# Patient Record
Sex: MALE | Race: WHITE | NOT HISPANIC OR LATINO | Employment: UNEMPLOYED | URBAN - METROPOLITAN AREA
[De-identification: names, ages, dates, MRNs, and addresses within clinical notes are randomized per-mention and may not be internally consistent; named-entity substitution may affect disease eponyms.]

---

## 2020-05-29 ENCOUNTER — HOSPITAL ENCOUNTER (EMERGENCY)
Facility: HOSPITAL | Age: 35
Discharge: LEFT BEFORE DISCHARGE | End: 2020-05-29
Attending: EMERGENCY MEDICINE | Admitting: EMERGENCY MEDICINE

## 2020-05-29 VITALS
OXYGEN SATURATION: 99 % | HEART RATE: 112 BPM | HEIGHT: 71 IN | BODY MASS INDEX: 28.28 KG/M2 | TEMPERATURE: 98.1 F | SYSTOLIC BLOOD PRESSURE: 150 MMHG | WEIGHT: 202 LBS | RESPIRATION RATE: 18 BRPM | DIASTOLIC BLOOD PRESSURE: 91 MMHG

## 2020-05-29 DIAGNOSIS — T40.601A OPIATE OVERDOSE, ACCIDENTAL OR UNINTENTIONAL, INITIAL ENCOUNTER (HCC): Primary | ICD-10-CM

## 2020-05-29 PROCEDURE — 99283 EMERGENCY DEPT VISIT LOW MDM: CPT

## 2020-05-29 RX ORDER — LOSARTAN POTASSIUM 25 MG/1
25 TABLET ORAL DAILY
COMMUNITY
End: 2021-02-02

## 2020-05-29 RX ORDER — PROPRANOLOL HYDROCHLORIDE 20 MG/1
20 TABLET ORAL 3 TIMES DAILY
COMMUNITY
End: 2021-02-02

## 2020-05-29 RX ORDER — ALPRAZOLAM 0.5 MG/1
0.5 TABLET ORAL 2 TIMES DAILY PRN
COMMUNITY
End: 2021-02-02

## 2020-05-29 RX ORDER — SODIUM CHLORIDE 0.9 % (FLUSH) 0.9 %
10 SYRINGE (ML) INJECTION AS NEEDED
Status: DISCONTINUED | OUTPATIENT
Start: 2020-05-29 | End: 2020-05-29 | Stop reason: HOSPADM

## 2021-02-02 ENCOUNTER — APPOINTMENT (OUTPATIENT)
Dept: GENERAL RADIOLOGY | Facility: HOSPITAL | Age: 36
End: 2021-02-02

## 2021-02-02 ENCOUNTER — HOSPITAL ENCOUNTER (INPATIENT)
Facility: HOSPITAL | Age: 36
LOS: 21 days | Discharge: NURSING, REHAB OR LONG-TERM CARE | End: 2021-02-23
Attending: EMERGENCY MEDICINE | Admitting: STUDENT IN AN ORGANIZED HEALTH CARE EDUCATION/TRAINING PROGRAM

## 2021-02-02 DIAGNOSIS — J18.9 COMMUNITY ACQUIRED PNEUMONIA, UNSPECIFIED LATERALITY: ICD-10-CM

## 2021-02-02 DIAGNOSIS — J04.10 TRACHEITIS: Primary | ICD-10-CM

## 2021-02-02 DIAGNOSIS — D64.9 ANEMIA, UNSPECIFIED TYPE: ICD-10-CM

## 2021-02-02 PROBLEM — J96.21 ACUTE ON CHRONIC RESPIRATORY FAILURE WITH HYPOXEMIA (HCC): Status: ACTIVE | Noted: 2021-02-02

## 2021-02-02 PROBLEM — D72.829 LEUKOCYTOSIS: Status: ACTIVE | Noted: 2021-02-02

## 2021-02-02 PROBLEM — A41.9 SEPSIS, UNSPECIFIED ORGANISM (HCC): Status: ACTIVE | Noted: 2021-02-02

## 2021-02-02 PROBLEM — I10 ESSENTIAL HYPERTENSION: Status: ACTIVE | Noted: 2021-02-02

## 2021-02-02 PROBLEM — G82.50 QUADRIPLEGIA (HCC): Status: ACTIVE | Noted: 2021-02-02

## 2021-02-02 LAB
ABO GROUP BLD: NORMAL
ALBUMIN SERPL-MCNC: 3.7 G/DL (ref 3.5–5.2)
ALBUMIN/GLOB SERPL: 1.1 G/DL
ALP SERPL-CCNC: 144 U/L (ref 39–117)
ALT SERPL W P-5'-P-CCNC: 16 U/L (ref 1–41)
ANION GAP SERPL CALCULATED.3IONS-SCNC: 11.9 MMOL/L (ref 5–15)
ARTERIAL PATENCY WRIST A: POSITIVE
AST SERPL-CCNC: 12 U/L (ref 1–40)
ATMOSPHERIC PRESS: 755.8 MMHG
B PARAPERT DNA SPEC QL NAA+PROBE: NOT DETECTED
B PERT DNA SPEC QL NAA+PROBE: NOT DETECTED
BASE EXCESS BLDA CALC-SCNC: -2 MMOL/L (ref 0–2)
BASOPHILS # BLD AUTO: 0.02 10*3/MM3 (ref 0–0.2)
BASOPHILS NFR BLD AUTO: 0.2 % (ref 0–1.5)
BDY SITE: ABNORMAL
BILIRUB SERPL-MCNC: 3.2 MG/DL (ref 0–1.2)
BLD GP AB SCN SERPL QL: NEGATIVE
BUN SERPL-MCNC: 14 MG/DL (ref 6–20)
BUN/CREAT SERPL: 45.2 (ref 7–25)
C PNEUM DNA NPH QL NAA+NON-PROBE: NOT DETECTED
CALCIUM SPEC-SCNC: 9.5 MG/DL (ref 8.6–10.5)
CHLORIDE SERPL-SCNC: 97 MMOL/L (ref 98–107)
CO2 SERPL-SCNC: 26.1 MMOL/L (ref 22–29)
CREAT SERPL-MCNC: 0.31 MG/DL (ref 0.76–1.27)
D-LACTATE SERPL-SCNC: 0.7 MMOL/L (ref 0.5–2)
DEPRECATED RDW RBC AUTO: 52.1 FL (ref 37–54)
EOSINOPHIL # BLD AUTO: 0.03 10*3/MM3 (ref 0–0.4)
EOSINOPHIL NFR BLD AUTO: 0.2 % (ref 0.3–6.2)
ERYTHROCYTE [DISTWIDTH] IN BLOOD BY AUTOMATED COUNT: 17.5 % (ref 12.3–15.4)
EXPIRATION DATE: NORMAL
FECAL OCCULT BLOOD SCREEN, POC: NEGATIVE
FERRITIN SERPL-MCNC: 615 NG/ML (ref 30–400)
FLUAV SUBTYP SPEC NAA+PROBE: NOT DETECTED
FLUBV RNA ISLT QL NAA+PROBE: NOT DETECTED
GFR SERPL CREATININE-BSD FRML MDRD: >150 ML/MIN/1.73
GLOBULIN UR ELPH-MCNC: 3.3 GM/DL
GLUCOSE SERPL-MCNC: 93 MG/DL (ref 65–99)
HADV DNA SPEC NAA+PROBE: NOT DETECTED
HAPTOGLOB SERPL-MCNC: 11 MG/DL (ref 30–200)
HCO3 BLDA-SCNC: 23.6 MMOL/L (ref 22–28)
HCOV 229E RNA SPEC QL NAA+PROBE: NOT DETECTED
HCOV HKU1 RNA SPEC QL NAA+PROBE: NOT DETECTED
HCOV NL63 RNA SPEC QL NAA+PROBE: NOT DETECTED
HCOV OC43 RNA SPEC QL NAA+PROBE: NOT DETECTED
HCT VFR BLD AUTO: 21.1 % (ref 37.5–51)
HGB BLD-MCNC: 6.8 G/DL (ref 13–17.7)
HGB RETIC QN AUTO: 30.4 PG (ref 29.8–36.1)
HMPV RNA NPH QL NAA+NON-PROBE: NOT DETECTED
HPIV1 RNA SPEC QL NAA+PROBE: NOT DETECTED
HPIV2 RNA SPEC QL NAA+PROBE: NOT DETECTED
HPIV3 RNA NPH QL NAA+PROBE: NOT DETECTED
HPIV4 P GENE NPH QL NAA+PROBE: NOT DETECTED
IMM GRANULOCYTES # BLD AUTO: 0.08 10*3/MM3 (ref 0–0.05)
IMM GRANULOCYTES NFR BLD AUTO: 0.7 % (ref 0–0.5)
IMM RETICS NFR: 28.3 % (ref 3–15.8)
INHALED O2 CONCENTRATION: 50 %
IRON 24H UR-MRATE: 36 MCG/DL (ref 59–158)
IRON SATN MFR SERPL: 16 % (ref 20–50)
LDH SERPL-CCNC: 193 U/L (ref 135–225)
LYMPHOCYTES # BLD AUTO: 0.99 10*3/MM3 (ref 0.7–3.1)
LYMPHOCYTES NFR BLD AUTO: 8.1 % (ref 19.6–45.3)
Lab: 154
M PNEUMO IGG SER IA-ACNC: NOT DETECTED
MCH RBC QN AUTO: 26.6 PG (ref 26.6–33)
MCHC RBC AUTO-ENTMCNC: 32.2 G/DL (ref 31.5–35.7)
MCV RBC AUTO: 82.4 FL (ref 79–97)
MODALITY: ABNORMAL
MONOCYTES # BLD AUTO: 0.56 10*3/MM3 (ref 0.1–0.9)
MONOCYTES NFR BLD AUTO: 4.6 % (ref 5–12)
MRSA DNA SPEC QL NAA+PROBE: ABNORMAL
NEGATIVE CONTROL: NEGATIVE
NEUTROPHILS NFR BLD AUTO: 10.48 10*3/MM3 (ref 1.7–7)
NEUTROPHILS NFR BLD AUTO: 86.2 % (ref 42.7–76)
NRBC BLD AUTO-RTO: 0.1 /100 WBC (ref 0–0.2)
O2 A-A PPRESDIFF RESPIRATORY: 0.2 MMHG
PCO2 BLDA: 43.3 MM HG (ref 35–45)
PH BLDA: 7.34 PH UNITS (ref 7.35–7.45)
PLATELET # BLD AUTO: 366 10*3/MM3 (ref 140–450)
PMV BLD AUTO: 9 FL (ref 6–12)
PO2 BLDA: 76.4 MM HG (ref 80–100)
POSITIVE CONTROL: POSITIVE
POTASSIUM SERPL-SCNC: 3.6 MMOL/L (ref 3.5–5.2)
PROCALCITONIN SERPL-MCNC: 0.12 NG/ML (ref 0–0.25)
PROT SERPL-MCNC: 7 G/DL (ref 6–8.5)
QT INTERVAL: 378 MS
RBC # BLD AUTO: 2.56 10*6/MM3 (ref 4.14–5.8)
RETICS # AUTO: 0.2 10*6/MM3 (ref 0.02–0.13)
RETICS/RBC NFR AUTO: 6.46 % (ref 0.7–1.9)
RH BLD: POSITIVE
RHINOVIRUS RNA SPEC NAA+PROBE: NOT DETECTED
RSV RNA NPH QL NAA+NON-PROBE: NOT DETECTED
SAO2 % BLDCOA: 94.3 % (ref 92–99)
SARS-COV-2 RNA NPH QL NAA+NON-PROBE: NOT DETECTED
SODIUM SERPL-SCNC: 135 MMOL/L (ref 136–145)
T&S EXPIRATION DATE: NORMAL
TIBC SERPL-MCNC: 225 MCG/DL (ref 298–536)
TOTAL RATE: 28 BREATHS/MINUTE
TRANSFERRIN SERPL-MCNC: 151 MG/DL (ref 200–360)
TROPONIN T SERPL-MCNC: <0.01 NG/ML (ref 0–0.03)
TROPONIN T SERPL-MCNC: <0.01 NG/ML (ref 0–0.03)
WBC # BLD AUTO: 12.16 10*3/MM3 (ref 3.4–10.8)

## 2021-02-02 PROCEDURE — 87147 CULTURE TYPE IMMUNOLOGIC: CPT | Performed by: INTERNAL MEDICINE

## 2021-02-02 PROCEDURE — 82270 OCCULT BLOOD FECES: CPT | Performed by: PHYSICIAN ASSISTANT

## 2021-02-02 PROCEDURE — 87040 BLOOD CULTURE FOR BACTERIA: CPT | Performed by: EMERGENCY MEDICINE

## 2021-02-02 PROCEDURE — 84484 ASSAY OF TROPONIN QUANT: CPT | Performed by: EMERGENCY MEDICINE

## 2021-02-02 PROCEDURE — 87205 SMEAR GRAM STAIN: CPT | Performed by: INTERNAL MEDICINE

## 2021-02-02 PROCEDURE — 85046 RETICYTE/HGB CONCENTRATE: CPT | Performed by: INTERNAL MEDICINE

## 2021-02-02 PROCEDURE — 82728 ASSAY OF FERRITIN: CPT | Performed by: INTERNAL MEDICINE

## 2021-02-02 PROCEDURE — 94640 AIRWAY INHALATION TREATMENT: CPT

## 2021-02-02 PROCEDURE — 25010000002 CEFEPIME PER 500 MG: Performed by: EMERGENCY MEDICINE

## 2021-02-02 PROCEDURE — 93010 ELECTROCARDIOGRAM REPORT: CPT | Performed by: INTERNAL MEDICINE

## 2021-02-02 PROCEDURE — 83605 ASSAY OF LACTIC ACID: CPT | Performed by: EMERGENCY MEDICINE

## 2021-02-02 PROCEDURE — 87150 DNA/RNA AMPLIFIED PROBE: CPT | Performed by: EMERGENCY MEDICINE

## 2021-02-02 PROCEDURE — 36415 COLL VENOUS BLD VENIPUNCTURE: CPT | Performed by: INTERNAL MEDICINE

## 2021-02-02 PROCEDURE — 86923 COMPATIBILITY TEST ELECTRIC: CPT

## 2021-02-02 PROCEDURE — 36415 COLL VENOUS BLD VENIPUNCTURE: CPT

## 2021-02-02 PROCEDURE — 0202U NFCT DS 22 TRGT SARS-COV-2: CPT | Performed by: EMERGENCY MEDICINE

## 2021-02-02 PROCEDURE — 82803 BLOOD GASES ANY COMBINATION: CPT

## 2021-02-02 PROCEDURE — 84466 ASSAY OF TRANSFERRIN: CPT | Performed by: INTERNAL MEDICINE

## 2021-02-02 PROCEDURE — 71045 X-RAY EXAM CHEST 1 VIEW: CPT

## 2021-02-02 PROCEDURE — P9016 RBC LEUKOCYTES REDUCED: HCPCS

## 2021-02-02 PROCEDURE — 87186 SC STD MICRODIL/AGAR DIL: CPT | Performed by: INTERNAL MEDICINE

## 2021-02-02 PROCEDURE — 25010000002 MORPHINE PER 10 MG: Performed by: HOSPITALIST

## 2021-02-02 PROCEDURE — 25010000002 VANCOMYCIN 10 G RECONSTITUTED SOLUTION: Performed by: HOSPITALIST

## 2021-02-02 PROCEDURE — 86850 RBC ANTIBODY SCREEN: CPT | Performed by: PHYSICIAN ASSISTANT

## 2021-02-02 PROCEDURE — 36600 WITHDRAWAL OF ARTERIAL BLOOD: CPT

## 2021-02-02 PROCEDURE — 25010000002 AZITHROMYCIN PER 500 MG: Performed by: EMERGENCY MEDICINE

## 2021-02-02 PROCEDURE — 87641 MR-STAPH DNA AMP PROBE: CPT | Performed by: HOSPITALIST

## 2021-02-02 PROCEDURE — 99285 EMERGENCY DEPT VISIT HI MDM: CPT

## 2021-02-02 PROCEDURE — 86900 BLOOD TYPING SEROLOGIC ABO: CPT

## 2021-02-02 PROCEDURE — 25010000002 PIPERACILLIN SOD-TAZOBACTAM PER 1 G: Performed by: HOSPITALIST

## 2021-02-02 PROCEDURE — 87070 CULTURE OTHR SPECIMN AEROBIC: CPT | Performed by: INTERNAL MEDICINE

## 2021-02-02 PROCEDURE — 25010000002 MORPHINE PER 10 MG: Performed by: EMERGENCY MEDICINE

## 2021-02-02 PROCEDURE — 25010000002 ONDANSETRON PER 1 MG: Performed by: EMERGENCY MEDICINE

## 2021-02-02 PROCEDURE — 80053 COMPREHEN METABOLIC PANEL: CPT | Performed by: EMERGENCY MEDICINE

## 2021-02-02 PROCEDURE — 99222 1ST HOSP IP/OBS MODERATE 55: CPT | Performed by: INTERNAL MEDICINE

## 2021-02-02 PROCEDURE — 86900 BLOOD TYPING SEROLOGIC ABO: CPT | Performed by: PHYSICIAN ASSISTANT

## 2021-02-02 PROCEDURE — 86901 BLOOD TYPING SEROLOGIC RH(D): CPT | Performed by: PHYSICIAN ASSISTANT

## 2021-02-02 PROCEDURE — 84145 PROCALCITONIN (PCT): CPT | Performed by: EMERGENCY MEDICINE

## 2021-02-02 PROCEDURE — 93005 ELECTROCARDIOGRAM TRACING: CPT | Performed by: EMERGENCY MEDICINE

## 2021-02-02 PROCEDURE — 87147 CULTURE TYPE IMMUNOLOGIC: CPT | Performed by: EMERGENCY MEDICINE

## 2021-02-02 PROCEDURE — 83615 LACTATE (LD) (LDH) ENZYME: CPT | Performed by: INTERNAL MEDICINE

## 2021-02-02 PROCEDURE — 85025 COMPLETE CBC W/AUTO DIFF WBC: CPT | Performed by: EMERGENCY MEDICINE

## 2021-02-02 PROCEDURE — 86901 BLOOD TYPING SEROLOGIC RH(D): CPT

## 2021-02-02 PROCEDURE — 36430 TRANSFUSION BLD/BLD COMPNT: CPT

## 2021-02-02 PROCEDURE — 83540 ASSAY OF IRON: CPT | Performed by: INTERNAL MEDICINE

## 2021-02-02 PROCEDURE — 94799 UNLISTED PULMONARY SVC/PX: CPT

## 2021-02-02 PROCEDURE — 83010 ASSAY OF HAPTOGLOBIN QUANT: CPT | Performed by: INTERNAL MEDICINE

## 2021-02-02 RX ORDER — PROPRANOLOL HYDROCHLORIDE 20 MG/1
20 TABLET ORAL 3 TIMES DAILY
Status: DISCONTINUED | OUTPATIENT
Start: 2021-02-02 | End: 2021-02-23 | Stop reason: HOSPADM

## 2021-02-02 RX ORDER — ONDANSETRON 2 MG/ML
4 INJECTION INTRAMUSCULAR; INTRAVENOUS ONCE
Status: COMPLETED | OUTPATIENT
Start: 2021-02-02 | End: 2021-02-02

## 2021-02-02 RX ORDER — HYDROXYZINE HYDROCHLORIDE 25 MG/1
25 TABLET, FILM COATED ORAL DAILY
COMMUNITY

## 2021-02-02 RX ORDER — SODIUM CHLORIDE 0.9 % (FLUSH) 0.9 %
10 SYRINGE (ML) INJECTION EVERY 12 HOURS SCHEDULED
Status: DISCONTINUED | OUTPATIENT
Start: 2021-02-02 | End: 2021-02-02

## 2021-02-02 RX ORDER — BISACODYL 10 MG
10 SUPPOSITORY, RECTAL RECTAL DAILY PRN
COMMUNITY
End: 2021-02-27 | Stop reason: SDUPTHER

## 2021-02-02 RX ORDER — ZINC GLUCONATE 50 MG
50 TABLET ORAL DAILY
COMMUNITY

## 2021-02-02 RX ORDER — LANOLIN ALCOHOL/MO/W.PET/CERES
3 CREAM (GRAM) TOPICAL NIGHTLY
COMMUNITY
End: 2021-02-23 | Stop reason: HOSPADM

## 2021-02-02 RX ORDER — MORPHINE SULFATE 2 MG/ML
4 INJECTION, SOLUTION INTRAMUSCULAR; INTRAVENOUS EVERY 4 HOURS PRN
Status: DISCONTINUED | OUTPATIENT
Start: 2021-02-02 | End: 2021-02-06

## 2021-02-02 RX ORDER — CIPROFLOXACIN 500 MG/1
500 TABLET, FILM COATED ORAL 2 TIMES DAILY
COMMUNITY
Start: 2021-01-30 | End: 2021-02-23 | Stop reason: HOSPADM

## 2021-02-02 RX ORDER — ACETAMINOPHEN 325 MG/1
650 TABLET ORAL EVERY 4 HOURS PRN
Status: DISCONTINUED | OUTPATIENT
Start: 2021-02-02 | End: 2021-02-04 | Stop reason: SDUPTHER

## 2021-02-02 RX ORDER — OXYCODONE HYDROCHLORIDE 5 MG/1
5 CAPSULE ORAL EVERY 6 HOURS PRN
COMMUNITY
End: 2021-02-23 | Stop reason: HOSPADM

## 2021-02-02 RX ORDER — SODIUM CHLORIDE 0.9 % (FLUSH) 0.9 %
10 SYRINGE (ML) INJECTION AS NEEDED
Status: DISCONTINUED | OUTPATIENT
Start: 2021-02-02 | End: 2021-02-02

## 2021-02-02 RX ORDER — LOSARTAN POTASSIUM 25 MG/1
25 TABLET ORAL DAILY
Status: DISCONTINUED | OUTPATIENT
Start: 2021-02-02 | End: 2021-02-03

## 2021-02-02 RX ORDER — ACETAMINOPHEN 650 MG/1
650 SUPPOSITORY RECTAL EVERY 4 HOURS PRN
COMMUNITY
End: 2021-03-16 | Stop reason: HOSPADM

## 2021-02-02 RX ORDER — MIDODRINE HYDROCHLORIDE 2.5 MG/1
7.5 TABLET ORAL 3 TIMES DAILY
COMMUNITY
End: 2021-03-16 | Stop reason: HOSPADM

## 2021-02-02 RX ORDER — FENTANYL 25 UG/H
1 PATCH TRANSDERMAL
Status: ON HOLD | COMMUNITY
End: 2021-02-22 | Stop reason: SDUPTHER

## 2021-02-02 RX ORDER — FLUDROCORTISONE ACETATE 0.1 MG/1
0.2 TABLET ORAL DAILY
COMMUNITY
End: 2021-02-23 | Stop reason: HOSPADM

## 2021-02-02 RX ORDER — FERROUS SULFATE 325(65) MG
325 TABLET ORAL
COMMUNITY
End: 2021-03-16 | Stop reason: HOSPADM

## 2021-02-02 RX ORDER — MORPHINE SULFATE 2 MG/ML
2 INJECTION, SOLUTION INTRAMUSCULAR; INTRAVENOUS ONCE
Status: COMPLETED | OUTPATIENT
Start: 2021-02-02 | End: 2021-02-02

## 2021-02-02 RX ORDER — BISACODYL 10 MG
10 SUPPOSITORY, RECTAL RECTAL DAILY PRN
Status: DISCONTINUED | OUTPATIENT
Start: 2021-02-02 | End: 2021-02-23 | Stop reason: HOSPADM

## 2021-02-02 RX ORDER — ACETAMINOPHEN 650 MG
1 TABLET, EXTENDED RELEASE ORAL DAILY
COMMUNITY
End: 2021-03-16 | Stop reason: HOSPADM

## 2021-02-02 RX ORDER — ACETAMINOPHEN 325 MG/1
650 TABLET ORAL EVERY 6 HOURS PRN
COMMUNITY
End: 2021-02-27

## 2021-02-02 RX ORDER — SENNA PLUS 8.6 MG/1
1 TABLET ORAL 2 TIMES DAILY
COMMUNITY
End: 2021-03-16 | Stop reason: HOSPADM

## 2021-02-02 RX ORDER — ONDANSETRON 2 MG/ML
4 INJECTION INTRAMUSCULAR; INTRAVENOUS EVERY 6 HOURS PRN
Status: DISCONTINUED | OUTPATIENT
Start: 2021-02-02 | End: 2021-02-23 | Stop reason: HOSPADM

## 2021-02-02 RX ORDER — DIPHENHYDRAMINE HCL/ZINC ACET 2 %-0.1 %
1 AEROSOL, SPRAY (GRAM) TOPICAL DAILY
Status: ON HOLD | COMMUNITY
Start: 2021-01-30 | End: 2021-02-22

## 2021-02-02 RX ORDER — MORPHINE SULFATE 2 MG/ML
4 INJECTION, SOLUTION INTRAMUSCULAR; INTRAVENOUS ONCE
Status: COMPLETED | OUTPATIENT
Start: 2021-02-02 | End: 2021-02-02

## 2021-02-02 RX ORDER — GLYCOPYRROLATE 0.2 MG/ML
0.4 INJECTION INTRAMUSCULAR; INTRAVENOUS ONCE
Status: COMPLETED | OUTPATIENT
Start: 2021-02-02 | End: 2021-02-02

## 2021-02-02 RX ORDER — IPRATROPIUM BROMIDE AND ALBUTEROL SULFATE 2.5; .5 MG/3ML; MG/3ML
3 SOLUTION RESPIRATORY (INHALATION)
Status: DISCONTINUED | OUTPATIENT
Start: 2021-02-02 | End: 2021-02-04

## 2021-02-02 RX ORDER — DOXYCYCLINE HYCLATE 100 MG
100 TABLET ORAL 2 TIMES DAILY
COMMUNITY
Start: 2021-02-02 | End: 2021-02-23 | Stop reason: HOSPADM

## 2021-02-02 RX ORDER — CLOPIDOGREL BISULFATE 75 MG/1
75 TABLET ORAL DAILY
COMMUNITY
End: 2021-02-23 | Stop reason: HOSPADM

## 2021-02-02 RX ORDER — SERTRALINE HYDROCHLORIDE 100 MG/1
100 TABLET, FILM COATED ORAL DAILY
COMMUNITY
End: 2021-03-16 | Stop reason: HOSPADM

## 2021-02-02 RX ORDER — MULTIPLE VITAMINS W/ MINERALS TAB 9MG-400MCG
1 TAB ORAL DAILY
COMMUNITY

## 2021-02-02 RX ORDER — ECHINACEA PURPUREA EXTRACT 125 MG
1 TABLET ORAL
COMMUNITY
End: 2021-03-16 | Stop reason: HOSPADM

## 2021-02-02 RX ORDER — ACETAMINOPHEN 160 MG/5ML
650 SOLUTION ORAL EVERY 4 HOURS PRN
Status: DISCONTINUED | OUTPATIENT
Start: 2021-02-02 | End: 2021-02-22

## 2021-02-02 RX ORDER — GABAPENTIN 800 MG/1
800 TABLET ORAL EVERY 6 HOURS
COMMUNITY
End: 2021-02-23 | Stop reason: HOSPADM

## 2021-02-02 RX ORDER — ACETAMINOPHEN 325 MG/1
650 TABLET ORAL EVERY 4 HOURS PRN
Status: DISCONTINUED | OUTPATIENT
Start: 2021-02-02 | End: 2021-02-22

## 2021-02-02 RX ORDER — ACETAMINOPHEN 650 MG/1
650 SUPPOSITORY RECTAL EVERY 4 HOURS PRN
Status: DISCONTINUED | OUTPATIENT
Start: 2021-02-02 | End: 2021-02-22

## 2021-02-02 RX ORDER — ASPIRIN 81 MG/1
81 TABLET ORAL DAILY
COMMUNITY

## 2021-02-02 RX ORDER — ASCORBIC ACID 500 MG
1000 TABLET ORAL DAILY
COMMUNITY

## 2021-02-02 RX ORDER — SODIUM CHLORIDE 9 MG/ML
75 INJECTION, SOLUTION INTRAVENOUS CONTINUOUS
Status: DISCONTINUED | OUTPATIENT
Start: 2021-02-02 | End: 2021-02-10

## 2021-02-02 RX ORDER — IPRATROPIUM BROMIDE AND ALBUTEROL SULFATE 2.5; .5 MG/3ML; MG/3ML
3 SOLUTION RESPIRATORY (INHALATION) EVERY 4 HOURS PRN
COMMUNITY
End: 2021-03-16 | Stop reason: HOSPADM

## 2021-02-02 RX ORDER — CYCLOBENZAPRINE HCL 10 MG
10 TABLET ORAL DAILY
COMMUNITY
End: 2021-02-23 | Stop reason: HOSPADM

## 2021-02-02 RX ORDER — ONDANSETRON 4 MG/1
4 TABLET, FILM COATED ORAL EVERY 6 HOURS PRN
Status: DISCONTINUED | OUTPATIENT
Start: 2021-02-02 | End: 2021-02-22

## 2021-02-02 RX ORDER — DEXTROMETHORPHAN HYDROBROMIDE AND GUAIFENESIN 20; 400 MG/20ML; MG/20ML
20 SOLUTION ORAL EVERY 4 HOURS PRN
COMMUNITY
Start: 2021-02-01 | End: 2021-02-23 | Stop reason: HOSPADM

## 2021-02-02 RX ORDER — ONDANSETRON 4 MG/1
4 TABLET, FILM COATED ORAL EVERY 6 HOURS PRN
COMMUNITY
End: 2021-03-16 | Stop reason: HOSPADM

## 2021-02-02 RX ORDER — ALPRAZOLAM 1 MG/1
1 TABLET ORAL EVERY 8 HOURS PRN
COMMUNITY
End: 2021-02-23 | Stop reason: HOSPADM

## 2021-02-02 RX ORDER — SODIUM CHLORIDE FOR INHALATION 7 %
4 VIAL, NEBULIZER (ML) INHALATION
Status: DISCONTINUED | OUTPATIENT
Start: 2021-02-02 | End: 2021-02-05

## 2021-02-02 RX ADMIN — SODIUM CHLORIDE 1000 ML: 9 INJECTION, SOLUTION INTRAVENOUS at 06:59

## 2021-02-02 RX ADMIN — PROPRANOLOL HYDROCHLORIDE 20 MG: 20 TABLET ORAL at 17:00

## 2021-02-02 RX ADMIN — PROPRANOLOL HYDROCHLORIDE 20 MG: 20 TABLET ORAL at 22:43

## 2021-02-02 RX ADMIN — VANCOMYCIN HYDROCHLORIDE 1500 MG: 10 INJECTION, POWDER, LYOPHILIZED, FOR SOLUTION INTRAVENOUS at 14:08

## 2021-02-02 RX ADMIN — GLYCOPYRROLATE 0.4 MG: 0.2 INJECTION, SOLUTION INTRAMUSCULAR; INTRAVENOUS at 13:09

## 2021-02-02 RX ADMIN — MORPHINE SULFATE 4 MG: 2 INJECTION, SOLUTION INTRAMUSCULAR; INTRAVENOUS at 21:14

## 2021-02-02 RX ADMIN — MORPHINE SULFATE 4 MG: 2 INJECTION, SOLUTION INTRAMUSCULAR; INTRAVENOUS at 13:09

## 2021-02-02 RX ADMIN — AZITHROMYCIN 500 MG: 500 INJECTION, POWDER, LYOPHILIZED, FOR SOLUTION INTRAVENOUS at 07:39

## 2021-02-02 RX ADMIN — LOSARTAN POTASSIUM 25 MG: 25 TABLET, FILM COATED ORAL at 17:00

## 2021-02-02 RX ADMIN — IPRATROPIUM BROMIDE AND ALBUTEROL SULFATE 3 ML: 2.5; .5 SOLUTION RESPIRATORY (INHALATION) at 15:44

## 2021-02-02 RX ADMIN — SODIUM CHLORIDE 125 ML/HR: 9 INJECTION, SOLUTION INTRAVENOUS at 16:41

## 2021-02-02 RX ADMIN — SODIUM CHLORIDE 1000 ML: 9 INJECTION, SOLUTION INTRAVENOUS at 08:19

## 2021-02-02 RX ADMIN — MORPHINE SULFATE 2 MG: 2 INJECTION, SOLUTION INTRAMUSCULAR; INTRAVENOUS at 06:40

## 2021-02-02 RX ADMIN — MORPHINE SULFATE 4 MG: 2 INJECTION, SOLUTION INTRAMUSCULAR; INTRAVENOUS at 07:03

## 2021-02-02 RX ADMIN — SODIUM CHLORIDE 125 ML/HR: 9 INJECTION, SOLUTION INTRAVENOUS at 22:41

## 2021-02-02 RX ADMIN — MORPHINE SULFATE 4 MG: 2 INJECTION, SOLUTION INTRAMUSCULAR; INTRAVENOUS at 18:22

## 2021-02-02 RX ADMIN — TAZOBACTAM SODIUM AND PIPERACILLIN SODIUM 4.5 G: 500; 4 INJECTION, SOLUTION INTRAVENOUS at 16:41

## 2021-02-02 RX ADMIN — CEFEPIME 2 G: 2 INJECTION, POWDER, FOR SOLUTION INTRAVENOUS at 06:58

## 2021-02-02 RX ADMIN — ONDANSETRON 4 MG: 2 INJECTION INTRAMUSCULAR; INTRAVENOUS at 06:40

## 2021-02-02 RX ADMIN — Medication 4 ML: at 15:53

## 2021-02-02 NOTE — CONSULTS
"[facility]    Reason for consult: tracheostomy, tracheitis, respiratory failure    HPI:  Mr. Carvajal is a 34yo Quadriplegic.  He has a chronic tracheostomy and feeding tube.  He was brought to [facility] er today for increasing shortness of breath of sudden onset for the past day or two.  He has had increased secretions with color change and increased tenacity as well.  He had oxygen saturations of 84% on 4L.  He feels morphine helps him feel less short of breath. His tracheostomy has already been exchanged.    Past Medical History:   Diagnosis Date   • Hypertension      Social History     Socioeconomic History   • Marital status: Single     Spouse name: Not on file   • Number of children: Not on file   • Years of education: Not on file   • Highest education level: Not on file   Tobacco Use   • Smoking status: Current Every Day Smoker     Packs/day: 0.50     Types: Cigarettes   • Smokeless tobacco: Never Used   Substance and Sexual Activity   • Alcohol use: Yes     Comment: occasional    • Drug use: Yes     Comment: \"a pill for pain off the street\"     No family history on file.  MEDS; reviewed as per chart  ALL: NKDA  ROS: 12 point negative except as in HPI    Vital Sign Min/Max for last 24 hours  Temp  Min: 96.5 °F (35.8 °C)  Max: 98 °F (36.7 °C)   BP  Min: 109/69  Max: 155/90   Pulse  Min: 79  Max: 107   Resp  Min: 16  Max: 28   SpO2  Min: 81 %  Max: 100 %   Flow (L/min)  Min: 10  Max: 15   Weight  Min: 73.3 kg (161 lb 11.2 oz)  Max: 73.3 kg (161 lb 11.2 oz)     GEN:   appears ill, AxOx3  HEENT: PERRL, EOMI, no icterus, mmm, no jvd, trachea midline, neck supple  CHEST: coarse breath sounds bilat, no wheezes, no crackles, no use of accessory muscles  CV: RRR, no m/g/r  ABD: soft, nt, nd +bs, no hepatosplenomegaly  EXT: no c/c/e  SKIN: no rashes, no xanthomas, nl turgor, warm, dry  LYMPH: no palpable cervical or supraclavicular lymphadenopathy  NEURO: CN 2-12 intact and symmetric bilaterally  PSYCH: nl " affect, nl orientation, nl judgement, nl mood  MSK: no kyphoscoliosis, weakness of all 4 extremities. He has some movement of the upper extremities bilaterally    Labs: 2/2: reviewed:  rpp negative  7.34/43/76  Wbc 12 (86%neutrophils)  hgb 6.8  plts 366  procal 0.12  CXR: 2/2: reviewed: there is tubing overlaying the lung fields and there is no lateral and I don't have prior for comparison but there maybe a right middle or lower lobe infiltrate there    A/P:  1. Tracheitis +/-Pneumonia-- agree with current antibiotics, pulmonary toilet, suctioning as needed, nebulizer and chest physiotherapy, repeat swallow eval.   2. Acute hypoxemic respiratory failure -- titrate oxygen as needed  3. Sepsis  4. Quadriplegia  5. Anemia
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
## 2021-02-02 NOTE — PROGRESS NOTES
[facility]  Clinical Pharmacy Department     Vancomycin Pharmacokinetic Note    Maxim Carvajal is a 35 y.o. male who is on day 1 of pharmacy to dose vancomycin for HAP.    Target level: AUC24 400-600  Consulting Provider:  Dr. Joshua Vasquez  Current Vancomycin Dose:  1500 mg (20 mg/kg) loading dose scheduled to start at 1145 today but has not been administered yet.   Planned Duration of Therapy: 7 days  Other Antimicrobials: Zosyn 4.5 gram IV q8h has been scheduled; patient also received cefepime 2 gram x 1 and azithromycin 500 mg IV this morning     Allergies  Allergies as of 02/02/2021   • (No Known Allergies)       Microbiology:  Microbiology Results (last 10 days)     Procedure Component Value - Date/Time    Respiratory Panel PCR w/COVID-19(SARS-CoV-2) BG/RAZIA/BIBI/PAD/COR/MAD/LOUIS In-House, NP Swab in UTM/VTM, 3-4 HR TAT - Swab, Nasopharynx [[id number]]  (Normal) Collected: 02/02/21 0641    Lab Status: Final result Specimen: Swab from Nasopharynx Updated: 02/02/21 0822     ADENOVIRUS, PCR Not Detected     Coronavirus 229E Not Detected     Coronavirus HKU1 Not Detected     Coronavirus NL63 Not Detected     Coronavirus OC43 Not Detected     COVID19 Not Detected     Human Metapneumovirus Not Detected     Human Rhinovirus/Enterovirus Not Detected     Influenza A PCR Not Detected     Influenza B PCR Not Detected     Parainfluenza Virus 1 Not Detected     Parainfluenza Virus 2 Not Detected     Parainfluenza Virus 3 Not Detected     Parainfluenza Virus 4 Not Detected     RSV, PCR Not Detected     Bordetella pertussis pcr Not Detected     Bordetella parapertussis PCR Not Detected     Chlamydophila pneumoniae PCR Not Detected     Mycoplasma pneumo by PCR Not Detected    Narrative:      Fact sheet for providers: https://docs.[employer]/wp-content/uploads/LJJ4738-3194-ER1.1-EUA-Provider-Fact-Sheet-3.pdf    Fact sheet for patients:  "https://docs.[employer]/wp-content/uploads/VTX0884-0469-YX9.1-EUA-Patient-Fact-Sheet-1.pdf    Test performed by PCR.          Radiology/Imaging:  Patient: MARK HARDIN  Time Out: 06:16  Exam(s): FILM CXR 1 VIEW      EXAM:    XR Chest, 1 View     CLINICAL HISTORY:     COVID Evaluation, Cough, Fever  Reason for exam: COVID Evaluation,   Cough, Fever. Reason for Exam:->COVID Evaluation, Cough, Fever. Portable-  >Yes.. Additional notes: COVID Evaluation, Cough, Fever     TECHNIQUE:    Frontal view of the chest.     COMPARISON:    No relevant prior studies available.     FINDINGS:    Lungs: Slightly increased bibasilar opacities.    Pleural space:  No acute findings    Heart:  No cardiomegaly.    Bones joints:  No acute findings.     IMPRESSION:           Slightly increased bibasilar opacities.  IMPRESSION:        Electronically signed by Sam Graves MD on 02-02-21 at 0616    Vitals/Labs/I&O  180.3 cm (71\")  73.3 kg (161 lb 11.2 oz)  Body mass index is 22.55 kg/m².   Temp:  [96.5 °F (35.8 °C)-97.7 °F (36.5 °C)] 96.7 °F (35.9 °C)  Heart Rate:  [79-98] 98  Resp:  [18-28] 18  BP: (109-140)/(61-78) 140/78    Results from last 7 days   Lab Units 02/02/21  0609   WBC 10*3/mm3 12.16*     Results from last 7 days   Lab Units 02/02/21  0608   LACTATE mmol/L 0.7      Results from last 7 days   Lab Units 02/02/21  0608   PROCALCITONIN ng/mL 0.12                  Estimated Creatinine Clearance: 338.3 mL/min (A) (by C-G formula based on SCr of 0.31 mg/dL (L)).  Results from last 7 days   Lab Units 02/02/21  0608   BUN mg/dL 14   CREATININE mg/dL 0.31*     Intake & Output (last 3 days)       01/30 0701 - 01/31 0700 01/31 0701 - 02/01 0700 02/01 0701 - 02/02 0700 02/02 0701 - 02/03 0700    IV Piggyback    2000    Total Intake(mL/kg)    2000 (27.3)    Net    +2000                  Vancomycin Dosing and Level History:  Last Dose Received in the ED/Outside Facility: 1500 mg (20 mg/kg) IV loading dose started at 1408 in the ED " "Clinical Observation Unit  Is Patient on Dialysis or Renal Replacement: no    Assessment/Plan:    Assessment:  Bayesian analysis of the most recent level(s) using [employer] provides the following patient-specific pharmacokinetic parameters:   CL: 5.38 L/h   Vd: 61.2 L   T1/2: 9.84 h  If the predicted trough on this regimen is not within what was previously considered a \"target trough range\", the AUC24 (a stronger predictor of vancomycin efficacy) is predicted to achieve the accepted target of 400-600 mg*h/L. To best balance efficacy and toxicity, we will be targeting AUC24 in this patient rather than steady-state trough levels.     Initiating the regimen of 1250 mg (17.1 mg/kg) IV every 12 hours gives a predicted steady-state trough of 12.7 mg/L and AUC24 of 460 mg*h/L based upon population pharmacokinetics and this patient's specific parameters.    Recommendations/Plan:  -Initiate vancomycin 1250 mg (17.1 mg/kg) IV every 12 hours   -Obtain vancomycin level on 2/4/21 @1400 prior to the 5th dose or sooner if acute changes   -Order MRSA nasal PCR; if negative would recommend discontinuing vancomycin  -Continue to monitor SCr    Thank you for involving pharmacy in this patient's care. Please contact pharmacy with any questions or concerns.                           Abril Qureshi, PharmD  Clinical Pharmacist  02/02/21 12:37 EST    "
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
## 2021-02-02 NOTE — NURSING NOTE
WOCN: Right heel pressure injury. Discussed with unit RN.  Continue current treatment as per facility.  Paint with betadine daily.  Please apply heel protector boots
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
## 2021-02-02 NOTE — PROGRESS NOTES
Clinical Pharmacy Services: Medication History    Maxim Carvajal is a 35 y.o. male presenting to [facility] for   Chief Complaint   Patient presents with   • Shortness of Breath       He  has a past medical history of Hypertension.    Allergies as of 02/02/2021   • (No Known Allergies)       Medication information was obtained from: alf paperwork  Pharmacy and Phone Number:     Prior to Admission Medications     Prescriptions Last Dose Informant Patient Reported? Taking?    acetaminophen (TYLENOL) 325 MG tablet  Nursing Home Yes Yes    Take 650 mg by mouth Every 6 (Six) Hours As Needed for Mild Pain .    acetaminophen (TYLENOL) 650 MG suppository  Nursing Home Yes Yes    Insert 650 mg into the rectum Every 4 (Four) Hours As Needed for Fever (do not exceed 3 grams in 24 hours).    ALPRAZolam (XANAX) 1 MG tablet  Nursing Home Yes Yes    Take 1 mg by mouth Every 8 (Eight) Hours As Needed for Anxiety.    ascorbic acid (VITAMIN C) 500 MG tablet  Nursing Home Yes Yes    Take 1,000 mg by mouth Daily. Via g tube    aspirin 81 MG EC tablet  Nursing Home Yes Yes    Take 81 mg by mouth Daily. Give via g tube    bisacodyl (DULCOLAX) 10 MG suppository  Nursing Home Yes Yes    Insert 10 mg into the rectum Daily.    ciprofloxacin (CIPRO) 500 MG tablet  Nursing Home Yes Yes    Take 500 mg by mouth 2 (Two) Times a Day. Give via g tube    clopidogrel (PLAVIX) 75 MG tablet  Nursing Home Yes Yes    Take 75 mg by mouth Daily.    collagenase 250 UNIT/GM ointment  Nursing Home Yes Yes    Apply 1 application topically to the appropriate area as directed Daily.    cyclobenzaprine (FLEXERIL) 10 MG tablet  Nursing Home Yes Yes    Take 10 mg by mouth Daily.    Dextromethorphan-guaiFENesin (Mucinex Fast-Max DM Max) 5-100 MG/5ML liquid  Nursing Home Yes Yes    Take 20 mL by mouth Every 4 (Four) Hours As Needed (cough).    doxycycline (VIBRAMYICN) 100 MG tablet  Nursing Home Yes Yes    Take 100 mg by mouth 2 (Two) Times a  Day.    fentaNYL (DURAGESIC) 25 MCG/HR patch  Nursing Home Yes Yes    Place 1 patch on the skin as directed by provider Every 72 (Seventy-Two) Hours.    ferrous sulfate 325 (65 FE) MG tablet  Nursing Home Yes Yes    Take 325 mg by mouth Daily With Breakfast. Via g tube    fludrocortisone 0.1 MG tablet  Nursing Home Yes Yes    Take 0.2 mg by mouth Daily.    gabapentin (NEURONTIN) 800 MG tablet  Nursing Home Yes Yes    Take 800 mg by mouth Every 6 (Six) Hours.    hydrOXYzine (ATARAX) 25 MG tablet  Nursing Home Yes Yes    Take 25 mg by mouth Daily. Via g tube    ipratropium-albuterol (DUO-NEB) 0.5-2.5 mg/3 ml nebulizer  Nursing Home Yes Yes    Take 3 mL by nebulization Every 4 (Four) Hours As Needed for Wheezing or Shortness of Air.    Lactobacillus Rhamnosus, GG, ([employer] Probiotic, Lactobacillus,) capsule  Nursing Home Yes Yes    Take 1 capsule by mouth Daily.    melatonin 3 MG tablet  Nursing Home Yes Yes    Take 3 mg by mouth Every Night.    midodrine (PROAMATINE) 2.5 MG tablet  Nursing Home Yes Yes    Take 7.5 mg by mouth 3 (Three) Times a Day.    multivitamin with minerals (MULTIVITAMIN ADULT PO)  Nursing Home Yes Yes    Take 1 tablet by mouth Daily.    omeprazole 2 mg/mL in sodium bicarbonate injection 8.4%  Nursing Home Yes Yes    Take 40 mg by mouth Daily.    ondansetron (ZOFRAN) 4 MG tablet  Nursing Home Yes Yes    Take 4 mg by mouth Every 6 (Six) Hours As Needed for Nausea.    oxyCODONE (OXY-IR) 5 MG capsule  Nursing Home Yes Yes    Take 5 mg by mouth Every 6 (Six) Hours As Needed (for breakthrough pain). Via g tube    polyethyl glycol-propyl glycol (SYSTANE) 0.4-0.3 % solution ophthalmic solution  Nursing Home Yes Yes    Administer 1 drop to both eyes Every 1 (One) Hour As Needed (dry eyes).    povidone-iodine (BETADINE) 10 % external solution  Nursing Home Yes Yes    Apply 1 application topically to the appropriate area as directed Daily. Apply to right heel topically once a day for impaired skin integrity  cleanse right heel with soap and water; paint heel with betadine moistened gauze and cover with dry dressing    senna (SENOKOT) 8.6 MG tablet  Nursing Home Yes Yes    Take 1 tablet by mouth 2 (Two) Times a Day.    sertraline (ZOLOFT) 100 MG tablet  Nursing Home Yes Yes    Take 100 mg by mouth Daily.    sodium chloride 0.65 % nasal spray  Nursing Home Yes Yes    1 spray into the nostril(s) as directed by provider Every 2 (Two) Hours As Needed (dry nostrils).    zinc gluconate ([employer] Zinc) 50 MG tablet  Nursing Home Yes Yes    Take 50 mg by mouth Daily.            Medication notes:     This medication list is complete to the best of my knowledge as of 2/2/2021    Please call if questions.    Herlinda Trevizo [facility]  Medication History Technician  [phone]    2/2/2021 18:21 EST

## 2021-02-03 LAB
ANION GAP SERPL CALCULATED.3IONS-SCNC: 19 MMOL/L (ref 5–15)
BACTERIA BLD CULT: ABNORMAL
BH BB BLOOD EXPIRATION DATE: NORMAL
BH BB BLOOD EXPIRATION DATE: NORMAL
BH BB BLOOD TYPE BARCODE: 5100
BH BB BLOOD TYPE BARCODE: 5100
BH BB DISPENSE STATUS: NORMAL
BH BB DISPENSE STATUS: NORMAL
BH BB PRODUCT CODE: NORMAL
BH BB PRODUCT CODE: NORMAL
BH BB UNIT NUMBER: NORMAL
BH BB UNIT NUMBER: NORMAL
BOTTLE TYPE: ABNORMAL
BUN SERPL-MCNC: 8 MG/DL (ref 6–20)
BUN/CREAT SERPL: 26.7 (ref 7–25)
CALCIUM SPEC-SCNC: 8.7 MG/DL (ref 8.6–10.5)
CHLORIDE SERPL-SCNC: 103 MMOL/L (ref 98–107)
CO2 SERPL-SCNC: 14 MMOL/L (ref 22–29)
CREAT SERPL-MCNC: 0.3 MG/DL (ref 0.76–1.27)
CROSSMATCH INTERPRETATION: NORMAL
CROSSMATCH INTERPRETATION: NORMAL
D DIMER PPP FEU-MCNC: 3.9 MCGFEU/ML (ref 0–0.49)
DAT POLY-SP REAG RBC QL: NEGATIVE
DEPRECATED RDW RBC AUTO: 51.5 FL (ref 37–54)
ERYTHROCYTE [DISTWIDTH] IN BLOOD BY AUTOMATED COUNT: 17.9 % (ref 12.3–15.4)
FOLATE SERPL-MCNC: >20 NG/ML (ref 4.78–24.2)
GFR SERPL CREATININE-BSD FRML MDRD: >150 ML/MIN/1.73
GLUCOSE SERPL-MCNC: 66 MG/DL (ref 65–99)
HCT VFR BLD AUTO: 25.6 % (ref 37.5–51)
HGB BLD-MCNC: 8.4 G/DL (ref 13–17.7)
MCH RBC QN AUTO: 26.8 PG (ref 26.6–33)
MCHC RBC AUTO-ENTMCNC: 32.8 G/DL (ref 31.5–35.7)
MCV RBC AUTO: 81.5 FL (ref 79–97)
PLATELET # BLD AUTO: 315 10*3/MM3 (ref 140–450)
PMV BLD AUTO: 8.7 FL (ref 6–12)
POTASSIUM SERPL-SCNC: 3.5 MMOL/L (ref 3.5–5.2)
RBC # BLD AUTO: 3.14 10*6/MM3 (ref 4.14–5.8)
SODIUM SERPL-SCNC: 136 MMOL/L (ref 136–145)
UNIT  ABO: NORMAL
UNIT  ABO: NORMAL
UNIT  RH: NORMAL
UNIT  RH: NORMAL
VIT B12 BLD-MCNC: 1647 PG/ML (ref 211–946)
WBC # BLD AUTO: 8.49 10*3/MM3 (ref 3.4–10.8)

## 2021-02-03 PROCEDURE — 25010000002 VANCOMYCIN 10 G RECONSTITUTED SOLUTION: Performed by: HOSPITALIST

## 2021-02-03 PROCEDURE — 94640 AIRWAY INHALATION TREATMENT: CPT

## 2021-02-03 PROCEDURE — 82746 ASSAY OF FOLIC ACID SERUM: CPT | Performed by: INTERNAL MEDICINE

## 2021-02-03 PROCEDURE — 25010000002 PIPERACILLIN SOD-TAZOBACTAM PER 1 G: Performed by: HOSPITALIST

## 2021-02-03 PROCEDURE — 99232 SBSQ HOSP IP/OBS MODERATE 35: CPT | Performed by: INTERNAL MEDICINE

## 2021-02-03 PROCEDURE — 94669 MECHANICAL CHEST WALL OSCILL: CPT

## 2021-02-03 PROCEDURE — 25010000002 VANCOMYCIN: Performed by: HOSPITALIST

## 2021-02-03 PROCEDURE — 25010000002 MORPHINE PER 10 MG: Performed by: HOSPITALIST

## 2021-02-03 PROCEDURE — 94799 UNLISTED PULMONARY SVC/PX: CPT

## 2021-02-03 PROCEDURE — 25010000002 PIPERACILLIN SOD-TAZOBACTAM PER 1 G: Performed by: INTERNAL MEDICINE

## 2021-02-03 PROCEDURE — 86880 COOMBS TEST DIRECT: CPT | Performed by: INTERNAL MEDICINE

## 2021-02-03 PROCEDURE — 80048 BASIC METABOLIC PNL TOTAL CA: CPT | Performed by: HOSPITALIST

## 2021-02-03 PROCEDURE — 85027 COMPLETE CBC AUTOMATED: CPT | Performed by: HOSPITALIST

## 2021-02-03 PROCEDURE — 82607 VITAMIN B-12: CPT | Performed by: INTERNAL MEDICINE

## 2021-02-03 PROCEDURE — 85379 FIBRIN DEGRADATION QUANT: CPT | Performed by: NURSE PRACTITIONER

## 2021-02-03 RX ORDER — EMOLLIENT COMBINATION NO.110
LOTION (ML) TOPICAL DAILY
Status: DISCONTINUED | OUTPATIENT
Start: 2021-02-03 | End: 2021-02-23 | Stop reason: HOSPADM

## 2021-02-03 RX ADMIN — TAZOBACTAM SODIUM AND PIPERACILLIN SODIUM 3.38 G: 375; 3 INJECTION, SOLUTION INTRAVENOUS at 17:27

## 2021-02-03 RX ADMIN — IPRATROPIUM BROMIDE AND ALBUTEROL SULFATE 3 ML: 2.5; .5 SOLUTION RESPIRATORY (INHALATION) at 23:55

## 2021-02-03 RX ADMIN — Medication 4 ML: at 00:32

## 2021-02-03 RX ADMIN — TAZOBACTAM SODIUM AND PIPERACILLIN SODIUM 4.5 G: 500; 4 INJECTION, SOLUTION INTRAVENOUS at 08:32

## 2021-02-03 RX ADMIN — Medication 4 ML: at 06:58

## 2021-02-03 RX ADMIN — SODIUM CHLORIDE 75 ML/HR: 9 INJECTION, SOLUTION INTRAVENOUS at 17:42

## 2021-02-03 RX ADMIN — MORPHINE SULFATE 4 MG: 2 INJECTION, SOLUTION INTRAMUSCULAR; INTRAVENOUS at 17:42

## 2021-02-03 RX ADMIN — LOSARTAN POTASSIUM 25 MG: 25 TABLET, FILM COATED ORAL at 08:32

## 2021-02-03 RX ADMIN — MORPHINE SULFATE 4 MG: 2 INJECTION, SOLUTION INTRAMUSCULAR; INTRAVENOUS at 13:42

## 2021-02-03 RX ADMIN — IPRATROPIUM BROMIDE AND ALBUTEROL SULFATE 3 ML: 2.5; .5 SOLUTION RESPIRATORY (INHALATION) at 00:32

## 2021-02-03 RX ADMIN — Medication: at 16:27

## 2021-02-03 RX ADMIN — MORPHINE SULFATE 4 MG: 2 INJECTION, SOLUTION INTRAMUSCULAR; INTRAVENOUS at 21:44

## 2021-02-03 RX ADMIN — VANCOMYCIN HYDROCHLORIDE 1250 MG: 10 INJECTION, POWDER, LYOPHILIZED, FOR SOLUTION INTRAVENOUS at 02:48

## 2021-02-03 RX ADMIN — IPRATROPIUM BROMIDE AND ALBUTEROL SULFATE 3 ML: 2.5; .5 SOLUTION RESPIRATORY (INHALATION) at 06:57

## 2021-02-03 RX ADMIN — Medication 4 ML: at 23:55

## 2021-02-03 RX ADMIN — TAZOBACTAM SODIUM AND PIPERACILLIN SODIUM 4.5 G: 500; 4 INJECTION, SOLUTION INTRAVENOUS at 00:42

## 2021-02-03 RX ADMIN — MORPHINE SULFATE 4 MG: 2 INJECTION, SOLUTION INTRAMUSCULAR; INTRAVENOUS at 01:06

## 2021-02-03 RX ADMIN — MORPHINE SULFATE 4 MG: 2 INJECTION, SOLUTION INTRAMUSCULAR; INTRAVENOUS at 05:25

## 2021-02-03 RX ADMIN — MORPHINE SULFATE 4 MG: 2 INJECTION, SOLUTION INTRAMUSCULAR; INTRAVENOUS at 09:39

## 2021-02-03 RX ADMIN — IPRATROPIUM BROMIDE AND ALBUTEROL SULFATE 3 ML: 2.5; .5 SOLUTION RESPIRATORY (INHALATION) at 16:08

## 2021-02-03 RX ADMIN — SODIUM CHLORIDE 125 ML/HR: 9 INJECTION, SOLUTION INTRAVENOUS at 06:20

## 2021-02-03 RX ADMIN — PROPRANOLOL HYDROCHLORIDE 20 MG: 20 TABLET ORAL at 08:32

## 2021-02-03 RX ADMIN — PROPRANOLOL HYDROCHLORIDE 20 MG: 20 TABLET ORAL at 15:24

## 2021-02-03 RX ADMIN — PROPRANOLOL HYDROCHLORIDE 20 MG: 20 TABLET ORAL at 20:43

## 2021-02-03 RX ADMIN — VANCOMYCIN HYDROCHLORIDE 1250 MG: 10 INJECTION, POWDER, LYOPHILIZED, FOR SOLUTION INTRAVENOUS at 15:31

## 2021-02-03 NOTE — PROGRESS NOTES
[facility]     Mar/chart reviewed  Follow up pneumonia/tracheitis  Some cough still, says improved overall    Vital Sign Min/Max for last 24 hours  Temp  Min: 96.5 °F (35.8 °C)  Max: 99.2 °F (37.3 °C)   BP  Min: 111/66  Max: 155/89   Pulse  Min: 64  Max: 107   Resp  Min: 16  Max: 27   SpO2  Min: 81 %  Max: 100 %   Flow (L/min)  Min: 10  Max: 15   Weight  Min: 73.5 kg (162 lb)  Max: 73.5 kg (162 lb)     Appears ill, axox3,   perrl, eomi, no icterus,  mmm, no jvd, trachea midline, neck supple,  chest coarse, decreased bilaterally, no crackles, no wheezes,   rrr,   soft, nt, nd +bs,  no c/c/ e  Skin warm, dry no rashes    Labs: 2/3: reviewed: pending    mrsa nares +    A/P:  1. Tracheitis +/-Pneumonia-- agree with current antibiotics, pulmonary toilet, suctioning as needed, nebulizer and chest physiotherapy, repeat swallow eval.   2. Acute hypoxemic respiratory failure -- titrate oxygen as needed  3. Sepsis  4. Quadriplegia  5. Anemia  6. mrsa swab positive -- will add vanco

## 2021-02-03 NOTE — NURSING NOTE
CWOCN consult for skin.  Patient with right heel resolving DTI.  Faint purple discoloration with dry skin layer flaking off.  Left heel also very dry and flaky.  Currently applying betadine to right heel and off loaded with boot.  Will request moisturizer for dry skin.  Patient with pressure injury to coccyx.  Total surface area measures 4x5 cm of erythema.  Small circular area of dark purple discoloration with small areas of slough 0.5 x 0.5 to right and left of coccyx.  Treated with therahoney, rosanne layer, covered with optifoam sacral.  Change daily.  I have ordered a low air loss mattress for patient.  Please put heel boots to both feet for protection.

## 2021-02-03 NOTE — DISCHARGE PLACEMENT REQUEST
"Maxim Hardin (35 y.o. Male)     Date of Birth Social Security Number Address Home Phone MRN    1985  [id number] [phone] [id number]    [facility] Status          None Single       Admission Date Admission Type Admitting Provider Attending Provider Department, Room/Bed    2/2/21 Emergency Joshua Vasquez MD [place], Robert Russell, MD [address], [address]1    Discharge Date Discharge Disposition Discharge Destination                       Attending Provider: Giancarlo Radford MD    Allergies: No Known Allergies    Isolation: Contact   Infection: MRSA (02/02/21)   Code Status: No CPR    Ht: 182.9 cm (72\")   Wt: 73.5 kg (162 lb)    Admission Cmt: None   Principal Problem: HCAP (healthcare-associated pneumonia) [J18.9]                 Active Insurance as of 2/2/2021     Primary Coverage     Payor Plan Insurance Group Employer/Plan Group    [place] [id number]     Payor Plan Address Payor Plan Phone Number Payor Plan Fax Number Effective Dates    [employer] [address] [address] [phone]  4/1/2020 - None Entered    [facility] [id number]       Subscriber Name Subscriber Birth Date Member ID       MAXIM HARDIN 1985 [id number]                 Emergency Contacts      (Rel.) Home Phone Work Phone Mobile Phone    Kinjal Hardin (Father) -- -- [phone]    Shaista Hardin (Mother) [phone] -- [phone]            "
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
## 2021-02-03 NOTE — PLAN OF CARE
Problem: Adult Inpatient Plan of Care  Goal: Plan of Care Review  Outcome: Ongoing, Progressing  Flowsheets (Taken 2/3/2021 1523)  Progress: improving  Plan of Care Reviewed With: patient  Outcome Summary: meds per order and request, skin care per protocol and order, no falls, trach in place, see vs and labs   Goal Outcome Evaluation:  Plan of Care Reviewed With: patient  Progress: improving  Outcome Summary: meds per order and request, skin care per protocol and order, no falls, trach in place, see vs and labs

## 2021-02-03 NOTE — PROGRESS NOTES
" LOS: 1 day     Name: Maxim Carvajal  Age: 35 y.o.  Sex: male  :  1985  MRN: [id number]         Primary Care Physician: Sheron Perez MD    Subjective   Subjective  Says that overall he does feel better today.  Minimal cough.    Objective   Vital Signs  Temp:  [96.5 °F (35.8 °C)-99.2 °F (37.3 °C)] 96.8 °F (36 °C)  Heart Rate:  [] 80  Resp:  [16-24] 16  BP: (112-155)/(58-90) 112/63  Body mass index is 21.97 kg/m².    Objective:  General Appearance:  Comfortable and in no acute distress (Chronically ill-appearing, looks weak and frail).    Vital signs: (most recent): Blood pressure 112/63, pulse 80, temperature 96.8 °F (36 °C), temperature source Oral, resp. rate 16, height 182.9 cm (72\"), weight 73.5 kg (162 lb), SpO2 96 %.    HEENT: (Trach midline with T-piece in place)    Lungs:  Normal effort and normal respiratory rate.  He is not in respiratory distress.  There are decreased breath sounds.    Heart: Normal rate.  Regular rhythm.    Abdomen: Abdomen is soft.  Bowel sounds are normal.   There is no abdominal tenderness.     Extremities: There is no dependent edema or local swelling.    Neurological: Patient is alert and oriented to person, place and time.    Skin:  Warm and dry.              Results Review:       I reviewed the patient's new clinical results.    Results from last 7 days   Lab Units 21  0612 21  0609   WBC 10*3/mm3 8.49 12.16*   HEMOGLOBIN g/dL 8.4* 6.8*   PLATELETS 10*3/mm3 315 366     Results from last 7 days   Lab Units 21  0612 21  0608   SODIUM mmol/L 136 135*   POTASSIUM mmol/L 3.5 3.6   CHLORIDE mmol/L 103 97*   CO2 mmol/L 14.0* 26.1   BUN mg/dL 8 14   CREATININE mg/dL 0.30* 0.31*   CALCIUM mg/dL 8.7 9.5   GLUCOSE mg/dL 66 93                 Scheduled Meds:   ipratropium-albuterol, 3 mL, Nebulization, TID - RT  losartan, 25 mg, Per G Tube, Daily  piperacillin-tazobactam, 4.5 g, Intravenous, Q8H  propranolol, 20 mg, Per G Tube, TID  sodium chloride, " 4 mL, Nebulization, BID - RT  vancomycin, 1,250 mg, Intravenous, Q12H      PRN Meds:   •  acetaminophen **OR** acetaminophen **OR** acetaminophen  •  acetaminophen  •  bisacodyl  •  Morphine  •  ondansetron **OR** ondansetron  •  Pharmacy to dose vancomycin  Continuous Infusions:  Pharmacy to dose vancomycin,   sodium chloride, 125 mL/hr, Last Rate: 125 mL/hr (02/03/21 0620)        Assessment/Plan   Active Hospital Problems    Diagnosis  POA   • **HCAP (healthcare-associated pneumonia) [J18.9]  Unknown   • Tracheitis [J04.10]  Yes   • Acute on chronic respiratory failure with hypoxemia (CMS/HCC) [J96.21]  Yes   • Anemia [D64.9]  Yes   • Leukocytosis [D72.829]  Yes   • Quadriplegia (CMS/HCC) [G82.50]  Yes   • Essential hypertension [I10]  Yes   • Sepsis, unspecified organism (CMS/HCC) [A41.9]  Unknown      Resolved Hospital Problems   No resolved problems to display.       Assessment & Plan    -Continues on broad-spectrum antibiotics for pneumonia/tracheitis.  MRSA screen positive.  Appreciate input from pulmonology.  Continue with pulmonary hygiene  -N.p.o. for now and speech therapy consulted for swallow eval  -Blood sugars down to 66 this morning.  Treated by RN.  Patient has utilized tube feeds through G-tube in the past and will consult nutrition as he is interested in restarting these.  States he is scared to eat at this point.  -Hemoglobin showed good response to 2 units of packed red blood cells yesterday.  Hematology working up further and appreciate their input.        I wore full protective equipment throughout the patient encounter including eye protection and facemask.  Hand hygiene was performed before donning protective equipment and after removal when leaving the room.    Giancarlo Radford MD  [facility]  02/03/21  09:19 EST

## 2021-02-03 NOTE — PROGRESS NOTES
Discharge Planning Assessment  [facility]     Patient Name: Maxim Carvajal  MRN: [id number]  Today's Date: 2/3/2021    Admit Date: 2/2/2021    Discharge Needs Assessment     Row Name 02/03/21 1753       Living Environment    Current Living Arrangements  extended care facility    Family Caregiver if Needed  parent(s)    Family Caregiver Names  Stephanie fan [phone]    Able to Return to Prior Arrangements  yes       Resource/Environmental Concerns    Resource/Environmental Concerns  none       Transition Planning    Patient/Family Anticipates Transition to  long-term care facility    Transportation Anticipated  health plan transportation       Discharge Needs Assessment    Readmission Within the Last 30 Days  no previous admission in last 30 days    Concerns to be Addressed  denies needs/concerns at this time    Discharge Facility/Level of Care Needs  nursing facility, intermediate    Provided Post Acute Provider List?  N/A    Provided Post Acute Provider Quality & Resource List?  N/A    Patient's Choice of Community Agency(s)  Patient prefers to return to his long term care bed at [facility]        Discharge Plan     Row Name 02/03/21 175       Plan    Plan  Return to [facility] medicaid bed at anytime, has a bedhold    Plan Comments  Facesheet information was verified with patient at bedside.  He states he would prefer to return to [facility] [place] and verified with Massimo / Too that he is from a long term medicaid bed with a bedhold and may return at anytime he is medically ready.  He will need EMS transport.  He denies needs at this time.  ........................Margarette Garcia RN        Continued Care and Services - Admitted Since 2/2/2021     Destination     Service Provider Request Status Selected Services Address Phone Fax Patient Preferred    [facility]  Pending - Request Sent N/A [address]  [facility] [id number] [phone] --                Demographic Summary     Row Name 02/03/21 1754       General Information    Admission Type  inpatient    Arrived From  home    Referral Source  admission list    Preferred Language  English       Contact Information    Permission Granted to Share Info With  ;family/designee    Contact Information Comments  Stephanie fan [phone]        Functional Status     Row Name 02/03/21 1755       Functional Status    Usual Activity Tolerance  good    Current Activity Tolerance  good       Functional Status, IADL    Medications  completely dependent    Meal Preparation  completely dependent    Housekeeping  completely dependent    Laundry  completely dependent    Shopping  completely dependent       Mental Status    General Appearance WDL  WDL       Mental Status Summary    Recent Changes in Mental Status/Cognitive Functioning  no changes       Employment/    Employment Status  unemployed        Psychosocial    No documentation.       Abuse/Neglect    No documentation.       Legal    No documentation.       Substance Abuse    No documentation.       Patient Forms    No documentation.           Margarette Garcia RN

## 2021-02-03 NOTE — SIGNIFICANT NOTE
02/03/21 1443   OTHER   Discipline speech language pathologist   Rehab Time/Intention   Session Not Performed other (see comments)  (Discussed POC with RN. Patient with copious thick secretions requring frequent suction. RN reports initiation of tube feeds this date. RN and SLP in agreement swallow eval not appropriate secondary to  secretions. SLP to follow for clinical swallow eval as patient appropriate and secretions improve.)
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
## 2021-02-04 ENCOUNTER — APPOINTMENT (OUTPATIENT)
Dept: CT IMAGING | Facility: HOSPITAL | Age: 36
End: 2021-02-04

## 2021-02-04 ENCOUNTER — APPOINTMENT (OUTPATIENT)
Dept: GENERAL RADIOLOGY | Facility: HOSPITAL | Age: 36
End: 2021-02-04

## 2021-02-04 LAB
ALBUMIN SERPL-MCNC: 3.4 G/DL (ref 3.5–5.2)
ALBUMIN/GLOB SERPL: 1.1 G/DL
ALP SERPL-CCNC: 122 U/L (ref 39–117)
ALT SERPL W P-5'-P-CCNC: 10 U/L (ref 1–41)
ANION GAP SERPL CALCULATED.3IONS-SCNC: 16 MMOL/L (ref 5–15)
ANION GAP SERPL CALCULATED.3IONS-SCNC: 19.9 MMOL/L (ref 5–15)
ARTERIAL PATENCY WRIST A: POSITIVE
ARTERIAL PATENCY WRIST A: POSITIVE
AST SERPL-CCNC: 14 U/L (ref 1–40)
ATMOSPHERIC PRESS: 738.2 MMHG
ATMOSPHERIC PRESS: 747.2 MMHG
BACTERIA SPEC AEROBE CULT: ABNORMAL
BASE EXCESS BLDA CALC-SCNC: -5.3 MMOL/L (ref 0–2)
BASE EXCESS BLDA CALC-SCNC: -8.9 MMOL/L (ref 0–2)
BASOPHILS # BLD AUTO: 0.02 10*3/MM3 (ref 0–0.2)
BASOPHILS NFR BLD AUTO: 0.2 % (ref 0–1.5)
BDY SITE: ABNORMAL
BDY SITE: ABNORMAL
BILIRUB CONJ SERPL-MCNC: 0.6 MG/DL (ref 0–0.3)
BILIRUB SERPL-MCNC: 2.4 MG/DL (ref 0–1.2)
BUN SERPL-MCNC: 4 MG/DL (ref 6–20)
BUN SERPL-MCNC: 5 MG/DL (ref 6–20)
BUN/CREAT SERPL: 19 (ref 7–25)
BUN/CREAT SERPL: 27.8 (ref 7–25)
CALCIUM SPEC-SCNC: 9.1 MG/DL (ref 8.6–10.5)
CALCIUM SPEC-SCNC: 9.3 MG/DL (ref 8.6–10.5)
CHLORIDE SERPL-SCNC: 103 MMOL/L (ref 98–107)
CHLORIDE SERPL-SCNC: 105 MMOL/L (ref 98–107)
CO2 SERPL-SCNC: 13.1 MMOL/L (ref 22–29)
CO2 SERPL-SCNC: 14 MMOL/L (ref 22–29)
CREAT SERPL-MCNC: 0.18 MG/DL (ref 0.76–1.27)
CREAT SERPL-MCNC: 0.21 MG/DL (ref 0.76–1.27)
D-LACTATE SERPL-SCNC: 0.6 MMOL/L (ref 0.5–2)
DEPRECATED RDW RBC AUTO: 51.2 FL (ref 37–54)
EOSINOPHIL # BLD AUTO: 0.1 10*3/MM3 (ref 0–0.4)
EOSINOPHIL NFR BLD AUTO: 1 % (ref 0.3–6.2)
ERYTHROCYTE [DISTWIDTH] IN BLOOD BY AUTOMATED COUNT: 17.8 % (ref 12.3–15.4)
GFR SERPL CREATININE-BSD FRML MDRD: >150 ML/MIN/1.73
GFR SERPL CREATININE-BSD FRML MDRD: >150 ML/MIN/1.73
GLOBULIN UR ELPH-MCNC: 3.1 GM/DL
GLUCOSE BLDC GLUCOMTR-MCNC: 154 MG/DL (ref 70–130)
GLUCOSE SERPL-MCNC: 79 MG/DL (ref 65–99)
GLUCOSE SERPL-MCNC: 83 MG/DL (ref 65–99)
GRAM STN SPEC: ABNORMAL
HAPTOGLOB SERPL-MCNC: <10 MG/DL (ref 30–200)
HCO3 BLDA-SCNC: 15.1 MMOL/L (ref 22–28)
HCO3 BLDA-SCNC: 19.9 MMOL/L (ref 22–28)
HCT VFR BLD AUTO: 27.1 % (ref 37.5–51)
HGB BLD-MCNC: 9.1 G/DL (ref 13–17.7)
HGB RETIC QN AUTO: 28.9 PG (ref 29.8–36.1)
IMM GRANULOCYTES # BLD AUTO: 0.06 10*3/MM3 (ref 0–0.05)
IMM GRANULOCYTES NFR BLD AUTO: 0.6 % (ref 0–0.5)
IMM RETICS NFR: 20.6 % (ref 3–15.8)
INHALED O2 CONCENTRATION: 35 %
INHALED O2 CONCENTRATION: 60 %
ISOLATED FROM: ABNORMAL
LDH SERPL-CCNC: 245 U/L (ref 135–225)
LYMPHOCYTES # BLD AUTO: 0.9 10*3/MM3 (ref 0.7–3.1)
LYMPHOCYTES NFR BLD AUTO: 9.4 % (ref 19.6–45.3)
MCH RBC QN AUTO: 27.1 PG (ref 26.6–33)
MCHC RBC AUTO-ENTMCNC: 33.6 G/DL (ref 31.5–35.7)
MCV RBC AUTO: 80.7 FL (ref 79–97)
MODALITY: ABNORMAL
MODALITY: ABNORMAL
MONOCYTES # BLD AUTO: 0.32 10*3/MM3 (ref 0.1–0.9)
MONOCYTES NFR BLD AUTO: 3.4 % (ref 5–12)
NEUTROPHILS NFR BLD AUTO: 8.14 10*3/MM3 (ref 1.7–7)
NEUTROPHILS NFR BLD AUTO: 85.4 % (ref 42.7–76)
NRBC BLD AUTO-RTO: 0 /100 WBC (ref 0–0.2)
O2 A-A PPRESDIFF RESPIRATORY: 0.3 MMHG
O2 A-A PPRESDIFF RESPIRATORY: 0.3 MMHG
PCO2 BLDA: 27 MM HG (ref 35–45)
PCO2 BLDA: 36.2 MM HG (ref 35–45)
PEEP RESPIRATORY: 5 CM[H2O]
PH BLDA: 7.35 PH UNITS (ref 7.35–7.45)
PH BLDA: 7.36 PH UNITS (ref 7.35–7.45)
PLATELET # BLD AUTO: 339 10*3/MM3 (ref 140–450)
PMV BLD AUTO: 8.6 FL (ref 6–12)
PO2 BLDA: 131.7 MM HG (ref 80–100)
PO2 BLDA: 63.8 MM HG (ref 80–100)
POTASSIUM SERPL-SCNC: 3.3 MMOL/L (ref 3.5–5.2)
POTASSIUM SERPL-SCNC: 3.3 MMOL/L (ref 3.5–5.2)
PROCALCITONIN SERPL-MCNC: 0.07 NG/ML (ref 0–0.25)
PROT SERPL-MCNC: 6.5 G/DL (ref 6–8.5)
RBC # BLD AUTO: 3.36 10*6/MM3 (ref 4.14–5.8)
RETICS # AUTO: 0.22 10*6/MM3 (ref 0.02–0.13)
RETICS/RBC NFR AUTO: 6.67 % (ref 0.7–1.9)
SAO2 % BLDCOA: 91.5 % (ref 92–99)
SAO2 % BLDCOA: 98.9 % (ref 92–99)
SET MECH RESP RATE: 1822
SODIUM SERPL-SCNC: 135 MMOL/L (ref 136–145)
SODIUM SERPL-SCNC: 136 MMOL/L (ref 136–145)
TOTAL RATE: 22 BREATHS/MINUTE
TOTAL RATE: 27 BREATHS/MINUTE
VANCOMYCIN TROUGH SERPL-MCNC: 4.8 MCG/ML (ref 5–20)
VENTILATOR MODE: AC
WBC # BLD AUTO: 9.54 10*3/MM3 (ref 3.4–10.8)

## 2021-02-04 PROCEDURE — 25010000002 LORAZEPAM PER 2 MG: Performed by: INTERNAL MEDICINE

## 2021-02-04 PROCEDURE — 83615 LACTATE (LD) (LDH) ENZYME: CPT | Performed by: INTERNAL MEDICINE

## 2021-02-04 PROCEDURE — 83010 ASSAY OF HAPTOGLOBIN QUANT: CPT | Performed by: INTERNAL MEDICINE

## 2021-02-04 PROCEDURE — 94799 UNLISTED PULMONARY SVC/PX: CPT

## 2021-02-04 PROCEDURE — 80202 ASSAY OF VANCOMYCIN: CPT | Performed by: HOSPITALIST

## 2021-02-04 PROCEDURE — 82803 BLOOD GASES ANY COMBINATION: CPT

## 2021-02-04 PROCEDURE — 99232 SBSQ HOSP IP/OBS MODERATE 35: CPT | Performed by: INTERNAL MEDICINE

## 2021-02-04 PROCEDURE — 87205 SMEAR GRAM STAIN: CPT | Performed by: INTERNAL MEDICINE

## 2021-02-04 PROCEDURE — 25010000002 PIPERACILLIN SOD-TAZOBACTAM PER 1 G: Performed by: INTERNAL MEDICINE

## 2021-02-04 PROCEDURE — 25010000002 FENTANYL CITRATE (PF) 100 MCG/2ML SOLUTION: Performed by: INTERNAL MEDICINE

## 2021-02-04 PROCEDURE — 87147 CULTURE TYPE IMMUNOLOGIC: CPT | Performed by: INTERNAL MEDICINE

## 2021-02-04 PROCEDURE — 25010000002 VANCOMYCIN PER 500 MG: Performed by: HOSPITALIST

## 2021-02-04 PROCEDURE — 36415 COLL VENOUS BLD VENIPUNCTURE: CPT | Performed by: HOSPITALIST

## 2021-02-04 PROCEDURE — 25010000002 VANCOMYCIN 10 G RECONSTITUTED SOLUTION: Performed by: HOSPITALIST

## 2021-02-04 PROCEDURE — 84145 PROCALCITONIN (PCT): CPT | Performed by: INTERNAL MEDICINE

## 2021-02-04 PROCEDURE — 82962 GLUCOSE BLOOD TEST: CPT

## 2021-02-04 PROCEDURE — 0 DIATRIZOATE MEGLUMINE & SODIUM PER 1 ML: Performed by: INTERNAL MEDICINE

## 2021-02-04 PROCEDURE — 71045 X-RAY EXAM CHEST 1 VIEW: CPT

## 2021-02-04 PROCEDURE — 36600 WITHDRAWAL OF ARTERIAL BLOOD: CPT

## 2021-02-04 PROCEDURE — 94002 VENT MGMT INPAT INIT DAY: CPT

## 2021-02-04 PROCEDURE — 94667 MNPJ CHEST WALL 1ST: CPT

## 2021-02-04 PROCEDURE — 87070 CULTURE OTHR SPECIMN AEROBIC: CPT | Performed by: INTERNAL MEDICINE

## 2021-02-04 PROCEDURE — 71275 CT ANGIOGRAPHY CHEST: CPT

## 2021-02-04 PROCEDURE — 25010000002 MORPHINE PER 10 MG: Performed by: HOSPITALIST

## 2021-02-04 PROCEDURE — 74177 CT ABD & PELVIS W/CONTRAST: CPT

## 2021-02-04 PROCEDURE — 83605 ASSAY OF LACTIC ACID: CPT | Performed by: INTERNAL MEDICINE

## 2021-02-04 PROCEDURE — 25010000002 PROPOFOL 10 MG/ML EMULSION

## 2021-02-04 PROCEDURE — 0 IOPAMIDOL PER 1 ML: Performed by: INTERNAL MEDICINE

## 2021-02-04 PROCEDURE — 80053 COMPREHEN METABOLIC PANEL: CPT | Performed by: INTERNAL MEDICINE

## 2021-02-04 PROCEDURE — 82248 BILIRUBIN DIRECT: CPT | Performed by: INTERNAL MEDICINE

## 2021-02-04 PROCEDURE — 85046 RETICYTE/HGB CONCENTRATE: CPT | Performed by: INTERNAL MEDICINE

## 2021-02-04 PROCEDURE — 85025 COMPLETE CBC W/AUTO DIFF WBC: CPT | Performed by: INTERNAL MEDICINE

## 2021-02-04 PROCEDURE — 25010000002 IOPAMIDOL 61 % SOLUTION: Performed by: INTERNAL MEDICINE

## 2021-02-04 RX ORDER — PROPOFOL 10 MG/ML
VIAL (ML) INTRAVENOUS
Status: COMPLETED
Start: 2021-02-04 | End: 2021-02-04

## 2021-02-04 RX ORDER — VANCOMYCIN HYDROCHLORIDE 1 G/200ML
1000 INJECTION, SOLUTION INTRAVENOUS EVERY 8 HOURS
Status: DISCONTINUED | OUTPATIENT
Start: 2021-02-04 | End: 2021-02-05

## 2021-02-04 RX ORDER — LORAZEPAM 2 MG/ML
2 INJECTION INTRAMUSCULAR ONCE
Status: COMPLETED | OUTPATIENT
Start: 2021-02-04 | End: 2021-02-04

## 2021-02-04 RX ORDER — AMOXICILLIN 250 MG
2 CAPSULE ORAL NIGHTLY
Status: DISCONTINUED | OUTPATIENT
Start: 2021-02-04 | End: 2021-02-22

## 2021-02-04 RX ORDER — FENTANYL CITRATE 50 UG/ML
50 INJECTION, SOLUTION INTRAMUSCULAR; INTRAVENOUS ONCE
Status: COMPLETED | OUTPATIENT
Start: 2021-02-04 | End: 2021-02-04

## 2021-02-04 RX ADMIN — MORPHINE SULFATE 4 MG: 2 INJECTION, SOLUTION INTRAMUSCULAR; INTRAVENOUS at 10:10

## 2021-02-04 RX ADMIN — TAZOBACTAM SODIUM AND PIPERACILLIN SODIUM 3.38 G: 375; 3 INJECTION, SOLUTION INTRAVENOUS at 18:33

## 2021-02-04 RX ADMIN — Medication: at 08:08

## 2021-02-04 RX ADMIN — FENTANYL CITRATE 50 MCG: 50 INJECTION, SOLUTION INTRAMUSCULAR; INTRAVENOUS at 15:45

## 2021-02-04 RX ADMIN — MORPHINE SULFATE 4 MG: 2 INJECTION, SOLUTION INTRAMUSCULAR; INTRAVENOUS at 02:03

## 2021-02-04 RX ADMIN — SODIUM CHLORIDE 75 ML/HR: 9 INJECTION, SOLUTION INTRAVENOUS at 08:12

## 2021-02-04 RX ADMIN — Medication 4 ML: at 07:13

## 2021-02-04 RX ADMIN — IOPAMIDOL 95 ML: 755 INJECTION, SOLUTION INTRAVENOUS at 01:52

## 2021-02-04 RX ADMIN — VANCOMYCIN HYDROCHLORIDE 1250 MG: 10 INJECTION, POWDER, LYOPHILIZED, FOR SOLUTION INTRAVENOUS at 03:32

## 2021-02-04 RX ADMIN — PROPRANOLOL HYDROCHLORIDE 20 MG: 20 TABLET ORAL at 18:33

## 2021-02-04 RX ADMIN — PROPRANOLOL HYDROCHLORIDE 20 MG: 20 TABLET ORAL at 08:07

## 2021-02-04 RX ADMIN — WATER: 100 IRRIGANT IRRIGATION at 13:15

## 2021-02-04 RX ADMIN — TAZOBACTAM SODIUM AND PIPERACILLIN SODIUM 3.38 G: 375; 3 INJECTION, SOLUTION INTRAVENOUS at 00:29

## 2021-02-04 RX ADMIN — IOPAMIDOL 100 ML: 612 INJECTION, SOLUTION INTRAVENOUS at 13:00

## 2021-02-04 RX ADMIN — MORPHINE SULFATE 4 MG: 2 INJECTION, SOLUTION INTRAMUSCULAR; INTRAVENOUS at 06:07

## 2021-02-04 RX ADMIN — PROPOFOL 50 MCG/KG/MIN: 10 INJECTION, EMULSION INTRAVENOUS at 15:15

## 2021-02-04 RX ADMIN — DEXMEDETOMIDINE HYDROCHLORIDE 0.2 MCG/KG/HR: 100 INJECTION, SOLUTION, CONCENTRATE INTRAVENOUS at 17:48

## 2021-02-04 RX ADMIN — MORPHINE SULFATE 4 MG: 2 INJECTION, SOLUTION INTRAMUSCULAR; INTRAVENOUS at 22:14

## 2021-02-04 RX ADMIN — DIATRIZOATE MEGLUMINE AND DIATRIZOATE SODIUM 30 ML: 600; 100 SOLUTION ORAL; RECTAL at 08:58

## 2021-02-04 RX ADMIN — DOCUSATE SODIUM 50MG AND SENNOSIDES 8.6MG 2 TABLET: 8.6; 5 TABLET, FILM COATED ORAL at 18:33

## 2021-02-04 RX ADMIN — MORPHINE SULFATE 4 MG: 2 INJECTION, SOLUTION INTRAMUSCULAR; INTRAVENOUS at 13:57

## 2021-02-04 RX ADMIN — VANCOMYCIN HYDROCHLORIDE 1000 MG: 1 INJECTION, SOLUTION INTRAVENOUS at 19:24

## 2021-02-04 RX ADMIN — TAZOBACTAM SODIUM AND PIPERACILLIN SODIUM 3.38 G: 375; 3 INJECTION, SOLUTION INTRAVENOUS at 08:07

## 2021-02-04 RX ADMIN — LORAZEPAM 2 MG: 2 INJECTION INTRAMUSCULAR; INTRAVENOUS at 15:45

## 2021-02-04 RX ADMIN — IPRATROPIUM BROMIDE AND ALBUTEROL SULFATE 3 ML: 2.5; .5 SOLUTION RESPIRATORY (INHALATION) at 07:12

## 2021-02-04 NOTE — SIGNIFICANT NOTE
I talked with pt about his trach msk & trach t pc with o2. He kept insisting that it wasn't working right. I checked all places & the output of the flow & Fio2. Everything is working properly. Pt keeps saying its not working. hes mad bc sats are 93-94%. I assure him that is good. Pt then gets very upset & wants a doctor & a supervisor. RT supervisor came to room. Checked his equipment. It was working fine.he becomes very anxious & mad bc we cant stay in the room with him. This RT was paged to NICU for a premature birth that needs my asisstance. This pt got very upset that I had to leave for NICU & stated that he was sick too. I told him that he is stable & doing ok at this time & I had to leave. I called RN & CNA to assist him getting pulled up in bed & pain issues. I told him I would come back when I was finished in NICU

## 2021-02-04 NOTE — NURSING NOTE
Call placed to pulmonary regarding increase O2 requirements and results of CTA of the chest. Spoke with Lamonte about pt's condition. New orders received for ABG's and to keep pt NPO incase a bronchoscopy is required in am.

## 2021-02-04 NOTE — SIGNIFICANT NOTE
02/04/21 1146   OTHER   Discipline speech language pathologist   Rehab Time/Intention   Session Not Performed other (see comments)  (Pt not appropriate for swallow eval.  RN reports pt to be transferred to ICU for ventilatory support. Please reconsult as appropriate.)

## 2021-02-04 NOTE — NURSING NOTE
Called ER CT about pt's STAT CT of the chest. Was told there was currently 8 other STAT CT's in the ER and they would call back when ready.

## 2021-02-04 NOTE — SIGNIFICANT NOTE
02/04/21 1052   OTHER   Discipline occupational therapist   Rehab Time/Intention   Session Not Performed   (Pt admit from NH with SOA. Noted pt is quadraplegic and requiring assist with all care. per RN plan to xfer to ICU. as pt requring full care  prior to admit, skilled acute care OT not indicated at this time.)

## 2021-02-04 NOTE — PROGRESS NOTES
Tracheostomy exchange    Old 6 shiley uncuffed trach exchanged from a cuffed 6 shiley. Done at bedside without difficulty

## 2021-02-04 NOTE — PROGRESS NOTES
[facility]  Clinical Pharmacy Department     Vancomycin Pharmacokinetic Note    Maxim Carvajal is a 35 y.o. male who is on day 3 of pharmacy to dose vancomycin for HAP.    Target level: AUC24 400-600  Consulting Provider:  Dr. Joshua Vasquez  Current Vancomycin Dose:  Vancomycin 1250mg IV q 12 hrs   Planned Duration of Therapy: 7 days  Other Antimicrobials: Zosyn 3.375 gram IV q8h EI has been scheduled; patient also received cefepime 2 gram x 1 and azithromycin 500 mg IV this morning     Allergies  Allergies as of 02/02/2021   • (No Known Allergies)       Microbiology:  Microbiology Results (last 10 days)     Procedure Component Value - Date/Time    Respiratory Culture - Sputum, ET Suction [[id number]]  (Abnormal) Collected: 02/02/21 2250    Lab Status: Preliminary result Specimen: Sputum from ET Suction Updated: 02/04/21 0958     Respiratory Culture Moderate growth (3+) Pseudomonas species      Light growth (2+) Staphylococcus aureus, MRSA     Comment: Methicillin resistant Staphylococcus aureus, Patient may be an isolation risk.         No Normal Respiratory Lisa     Gram Stain No WBCs or organisms seen      No epithelial cells seen    MRSA Screen, PCR (Inpatient) - Swab, Nares [[id number]]  (Abnormal) Collected: 02/02/21 2120    Lab Status: Final result Specimen: Swab from Nares Updated: 02/02/21 2350     MRSA PCR MRSA Detected    Blood Culture - Blood, Arm, Left [[id number]] Collected: 02/02/21 0656    Lab Status: Preliminary result Specimen: Blood from Arm, Left Updated: 02/04/21 0715     Blood Culture No growth at 2 days    Blood Culture - Blood, Arm, Left [[id number]]  (Abnormal) Collected: 02/02/21 0656    Lab Status: Final result Specimen: Blood from Arm, Left Updated: 02/04/21 0921     Blood Culture Staphylococcus, coagulase negative     Comment: Probable contaminant requires clinical correlation, susceptibility not performed unless requested by physician.          Isolated from Anaerobic Bottle     Gram Stain  Anaerobic Bottle Gram positive cocci in clusters    Blood Culture ID, PCR - Blood, Arm, Left [[id number]]  (Abnormal) Collected: 02/02/21 0656    Lab Status: Edited Result - FINAL Specimen: Blood from Arm, Left Updated: 02/03/21 1202     BCID, PCR Staphylococcus spp, not aureus. Identification by BCID PCR.     BOTTLE TYPE Anaerobic Bottle     Comment: Appended report. These results have been appended to a previously final verified report.       Respiratory Panel PCR w/COVID-19(SARS-CoV-2) BG/RAZIA/BIBI/PAD/[facility] In-House, NP Swab in UTM/VTM, 3-4 HR TAT - Swab, Nasopharynx [[id number]]  (Normal) Collected: 02/02/21 0641    Lab Status: Final result Specimen: Swab from Nasopharynx Updated: 02/02/21 0822     ADENOVIRUS, PCR Not Detected     Coronavirus 229E Not Detected     Coronavirus HKU1 Not Detected     Coronavirus NL63 Not Detected     Coronavirus OC43 Not Detected     COVID19 Not Detected     Human Metapneumovirus Not Detected     Human Rhinovirus/Enterovirus Not Detected     Influenza A PCR Not Detected     Influenza B PCR Not Detected     Parainfluenza Virus 1 Not Detected     Parainfluenza Virus 2 Not Detected     Parainfluenza Virus 3 Not Detected     Parainfluenza Virus 4 Not Detected     RSV, PCR Not Detected     Bordetella pertussis pcr Not Detected     Bordetella parapertussis PCR Not Detected     Chlamydophila pneumoniae PCR Not Detected     Mycoplasma pneumo by PCR Not Detected    Narrative:      Fact sheet for providers: https://docs.[employer]/wp-content/uploads/AKY5205-2771-YJ1.1-EUA-Provider-Fact-Sheet-3.pdf    Fact sheet for patients: https://docs.[employer]/wp-content/uploads/ZUU1130-4453-CM1.1-EUA-Patient-Fact-Sheet-1.pdf    Test performed by PCR.          Radiology/Imaging:  Patient: MARK HARDIN  Time Out: 06:16  Exam(s): FILM CXR 1 VIEW      EXAM:    XR Chest, 1 View     CLINICAL HISTORY:     COVID Evaluation, Cough, Fever  Reason for exam: COVID Evaluation,   Cough, Fever. Reason for  "Exam:->COVID Evaluation, Cough, Fever. Portable-  >Yes.. Additional notes: COVID Evaluation, Cough, Fever     TECHNIQUE:    Frontal view of the chest.     COMPARISON:    No relevant prior studies available.     FINDINGS:    Lungs: Slightly increased bibasilar opacities.    Pleural space:  No acute findings    Heart:  No cardiomegaly.    Bones joints:  No acute findings.     IMPRESSION:           Slightly increased bibasilar opacities.  IMPRESSION:        Electronically signed by Sam Graves MD on 02-02-21 at 0616    Vitals/Labs/I&O  182.9 cm (72\")  73.5 kg (162 lb)  Body mass index is 21.97 kg/m².   Temp:  [96 °F (35.6 °C)-98.5 °F (36.9 °C)] 98.5 °F (36.9 °C)  Heart Rate:  [67-93] 85  Resp:  [20-28] 20  BP: (102-149)/(70-92) 131/70  FiO2 (%):  [61 %] 61 %    Results from last 7 days   Lab Units 02/04/21  0407 02/03/21  0612 02/02/21  0609   WBC 10*3/mm3 9.54 8.49 12.16*     Results from last 7 days   Lab Units 02/04/21  0515 02/02/21  0608   LACTATE mmol/L 0.6 0.7      Results from last 7 days   Lab Units 02/04/21  0837 02/02/21  0608   PROCALCITONIN ng/mL 0.07 0.12                  Estimated Creatinine Clearance: 500.7 mL/min (A) (by C-G formula based on SCr of 0.21 mg/dL (L)).  Results from last 7 days   Lab Units 02/04/21  0515 02/04/21  0407 02/03/21  0612   BUN mg/dL 4* 5* 8   CREATININE mg/dL 0.21* 0.18* 0.30*     Intake & Output (last 3 days)       02/01 0701 - 02/02 0700 02/02 0701 - 02/03 0700 02/03 0701 - 02/04 0700 02/04 0701 - 02/05 0700    P.O.    0    I.V. (mL/kg)  1195.2 (16.3)  950 (12.9)    Blood  600      Other  80 380 150    NG/GT   480 240    IV Piggyback  2850 250 50    Total Intake(mL/kg)  4725.2 (64.3) 1110 (15.1) 1390 (18.9)    Urine (mL/kg/hr)  1000 (0.6) 1550 (0.9) 150 (0.2)    Stool   0 0    Total Output  1000 1550 150    Net  +3725.2 -440 +1240            Urine Unmeasured Occurrence  1 x 3 x 1 x    Stool Unmeasured Occurrence   1 x 1 x          Vancomycin Dosing and Level " "History:  2/2 1408 Vancomycin 1500mg IV x1  2/3 0248, 1531   Vancomycin 1250mg IV q 12 hrs  2/4 0332  2/4 1459  vancomycin trough level=  4.8 mcg/ml    Assessment/Plan:    Assessment:  Bayesian analysis of the most recent level(s) using [employer] provides the following patient-specific pharmacokinetic parameters:   CL:  7.36 L/h              Vd:     43.8 L              T1/2:    5.13h  If the predicted trough on this regimen is not within what was previously considered a \"target trough range\", the AUC24 (a stronger predictor of vancomycin efficacy) is predicted to achieve the accepted target of 400-600 mg*h/L. To best balance efficacy and toxicity, we will be targeting AUC24 in this patient rather than steady-state trough levels.     IIncreasing to vancomycin 1gm IV q 8 hrs gives a predicted steady-state trough of 10.2 mg/L and AUC24 of 408 mg*h/L based upon population pharmacokinetics and this patient's specific parameters.    Recommendations/Plan:  -Increase to  vancomycin 1000mg (13.6 mg/kg) IV q 8 hrs   -Obtain vancomycin level on 2/521 @1030 or sooner if acute changes   - MRSA nasal PCR was (+)  -Continue to monitor SCr     Thank you for involving pharmacy in this patient's care. Please contact pharmacy with any questions or concerns.                         Katie Pasc[facility]  Clinical Pharmacist  02/04/21 17:38 EST    "

## 2021-02-04 NOTE — PROGRESS NOTES
Called to see patient for increased work of breathing, low oxygen saturations.  Patient worsened after being laid flat  He is in obvious distress, coarse breath sounds discussed with RN and RT  He will need tracheostomy exchange and bronch for therapeutic clearance of secretions. Patient provided verbal consent at bedside    CC 35 mins excluding billable procedures

## 2021-02-04 NOTE — PROGRESS NOTES
[facility]      Mar/chart reviewed  Follow up pneumonia/tracheitis  Some cough still,says he is worse, says his belly hurst and he is more short of breath, says he had some hemoptysis and had blood in his tool    Vital Sign Min/Max for last 24 hours  Temp  Min: 96 °F (35.6 °C)  Max: 97.1 °F (36.2 °C)   BP  Min: 102/73  Max: 149/92   Pulse  Min: 65  Max: 80   Resp  Min: 16  Max: 28   SpO2  Min: 85 %  Max: 97 %   Flow (L/min)  Min: 9  Max: 11   No data recorded     Appears ill, axox3,   perrl, eomi, no icterus,  mmm, no jvd, trachea midline, neck supple,  chest coarse, decreased bilaterally, no crackles, no wheezes,   rrr, +use of accessory muscles  soft, nt, nd +bs,  no c/c/ e  Skin warm, dry no rashes    Labs: 2/4: reviewed:  Na 135  k 3.3  Bicarb 14  Cr 0.21  7.35/27/63    2/4: ct angio chest: bilateral infiltrates, moderate bilateral effusions    A/P:  1. Tracheitis and Pneumonia-- agree with current antibiotics, pulmonary toilet, suctioning as needed, nebulizer and chest physiotherapy,    2. Acute hypoxemic respiratory failure -- titrate oxygen as needed  3. Sepsis  4. Quadriplegia  5. Anemia    He is doing worse this morning, will transfer to unit for therapeutic bronch and vent support    CC35 mins excluding future billable procedures    6. mrsa swab positive --   7. Bilateral effusions -- observation  8. Metabolic acidosis

## 2021-02-04 NOTE — PROCEDURES
Bronchoscopy Procedure Note    Procedure:  1. Bronchoscopy, Therapeutic    Pre-Operative Diagnosis: mucous plugging, pneumonia    Post-Operative Diagnosis: Same    Indication: respiratory failure, pneumonia    Anesthesia: icu medications    Procedure Details: Patient was consented for the procedure with all risks and benefits of the procedure explained in detail.  Patient was given the opportunity to ask questions and all concerns were answered.  The bronchocope was inserted into the main airway via the tracheostomy. An anatomical survey was done of the main airways and the subsegmental bronchus.  The findings are reported above.  A bronchoalveolar lavage was performed using aliquots of normal saline instilled into the airways then aspirated back. BAL peformed in the right middle lobe.    Findings: mucous plugs and mucous throughout the airways, therapeutic suctioning performed with success and combined with BAL    Estimated Blood Loss:  Minimal           Specimens:  Sent purulent fluid                Complications:  None; patient tolerated the procedure well.           Disposition: ICU - intubated and hemodynamically stable.      Patient tolerated the procedure well.    While I was in the room and during my examination of the patient I wore gown, gloves, mask, eye protection and washed my hands before and after the encounter.  Proper enhanced droplet precautions and isolation precautions were taken.    Darci Sam MD  2/4/2021  15:44 EST

## 2021-02-04 NOTE — NURSING NOTE
Offered assistance with deep inline suctioning but pt refused. Educated pt on importance and he verbalized understanding. Will CTM pt.   H<M 2-4-21 0017

## 2021-02-04 NOTE — PROGRESS NOTES
" LOS: 2 days     Name: Maxim Carvajal  Age: 35 y.o.  Sex: male  :  1985  MRN: [id number]         Primary Care Physician: Sheron Perez MD    Subjective   Subjective  Patient with increased work of breathing and shortness of breath.  Complains of abdominal pain.  Feels significantly worse than yesterday.    Objective   Vital Signs  Temp:  [96 °F (35.6 °C)-97.5 °F (36.4 °C)] 97.5 °F (36.4 °C)  Heart Rate:  [65-87] 71  Resp:  [20-28] 22  BP: (102-149)/(70-92) 131/70  Body mass index is 21.97 kg/m².    Objective:  General Appearance:  Comfortable, in no acute distress and ill-appearing.    Vital signs: (most recent): Blood pressure 131/70, pulse 71, temperature 97.5 °F (36.4 °C), temperature source Oral, resp. rate 22, height 182.9 cm (72\"), weight 73.5 kg (162 lb), SpO2 95 %.    Lungs:  Tachypnea and increased effort.  There are decreased breath sounds and rhonchi.    Heart: Normal rate.  Regular rhythm.    Abdomen: Abdomen is soft.  (G-tube in place.  He is doing some belly breathing.  Abdomen is soft with mild tenderness).  Bowel sounds are normal.   There is no abdominal tenderness.     Extremities: There is no dependent edema or local swelling.    Neurological: Patient is alert and oriented to person, place and time.    Skin:  Warm and dry.              Results Review:       I reviewed the patient's new clinical results.    Results from last 7 days   Lab Units 21  0407 21  0612 21  0609   WBC 10*3/mm3 9.54 8.49 12.16*   HEMOGLOBIN g/dL 9.1* 8.4* 6.8*   PLATELETS 10*3/mm3 339 315 366     Results from last 7 days   Lab Units 21  0515 21  0407 21  0612 21  0608   SODIUM mmol/L 135* 136 136 135*   POTASSIUM mmol/L 3.3* 3.3* 3.5 3.6   CHLORIDE mmol/L 105 103 103 97*   CO2 mmol/L 14.0* 13.1* 14.0* 26.1   BUN mg/dL 4* 5* 8 14   CREATININE mg/dL 0.21* 0.18* 0.30* 0.31*   CALCIUM mg/dL 9.3 9.1 8.7 9.5   GLUCOSE mg/dL 83 79 66 93                 Scheduled Meds: "   Eucerin original healing lotion, , Topical, Daily  [COMPLETED] iopamidol, 100 mL, Intravenous, Once in imaging  ipratropium-albuterol, 3 mL, Nebulization, TID - RT  lactulose enema, , Rectal, Once  piperacillin-tazobactam, 3.375 g, Intravenous, Q8H  propranolol, 20 mg, Per G Tube, TID  sennosides-docusate, 2 tablet, Oral, Nightly  sodium chloride, 4 mL, Nebulization, BID - RT  vancomycin, 1,250 mg, Intravenous, Q12H      PRN Meds:   •  acetaminophen **OR** acetaminophen **OR** acetaminophen  •  acetaminophen  •  bisacodyl  •  Morphine  •  ondansetron **OR** ondansetron  •  Pharmacy to dose vancomycin  Continuous Infusions:  Pharmacy to dose vancomycin,   sodium chloride, 75 mL/hr, Last Rate: 75 mL/hr (02/04/21 0812)        Assessment/Plan   Active Hospital Problems    Diagnosis  POA   • **HCAP (healthcare-associated pneumonia) [J18.9]  Unknown   • Tracheitis [J04.10]  Yes   • Acute on chronic respiratory failure with hypoxemia (CMS/HCC) [J96.21]  Yes   • Anemia [D64.9]  Yes   • Leukocytosis [D72.829]  Yes   • Quadriplegia (CMS/HCC) [G82.50]  Yes   • Essential hypertension [I10]  Yes   • Sepsis, unspecified organism (CMS/HCC) [A41.9]  Unknown      Resolved Hospital Problems   No resolved problems to display.       Assessment & Plan    -Continues on broad-spectrum antibiotics for pneumonia/tracheitis.  MRSA screen positive.  Patient looks worse today with increased work of breathing and he has been transferred to the ICU to be placed on the vent and will require bronchoscopy.  Discussed with Dr. Darci Sam.  -CT scan of the abdomen and pelvis pending to further evaluate his abdominal pain and the anemia  -N.p.o. for now and speech therapy consulted for swallow eval  -Blood sugars improved and more stable currently  -Hemoglobin showed good response to 2 units of packed red blood cells and anemia currently stable.   Etiology not entirely clear yet and hematology working up further.      I wore full protective  equipment throughout the patient encounter including eye protection and facemask.  Hand hygiene was performed before donning protective equipment and after removal when leaving the room.    Giancarlo Radford MD  [facility]  02/04/21  12:34 EST
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
## 2021-02-05 ENCOUNTER — APPOINTMENT (OUTPATIENT)
Dept: GENERAL RADIOLOGY | Facility: HOSPITAL | Age: 36
End: 2021-02-05

## 2021-02-05 LAB
ALBUMIN SERPL-MCNC: 3.4 G/DL (ref 3.5–5.2)
ALBUMIN/GLOB SERPL: 1.2 G/DL
ALP SERPL-CCNC: 108 U/L (ref 39–117)
ALT SERPL W P-5'-P-CCNC: 11 U/L (ref 1–41)
ANION GAP SERPL CALCULATED.3IONS-SCNC: 8.9 MMOL/L (ref 5–15)
APTT PPP: 32.5 SECONDS (ref 22.7–35.4)
AST SERPL-CCNC: 10 U/L (ref 1–40)
BASOPHILS # BLD AUTO: 0.01 10*3/MM3 (ref 0–0.2)
BASOPHILS NFR BLD AUTO: 0.2 % (ref 0–1.5)
BILIRUB SERPL-MCNC: 2.1 MG/DL (ref 0–1.2)
BUN SERPL-MCNC: 5 MG/DL (ref 6–20)
BUN/CREAT SERPL: 21.7 (ref 7–25)
CALCIUM SPEC-SCNC: 9.1 MG/DL (ref 8.6–10.5)
CHLORIDE SERPL-SCNC: 107 MMOL/L (ref 98–107)
CO2 SERPL-SCNC: 21.1 MMOL/L (ref 22–29)
CREAT SERPL-MCNC: 0.23 MG/DL (ref 0.76–1.27)
DEPRECATED RDW RBC AUTO: 50.9 FL (ref 37–54)
EOSINOPHIL # BLD AUTO: 0.18 10*3/MM3 (ref 0–0.4)
EOSINOPHIL NFR BLD AUTO: 2.8 % (ref 0.3–6.2)
ERYTHROCYTE [DISTWIDTH] IN BLOOD BY AUTOMATED COUNT: 17.7 % (ref 12.3–15.4)
GFR SERPL CREATININE-BSD FRML MDRD: >150 ML/MIN/1.73
GLOBULIN UR ELPH-MCNC: 2.9 GM/DL
GLUCOSE BLDC GLUCOMTR-MCNC: 100 MG/DL (ref 70–130)
GLUCOSE BLDC GLUCOMTR-MCNC: 144 MG/DL (ref 70–130)
GLUCOSE SERPL-MCNC: 96 MG/DL (ref 65–99)
HCT VFR BLD AUTO: 26.1 % (ref 37.5–51)
HGB BLD-MCNC: 8.7 G/DL (ref 13–17.7)
HGB RETIC QN AUTO: 29.1 PG (ref 29.8–36.1)
IMM GRANULOCYTES # BLD AUTO: 0.03 10*3/MM3 (ref 0–0.05)
IMM GRANULOCYTES NFR BLD AUTO: 0.5 % (ref 0–0.5)
IMM RETICS NFR: 12.1 % (ref 3–15.8)
INR PPP: 1.25 (ref 0.9–1.1)
LDH SERPL-CCNC: 201 U/L (ref 135–225)
LYMPHOCYTES # BLD AUTO: 0.82 10*3/MM3 (ref 0.7–3.1)
LYMPHOCYTES NFR BLD AUTO: 12.6 % (ref 19.6–45.3)
MCH RBC QN AUTO: 26.9 PG (ref 26.6–33)
MCHC RBC AUTO-ENTMCNC: 33.3 G/DL (ref 31.5–35.7)
MCV RBC AUTO: 80.6 FL (ref 79–97)
MONOCYTES # BLD AUTO: 0.33 10*3/MM3 (ref 0.1–0.9)
MONOCYTES NFR BLD AUTO: 5.1 % (ref 5–12)
NEUTROPHILS NFR BLD AUTO: 5.14 10*3/MM3 (ref 1.7–7)
NEUTROPHILS NFR BLD AUTO: 78.8 % (ref 42.7–76)
NRBC BLD AUTO-RTO: 0 /100 WBC (ref 0–0.2)
PLATELET # BLD AUTO: 311 10*3/MM3 (ref 140–450)
PMV BLD AUTO: 8.8 FL (ref 6–12)
POTASSIUM SERPL-SCNC: 2.9 MMOL/L (ref 3.5–5.2)
PROT SERPL-MCNC: 6.3 G/DL (ref 6–8.5)
PROTHROMBIN TIME: 15.5 SECONDS (ref 11.7–14.2)
RBC # BLD AUTO: 3.24 10*6/MM3 (ref 4.14–5.8)
RETICS # AUTO: 0.17 10*6/MM3 (ref 0.02–0.13)
RETICS/RBC NFR AUTO: 5.29 % (ref 0.7–1.9)
SODIUM SERPL-SCNC: 137 MMOL/L (ref 136–145)
VANCOMYCIN TROUGH SERPL-MCNC: 10.1 MCG/ML (ref 5–20)
WBC # BLD AUTO: 6.51 10*3/MM3 (ref 3.4–10.8)

## 2021-02-05 PROCEDURE — 99232 SBSQ HOSP IP/OBS MODERATE 35: CPT | Performed by: INTERNAL MEDICINE

## 2021-02-05 PROCEDURE — 94003 VENT MGMT INPAT SUBQ DAY: CPT

## 2021-02-05 PROCEDURE — 82962 GLUCOSE BLOOD TEST: CPT

## 2021-02-05 PROCEDURE — 71045 X-RAY EXAM CHEST 1 VIEW: CPT

## 2021-02-05 PROCEDURE — 85046 RETICYTE/HGB CONCENTRATE: CPT | Performed by: INTERNAL MEDICINE

## 2021-02-05 PROCEDURE — 83615 LACTATE (LD) (LDH) ENZYME: CPT | Performed by: INTERNAL MEDICINE

## 2021-02-05 PROCEDURE — 25010000002 MORPHINE PER 10 MG: Performed by: HOSPITALIST

## 2021-02-05 PROCEDURE — 85730 THROMBOPLASTIN TIME PARTIAL: CPT | Performed by: INTERNAL MEDICINE

## 2021-02-05 PROCEDURE — 25010000002 VANCOMYCIN 10 G RECONSTITUTED SOLUTION: Performed by: HOSPITALIST

## 2021-02-05 PROCEDURE — 80053 COMPREHEN METABOLIC PANEL: CPT | Performed by: INTERNAL MEDICINE

## 2021-02-05 PROCEDURE — 85610 PROTHROMBIN TIME: CPT | Performed by: INTERNAL MEDICINE

## 2021-02-05 PROCEDURE — 85025 COMPLETE CBC W/AUTO DIFF WBC: CPT | Performed by: INTERNAL MEDICINE

## 2021-02-05 PROCEDURE — 94799 UNLISTED PULMONARY SVC/PX: CPT

## 2021-02-05 PROCEDURE — 25010000002 PIPERACILLIN SOD-TAZOBACTAM PER 1 G: Performed by: INTERNAL MEDICINE

## 2021-02-05 PROCEDURE — 25010000002 VANCOMYCIN PER 500 MG: Performed by: HOSPITALIST

## 2021-02-05 PROCEDURE — 80202 ASSAY OF VANCOMYCIN: CPT | Performed by: HOSPITALIST

## 2021-02-05 PROCEDURE — 94668 MNPJ CHEST WALL SBSQ: CPT

## 2021-02-05 RX ORDER — CYCLOBENZAPRINE HCL 10 MG
10 TABLET ORAL DAILY
Status: DISCONTINUED | OUTPATIENT
Start: 2021-02-05 | End: 2021-02-22

## 2021-02-05 RX ORDER — PANTOPRAZOLE SODIUM 40 MG/1
40 TABLET, DELAYED RELEASE ORAL EVERY MORNING
Status: DISCONTINUED | OUTPATIENT
Start: 2021-02-05 | End: 2021-02-05

## 2021-02-05 RX ORDER — ASCORBIC ACID 500 MG
1000 TABLET ORAL DAILY
Status: DISCONTINUED | OUTPATIENT
Start: 2021-02-05 | End: 2021-02-22

## 2021-02-05 RX ORDER — FENTANYL 25 UG/H
1 PATCH TRANSDERMAL
Status: DISCONTINUED | OUTPATIENT
Start: 2021-02-05 | End: 2021-02-23 | Stop reason: HOSPADM

## 2021-02-05 RX ORDER — ALPRAZOLAM 0.5 MG/1
1 TABLET ORAL EVERY 8 HOURS PRN
Status: DISCONTINUED | OUTPATIENT
Start: 2021-02-05 | End: 2021-02-11

## 2021-02-05 RX ORDER — HYDRALAZINE HYDROCHLORIDE 20 MG/ML
10 INJECTION INTRAMUSCULAR; INTRAVENOUS EVERY 4 HOURS PRN
Status: DISCONTINUED | OUTPATIENT
Start: 2021-02-05 | End: 2021-02-23 | Stop reason: HOSPADM

## 2021-02-05 RX ORDER — GABAPENTIN 400 MG/1
800 CAPSULE ORAL EVERY 8 HOURS SCHEDULED
Status: DISCONTINUED | OUTPATIENT
Start: 2021-02-05 | End: 2021-02-18 | Stop reason: ALTCHOICE

## 2021-02-05 RX ORDER — LABETALOL HYDROCHLORIDE 5 MG/ML
10 INJECTION, SOLUTION INTRAVENOUS EVERY 4 HOURS PRN
Status: DISCONTINUED | OUTPATIENT
Start: 2021-02-05 | End: 2021-02-23 | Stop reason: HOSPADM

## 2021-02-05 RX ORDER — SERTRALINE HYDROCHLORIDE 100 MG/1
100 TABLET, FILM COATED ORAL DAILY
Status: DISCONTINUED | OUTPATIENT
Start: 2021-02-05 | End: 2021-02-22

## 2021-02-05 RX ORDER — ASPIRIN 81 MG/1
81 TABLET, CHEWABLE ORAL DAILY
Status: DISCONTINUED | OUTPATIENT
Start: 2021-02-05 | End: 2021-02-23 | Stop reason: HOSPADM

## 2021-02-05 RX ORDER — ASPIRIN 81 MG/1
81 TABLET ORAL DAILY
Status: DISCONTINUED | OUTPATIENT
Start: 2021-02-05 | End: 2021-02-05

## 2021-02-05 RX ORDER — OXYCODONE HCL 5 MG/5 ML
5 SOLUTION, ORAL ORAL EVERY 4 HOURS PRN
Status: DISCONTINUED | OUTPATIENT
Start: 2021-02-05 | End: 2021-02-18

## 2021-02-05 RX ORDER — LANSOPRAZOLE
30 KIT EVERY MORNING
Status: DISCONTINUED | OUTPATIENT
Start: 2021-02-05 | End: 2021-02-23 | Stop reason: HOSPADM

## 2021-02-05 RX ORDER — HYDROXYZINE HYDROCHLORIDE 25 MG/1
25 TABLET, FILM COATED ORAL DAILY
Status: DISCONTINUED | OUTPATIENT
Start: 2021-02-05 | End: 2021-02-22

## 2021-02-05 RX ADMIN — OXYCODONE HYDROCHLORIDE 5 MG: 5 SOLUTION ORAL at 17:51

## 2021-02-05 RX ADMIN — DEXMEDETOMIDINE HYDROCHLORIDE 0.4 MCG/KG/HR: 100 INJECTION, SOLUTION, CONCENTRATE INTRAVENOUS at 03:45

## 2021-02-05 RX ADMIN — HYDROXYZINE HYDROCHLORIDE 25 MG: 25 TABLET ORAL at 13:46

## 2021-02-05 RX ADMIN — DEXMEDETOMIDINE HYDROCHLORIDE 0.4 MCG/KG/HR: 100 INJECTION, SOLUTION, CONCENTRATE INTRAVENOUS at 16:26

## 2021-02-05 RX ADMIN — VANCOMYCIN HYDROCHLORIDE 1000 MG: 1 INJECTION, SOLUTION INTRAVENOUS at 12:15

## 2021-02-05 RX ADMIN — MORPHINE SULFATE 4 MG: 2 INJECTION, SOLUTION INTRAMUSCULAR; INTRAVENOUS at 19:09

## 2021-02-05 RX ADMIN — MORPHINE SULFATE 4 MG: 2 INJECTION, SOLUTION INTRAMUSCULAR; INTRAVENOUS at 08:28

## 2021-02-05 RX ADMIN — TAZOBACTAM SODIUM AND PIPERACILLIN SODIUM 3.38 G: 375; 3 INJECTION, SOLUTION INTRAVENOUS at 08:28

## 2021-02-05 RX ADMIN — Medication: at 08:31

## 2021-02-05 RX ADMIN — SODIUM CHLORIDE 75 ML/HR: 9 INJECTION, SOLUTION INTRAVENOUS at 08:28

## 2021-02-05 RX ADMIN — MORPHINE SULFATE 4 MG: 2 INJECTION, SOLUTION INTRAMUSCULAR; INTRAVENOUS at 11:27

## 2021-02-05 RX ADMIN — ALPRAZOLAM 1 MG: 0.5 TABLET ORAL at 20:09

## 2021-02-05 RX ADMIN — VANCOMYCIN HYDROCHLORIDE 1000 MG: 1 INJECTION, SOLUTION INTRAVENOUS at 03:46

## 2021-02-05 RX ADMIN — FENTANYL 1 PATCH: 25 PATCH TRANSDERMAL at 11:28

## 2021-02-05 RX ADMIN — CYCLOBENZAPRINE 10 MG: 10 TABLET, FILM COATED ORAL at 13:46

## 2021-02-05 RX ADMIN — ALPRAZOLAM 1 MG: 0.5 TABLET ORAL at 11:27

## 2021-02-05 RX ADMIN — GABAPENTIN 800 MG: 400 CAPSULE ORAL at 22:09

## 2021-02-05 RX ADMIN — OXYCODONE HYDROCHLORIDE AND ACETAMINOPHEN 1000 MG: 500 TABLET ORAL at 13:46

## 2021-02-05 RX ADMIN — VANCOMYCIN HYDROCHLORIDE 1250 MG: 10 INJECTION, POWDER, LYOPHILIZED, FOR SOLUTION INTRAVENOUS at 20:05

## 2021-02-05 RX ADMIN — TAZOBACTAM SODIUM AND PIPERACILLIN SODIUM 3.38 G: 375; 3 INJECTION, SOLUTION INTRAVENOUS at 17:57

## 2021-02-05 RX ADMIN — LANSOPRAZOLE 30 MG: KIT at 13:46

## 2021-02-05 RX ADMIN — MORPHINE SULFATE 4 MG: 2 INJECTION, SOLUTION INTRAMUSCULAR; INTRAVENOUS at 15:15

## 2021-02-05 RX ADMIN — MORPHINE SULFATE 4 MG: 2 INJECTION, SOLUTION INTRAMUSCULAR; INTRAVENOUS at 03:46

## 2021-02-05 RX ADMIN — TAZOBACTAM SODIUM AND PIPERACILLIN SODIUM 3.38 G: 375; 3 INJECTION, SOLUTION INTRAVENOUS at 00:15

## 2021-02-05 RX ADMIN — GABAPENTIN 800 MG: 400 CAPSULE ORAL at 13:47

## 2021-02-05 RX ADMIN — SERTRALINE 100 MG: 100 TABLET, FILM COATED ORAL at 13:47

## 2021-02-05 NOTE — PROGRESS NOTES
Vancomycin Pharmacokinetic Note    Maxim Carvajal is a 35 y.o. male who is on day 4 pharmacy to dose vancomycin for HAP.  Target level: AUC24 400-600  Consulting Provider: Joshua Vasquez MD  Current Vancomycin Dose: 1000 mg (~14 mg/kg) IV every 8 hours   Other Antimicrobials: Zosyn 3.375 g IV q8h infused over 4 hours     Allergies  Allergies as of 02/02/2021   • (No Known Allergies)       Microbiology  Microbiology Results (last 10 days)     Procedure Component Value - Date/Time    Respiratory Culture - Lavage, Lung, Right Middle Lobe [[id number]] Collected: 02/04/21 1837    Lab Status: Preliminary result Specimen: Lavage from Lung, Right Middle Lobe Updated: 02/05/21 0928     Respiratory Culture No growth     Gram Stain No WBCs or organisms seen      No epithelial cells seen    Respiratory Culture - Sputum, ET Suction [[id number]]  (Abnormal)  (Susceptibility) Collected: 02/02/21 2250    Lab Status: Preliminary result Specimen: Sputum from ET Suction Updated: 02/05/21 0921     Respiratory Culture Moderate growth (3+) Pseudomonas aeruginosa      Light growth (2+) Staphylococcus aureus, MRSA     Comment: Methicillin resistant Staphylococcus aureus, Patient may be an isolation risk.    Mics to follow 2/5/21         No Normal Respiratory Lisa     Gram Stain No WBCs or organisms seen      No epithelial cells seen    Narrative:      2/5/21 Pip/Tazo to follow    Susceptibility      Pseudomonas aeruginosa     YOGI (Preliminary)     Cefepime Susceptible     Ceftazidime Susceptible     Ciprofloxacin Resistant     Gentamicin Susceptible     Levofloxacin Resistant                    MRSA Screen, PCR (Inpatient) - Swab, Nares [[id number]]  (Abnormal) Collected: 02/02/21 2120    Lab Status: Final result Specimen: Swab from Nares Updated: 02/02/21 2350     MRSA PCR MRSA Detected    Blood Culture - Blood, Arm, Left [[id number]] Collected: 02/02/21 0656    Lab Status: Preliminary result Specimen: Blood from Arm, Left Updated:  02/05/21 0715     Blood Culture No growth at 3 days    Blood Culture - Blood, Arm, Left [[id number]]  (Abnormal) Collected: 02/02/21 0656    Lab Status: Final result Specimen: Blood from Arm, Left Updated: 02/04/21 0921     Blood Culture Staphylococcus, coagulase negative     Comment: Probable contaminant requires clinical correlation, susceptibility not performed unless requested by physician.          Isolated from Anaerobic Bottle     Gram Stain Anaerobic Bottle Gram positive cocci in clusters    Blood Culture ID, PCR - Blood, Arm, Left [[id number]]  (Abnormal) Collected: 02/02/21 0656    Lab Status: Edited Result - FINAL Specimen: Blood from Arm, Left Updated: 02/03/21 1202     BCID, PCR Staphylococcus spp, not aureus. Identification by BCID PCR.     BOTTLE TYPE Anaerobic Bottle     Comment: Appended report. These results have been appended to a previously final verified report.       Respiratory Panel PCR w/COVID-19(SARS-CoV-2) BG/RAZIA/BIBI/PAD/COR/MAD/[facility] In-House, NP Swab in UTM/VTM, 3-4 HR TAT - Swab, Nasopharynx [[id number]]  (Normal) Collected: 02/02/21 0641    Lab Status: Final result Specimen: Swab from Nasopharynx Updated: 02/02/21 0822     ADENOVIRUS, PCR Not Detected     Coronavirus 229E Not Detected     Coronavirus HKU1 Not Detected     Coronavirus NL63 Not Detected     Coronavirus OC43 Not Detected     COVID19 Not Detected     Human Metapneumovirus Not Detected     Human Rhinovirus/Enterovirus Not Detected     Influenza A PCR Not Detected     Influenza B PCR Not Detected     Parainfluenza Virus 1 Not Detected     Parainfluenza Virus 2 Not Detected     Parainfluenza Virus 3 Not Detected     Parainfluenza Virus 4 Not Detected     RSV, PCR Not Detected     Bordetella pertussis pcr Not Detected     Bordetella parapertussis PCR Not Detected     Chlamydophila pneumoniae PCR Not Detected     Mycoplasma pneumo by PCR Not Detected    Narrative:      Fact sheet for providers:  "https://docs.ROIÂ²/wp-content/uploads/UGK9655-7958-HO6.1-EUA-Provider-Fact-Sheet-3.pdf    Fact sheet for patients: https://docs.ROIÂ²/wp-content/uploads/NQT0542-3998-MV6.1-EUA-Patient-Fact-Sheet-1.pdf    Test performed by PCR.        Vitals/Labs/I&O  182.9 cm (72\")  73.5 kg (162 lb)  Body mass index is 21.97 kg/m².   Temp:  [98 °F (36.7 °C)-98.5 °F (36.9 °C)] 98.4 °F (36.9 °C)  Heart Rate:  [48-98] 60  Resp:  [19-26] 26  BP: (103-171)/() 171/93  FiO2 (%):  [30 %-99 %] 30 %    Results from last 7 days   Lab Units 02/05/21  0755 02/04/21  0407 02/03/21  0612   WBC 10*3/mm3 6.51 9.54 8.49     Results from last 7 days   Lab Units 02/04/21  0515 02/02/21  0608   LACTATE mmol/L 0.6 0.7      Results from last 7 days   Lab Units 02/04/21  0837 02/02/21  0608   PROCALCITONIN ng/mL 0.07 0.12                  Estimated Creatinine Clearance: 457.2 mL/min (A) (by C-G formula based on SCr of 0.23 mg/dL (L)).  Results from last 7 days   Lab Units 02/05/21  0755 02/04/21  0515 02/04/21  0407   BUN mg/dL 5* 4* 5*   CREATININE mg/dL 0.23* 0.21* 0.18*     Intake & Output (last 3 days)       02/02 0701 - 02/03 0700 02/03 0701 - 02/04 0700 02/04 0701 - 02/05 0700 02/05 0701 - 02/06 0700    P.O.   0     I.V. (mL/kg) 1195.2 (16.3)  950 (12.9)     Blood 600       Other 80 380 150     NG/GT  480 240     IV Piggyback 2850 250 50     Total Intake(mL/kg) 4725.2 (64.3) 1110 (15.1) 1390 (18.9)     Urine (mL/kg/hr) 1000 (0.6) 1550 (0.9) 600 (0.3)     Stool  0 0     Total Output 1000 1550 600     Net +3725.2 -440 +790             Urine Unmeasured Occurrence 1 x 3 x 1 x     Stool Unmeasured Occurrence  1 x 3 x         Vancomycin Dosing & Level History  1500 mg (~20 mg/kg) IV x1 dose    02/02 1408  1250 mg (17 mg/kg) IV q12h    02/03 0248   02/03 1531   02/04 0332    Trough 02/04 1459: 4.8 mg/L   1000 mg (~14 mg/kg) IV q8h (tommie 0300, 1100, 1900)   02/04 1924   02/05 0346    Trough 02/05 1024: 10.1 mg/L    02/05 " "1215    Results from last 7 days   Lab Units 02/05/21  1024 02/04/21  1459   VANCOMYCIN TR mcg/mL 10.10 4.80*     Assessment:  Bayesian analysis of the most recent level(s) using [employer] [facility]RX provides the following patient-specific pharmacokinetic parameters:   CL: 7.42 L/h   Vd: 43.5 L   T1/2: 4.58 h  If the predicted trough on this regimen is not within what was previously considered a \"target trough range\", the AUC24 (a stronger predictor of vancomycin efficacy) is predicted to achieve the accepted target of 400-600 mg*h/L. To best balance efficacy and toxicity, we will be targeting AUC24 in this patient rather than steady-state trough levels.     Increasing the regimen to 1250 mg (17 mg/kg) IV every 8 hours gives a predicted steady-state trough of 12.7 mg/L and AUC24 of 505 mg*h/L.    Recommendations/Plan:  - Increase vancomycin regimen to 1250 mg (17 mg/kg) IV every 8 hours  - Obtain vancomycin level tomorrow 30 mins prior to the 3rd overall dose of new regimen  - Continue to monitor SCr    Thank you for involving pharmacy in this patient's care. Please contact pharmacy with any questions or concerns.           Damir Wolfe PharmD, EUGENIO, BCPS  02/05/21 11:05 EST      "
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
## 2021-02-05 NOTE — PROGRESS NOTES
Continued Stay Note  [facility]     Patient Name: Maxim Carvajal  MRN: [id number]  Today's Date: 2/5/2021    Admit Date: 2/2/2021    Discharge Plan     Row Name 02/05/21 0838       Plan    Plan  Discharge plans pending clinical course. Pt is from [facility]  He may return    Plan Comments  Pt remains in ICU on vent  CCP following for DC needs as patient’s clinical course evolves        Discharge Codes    No documentation.             Noelle Contreras RN

## 2021-02-05 NOTE — PROGRESS NOTES
[facility]    Patient transferred to the ICU yesterday.  Still vent dependent today.  Results of abdominal CT noted to show gluteal hematoma.  For now will defer care to intensivist and oncology.  Appreciate their assistance.  [facility] will follow peripherally and resume care once out of the ICU.    Ginacarlo Radford MD
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
## 2021-02-05 NOTE — PLAN OF CARE
Goal Outcome Evaluation:  Plan of Care Reviewed With: patient  Progress: no change  Outcome Summary: Pt remains in ICU on vent and precedex. Pt vagal to HR of 27 at 2000 and dropped sats to 70. Quickly recovered. Other VS stable. Prn morphine x2. Diarrhea x2. Frequent suctioning, secretions increasing throughout shift. Patient benefited a lot from chest physiotherapy. WCTM

## 2021-02-06 LAB
ALBUMIN SERPL-MCNC: 3.1 G/DL (ref 3.5–5.2)
ANION GAP SERPL CALCULATED.3IONS-SCNC: 10.1 MMOL/L (ref 5–15)
BACTERIA SPEC RESP CULT: ABNORMAL
BASOPHILS # BLD AUTO: 0.01 10*3/MM3 (ref 0–0.2)
BASOPHILS NFR BLD AUTO: 0.1 % (ref 0–1.5)
BUN SERPL-MCNC: 10 MG/DL (ref 6–20)
BUN/CREAT SERPL: 22.2 (ref 7–25)
CALCIUM SPEC-SCNC: 8.7 MG/DL (ref 8.6–10.5)
CHLORIDE SERPL-SCNC: 109 MMOL/L (ref 98–107)
CO2 SERPL-SCNC: 22.9 MMOL/L (ref 22–29)
CREAT SERPL-MCNC: 0.45 MG/DL (ref 0.76–1.27)
DEPRECATED RDW RBC AUTO: 54.8 FL (ref 37–54)
EOSINOPHIL # BLD AUTO: 0.14 10*3/MM3 (ref 0–0.4)
EOSINOPHIL NFR BLD AUTO: 1.9 % (ref 0.3–6.2)
ERYTHROCYTE [DISTWIDTH] IN BLOOD BY AUTOMATED COUNT: 18 % (ref 12.3–15.4)
GFR SERPL CREATININE-BSD FRML MDRD: >150 ML/MIN/1.73
GLUCOSE BLDC GLUCOMTR-MCNC: 116 MG/DL (ref 70–130)
GLUCOSE BLDC GLUCOMTR-MCNC: 126 MG/DL (ref 70–130)
GLUCOSE BLDC GLUCOMTR-MCNC: 139 MG/DL (ref 70–130)
GLUCOSE BLDC GLUCOMTR-MCNC: 144 MG/DL (ref 70–130)
GLUCOSE SERPL-MCNC: 126 MG/DL (ref 65–99)
GRAM STN SPEC: ABNORMAL
HCT VFR BLD AUTO: 27.4 % (ref 37.5–51)
HGB BLD-MCNC: 8.8 G/DL (ref 13–17.7)
HGB RETIC QN AUTO: 30.7 PG (ref 29.8–36.1)
IMM GRANULOCYTES # BLD AUTO: 0.03 10*3/MM3 (ref 0–0.05)
IMM GRANULOCYTES NFR BLD AUTO: 0.4 % (ref 0–0.5)
IMM RETICS NFR: 17 % (ref 3–15.8)
LYMPHOCYTES # BLD AUTO: 1.05 10*3/MM3 (ref 0.7–3.1)
LYMPHOCYTES NFR BLD AUTO: 14.1 % (ref 19.6–45.3)
MCH RBC QN AUTO: 27 PG (ref 26.6–33)
MCHC RBC AUTO-ENTMCNC: 32.1 G/DL (ref 31.5–35.7)
MCV RBC AUTO: 84 FL (ref 79–97)
MONOCYTES # BLD AUTO: 0.48 10*3/MM3 (ref 0.1–0.9)
MONOCYTES NFR BLD AUTO: 6.4 % (ref 5–12)
NEUTROPHILS NFR BLD AUTO: 5.74 10*3/MM3 (ref 1.7–7)
NEUTROPHILS NFR BLD AUTO: 77.1 % (ref 42.7–76)
NRBC BLD AUTO-RTO: 0 /100 WBC (ref 0–0.2)
PHOSPHATE SERPL-MCNC: 3.7 MG/DL (ref 2.5–4.5)
PLATELET # BLD AUTO: 282 10*3/MM3 (ref 140–450)
PMV BLD AUTO: 9 FL (ref 6–12)
POTASSIUM SERPL-SCNC: 3.3 MMOL/L (ref 3.5–5.2)
RBC # BLD AUTO: 3.26 10*6/MM3 (ref 4.14–5.8)
RETICS # AUTO: 0.16 10*6/MM3 (ref 0.02–0.13)
RETICS/RBC NFR AUTO: 4.89 % (ref 0.7–1.9)
SODIUM SERPL-SCNC: 142 MMOL/L (ref 136–145)
VANCOMYCIN TROUGH SERPL-MCNC: 18.4 MCG/ML (ref 5–20)
WBC # BLD AUTO: 7.45 10*3/MM3 (ref 3.4–10.8)

## 2021-02-06 PROCEDURE — 25010000002 MORPHINE PER 10 MG: Performed by: INTERNAL MEDICINE

## 2021-02-06 PROCEDURE — 25010000002 PIPERACILLIN SOD-TAZOBACTAM PER 1 G: Performed by: INTERNAL MEDICINE

## 2021-02-06 PROCEDURE — 94799 UNLISTED PULMONARY SVC/PX: CPT

## 2021-02-06 PROCEDURE — 82962 GLUCOSE BLOOD TEST: CPT

## 2021-02-06 PROCEDURE — 99232 SBSQ HOSP IP/OBS MODERATE 35: CPT | Performed by: INTERNAL MEDICINE

## 2021-02-06 PROCEDURE — 85046 RETICYTE/HGB CONCENTRATE: CPT | Performed by: INTERNAL MEDICINE

## 2021-02-06 PROCEDURE — 94003 VENT MGMT INPAT SUBQ DAY: CPT

## 2021-02-06 PROCEDURE — 85025 COMPLETE CBC W/AUTO DIFF WBC: CPT | Performed by: INTERNAL MEDICINE

## 2021-02-06 PROCEDURE — 25010000002 VANCOMYCIN 10 G RECONSTITUTED SOLUTION: Performed by: HOSPITALIST

## 2021-02-06 PROCEDURE — 80202 ASSAY OF VANCOMYCIN: CPT | Performed by: HOSPITALIST

## 2021-02-06 PROCEDURE — 25010000002 MORPHINE PER 10 MG: Performed by: HOSPITALIST

## 2021-02-06 PROCEDURE — 80069 RENAL FUNCTION PANEL: CPT | Performed by: INTERNAL MEDICINE

## 2021-02-06 RX ORDER — MORPHINE SULFATE 2 MG/ML
2 INJECTION, SOLUTION INTRAMUSCULAR; INTRAVENOUS EVERY 4 HOURS PRN
Status: DISPENSED | OUTPATIENT
Start: 2021-02-06 | End: 2021-02-09

## 2021-02-06 RX ORDER — POTASSIUM CHLORIDE 7.45 MG/ML
10 INJECTION INTRAVENOUS
Status: DISCONTINUED | OUTPATIENT
Start: 2021-02-06 | End: 2021-02-23 | Stop reason: HOSPADM

## 2021-02-06 RX ORDER — POTASSIUM CHLORIDE 1.5 G/1.77G
40 POWDER, FOR SOLUTION ORAL AS NEEDED
Status: DISCONTINUED | OUTPATIENT
Start: 2021-02-06 | End: 2021-02-22

## 2021-02-06 RX ORDER — ALPRAZOLAM 0.5 MG/1
1 TABLET ORAL 3 TIMES DAILY PRN
Status: DISCONTINUED | OUTPATIENT
Start: 2021-02-06 | End: 2021-02-09

## 2021-02-06 RX ORDER — POTASSIUM CHLORIDE 750 MG/1
40 TABLET, FILM COATED, EXTENDED RELEASE ORAL AS NEEDED
Status: DISCONTINUED | OUTPATIENT
Start: 2021-02-06 | End: 2021-02-23 | Stop reason: HOSPADM

## 2021-02-06 RX ADMIN — MORPHINE SULFATE 2 MG: 2 INJECTION, SOLUTION INTRAMUSCULAR; INTRAVENOUS at 20:02

## 2021-02-06 RX ADMIN — DOCUSATE SODIUM 50MG AND SENNOSIDES 8.6MG 2 TABLET: 8.6; 5 TABLET, FILM COATED ORAL at 20:00

## 2021-02-06 RX ADMIN — OXYCODONE HYDROCHLORIDE 5 MG: 5 SOLUTION ORAL at 08:41

## 2021-02-06 RX ADMIN — GABAPENTIN 800 MG: 400 CAPSULE ORAL at 08:40

## 2021-02-06 RX ADMIN — OXYCODONE HYDROCHLORIDE AND ACETAMINOPHEN 1000 MG: 500 TABLET ORAL at 08:40

## 2021-02-06 RX ADMIN — VANCOMYCIN HYDROCHLORIDE 1250 MG: 10 INJECTION, POWDER, LYOPHILIZED, FOR SOLUTION INTRAVENOUS at 04:39

## 2021-02-06 RX ADMIN — MORPHINE SULFATE 4 MG: 2 INJECTION, SOLUTION INTRAMUSCULAR; INTRAVENOUS at 00:15

## 2021-02-06 RX ADMIN — TAZOBACTAM SODIUM AND PIPERACILLIN SODIUM 3.38 G: 375; 3 INJECTION, SOLUTION INTRAVENOUS at 00:15

## 2021-02-06 RX ADMIN — VANCOMYCIN HYDROCHLORIDE 1250 MG: 10 INJECTION, POWDER, LYOPHILIZED, FOR SOLUTION INTRAVENOUS at 20:00

## 2021-02-06 RX ADMIN — MORPHINE SULFATE 4 MG: 2 INJECTION, SOLUTION INTRAMUSCULAR; INTRAVENOUS at 04:49

## 2021-02-06 RX ADMIN — DEXMEDETOMIDINE HYDROCHLORIDE 0.7 MCG/KG/HR: 100 INJECTION, SOLUTION, CONCENTRATE INTRAVENOUS at 01:19

## 2021-02-06 RX ADMIN — DEXMEDETOMIDINE HYDROCHLORIDE 0.7 MCG/KG/HR: 100 INJECTION, SOLUTION, CONCENTRATE INTRAVENOUS at 11:47

## 2021-02-06 RX ADMIN — ASPIRIN 81 MG: 81 TABLET, CHEWABLE ORAL at 08:41

## 2021-02-06 RX ADMIN — ALPRAZOLAM 1 MG: 0.5 TABLET ORAL at 17:07

## 2021-02-06 RX ADMIN — PROPRANOLOL HYDROCHLORIDE 20 MG: 20 TABLET ORAL at 20:00

## 2021-02-06 RX ADMIN — PROPRANOLOL HYDROCHLORIDE 20 MG: 20 TABLET ORAL at 08:41

## 2021-02-06 RX ADMIN — Medication: at 08:59

## 2021-02-06 RX ADMIN — SODIUM CHLORIDE 75 ML/HR: 9 INJECTION, SOLUTION INTRAVENOUS at 23:28

## 2021-02-06 RX ADMIN — GABAPENTIN 800 MG: 400 CAPSULE ORAL at 22:16

## 2021-02-06 RX ADMIN — GABAPENTIN 800 MG: 400 CAPSULE ORAL at 16:09

## 2021-02-06 RX ADMIN — SODIUM CHLORIDE 75 ML/HR: 9 INJECTION, SOLUTION INTRAVENOUS at 08:58

## 2021-02-06 RX ADMIN — TAZOBACTAM SODIUM AND PIPERACILLIN SODIUM 3.38 G: 375; 3 INJECTION, SOLUTION INTRAVENOUS at 08:41

## 2021-02-06 RX ADMIN — TAZOBACTAM SODIUM AND PIPERACILLIN SODIUM 3.38 G: 375; 3 INJECTION, SOLUTION INTRAVENOUS at 23:28

## 2021-02-06 RX ADMIN — SERTRALINE 100 MG: 100 TABLET, FILM COATED ORAL at 08:40

## 2021-02-06 RX ADMIN — MORPHINE SULFATE 4 MG: 2 INJECTION, SOLUTION INTRAMUSCULAR; INTRAVENOUS at 12:52

## 2021-02-06 RX ADMIN — PROPRANOLOL HYDROCHLORIDE 20 MG: 20 TABLET ORAL at 16:22

## 2021-02-06 RX ADMIN — OXYCODONE HYDROCHLORIDE 5 MG: 5 SOLUTION ORAL at 21:04

## 2021-02-06 RX ADMIN — VANCOMYCIN HYDROCHLORIDE 1250 MG: 10 INJECTION, POWDER, LYOPHILIZED, FOR SOLUTION INTRAVENOUS at 12:52

## 2021-02-06 RX ADMIN — CYCLOBENZAPRINE 10 MG: 10 TABLET, FILM COATED ORAL at 08:41

## 2021-02-06 RX ADMIN — MORPHINE SULFATE 2 MG: 2 INJECTION, SOLUTION INTRAMUSCULAR; INTRAVENOUS at 23:38

## 2021-02-06 RX ADMIN — TAZOBACTAM SODIUM AND PIPERACILLIN SODIUM 3.38 G: 375; 3 INJECTION, SOLUTION INTRAVENOUS at 16:23

## 2021-02-06 RX ADMIN — HYDROXYZINE HYDROCHLORIDE 25 MG: 25 TABLET ORAL at 08:41

## 2021-02-06 RX ADMIN — LANSOPRAZOLE 30 MG: KIT at 08:40

## 2021-02-06 NOTE — PROGRESS NOTES
[facility]  Clinical Pharmacy Department     Vancomycin Pharmacokinetic Note    Maxim Carvajal is a 35 y.o. male who is on day 5 of pharmacy to dose vancomycin for HAP.    Target level: AUC24 400-600  Consulting Provider:  Dr. Vasquez  Current Vancomycin Dose:   1250 mg (17 mg/kg) IV every  8  hours  Planned Duration of Therapy: 7 days  Other Antimicrobials: pip/tazo    Allergies  Allergies as of 02/02/2021   • (No Known Allergies)       Microbiology:  Microbiology Results (last 10 days)       Procedure Component Value - Date/Time    Respiratory Culture - Lavage, Lung, Right Middle Lobe [[id number]] Collected: 02/04/21 1837    Lab Status: Preliminary result Specimen: Lavage from Lung, Right Middle Lobe Updated: 02/05/21 0928     Respiratory Culture No growth     Gram Stain No WBCs or organisms seen      No epithelial cells seen    Respiratory Culture - Sputum, ET Suction [[id number]]  (Abnormal)  (Susceptibility) Collected: 02/02/21 2250    Lab Status: Final result Specimen: Sputum from ET Suction Updated: 02/06/21 0916     Respiratory Culture Moderate growth (3+) Pseudomonas aeruginosa      Light growth (2+) Staphylococcus aureus, MRSA     Comment: Methicillin resistant Staphylococcus aureus, Patient may be an isolation risk         No Normal Respiratory Lisa     Gram Stain No WBCs or organisms seen      No epithelial cells seen    Susceptibility        Pseudomonas aeruginosa     YOGI     Cefepime Susceptible     Ceftazidime Susceptible     Ciprofloxacin Resistant     Gentamicin Susceptible     Levofloxacin Resistant     Piperacillin + Tazobactam Susceptible [1]               [1]   Appended report. These results have been appended to a previously preliminary verified report.               Susceptibility        Staphylococcus aureus, MRSA     YOGI     Clindamycin Resistant     Linezolid Susceptible     Oxacillin Resistant     Penicillin G Resistant     Tetracycline Susceptible     Trimethoprim + Sulfamethoxazole  Susceptible     Vancomycin Susceptible                      MRSA Screen, PCR (Inpatient) - Swab, Nares [[id number]]  (Abnormal) Collected: 02/02/21 2120    Lab Status: Final result Specimen: Swab from Nares Updated: 02/02/21 2350     MRSA PCR MRSA Detected    Blood Culture - Blood, Arm, Left [[id number]] Collected: 02/02/21 0656    Lab Status: Preliminary result Specimen: Blood from Arm, Left Updated: 02/06/21 0715     Blood Culture No growth at 4 days    Blood Culture - Blood, Arm, Left [[id number]]  (Abnormal) Collected: 02/02/21 0656    Lab Status: Final result Specimen: Blood from Arm, Left Updated: 02/04/21 0921     Blood Culture Staphylococcus, coagulase negative     Comment: Probable contaminant requires clinical correlation, susceptibility not performed unless requested by physician.          Isolated from Anaerobic Bottle     Gram Stain Anaerobic Bottle Gram positive cocci in clusters    Blood Culture ID, PCR - Blood, Arm, Left [[id number]]  (Abnormal) Collected: 02/02/21 0656    Lab Status: Edited Result - FINAL Specimen: Blood from Arm, Left Updated: 02/03/21 1202     BCID, PCR Staphylococcus spp, not aureus. Identification by BCID PCR.     BOTTLE TYPE Anaerobic Bottle     Comment: Appended report. These results have been appended to a previously final verified report.       Respiratory Panel PCR w/COVID-19(SARS-CoV-2) BG/RAZIA/BIBI/PAD/COR/MAD/LOUIS In-House, NP Swab in UTM/VTM, 3-4 HR TAT - Swab, Nasopharynx [[id number]]  (Normal) Collected: 02/02/21 0641    Lab Status: Final result Specimen: Swab from Nasopharynx Updated: 02/02/21 0822     ADENOVIRUS, PCR Not Detected     Coronavirus 229E Not Detected     Coronavirus HKU1 Not Detected     Coronavirus NL63 Not Detected     Coronavirus OC43 Not Detected     COVID19 Not Detected     Human Metapneumovirus Not Detected     Human Rhinovirus/Enterovirus Not Detected     Influenza A PCR Not Detected     Influenza B PCR Not Detected     Parainfluenza Virus 1 Not  "Detected     Parainfluenza Virus 2 Not Detected     Parainfluenza Virus 3 Not Detected     Parainfluenza Virus 4 Not Detected     RSV, PCR Not Detected     Bordetella pertussis pcr Not Detected     Bordetella parapertussis PCR Not Detected     Chlamydophila pneumoniae PCR Not Detected     Mycoplasma pneumo by PCR Not Detected    Narrative:      Fact sheet for providers: https://docs.[employer]/wp-content/uploads/CAS6357-8087-KL0.1-EUA-Provider-Fact-Sheet-3.pdf    Fact sheet for patients: https://docs.[employer]/wp-content/uploads/JDG7464-8591-DH3.1-EUA-Patient-Fact-Sheet-1.pdf    Test performed by PCR.            Radiology/Imaging:  Chest XR 2/4:  IMPRESSION:  Bilateral pleural effusions, left greater than right with  some bibasilar atelectasis and/or pneumonia.    Vitals/Labs/I&O  182.9 cm (72\")  73.5 kg (162 lb)  Body mass index is 21.97 kg/m².   [unfilled]    Results from last 7 days   Lab Units 02/06/21  0725 02/05/21  0755 02/04/21  0407   WBC 10*3/mm3 7.45 6.51 9.54     Results from last 7 days   Lab Units 02/04/21  0515 02/02/21  0608   LACTATE mmol/L 0.6 0.7      Results from last 7 days   Lab Units 02/04/21  0837 02/02/21  0608   PROCALCITONIN ng/mL 0.07 0.12                  Estimated Creatinine Clearance: 457.2 mL/min (A) (by C-G formula based on SCr of 0.23 mg/dL (L)).  Results from last 7 days   Lab Units 02/05/21  0755 02/04/21  0515 02/04/21  0407   BUN mg/dL 5* 4* 5*   CREATININE mg/dL 0.23* 0.21* 0.18*     Intake & Output (last 3 days)         02/03 0701 - 02/04 0700 02/04 0701 - 02/05 0700 02/05 0701 - 02/06 0700 02/06 0701 - 02/07 0700    P.O.  0      I.V. (mL/kg)  950 (12.9) 1974 (26.9)     Blood        Other 380 150 404 120    NG/ 240 784     IV Piggyback 250 50      Total Intake(mL/kg) 1110 (15.1) 1390 (18.9) 3162 (43) 120 (1.6)    Urine (mL/kg/hr) 1550 (0.9) 600 (0.3) 300 (0.2)     Stool 0 0      Total Output 1550 600 300     Net -440 +790 +2862 +120            Urine Unmeasured " "Occurrence 3 x 1 x 5 x 1 x    Stool Unmeasured Occurrence 1 x 3 x              Vancomycin Dosing and Level History:  Is Patient on Dialysis or Renal Replacement: no  Vancomycin Dosing & Level History  1500 mg (~20 mg/kg) IV x1 dose               02/02 1408  1250 mg (17 mg/kg) IV q12h               02/03 0248              02/03 1531              02/04 0332                          Trough 02/04 1459: 4.8 mg/L   1000 mg (~14 mg/kg) IV q8h (trent 0300, 1100, 1900)              02/04 1924              02/05 0346                          Trough 02/05 1024: 10.1 mg/L               02/05 1215  1250 mg (~17 mg/kg) IV q8h (Trent 2000/0400/1200)              02/06 0439 & 1256                      Trough @  1140 (7h) = 18.4            Results from last 7 days   Lab Units 02/06/21  1140 02/05/21  1024 02/04/21  1459   VANCOMYCIN TR mcg/mL 18.40 10.10 4.80*       Assessment/Plan:    Assessment:  Bayesian analysis of the most recent level(s) using [employer] provides the following patient-specific pharmacokinetic parameters:   CL: 0.0914 L/kg/h   Vd: 0.648 L/kg   T1/2: 5.4 h  If the predicted trough on this regimen is not within what was previously considered a \"target trough range\", the AUC24 (a stronger predictor of vancomycin efficacy) is predicted to achieve the accepted target of 400-600 mg*h/L. To best balance efficacy and toxicity, we will be targeting AUC24 in this patient rather than steady-state trough levels.     Continuing the regimen of 1250 mg (17 mg/kg) IV every 8 hours gives a predicted steady-state trough of 15.0 mg/L and AUC24 of 558 mg*h/L.    Recommendations/Plan:  - Continue current vancomycin regimen of 1250 mg (17 mg/kg) IV every 8 hours  - No additional levels planned unless acute changes in renal function or consult extended (would typically obtain level in 2 days, which is the last planned day of therapy)  - Continue to monitor Scr --> no BMP from today, BMP ordered for tomorrow am to assess SCr    Thank you " for involving pharmacy in this patient's care. Please contact pharmacy with any questions or concerns.                           Ivon Rai, PharmD, BCPS  Clinical Pharmacist, Infectious Diseases  02/06/21 12:47 EST

## 2021-02-06 NOTE — PROGRESS NOTES
"  Daily Progress Note.   [facility] INTENSIVE CARE  2/6/2021    Patient:  Name:  Maxim Carvajal  MRN:  [id number]  1985  35 y.o.  male         CC: Sepsis, hypoxemia, pneumonia with tracheitis and mucous plug  Respiratory failure  Quadriplegia    Interval History:  Patient tolerating pressure support he is awake alert.  He is on Precedex at present time.  Some mucus secretions suctioned today.  ROS: No fever, no diarrhea, no chest pain  PMFSSH: no change    Physical Exam:  /86   Pulse 61   Temp 98.2 °F (36.8 °C) (Oral)   Resp 22   Ht 182.9 cm (72\")   Wt 73.5 kg (162 lb)   SpO2 98%   BMI 21.97 kg/m²   Body mass index is 21.97 kg/m².    Intake/Output Summary (Last 24 hours) at 2/6/2021 1602  Last data filed at 2/6/2021 0815  Gross per 24 hour   Intake 3282 ml   Output 300 ml   Net 2982 ml   On mechanical ventilation pressure support the tracheostomy  General appearance:non toxic, conversant   Eyes: anicteric sclerae, moist conjunctivae; no lid-lag; PERRLA  HENT: Atraumatic; oropharynx clear with moist mucous membranes and no mucosal ulcerations; normal hard and soft palate  Neck: Trachea midline; +trachoeotomy  Lungs: no sig crackles symmetric mech air entry, with normal respiratory effort and no intercostal retractions  CV: RRR, no MRGs   Abdomen: Soft, non-tender; no masses or HSM  Extremities: No peripheral edema or extremity lymphadenopathy  Skin: wwp no diffuse rash  Psych: Appropriate affect, alert  Neuro nodes heads, mouths answers to questions    Data Review:  Notable Labs:  Results from last 7 days   Lab Units 02/06/21  0725 02/05/21  0755 02/04/21  0407 02/03/21  0612 02/02/21  0609   WBC 10*3/mm3 7.45 6.51 9.54 8.49 12.16*   HEMOGLOBIN g/dL 8.8* 8.7* 9.1* 8.4* 6.8*   PLATELETS 10*3/mm3 282 311 339 315 366     Results from last 7 days   Lab Units 02/05/21  0755 02/04/21  0515 02/04/21  0407 02/03/21  0612 02/02/21  0608   SODIUM mmol/L 137 135* 136 136 135*   POTASSIUM mmol/L " 2.9* 3.3* 3.3* 3.5 3.6   CHLORIDE mmol/L 107 105 103 103 97*   CO2 mmol/L 21.1* 14.0* 13.1* 14.0* 26.1   BUN mg/dL 5* 4* 5* 8 14   CREATININE mg/dL 0.23* 0.21* 0.18* 0.30* 0.31*   GLUCOSE mg/dL 96 83 79 66 93   CALCIUM mg/dL 9.1 9.3 9.1 8.7 9.5   Estimated Creatinine Clearance: 457.2 mL/min (A) (by C-G formula based on SCr of 0.23 mg/dL (L)).    Results from last 7 days   Lab Units 02/06/21  0725 02/05/21  0755 02/04/21  0837 02/04/21  0515 02/04/21  0407 02/03/21  2131  02/02/21  0742  02/02/21  0608   LDH U/L  --  201  --   --  245*  --   --  193  --   --    AST (SGOT) U/L  --  10  --   --  14  --   --   --   --  12   ALT (SGPT) U/L  --  11  --   --  10  --   --   --   --  16   PROCALCITONIN ng/mL  --   --  0.07  --   --   --   --   --   --  0.12   LACTATE mmol/L  --   --   --  0.6  --   --   --   --   --  0.7   FERRITIN ng/mL  --   --   --   --   --   --   --  615.00*  --   --    D DIMER QUANT MCGFEU/mL  --   --   --   --   --  3.90*  --   --   --   --    PLATELETS 10*3/mm3 282 311  --   --  339  --    < >  --    < >  --     < > = values in this interval not displayed.       Results from last 7 days   Lab Units 02/04/21  1628 02/04/21  0437 02/02/21  0845   PH, ARTERIAL pH units 7.348* 7.355 7.344*   PCO2, ARTERIAL mm Hg 36.2 27.0* 43.3   PO2 ART mm Hg 131.7* 63.8* 76.4*   HCO3 ART mmol/L 19.9* 15.1* 23.6       Imaging:  Reviewed chest images personally from past 3 days    Scheduled meds:    ascorbic acid, 1,000 mg, Oral, Daily  aspirin, 81 mg, Nasogastric, Daily  cyclobenzaprine, 10 mg, Oral, Daily  Eucerin original healing lotion, , Topical, Daily  fentaNYL, 1 patch, Transdermal, Q72H  gabapentin, 800 mg, Oral, Q8H  hydrOXYzine, 25 mg, Oral, Daily  lansoprazole, 30 mg, Nasogastric, QAM  piperacillin-tazobactam, 3.375 g, Intravenous, Q8H  propranolol, 20 mg, Per G Tube, TID  sennosides-docusate, 2 tablet, Oral, Nightly  sertraline, 100 mg, Oral, Daily  vancomycin, 1,250 mg, Intravenous, Q8H        ASSESSMENT  /   PLAN:  1. Tracheitis and pneumonia with mucous plugging status post bronchoscopy on 2/4/2021: Growing both Pseudomonas and MRSA  2. Exchange to a cuffed tracheostomy and now on the ventilator as of 2/4/2021  3. Bilateral pleural effusion left more than right  4. Acute hypoxemic respiratory failure  5. Sepsis  6. Quadriplegia  7. Anemia  8. Essential hypertension  9. Gluteal muscle bleed    Holding plavix due to acute hemorrhage  Prn transfusions  Cont abx follow mciro  bp control  Asa cont  Vent reviewed  Stop precedex, prn anxiolytics  Trach collar trial tomorrow, he refused today, discussed with rt  Reviewed vent  Total critical care time was 36 minutes, excluding any separately billable procedure time.  Time did not overlap with any other provider.    Claudio Sam MD  [facility]  02/06/21  16:02 EST
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
## 2021-02-07 LAB
ALBUMIN SERPL-MCNC: 3.1 G/DL (ref 3.5–5.2)
ANION GAP SERPL CALCULATED.3IONS-SCNC: 11.6 MMOL/L (ref 5–15)
ARTERIAL PATENCY WRIST A: POSITIVE
ATMOSPHERIC PRESS: 757 MMHG
BACTERIA SPEC AEROBE CULT: NORMAL
BASE EXCESS BLDA CALC-SCNC: 0.1 MMOL/L (ref 0–2)
BASOPHILS # BLD AUTO: 0.01 10*3/MM3 (ref 0–0.2)
BASOPHILS NFR BLD AUTO: 0.2 % (ref 0–1.5)
BDY SITE: NORMAL
BUN SERPL-MCNC: 16 MG/DL (ref 6–20)
BUN/CREAT SERPL: 21.9 (ref 7–25)
CALCIUM SPEC-SCNC: 8.9 MG/DL (ref 8.6–10.5)
CHLORIDE SERPL-SCNC: 110 MMOL/L (ref 98–107)
CO2 SERPL-SCNC: 23.4 MMOL/L (ref 22–29)
CREAT SERPL-MCNC: 0.73 MG/DL (ref 0.76–1.27)
DEPRECATED RDW RBC AUTO: 52.6 FL (ref 37–54)
EOSINOPHIL # BLD AUTO: 0.17 10*3/MM3 (ref 0–0.4)
EOSINOPHIL NFR BLD AUTO: 2.7 % (ref 0.3–6.2)
ERYTHROCYTE [DISTWIDTH] IN BLOOD BY AUTOMATED COUNT: 17.8 % (ref 12.3–15.4)
GFR SERPL CREATININE-BSD FRML MDRD: 121 ML/MIN/1.73
GLUCOSE BLDC GLUCOMTR-MCNC: 121 MG/DL (ref 70–130)
GLUCOSE BLDC GLUCOMTR-MCNC: 122 MG/DL (ref 70–130)
GLUCOSE SERPL-MCNC: 99 MG/DL (ref 65–99)
HCO3 BLDA-SCNC: 25.1 MMOL/L (ref 22–28)
HCT VFR BLD AUTO: 26.9 % (ref 37.5–51)
HGB BLD-MCNC: 8.6 G/DL (ref 13–17.7)
IMM GRANULOCYTES # BLD AUTO: 0.03 10*3/MM3 (ref 0–0.05)
IMM GRANULOCYTES NFR BLD AUTO: 0.5 % (ref 0–0.5)
INHALED O2 CONCENTRATION: 25 %
LYMPHOCYTES # BLD AUTO: 0.96 10*3/MM3 (ref 0.7–3.1)
LYMPHOCYTES NFR BLD AUTO: 15.2 % (ref 19.6–45.3)
MCH RBC QN AUTO: 26.4 PG (ref 26.6–33)
MCHC RBC AUTO-ENTMCNC: 32 G/DL (ref 31.5–35.7)
MCV RBC AUTO: 82.5 FL (ref 79–97)
MODALITY: NORMAL
MONOCYTES # BLD AUTO: 0.53 10*3/MM3 (ref 0.1–0.9)
MONOCYTES NFR BLD AUTO: 8.4 % (ref 5–12)
NEUTROPHILS NFR BLD AUTO: 4.62 10*3/MM3 (ref 1.7–7)
NEUTROPHILS NFR BLD AUTO: 73 % (ref 42.7–76)
NRBC BLD AUTO-RTO: 0 /100 WBC (ref 0–0.2)
O2 A-A PPRESDIFF RESPIRATORY: 0.7 MMHG
PCO2 BLDA: 41.8 MM HG (ref 35–45)
PEEP RESPIRATORY: 5 CM[H2O]
PH BLDA: 7.39 PH UNITS (ref 7.35–7.45)
PHOSPHATE SERPL-MCNC: 4.2 MG/DL (ref 2.5–4.5)
PLATELET # BLD AUTO: 299 10*3/MM3 (ref 140–450)
PMV BLD AUTO: 8.8 FL (ref 6–12)
PO2 BLDA: 93.7 MM HG (ref 80–100)
POTASSIUM SERPL-SCNC: 3.4 MMOL/L (ref 3.5–5.2)
PSV: 5 CMH2O
RBC # BLD AUTO: 3.26 10*6/MM3 (ref 4.14–5.8)
SAO2 % BLDCOA: 97.2 % (ref 92–99)
SODIUM SERPL-SCNC: 145 MMOL/L (ref 136–145)
TOTAL RATE: 23 BREATHS/MINUTE
VANCOMYCIN TROUGH SERPL-MCNC: 35.1 MCG/ML (ref 5–20)
VENTILATOR MODE: NORMAL
WBC # BLD AUTO: 6.32 10*3/MM3 (ref 3.4–10.8)

## 2021-02-07 PROCEDURE — 94799 UNLISTED PULMONARY SVC/PX: CPT

## 2021-02-07 PROCEDURE — 25010000002 PIPERACILLIN SOD-TAZOBACTAM PER 1 G: Performed by: INTERNAL MEDICINE

## 2021-02-07 PROCEDURE — 36600 WITHDRAWAL OF ARTERIAL BLOOD: CPT

## 2021-02-07 PROCEDURE — 99231 SBSQ HOSP IP/OBS SF/LOW 25: CPT | Performed by: INTERNAL MEDICINE

## 2021-02-07 PROCEDURE — 82962 GLUCOSE BLOOD TEST: CPT

## 2021-02-07 PROCEDURE — 80202 ASSAY OF VANCOMYCIN: CPT | Performed by: INTERNAL MEDICINE

## 2021-02-07 PROCEDURE — 25010000002 VANCOMYCIN 10 G RECONSTITUTED SOLUTION: Performed by: HOSPITALIST

## 2021-02-07 PROCEDURE — 80069 RENAL FUNCTION PANEL: CPT | Performed by: INTERNAL MEDICINE

## 2021-02-07 PROCEDURE — 85025 COMPLETE CBC W/AUTO DIFF WBC: CPT | Performed by: INTERNAL MEDICINE

## 2021-02-07 PROCEDURE — 25010000002 MORPHINE PER 10 MG: Performed by: INTERNAL MEDICINE

## 2021-02-07 PROCEDURE — 94668 MNPJ CHEST WALL SBSQ: CPT

## 2021-02-07 PROCEDURE — 82803 BLOOD GASES ANY COMBINATION: CPT

## 2021-02-07 RX ADMIN — LANSOPRAZOLE 30 MG: KIT at 06:30

## 2021-02-07 RX ADMIN — POTASSIUM CHLORIDE 40 MEQ: 1.5 POWDER, FOR SOLUTION ORAL at 17:28

## 2021-02-07 RX ADMIN — MORPHINE SULFATE 2 MG: 2 INJECTION, SOLUTION INTRAMUSCULAR; INTRAVENOUS at 08:43

## 2021-02-07 RX ADMIN — DOCUSATE SODIUM 50MG AND SENNOSIDES 8.6MG 2 TABLET: 8.6; 5 TABLET, FILM COATED ORAL at 20:04

## 2021-02-07 RX ADMIN — MORPHINE SULFATE 2 MG: 2 INJECTION, SOLUTION INTRAMUSCULAR; INTRAVENOUS at 12:19

## 2021-02-07 RX ADMIN — OXYCODONE HYDROCHLORIDE 5 MG: 5 SOLUTION ORAL at 22:38

## 2021-02-07 RX ADMIN — VANCOMYCIN HYDROCHLORIDE 1250 MG: 10 INJECTION, POWDER, LYOPHILIZED, FOR SOLUTION INTRAVENOUS at 20:03

## 2021-02-07 RX ADMIN — VANCOMYCIN HYDROCHLORIDE 1250 MG: 10 INJECTION, POWDER, LYOPHILIZED, FOR SOLUTION INTRAVENOUS at 03:45

## 2021-02-07 RX ADMIN — PROPRANOLOL HYDROCHLORIDE 20 MG: 20 TABLET ORAL at 08:42

## 2021-02-07 RX ADMIN — ALPRAZOLAM 1 MG: 0.5 TABLET ORAL at 15:16

## 2021-02-07 RX ADMIN — GABAPENTIN 800 MG: 400 CAPSULE ORAL at 06:30

## 2021-02-07 RX ADMIN — VANCOMYCIN HYDROCHLORIDE 1250 MG: 10 INJECTION, POWDER, LYOPHILIZED, FOR SOLUTION INTRAVENOUS at 12:19

## 2021-02-07 RX ADMIN — MORPHINE SULFATE 2 MG: 2 INJECTION, SOLUTION INTRAMUSCULAR; INTRAVENOUS at 03:45

## 2021-02-07 RX ADMIN — ACETAMINOPHEN 650 MG: 325 TABLET, FILM COATED ORAL at 04:40

## 2021-02-07 RX ADMIN — TAZOBACTAM SODIUM AND PIPERACILLIN SODIUM 3.38 G: 375; 3 INJECTION, SOLUTION INTRAVENOUS at 08:43

## 2021-02-07 RX ADMIN — CYCLOBENZAPRINE 10 MG: 10 TABLET, FILM COATED ORAL at 08:41

## 2021-02-07 RX ADMIN — MORPHINE SULFATE 2 MG: 2 INJECTION, SOLUTION INTRAMUSCULAR; INTRAVENOUS at 21:52

## 2021-02-07 RX ADMIN — GABAPENTIN 800 MG: 400 CAPSULE ORAL at 22:31

## 2021-02-07 RX ADMIN — OXYCODONE HYDROCHLORIDE AND ACETAMINOPHEN 1000 MG: 500 TABLET ORAL at 08:42

## 2021-02-07 RX ADMIN — SERTRALINE 100 MG: 100 TABLET, FILM COATED ORAL at 08:41

## 2021-02-07 RX ADMIN — Medication: at 08:43

## 2021-02-07 RX ADMIN — ASPIRIN 81 MG: 81 TABLET, CHEWABLE ORAL at 08:42

## 2021-02-07 RX ADMIN — ACETAMINOPHEN 650 MG: 325 TABLET, FILM COATED ORAL at 08:42

## 2021-02-07 RX ADMIN — HYDROXYZINE HYDROCHLORIDE 25 MG: 25 TABLET ORAL at 08:41

## 2021-02-07 RX ADMIN — GABAPENTIN 800 MG: 400 CAPSULE ORAL at 13:28

## 2021-02-07 RX ADMIN — PROPRANOLOL HYDROCHLORIDE 20 MG: 20 TABLET ORAL at 20:04

## 2021-02-07 RX ADMIN — TAZOBACTAM SODIUM AND PIPERACILLIN SODIUM 3.38 G: 375; 3 INJECTION, SOLUTION INTRAVENOUS at 15:16

## 2021-02-07 RX ADMIN — PROPRANOLOL HYDROCHLORIDE 20 MG: 20 TABLET ORAL at 15:16

## 2021-02-07 RX ADMIN — ALPRAZOLAM 1 MG: 0.5 TABLET ORAL at 01:40

## 2021-02-07 RX ADMIN — MORPHINE SULFATE 2 MG: 2 INJECTION, SOLUTION INTRAMUSCULAR; INTRAVENOUS at 17:36

## 2021-02-07 RX ADMIN — POTASSIUM CHLORIDE 40 MEQ: 1.5 POWDER, FOR SOLUTION ORAL at 12:19

## 2021-02-07 NOTE — PROGRESS NOTES
Pharmacy Note-Vancomycin Dosing  Patient had a significant increase in SCR today from baseline of 0.3 and 0.45 yesterday to 0.73 today. Trough level was therapeutic yesterday but due to the increase in Scr will obtain a trough level prior to the next dose to ensure no accumulation of vancomycin.    Trough level scheduled for 1930 today-to be followed up by my colleague.    Ivon Rai, PharmD, [facility]  Clinical Pharmacist, Infectious Diseases
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
## 2021-02-07 NOTE — PROGRESS NOTES
Morning ABG moved to 0900. Advised that ABG was wanted after night on PS. Pt could not tolerate PS and was flipped back to VC/AC at beginning of shift. Will pass along in report to obtain after pt is on PS again this morning.

## 2021-02-07 NOTE — PROGRESS NOTES
"  Daily Progress Note.   [facility] INTENSIVE CARE  2/7/2021    Patient:  Name:  Maxim Carvajal  MRN:  [id number]  1985  35 y.o.  male         CC: Sepsis, hypoxemia, pneumonia with tracheitis and mucous plug  Respiratory failure  Quadriplegia      Interval History:  Patient tolerating pressure support he is awake alert  +fever overnight patient said he felt that however he felt as though he just had to many blankets on him says he feels okay now no hemoptysis not a lot of secretions  Tc trial today  prn anxiolytics    ROS: +fever, no diarrhea, no chest pain  PMFSSH: no change    Physical Exam:  /75   Pulse 71   Temp 100 °F (37.8 °C) (Oral)   Resp 22   Ht 182.9 cm (72\")   Wt 73.5 kg (162 lb)   SpO2 99%   BMI 21.97 kg/m²   Body mass index is 21.97 kg/m².    Intake/Output Summary (Last 24 hours) at 2/7/2021 1303  Last data filed at 2/7/2021 0730  Gross per 24 hour   Intake 5041 ml   Output 400 ml   Net 4641 ml   On mechanical ventilation trach collar the tracheostomy  General appearance:non toxic, conversant   Eyes: anicteric sclerae, moist conjunctivae; no lid-lag; PERRLA  HENT: Atraumatic; oropharynx clear with moist mucous membranes and no mucosal ulcerations; normal hard and soft palate  Neck: Trachea midline; +trachoeotomy  Lungs: no sig crackles symmetric mech air entry, with normal respiratory effort and no intercostal retractions  CV: RRR, no MRGs   Abdomen: Soft, non-tender; no masses or HSM  Extremities: No peripheral edema or extremity lymphadenopathy thin musculature contracted chronic changes  Skin: wwp no diffuse rash  Psych: Appropriate affect, alert  Neuro nodes heads, mouths answers to questions    Data Review:  Notable Labs:  Results from last 7 days   Lab Units 02/07/21  0855 02/06/21  0725 02/05/21  0755 02/04/21  0407 02/03/21  0612 02/02/21  0609   WBC 10*3/mm3 6.32 7.45 6.51 9.54 8.49 12.16*   HEMOGLOBIN g/dL 8.6* 8.8* 8.7* 9.1* 8.4* 6.8*   PLATELETS 10*3/mm3 299 282 " 311 339 315 366     Results from last 7 days   Lab Units 02/07/21  0855 02/06/21  1655 02/05/21  0755 02/04/21  0515 02/04/21  0407 02/03/21  0612 02/02/21  0608   SODIUM mmol/L 145 142 137 135* 136 136 135*   POTASSIUM mmol/L 3.4* 3.3* 2.9* 3.3* 3.3* 3.5 3.6   CHLORIDE mmol/L 110* 109* 107 105 103 103 97*   CO2 mmol/L 23.4 22.9 21.1* 14.0* 13.1* 14.0* 26.1   BUN mg/dL 16 10 5* 4* 5* 8 14   CREATININE mg/dL 0.73* 0.45* 0.23* 0.21* 0.18* 0.30* 0.31*   GLUCOSE mg/dL 99 126* 96 83 79 66 93   CALCIUM mg/dL 8.9 8.7 9.1 9.3 9.1 8.7 9.5   PHOSPHORUS mg/dL 4.2 3.7  --   --   --   --   --    Estimated Creatinine Clearance: 144 mL/min (A) (by C-G formula based on SCr of 0.73 mg/dL (L)).    Results from last 7 days   Lab Units 02/07/21  0855 02/06/21  0725 02/05/21  0755 02/04/21  0837 02/04/21  0515 02/04/21  0407 02/03/21  2131  02/02/21  0742  02/02/21  0608   LDH U/L  --   --  201  --   --  245*  --   --  193  --   --    AST (SGOT) U/L  --   --  10  --   --  14  --   --   --   --  12   ALT (SGPT) U/L  --   --  11  --   --  10  --   --   --   --  16   PROCALCITONIN ng/mL  --   --   --  0.07  --   --   --   --   --   --  0.12   LACTATE mmol/L  --   --   --   --  0.6  --   --   --   --   --  0.7   FERRITIN ng/mL  --   --   --   --   --   --   --   --  615.00*  --   --    D DIMER QUANT MCGFEU/mL  --   --   --   --   --   --  3.90*  --   --   --   --    PLATELETS 10*3/mm3 299 282 311  --   --  339  --    < >  --    < >  --     < > = values in this interval not displayed.       Results from last 7 days   Lab Units 02/07/21  1026 02/04/21  1628 02/04/21  0437 02/02/21  0845   PH, ARTERIAL pH units 7.387 7.348* 7.355 7.344*   PCO2, ARTERIAL mm Hg 41.8 36.2 27.0* 43.3   PO2 ART mm Hg 93.7 131.7* 63.8* 76.4*   HCO3 ART mmol/L 25.1 19.9* 15.1* 23.6       Imaging:  Reviewed chest images personally from past 3 days    Scheduled meds:    ascorbic acid, 1,000 mg, Oral, Daily  aspirin, 81 mg, Nasogastric, Daily  cyclobenzaprine, 10 mg,  Oral, Daily  Eucerin original healing lotion, , Topical, Daily  fentaNYL, 1 patch, Transdermal, Q72H  gabapentin, 800 mg, Oral, Q8H  hydrOXYzine, 25 mg, Oral, Daily  lansoprazole, 30 mg, Nasogastric, QAM  piperacillin-tazobactam, 3.375 g, Intravenous, Q8H  propranolol, 20 mg, Per G Tube, TID  sennosides-docusate, 2 tablet, Oral, Nightly  sertraline, 100 mg, Oral, Daily  vancomycin, 1,250 mg, Intravenous, Q8H        ASSESSMENT  /  PLAN:  1. Tracheitis and pneumonia with mucous plugging status post bronchoscopy on 2/4/2021: Growing both Pseudomonas and MRSA  2. Exchange to a cuffed tracheostomy and now on the ventilator as of 2/4/2021  3. Bilateral pleural effusion left more than right  4. Acute hypoxemic respiratory failure  5. Sepsis  6. Quadriplegia  7. Anemia  8. Essential hypertension  9. Gluteal muscle bleed    Holding plavix due to acute hemorrhage  Prn transfusions  Cont abx follow mciro  bp control  Asa cont  Vent reviewed  He will remain off precedex, prn anxiolytics  Trach collar trial today , he refused yesterday, will leave it up to him whether he feels as though he will need to go back on pressure support overnight or whether he can continue with trach collar      Claudio Sam MD  [facility]  02/07/21  1323 EST

## 2021-02-07 NOTE — PLAN OF CARE
Goal Outcome Evaluation:  Plan of Care Reviewed With: patient  Progress: no change  Outcome Summary: Pt remains in ICU. VS stable overnight. Switched to AC mode when patient complained of SOA and increased WOB. Large amount of secretions.

## 2021-02-08 ENCOUNTER — APPOINTMENT (OUTPATIENT)
Dept: GENERAL RADIOLOGY | Facility: HOSPITAL | Age: 36
End: 2021-02-08

## 2021-02-08 LAB
ALBUMIN SERPL-MCNC: 3.3 G/DL (ref 3.5–5.2)
ANION GAP SERPL CALCULATED.3IONS-SCNC: 8.1 MMOL/L (ref 5–15)
B PARAPERT DNA SPEC QL NAA+PROBE: NOT DETECTED
B PERT DNA SPEC QL NAA+PROBE: NOT DETECTED
BUN SERPL-MCNC: 19 MG/DL (ref 6–20)
BUN/CREAT SERPL: 26 (ref 7–25)
C PNEUM DNA NPH QL NAA+NON-PROBE: NOT DETECTED
CALCIUM SPEC-SCNC: 9.2 MG/DL (ref 8.6–10.5)
CHLORIDE SERPL-SCNC: 108 MMOL/L (ref 98–107)
CO2 SERPL-SCNC: 26.9 MMOL/L (ref 22–29)
CREAT SERPL-MCNC: 0.73 MG/DL (ref 0.76–1.27)
FLUAV SUBTYP SPEC NAA+PROBE: NOT DETECTED
FLUBV RNA ISLT QL NAA+PROBE: NOT DETECTED
GFR SERPL CREATININE-BSD FRML MDRD: 121 ML/MIN/1.73
GLUCOSE BLDC GLUCOMTR-MCNC: 105 MG/DL (ref 70–130)
GLUCOSE BLDC GLUCOMTR-MCNC: 118 MG/DL (ref 70–130)
GLUCOSE SERPL-MCNC: 105 MG/DL (ref 65–99)
HADV DNA SPEC NAA+PROBE: NOT DETECTED
HCOV 229E RNA SPEC QL NAA+PROBE: NOT DETECTED
HCOV HKU1 RNA SPEC QL NAA+PROBE: NOT DETECTED
HCOV NL63 RNA SPEC QL NAA+PROBE: NOT DETECTED
HCOV OC43 RNA SPEC QL NAA+PROBE: NOT DETECTED
HMPV RNA NPH QL NAA+NON-PROBE: NOT DETECTED
HPIV1 RNA SPEC QL NAA+PROBE: NOT DETECTED
HPIV2 RNA SPEC QL NAA+PROBE: NOT DETECTED
HPIV3 RNA NPH QL NAA+PROBE: NOT DETECTED
HPIV4 P GENE NPH QL NAA+PROBE: NOT DETECTED
M PNEUMO IGG SER IA-ACNC: NOT DETECTED
PHOSPHATE SERPL-MCNC: 3.7 MG/DL (ref 2.5–4.5)
POTASSIUM SERPL-SCNC: 4.3 MMOL/L (ref 3.5–5.2)
PROCALCITONIN SERPL-MCNC: 0.07 NG/ML (ref 0–0.25)
RHINOVIRUS RNA SPEC NAA+PROBE: NOT DETECTED
RSV RNA NPH QL NAA+NON-PROBE: NOT DETECTED
SARS-COV-2 RNA NPH QL NAA+NON-PROBE: NOT DETECTED
SODIUM SERPL-SCNC: 143 MMOL/L (ref 136–145)
VANCOMYCIN SERPL-MCNC: 20.3 MCG/ML (ref 5–40)
VANCOMYCIN SERPL-MCNC: 39.3 MCG/ML (ref 5–40)

## 2021-02-08 PROCEDURE — 80069 RENAL FUNCTION PANEL: CPT | Performed by: INTERNAL MEDICINE

## 2021-02-08 PROCEDURE — 71045 X-RAY EXAM CHEST 1 VIEW: CPT

## 2021-02-08 PROCEDURE — 25010000002 MORPHINE PER 10 MG: Performed by: INTERNAL MEDICINE

## 2021-02-08 PROCEDURE — 94799 UNLISTED PULMONARY SVC/PX: CPT

## 2021-02-08 PROCEDURE — 87040 BLOOD CULTURE FOR BACTERIA: CPT | Performed by: INTERNAL MEDICINE

## 2021-02-08 PROCEDURE — 80202 ASSAY OF VANCOMYCIN: CPT | Performed by: HOSPITALIST

## 2021-02-08 PROCEDURE — 82962 GLUCOSE BLOOD TEST: CPT

## 2021-02-08 PROCEDURE — 25010000002 PIPERACILLIN SOD-TAZOBACTAM PER 1 G: Performed by: INTERNAL MEDICINE

## 2021-02-08 PROCEDURE — 0202U NFCT DS 22 TRGT SARS-COV-2: CPT | Performed by: INTERNAL MEDICINE

## 2021-02-08 PROCEDURE — 99223 1ST HOSP IP/OBS HIGH 75: CPT | Performed by: INTERNAL MEDICINE

## 2021-02-08 PROCEDURE — 25010000002 VANCOMYCIN 10 G RECONSTITUTED SOLUTION: Performed by: HOSPITALIST

## 2021-02-08 PROCEDURE — 25010000002 CEFEPIME PER 500 MG: Performed by: INTERNAL MEDICINE

## 2021-02-08 PROCEDURE — 84145 PROCALCITONIN (PCT): CPT | Performed by: INTERNAL MEDICINE

## 2021-02-08 RX ADMIN — SODIUM CHLORIDE 75 ML/HR: 9 INJECTION, SOLUTION INTRAVENOUS at 21:10

## 2021-02-08 RX ADMIN — PROPRANOLOL HYDROCHLORIDE 20 MG: 20 TABLET ORAL at 08:38

## 2021-02-08 RX ADMIN — GABAPENTIN 800 MG: 400 CAPSULE ORAL at 21:10

## 2021-02-08 RX ADMIN — ALPRAZOLAM 1 MG: 0.5 TABLET ORAL at 04:35

## 2021-02-08 RX ADMIN — OXYCODONE HYDROCHLORIDE AND ACETAMINOPHEN 1000 MG: 500 TABLET ORAL at 08:38

## 2021-02-08 RX ADMIN — PROPRANOLOL HYDROCHLORIDE 20 MG: 20 TABLET ORAL at 21:10

## 2021-02-08 RX ADMIN — DOCUSATE SODIUM 50MG AND SENNOSIDES 8.6MG 2 TABLET: 8.6; 5 TABLET, FILM COATED ORAL at 21:10

## 2021-02-08 RX ADMIN — Medication: at 08:42

## 2021-02-08 RX ADMIN — TAZOBACTAM SODIUM AND PIPERACILLIN SODIUM 3.38 G: 375; 3 INJECTION, SOLUTION INTRAVENOUS at 08:39

## 2021-02-08 RX ADMIN — OXYCODONE HYDROCHLORIDE 5 MG: 5 SOLUTION ORAL at 17:56

## 2021-02-08 RX ADMIN — MORPHINE SULFATE 2 MG: 2 INJECTION, SOLUTION INTRAMUSCULAR; INTRAVENOUS at 04:35

## 2021-02-08 RX ADMIN — MORPHINE SULFATE 2 MG: 2 INJECTION, SOLUTION INTRAMUSCULAR; INTRAVENOUS at 08:49

## 2021-02-08 RX ADMIN — TAZOBACTAM SODIUM AND PIPERACILLIN SODIUM 3.38 G: 375; 3 INJECTION, SOLUTION INTRAVENOUS at 00:50

## 2021-02-08 RX ADMIN — VANCOMYCIN HYDROCHLORIDE 1250 MG: 10 INJECTION, POWDER, LYOPHILIZED, FOR SOLUTION INTRAVENOUS at 23:25

## 2021-02-08 RX ADMIN — PROPRANOLOL HYDROCHLORIDE 20 MG: 20 TABLET ORAL at 17:15

## 2021-02-08 RX ADMIN — FENTANYL 1 PATCH: 25 PATCH TRANSDERMAL at 11:59

## 2021-02-08 RX ADMIN — ASPIRIN 81 MG: 81 TABLET, CHEWABLE ORAL at 08:38

## 2021-02-08 RX ADMIN — ALPRAZOLAM 1 MG: 0.5 TABLET ORAL at 13:52

## 2021-02-08 RX ADMIN — SERTRALINE 100 MG: 100 TABLET, FILM COATED ORAL at 08:38

## 2021-02-08 RX ADMIN — LANSOPRAZOLE 30 MG: KIT at 06:47

## 2021-02-08 RX ADMIN — MORPHINE SULFATE 2 MG: 2 INJECTION, SOLUTION INTRAMUSCULAR; INTRAVENOUS at 17:56

## 2021-02-08 RX ADMIN — OXYCODONE HYDROCHLORIDE 5 MG: 5 SOLUTION ORAL at 22:14

## 2021-02-08 RX ADMIN — HYDROXYZINE HYDROCHLORIDE 25 MG: 25 TABLET ORAL at 08:38

## 2021-02-08 RX ADMIN — GABAPENTIN 800 MG: 400 CAPSULE ORAL at 17:15

## 2021-02-08 RX ADMIN — OXYCODONE HYDROCHLORIDE 5 MG: 5 SOLUTION ORAL at 12:06

## 2021-02-08 RX ADMIN — MORPHINE SULFATE 2 MG: 2 INJECTION, SOLUTION INTRAMUSCULAR; INTRAVENOUS at 13:52

## 2021-02-08 RX ADMIN — MORPHINE SULFATE 2 MG: 2 INJECTION, SOLUTION INTRAMUSCULAR; INTRAVENOUS at 22:15

## 2021-02-08 RX ADMIN — CYCLOBENZAPRINE 10 MG: 10 TABLET, FILM COATED ORAL at 08:38

## 2021-02-08 RX ADMIN — CEFEPIME HYDROCHLORIDE 2 G: 2 INJECTION, POWDER, FOR SOLUTION INTRAVENOUS at 18:37

## 2021-02-08 RX ADMIN — GABAPENTIN 800 MG: 400 CAPSULE ORAL at 05:28

## 2021-02-08 NOTE — PROGRESS NOTES
Vancomycin level elevated 35.1 in the setting of increasing SCr.  Next dose had already been given.  I have discontinued the scheduled vancomycin and ordered a vancomycin random level 2/8 @ noon.    Preston Wolfe, PharmD, BCIDP, BCPS

## 2021-02-08 NOTE — PLAN OF CARE
Goal Outcome Evaluation:  Plan of Care Reviewed With: patient  Progress: improving  Outcome Summary: Pt remains in ICU on T-piece at 30%. VS stable overnight. T-piece tolerated very well. Less frequent suctioning than previous 2 nights. Uneventful night. WCTM

## 2021-02-08 NOTE — PAYOR COMM NOTE
"Maxim Hardin (35 y.o. Male)                        ATTENTION;   CONTINUED STAY CLINICALS CASE REF # [id number]                      REPLY TO [facility] DEPT, CECILIA MARTIN LPN  [phone] OR CALL          Date of Birth Social Security Number Address Home Phone MRN    1985  [address] [facility] [phone] [id number]    Druze Marital Status          None Single       Admission Date Admission Type Admitting Provider Attending Provider Department, Room/Bed    2/2/21 Emergency Joshua Vasquez MD [place], Robert Russell, MD [facility] INTENSIVE CARE, I382/1    Discharge Date Discharge Disposition Discharge Destination                       Attending Provider: Giancarlo Radford MD    Allergies: No Known Allergies    Isolation: Contact   Infection: MRSA (02/02/21)   Code Status: No CPR    Ht: 182.9 cm (72\")   Wt: 73.5 kg (162 lb)    Admission Cmt: None   Principal Problem: HCAP (healthcare-associated pneumonia) [J18.9]                 Active Insurance as of 2/2/2021     Primary Coverage     Payor Plan Insurance Group Employer/Plan Group    [place] [id number]     Payor Plan Address Payor Plan Phone Number Payor Plan Fax Number Effective Dates    [employer] [address] [address] [phone]  4/1/2020 - None Entered    Former[facility] [id number]       Subscriber Name Subscriber Birth Date Member ID       MAXIM HARDIN 1985 [id number]                 Emergency Contacts      (Rel.) Home Phone Work Phone Mobile Phone    Kinjal Hardin (Father) -- -- [phone]    Shaista Hardin (Mother) [phone] -- [phone]            Vital Signs (last day)     Date/Time   Temp   Temp src   Pulse   Resp   BP   Patient Position   SpO2    02/08/21 1100   --   --   89   --   147/88   --   97    02/08/21 1000   --   --   82   --   146/86   --   97    02/08/21 0900   --   --   82   --   153/91   --   98    02/08/21 0838   --   --  "  79   --   147/90   --   --    02/08/21 0800   --   --   77   --   147/90   --   98    02/08/21 0740   --   --   80   16   --   --   97    02/08/21 0730   98.1 (36.7)   Oral   78   18   --   Lying   98    02/08/21 0700   --   --   84   --   138/83   --   97    02/08/21 0500   --   --   80   --   (!) 161/102   --   98    02/08/21 0400   99 (37.2)   Oral   74   --   147/84   --   98    02/08/21 0300   --   --   85   --   (!) 186/103   --   100    02/08/21 0200   --   --   91   --   146/80   --   97    02/08/21 0100   --   --   80   --   (!) 178/101   --   100    02/08/21 0000   (!) 101.3 (38.5)   Oral   78   --   152/91   --   98    02/07/21 2358   --   --   80   --   --   --   97    02/07/21 2300   --   --   78   --   147/88   --   98    02/07/21 2200   --   --   82   --   159/92   --   98    02/07/21 2116   --   --   96   16   --   --   94    02/07/21 2100   --   --   94   --   143/80   --   92    02/07/21 2045   --   --   86   16   --   --   96    02/07/21 2000   99.8 (37.7)   Oral   76   --   145/82   --   98    02/07/21 1900   --   --   82   --   164/94   --   100    02/07/21 1800   --   --   76   --   155/94   --   97    02/07/21 1703   --   --   81   20   --   --   95    02/07/21 1700   --   --   80   --   130/73   --   95    02/07/21 1600   --   --   76   --   133/82   --   95    02/07/21 1500   --   --   82   --   144/73   --   95    02/07/21 1400   --   --   80   --   144/99   --   95    02/07/21 1300   --   --   70   --   133/75   --   99    02/07/21 1200   --   --   71   --   132/75   --   99    02/07/21 1100   --   --   80   --   123/66   --   97    02/07/21 1030   --   --   88   22   137/78   Sitting   100    02/07/21 1000   --   --   73   --   127/72   --   97    02/07/21 0900   --   --   80   --   136/78   --   98    02/07/21 0800   --   --   95   --   121/73   --   97    02/07/21 0753   100 (37.8)   Oral   --   --   --   --   --    02/07/21 0657   --   --   --   26   --   --   --    02/07/21 0600   --    --   99   --   149/87   --   100    02/07/21 0500   --   --   96   --   142/59   --   97    02/07/21 0407   (!) 101.5 (38.6)   Oral   --   --   --   --   --    02/07/21 0400   --   --   97   --   131/68   --   97    02/07/21 0345   --   --   98   24   --   --   94    02/07/21 0300   --   --   84   --   132/73   --   96    02/07/21 0200   --   --   84   --   135/75   --   96    02/07/21 0100   --   --   81   --   126/71   --   94    02/07/21 0003   --   --   86   25   --   --   93    02/07/21 0000   --   --   86   --   134/86   --   93              Oxygen Therapy (last day)     Date/Time   SpO2   Device (Oxygen Therapy)   Flow (L/min)   Oxygen Concentration (%)   ETCO2 (mmHg)    02/08/21 1100   97   --   --   --   --    02/08/21 1000   97   --   --   --   --    02/08/21 0900   98   T - piece   6   28   --    02/08/21 0800   98   --   --   --   --    02/08/21 0740   97   T - piece   6   28   --    02/08/21 0730   98   T - piece   --   28   --    02/08/21 0700   97   --   --   --   --    02/08/21 0500   98   --   --   --   --    02/08/21 0400   98   T - piece   --   30   --    02/08/21 0300   100   --   --   --   --    02/08/21 0200   97   --   --   --   --    02/08/21 0100   100   --   --   --   --    02/08/21 0000   98   --   --   --   --    02/07/21 2358   97   T - piece   8   30   --    02/07/21 2300   98   --   --   --   --    02/07/21 2200   98   --   --   --   --    02/07/21 2116   94   T - piece   8   30   --    02/07/21 2100   92   --   --   --   --    02/07/21 2045   96   T - piece   --   30   --    02/07/21 2000   98   T - piece   --   30   --    02/07/21 1900   100   --   --   --   --    02/07/21 1800   97   --   --   --   --    02/07/21 1703   95   tracheostomy collar   10   30   --    02/07/21 1700   95   --   --   --   --    02/07/21 1615   --   tracheostomy collar   --   30   --    02/07/21 1600   95   --   --   --   --    02/07/21 1500   95   --   --   --   --    02/07/21 1400   95   --   --   --    --    02/07/21 1300   99   --   --   --   --    02/07/21 1211   --   tracheostomy collar   --   30   --    02/07/21 1200   99   --   --   --   --    02/07/21 1100   97   tracheostomy collar   --   30   --    02/07/21 1030   100   humidified;tracheostomy collar   10   30   --    02/07/21 1000   97   --   --   --   --    02/07/21 0900   98   --   --   --   --    02/07/21 0800   97   --   --   --   --    02/07/21 0730   --   ventilator   --   25   --    02/07/21 0657   --   ventilator   --   25   --    02/07/21 0600   100   --   --   --   --    02/07/21 0500   97   --   --   --   --    02/07/21 0400   97   ventilator   --   25   --    02/07/21 0345   94   ventilator   --   25   --    02/07/21 0300   96   --   --   --   --    02/07/21 0200   96   --   --   --   --    02/07/21 0100   94   --   --   --   --    02/07/21 0003   93   ventilator   --   25   --    02/07/21 0000   93   ventilator   --   25   --              Orders (last 24 hrs)      Start     Ordered    02/08/21 2000  Vancomycin, Random  Timed     Comments: Please draw vancomycin random level at 8pm.      02/08/21 1121    02/08/21 1200  Vancomycin, Random  Timed,   Status:  Canceled      02/08/21 0125    02/08/21 0034  POC Glucose Once  Once      02/08/21 0032    02/07/21 2355  Vancomycin, Random  Timed      02/07/21 2355    02/07/21 1930  Vancomycin, Trough  Timed      02/07/21 1511    02/07/21 1347  POC Glucose Once  Once      02/07/21 1329    02/06/21 1656  morphine injection 2 mg  Every 4 Hours PRN      02/06/21 1657    02/06/21 1631  Renal Function Panel  Daily      02/06/21 1630    02/06/21 1631  Patient Currently On Electrolyte Replacement Protocol - Please Refer to MAR for Protocol Details  Misc Nursing Order (Specify)  Daily     Comments: Patient Currently On Electrolyte Replacement Protocol - Please Refer to MAR for Protocol Details    02/06/21 1630    02/06/21 1630  potassium chloride (K-DUR,KLOR-CON) ER tablet 40 mEq  As Needed      02/06/21 1630     02/06/21 1630  potassium chloride (KLOR-CON) packet 40 mEq  As Needed      02/06/21 1630    02/06/21 1630  potassium chloride 10 mEq in 100 mL IVPB  Every 1 Hour PRN      02/06/21 1630    02/06/21 1620  ALPRAZolam (XANAX) tablet 1 mg  3 Times Daily PRN      02/06/21 1621    02/05/21 2130  Respiratory Communication  2 Times Daily - RT,   Status:  Canceled     Comments: Place patient back on rate at night.    02/05/21 1547    02/05/21 2000  vancomycin 1250 mg/250 mL 0.9% NS IVPB ([facility])  Every 8 Hours,   Status:  Discontinued      02/05/21 1351    02/05/21 1400  gabapentin (NEURONTIN) capsule 800 mg  Every 8 Hours Scheduled      02/05/21 1023    02/05/21 1215  lansoprazole (FIRST) oral suspension 30 mg  Every Morning      02/05/21 1118    02/05/21 1215  aspirin chewable tablet 81 mg  Daily      02/05/21 1119    02/05/21 1145  ascorbic acid (VITAMIN C) tablet 1,000 mg  Daily      02/05/21 1023    02/05/21 1145  cyclobenzaprine (FLEXERIL) tablet 10 mg  Daily      02/05/21 1023    02/05/21 1145  hydrOXYzine (ATARAX) tablet 25 mg  Daily      02/05/21 1023    02/05/21 1145  sertraline (ZOLOFT) tablet 100 mg  Daily      02/05/21 1023    02/05/21 1115  fentaNYL (DURAGESIC) 25 MCG/HR patch 1 patch  Every 72 Hours      02/05/21 1023    02/05/21 1109  hydrALAZINE (APRESOLINE) injection 10 mg  Every 4 Hours PRN      02/05/21 1109    02/05/21 1023  labetalol (NORMODYNE,TRANDATE) injection 10 mg  Every 4 Hours PRN      02/05/21 1024    02/05/21 1022  oxyCODONE (ROXICODONE) 5 MG/5ML solution 5 mg  Every 4 Hours PRN      02/05/21 1023    02/05/21 1020  ALPRAZolam (XANAX) tablet 1 mg  Every 8 Hours PRN      02/05/21 1023    02/04/21 1315  sennosides-docusate (PERICOLACE) 8.6-50 MG per tablet 2 tablet  Nightly      02/04/21 1227    02/04/21 0600  Wound Care  Daily     Comments: Apply small amount of therahoney to a piece of opticell and cover wound.  Cover with sacral optifoam 7x7.    02/03/21 1243    02/03/21 1600   piperacillin-tazobactam (ZOSYN) 3.375 g in iso-osmotic dextrose 50 ml (premix)  Every 8 Hours      02/03/21 1306    02/03/21 1330  Eucerin original healing lotion lotion  Daily      02/03/21 1243    02/02/21 2337  Trach Care  Every Shift      02/02/21 2337    02/02/21 2030  Chest Physiotherapy (PD & P)  3 Times Daily - RT      02/02/21 1458    02/02/21 1600  propranolol (INDERAL) tablet 20 mg  3 Times Daily      02/02/21 1256    02/02/21 1600  Vital Signs  Every 4 Hours      02/02/21 1256    02/02/21 1514  Wound Care  Daily      02/02/21 1513    02/02/21 1257  Intake & Output  Every Shift      02/02/21 1256    02/02/21 1256  acetaminophen (TYLENOL) tablet 650 mg  Every 4 Hours PRN      02/02/21 1256    02/02/21 1256  acetaminophen (TYLENOL) 160 MG/5ML solution 650 mg  Every 4 Hours PRN      02/02/21 1256    02/02/21 1256  acetaminophen (TYLENOL) suppository 650 mg  Every 4 Hours PRN      02/02/21 1256    02/02/21 1138  sodium chloride 0.9 % infusion  Continuous      02/02/21 1136    02/02/21 1122  Pharmacy to dose vancomycin  Continuous PRN      02/02/21 1126    02/02/21 0926  ondansetron (ZOFRAN) tablet 4 mg  Every 6 Hours PRN      02/02/21 0926    02/02/21 0926  ondansetron (ZOFRAN) injection 4 mg  Every 6 Hours PRN      02/02/21 0926    02/02/21 0926  bisacodyl (DULCOLAX) suppository 10 mg  Daily PRN      02/02/21 0926    02/02/21 0000  doxycycline (VIBRAMYICN) 100 MG tablet  2 Times Daily      02/02/21 1819 02/01/21 0000  Dextromethorphan-guaiFENesin (Mucinex Fast-Max DM Max) 5-100 MG/5ML liquid  Every 4 Hours PRN      02/02/21 1819    01/30/21 0000  collagenase 250 UNIT/GM ointment  Daily      02/02/21 1819    Unscheduled  Suction Tracheostomy  As Needed      02/02/21 1141    Unscheduled  Jamal and Document Tube Depth (in cm)  As Needed      02/03/21 1043    Unscheduled  Straight Cath  As Needed     Comments: Volumes 400 or greater    02/05/21 1542    Unscheduled  Magnesium  As Needed      02/06/21 1632     Unscheduled  Potassium  As Needed      02/06/21 1630    --  acetaminophen (TYLENOL) 650 MG suppository  Every 4 Hours PRN      02/02/21 1819    --  aspirin 81 MG EC tablet  Daily      02/02/21 1819    --  povidone-iodine (BETADINE) 10 % external solution  Daily      02/02/21 1819    --  cyclobenzaprine (FLEXERIL) 10 MG tablet  Daily      02/02/21 1819    --  bisacodyl (DULCOLAX) 10 MG suppository  Daily      02/02/21 1819    --  fentaNYL (DURAGESIC) 25 MCG/HR patch  Every 72 Hours      02/02/21 1819    --  ferrous sulfate 325 (65 FE) MG tablet  Daily With Breakfast      02/02/21 1819    --  fludrocortisone 0.1 MG tablet  Daily      02/02/21 1819    --  gabapentin (NEURONTIN) 800 MG tablet  Every 6 Hours      02/02/21 1819    --  hydrOXYzine (ATARAX) 25 MG tablet  Daily      02/02/21 1819    --  ipratropium-albuterol (DUO-NEB) 0.5-2.5 mg/3 ml nebulizer  Every 4 Hours PRN      02/02/21 1819    --  melatonin 3 MG tablet  Nightly      02/02/21 1819    --  midodrine (PROAMATINE) 2.5 MG tablet  3 Times Daily      02/02/21 1819    --  multivitamin with minerals (MULTIVITAMIN ADULT PO)  Daily      02/02/21 1819    --  sodium chloride 0.65 % nasal spray  Every 2 Hours PRN      02/02/21 1819    --  omeprazole 2 mg/mL in sodium bicarbonate injection 8.4%  Daily      02/02/21 1819    --  oxyCODONE (OXY-IR) 5 MG capsule  Every 6 Hours PRN      02/02/21 1819    --  clopidogrel (PLAVIX) 75 MG tablet  Daily      02/02/21 1819    --  senna (SENOKOT) 8.6 MG tablet  2 Times Daily      02/02/21 1819    --  polyethyl glycol-propyl glycol (SYSTANE) 0.4-0.3 % solution ophthalmic solution  Every 1 Hour PRN      02/02/21 1819    --  acetaminophen (TYLENOL) 325 MG tablet  Every 6 Hours PRN      02/02/21 1819    --  ascorbic acid (VITAMIN C) 500 MG tablet  Daily      02/02/21 1819    --  ALPRAZolam (XANAX) 1 MG tablet  Every 8 Hours PRN      02/02/21 1819    --  zinc gluconate ([employer] Zinc) 50 MG tablet  Daily      02/02/21 1819    --   "ondansetron (ZOFRAN) 4 MG tablet  Every 6 Hours PRN      02/02/21 1819    --  sertraline (ZOLOFT) 100 MG tablet  Daily      02/02/21 1819              Carlos Joe, RN   Registered Nurse      Plan of Care   Signed   Date of Service:  02/08/21 0614   Creation Time:  02/08/21 0614            Signed             Show:Clear all  []Manual[x]Template[]Copied    Added by:  [x]Carlos Joe RN    []Hover for details  Goal Outcome Evaluation:  Plan of Care Reviewed With: patient  Progress: improving  Outcome Summary: Pt remains in ICU on T-piece at 30%. VS stable overnight. T-piece tolerated very well. Less frequent suctioning than previous 2 nights. Uneventful night. [facility]                             Physician Progress Notes (last 24 hours)       Claudio Sam MD at 02/07/21 1302            Daily Progress Note.   [facility] INTENSIVE CARE  2/7/2021    Patient:  Name:  Maxim Carvajal  MRN:  [id number]  1985  35 y.o.  male         CC: Sepsis, hypoxemia, pneumonia with tracheitis and mucous plug  Respiratory failure  Quadriplegia      Interval History:  Patient tolerating pressure support he is awake alert  +fever overnight patient said he felt that however he felt as though he just had to many blankets on him says he feels okay now no hemoptysis not a lot of secretions  Tc trial today  prn anxiolytics    ROS: +fever, no diarrhea, no chest pain  PMFSSH: no change    Physical Exam:  /75   Pulse 71   Temp 100 °F (37.8 °C) (Oral)   Resp 22   Ht 182.9 cm (72\")   Wt 73.5 kg (162 lb)   SpO2 99%   BMI 21.97 kg/m²   Body mass index is 21.97 kg/m².    Intake/Output Summary (Last 24 hours) at 2/7/2021 1303  Last data filed at 2/7/2021 0730  Gross per 24 hour   Intake 5041 ml   Output 400 ml   Net 4641 ml   On mechanical ventilation trach collar the tracheostomy  General appearance:non toxic, conversant   Eyes: anicteric sclerae, moist conjunctivae; no lid-lag; PERRLA  HENT: Atraumatic; " oropharynx clear with moist mucous membranes and no mucosal ulcerations; normal hard and soft palate  Neck: Trachea midline; +trachoeotomy  Lungs: no sig crackles symmetric mech air entry, with normal respiratory effort and no intercostal retractions  CV: RRR, no MRGs   Abdomen: Soft, non-tender; no masses or HSM  Extremities: No peripheral edema or extremity lymphadenopathy thin musculature contracted chronic changes  Skin: wwp no diffuse rash  Psych: Appropriate affect, alert  Neuro nodes heads, mouths answers to questions    Data Review:  Notable Labs:  Results from last 7 days   Lab Units 02/07/21  0855 02/06/21  0725 02/05/21  0755 02/04/21  0407 02/03/21  0612 02/02/21  0609   WBC 10*3/mm3 6.32 7.45 6.51 9.54 8.49 12.16*   HEMOGLOBIN g/dL 8.6* 8.8* 8.7* 9.1* 8.4* 6.8*   PLATELETS 10*3/mm3 299 282 311 339 315 366     Results from last 7 days   Lab Units 02/07/21  0855 02/06/21  1655 02/05/21  0755 02/04/21  0515 02/04/21  0407 02/03/21  0612 02/02/21  0608   SODIUM mmol/L 145 142 137 135* 136 136 135*   POTASSIUM mmol/L 3.4* 3.3* 2.9* 3.3* 3.3* 3.5 3.6   CHLORIDE mmol/L 110* 109* 107 105 103 103 97*   CO2 mmol/L 23.4 22.9 21.1* 14.0* 13.1* 14.0* 26.1   BUN mg/dL 16 10 5* 4* 5* 8 14   CREATININE mg/dL 0.73* 0.45* 0.23* 0.21* 0.18* 0.30* 0.31*   GLUCOSE mg/dL 99 126* 96 83 79 66 93   CALCIUM mg/dL 8.9 8.7 9.1 9.3 9.1 8.7 9.5   PHOSPHORUS mg/dL 4.2 3.7  --   --   --   --   --    Estimated Creatinine Clearance: 144 mL/min (A) (by C-G formula based on SCr of 0.73 mg/dL (L)).    Results from last 7 days   Lab Units 02/07/21  0855 02/06/21  0725 02/05/21  0755 02/04/21  0837 02/04/21  0515 02/04/21  0407 02/03/21  2131  02/02/21  0742  02/02/21  0608   LDH U/L  --   --  201  --   --  245*  --   --  193  --   --    AST (SGOT) U/L  --   --  10  --   --  14  --   --   --   --  12   ALT (SGPT) U/L  --   --  11  --   --  10  --   --   --   --  16   PROCALCITONIN ng/mL  --   --   --  0.07  --   --   --   --   --   --  0.12    LACTATE mmol/L  --   --   --   --  0.6  --   --   --   --   --  0.7   FERRITIN ng/mL  --   --   --   --   --   --   --   --  615.00*  --   --    D DIMER QUANT MCGFEU/mL  --   --   --   --   --   --  3.90*  --   --   --   --    PLATELETS 10*3/mm3 299 282 311  --   --  339  --    < >  --    < >  --     < > = values in this interval not displayed.       Results from last 7 days   Lab Units 02/07/21  1026 02/04/21  1628 02/04/21  0437 02/02/21  0845   PH, ARTERIAL pH units 7.387 7.348* 7.355 7.344*   PCO2, ARTERIAL mm Hg 41.8 36.2 27.0* 43.3   PO2 ART mm Hg 93.7 131.7* 63.8* 76.4*   HCO3 ART mmol/L 25.1 19.9* 15.1* 23.6       Imaging:  Reviewed chest images personally from past 3 days    Scheduled meds:    ascorbic acid, 1,000 mg, Oral, Daily  aspirin, 81 mg, Nasogastric, Daily  cyclobenzaprine, 10 mg, Oral, Daily  Eucerin original healing lotion, , Topical, Daily  fentaNYL, 1 patch, Transdermal, Q72H  gabapentin, 800 mg, Oral, Q8H  hydrOXYzine, 25 mg, Oral, Daily  lansoprazole, 30 mg, Nasogastric, QAM  piperacillin-tazobactam, 3.375 g, Intravenous, Q8H  propranolol, 20 mg, Per G Tube, TID  sennosides-docusate, 2 tablet, Oral, Nightly  sertraline, 100 mg, Oral, Daily  vancomycin, 1,250 mg, Intravenous, Q8H        ASSESSMENT  /  PLAN:  1. Tracheitis and pneumonia with mucous plugging status post bronchoscopy on 2/4/2021: Growing both Pseudomonas and MRSA  2. Exchange to a cuffed tracheostomy and now on the ventilator as of 2/4/2021  3. Bilateral pleural effusion left more than right  4. Acute hypoxemic respiratory failure  5. Sepsis  6. Quadriplegia  7. Anemia  8. Essential hypertension  9. Gluteal muscle bleed    Holding plavix due to acute hemorrhage  Prn transfusions  Cont abx follow mciro  bp control  Asa cont  Vent reviewed  He will remain off precedex, prn anxiolytics  Trach collar trial today , he refused yesterday, will leave it up to him whether he feels as though he will need to go back on pressure support  overnight or whether he can continue with trach collar      Claudio Sam MD  [facility]  02/07/21  1323 EST        Electronically signed by Claudio Sam MD at 02/07/21 1323        Encounter Information     Department Encounter #    [facility] [id number]      Matt Steele MD   Physician   Oncology   Progress Notes   Signed   Date of Service:  02/07/21 1109   Creation Time:  02/07/21 1109            Signed        Expand All Collapse All  []Expand All by Default    Show:Clear all  [x]Manual[x]Template[x]Copied    Added by:  [x]Matt Steele MD    []Anali for details  [facility] GROUP INPATIENT PROGRESS NOTE     Length of Stay:  5 days     CHIEF COMPLAINT/REASON FOR VISIT:  Anemia     SUBJECTIVE:   Events noted.     ROS:   Review of Systems   Constitutional: Positive for activity change. Negative for fever.   Respiratory: Positive for cough and shortness of breath (Some improved after PRBCs). Negative for wheezing.         Hemoptysis   Cardiovascular: Negative.    Gastrointestinal: Negative.    Musculoskeletal: Negative.    Neurological: Positive for weakness.   Hematological: Negative.     unchanged 2/7     OBJECTIVE:  Vitals          Vitals:     02/07/21 0800 02/07/21 0900 02/07/21 1000 02/07/21 1100   BP: 121/73 136/78 127/72 123/66   Pulse: 95 80 73 80   Resp:           Temp:           TempSrc:           SpO2: 97% 98% 97% 97%   Weight:           Height:                      PHYSICAL EXAMINATION:  CONSTITUTIONAL: pleasant chronic ill-appearing young man   CV: RRR, S1S2, no murmur  RESP: Coarse breath sounds bilaterally, no increased work of breathing  GI: soft, non-tender, no splenomegaly, +bs  MUSC: no edema, generalized muscle atrophy  NEURO: alert and oriented x3, quadriplegia           DIAGNOSTIC DATA:  Results Review:                I reviewed the patient's new clinical results.            Results from last 7 days   Lab Units 02/07/21  0864  02/06/21  0725 02/05/21  0755   WBC 10*3/mm3 6.32 7.45 6.51   HEMOGLOBIN g/dL 8.6* 8.8* 8.7*   HEMATOCRIT % 26.9* 27.4* 26.1*   PLATELETS 10*3/mm3 299 282 311                Results from last 7 days   Lab Units 02/06/21  1655 02/05/21  0755 02/04/21  0515 02/04/21  0407   02/02/21  0608   SODIUM mmol/L 142 137 135* 136   < > 135*   POTASSIUM mmol/L 3.3* 2.9* 3.3* 3.3*   < > 3.6   CHLORIDE mmol/L 109* 107 105 103   < > 97*   CO2 mmol/L 22.9 21.1* 14.0* 13.1*   < > 26.1   BUN mg/dL 10 5* 4* 5*   < > 14   CREATININE mg/dL 0.45* 0.23* 0.21* 0.18*   < > 0.31*   CALCIUM mg/dL 8.7 9.1 9.3 9.1   < > 9.5   BILIRUBIN mg/dL  --  2.1*  --  2.4*  --  3.2*   ALK PHOS U/L  --  108  --  122*  --  144*   ALT (SGPT) U/L  --  11  --  10  --  16   AST (SGOT) U/L  --  10  --  14  --  12   GLUCOSE mg/dL 126* 96 83 79   < > 93    < > = values in this interval not displayed.            Lab Results   Component Value Date     NEUTROABS 4.62 02/07/2021           Results from last 7 days   Lab Units 02/05/21  0755   INR   1.25*   APTT seconds 32.5             Component      Latest Ref Rng & Units 2/2/2021 2/3/2021   Immature Reticulocyte Fraction      3.0 - 15.8 % 28.3 (H)     Reticulocyte %      0.70 - 1.90 % 6.46 (H)     Reticulocyte Absolute      0.0200 - 0.1300 10*6/mm3 0.1996 (H)     Reticulated Hgb      29.8 - 36.1 pg 30.4     Direct Antiglobulin Test         Negative   Vitamin B-12      211 - 946 pg/mL   1,647 (H)      Component      Latest Ref Rng & Units 2/2/2021 2/3/2021   Iron      59 - 158 mcg/dL 36 (L)     Iron Saturation      20 - 50 % 16 (L)     Transferrin      200 - 360 mg/dL 151 (L)     TIBC      298 - 536 mcg/dL 225 (L)     Immature Reticulocyte Fraction      3.0 - 15.8 % 28.3 (H)     Reticulocyte %      0.70 - 1.90 % 6.46 (H)     Reticulocyte Absolute      0.0200 - 0.1300 10*6/mm3 0.1996 (H)     Reticulated Hgb      29.8 - 36.1 pg 30.4     Ferritin      30.00 - 400.00 ng/mL 615.00 (H)     Haptoglobin      30 - 200 mg/dL 11  (L)     Direct Antiglobulin Test         Negative   Vitamin B-12      211 - 946 pg/mL   1,647 (H)   Folate      4.78 - 24.20 ng/mL   >20.00      Component      Latest Ref Rng & Units 2/3/2021 2/4/2021   Immature Reticulocyte Fraction      3.0 - 15.8 %   20.6 (H)   Reticulocyte %      0.70 - 1.90 %   6.67 (H)   Reticulocyte Absolute      0.0200 - 0.1300 10*6/mm3   0.2241 (H)   Reticulated Hgb      29.8 - 36.1 pg   28.9 (L)   D-Dimer, Quant      0.00 - 0.49 MCGFEU/mL 3.90 (H)     LDH      135 - 225 U/L   245 (H)   Haptoglobin      30 - 200 mg/dL   <10 (L)   Bilirubin, Direct      0.0 - 0.3 mg/dL   0.6 (H)      PT 15.5  PTT 32.5     IMAGING:    XR Chest 1 View (02/04/2021 08:26)  CT Angiogram Chest (02/04/2021 01:47)           Assessment/Plan      ASSESSMENT/PLAN:  This is a 35 y.o. male with:      *Anemia

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
## 2021-02-08 NOTE — PROGRESS NOTES
"  Daily Progress Note.   [facility] INTENSIVE CARE  2/8/2021    Patient:  Name:  Maxim Carvajal  MRN:  [id number]  1985  35 y.o.  male         CC: Sepsis, hypoxemia, pneumonia with tracheitis and mucous plug  Respiratory failure  Quadriplegia      Interval History:  Patient did well on trach collar now T-piece  Still having some fevers overnight.  Still having some secretions.  He says his throat is little sore from suctioning no hemoptysis he is awake and alert     ROS: +fever, no diarrhea, no chest pain  PMFSSH: no change    Physical Exam:  /88   Pulse 89   Temp 98.1 °F (36.7 °C) (Oral)   Resp 16   Ht 182.9 cm (72\")   Wt 73.5 kg (162 lb)   SpO2 97%   BMI 21.97 kg/m²   Body mass index is 21.97 kg/m².    Intake/Output Summary (Last 24 hours) at 2/8/2021 1323  Last data filed at 2/8/2021 1200  Gross per 24 hour   Intake 4371 ml   Output 2400 ml   Net 1971 ml   T-piece e tracheostomy  General appearance:non toxic, conversant   Eyes: anicteric sclerae, moist conjunctivae; no lid-lag; PERRLA  HENT: Atraumatic; oropharynx clear with moist mucous membranes and no mucosal ulcerations; normal hard and soft palate  Neck: Trachea midline; +trachoeotomy  Lungs: no sig crackles symmetric mech air entry however significant rhonchi, with normal respiratory effort and no intercostal retractions  CV: RRR, no MRGs   Abdomen: Soft, non-tender; no masses or HSM  Extremities: No peripheral edema or extremity lymphadenopathy thin musculature contracted chronic changes  Skin: wwp no diffuse rash  Psych: Appropriate affect, alert  Neuro nodes heads, mouths answers to questions    Data Review:  Notable Labs:  Results from last 7 days   Lab Units 02/07/21  0855 02/06/21  0725 02/05/21  0755 02/04/21  0407 02/03/21  0612 02/02/21  0609   WBC 10*3/mm3 6.32 7.45 6.51 9.54 8.49 12.16*   HEMOGLOBIN g/dL 8.6* 8.8* 8.7* 9.1* 8.4* 6.8*   PLATELETS 10*3/mm3 299 282 311 339 315 366     Results from last 7 days   Lab " Units 02/08/21  0926 02/07/21  0855 02/06/21  1655 02/05/21  0755 02/04/21  0515 02/04/21  0407 02/03/21  0612   SODIUM mmol/L 143 145 142 137 135* 136 136   POTASSIUM mmol/L 4.3 3.4* 3.3* 2.9* 3.3* 3.3* 3.5   CHLORIDE mmol/L 108* 110* 109* 107 105 103 103   CO2 mmol/L 26.9 23.4 22.9 21.1* 14.0* 13.1* 14.0*   BUN mg/dL 19 16 10 5* 4* 5* 8   CREATININE mg/dL 0.73* 0.73* 0.45* 0.23* 0.21* 0.18* 0.30*   GLUCOSE mg/dL 105* 99 126* 96 83 79 66   CALCIUM mg/dL 9.2 8.9 8.7 9.1 9.3 9.1 8.7   PHOSPHORUS mg/dL 3.7 4.2 3.7  --   --   --   --    Estimated Creatinine Clearance: 144 mL/min (A) (by C-G formula based on SCr of 0.73 mg/dL (L)).    Results from last 7 days   Lab Units 02/07/21  0855 02/06/21  0725 02/05/21  0755 02/04/21  0837 02/04/21  0515 02/04/21  0407 02/03/21  2131  02/02/21  0742  02/02/21  0608   LDH U/L  --   --  201  --   --  245*  --   --  193  --   --    AST (SGOT) U/L  --   --  10  --   --  14  --   --   --   --  12   ALT (SGPT) U/L  --   --  11  --   --  10  --   --   --   --  16   PROCALCITONIN ng/mL  --   --   --  0.07  --   --   --   --   --   --  0.12   LACTATE mmol/L  --   --   --   --  0.6  --   --   --   --   --  0.7   FERRITIN ng/mL  --   --   --   --   --   --   --   --  615.00*  --   --    D DIMER QUANT MCGFEU/mL  --   --   --   --   --   --  3.90*  --   --   --   --    PLATELETS 10*3/mm3 299 282 311  --   --  339  --    < >  --    < >  --     < > = values in this interval not displayed.       Results from last 7 days   Lab Units 02/07/21  1026 02/04/21  1628 02/04/21  0437 02/02/21  0845   PH, ARTERIAL pH units 7.387 7.348* 7.355 7.344*   PCO2, ARTERIAL mm Hg 41.8 36.2 27.0* 43.3   PO2 ART mm Hg 93.7 131.7* 63.8* 76.4*   HCO3 ART mmol/L 25.1 19.9* 15.1* 23.6       Imaging:  Reviewed chest images personally from past 3 days    Scheduled meds:    ascorbic acid, 1,000 mg, Oral, Daily  aspirin, 81 mg, Nasogastric, Daily  cyclobenzaprine, 10 mg, Oral, Daily  Eucerin original healing lotion, ,  Topical, Daily  fentaNYL, 1 patch, Transdermal, Q72H  gabapentin, 800 mg, Oral, Q8H  hydrOXYzine, 25 mg, Oral, Daily  lansoprazole, 30 mg, Nasogastric, QAM  piperacillin-tazobactam, 3.375 g, Intravenous, Q8H  propranolol, 20 mg, Per G Tube, TID  sennosides-docusate, 2 tablet, Oral, Nightly  sertraline, 100 mg, Oral, Daily        ASSESSMENT  /  PLAN:  1. Tracheitis and pneumonia with mucous plugging status post bronchoscopy on 2/4/2021: Growing both Pseudomonas and MRSA  2. Exchange to a cuffed tracheostomy and now on the ventilator as of 2/4/2021  3. Bilateral pleural effusion left more than right  4. Acute hypoxemic respiratory failure  5. Sepsis  6. Quadriplegia  7. Anemia  8. Essential hypertension  9. Gluteal muscle bleed  10. fever    Holding plavix due to acute hemorrhage if no further bleeding and hemoglobin stable would like to resume this however hemoglobin is still downtrending  Prn transfusions  Cont abx this concerning still having fevers we will consult infectious disease  bp control  Asa cont  See if we can continue on trach collar NTP still having a lot of rhonchi on exam  He will remain off precedex, prn anxiolytics    Plan of care and patient course was reviewed with multidisciplinary team in morning rounds.        Claudio Sam MD  [facility]  02/08/21  5005

## 2021-02-09 LAB
ALBUMIN SERPL-MCNC: 3.2 G/DL (ref 3.5–5.2)
ALP SERPL-CCNC: 86 U/L (ref 39–117)
ALT SERPL W P-5'-P-CCNC: 25 U/L (ref 1–41)
ANION GAP SERPL CALCULATED.3IONS-SCNC: 7.5 MMOL/L (ref 5–15)
AST SERPL-CCNC: 23 U/L (ref 1–40)
BASOPHILS # BLD AUTO: 0 10*3/MM3 (ref 0–0.2)
BASOPHILS NFR BLD AUTO: 0 % (ref 0–1.5)
BILIRUB CONJ SERPL-MCNC: 0.4 MG/DL (ref 0–0.3)
BILIRUB INDIRECT SERPL-MCNC: 1.2 MG/DL
BILIRUB SERPL-MCNC: 1.6 MG/DL (ref 0–1.2)
BUN SERPL-MCNC: 20 MG/DL (ref 6–20)
BUN/CREAT SERPL: 34.5 (ref 7–25)
CALCIUM SPEC-SCNC: 9.4 MG/DL (ref 8.6–10.5)
CHLORIDE SERPL-SCNC: 103 MMOL/L (ref 98–107)
CO2 SERPL-SCNC: 28.5 MMOL/L (ref 22–29)
CREAT SERPL-MCNC: 0.58 MG/DL (ref 0.76–1.27)
DEPRECATED RDW RBC AUTO: 54.5 FL (ref 37–54)
EOSINOPHIL # BLD AUTO: 0.39 10*3/MM3 (ref 0–0.4)
EOSINOPHIL NFR BLD AUTO: 6.1 % (ref 0.3–6.2)
ERYTHROCYTE [DISTWIDTH] IN BLOOD BY AUTOMATED COUNT: 17.9 % (ref 12.3–15.4)
GFR SERPL CREATININE-BSD FRML MDRD: >150 ML/MIN/1.73
GLUCOSE SERPL-MCNC: 119 MG/DL (ref 65–99)
HCT VFR BLD AUTO: 25.5 % (ref 37.5–51)
HGB BLD-MCNC: 7.9 G/DL (ref 13–17.7)
IMM GRANULOCYTES # BLD AUTO: 0.02 10*3/MM3 (ref 0–0.05)
IMM GRANULOCYTES NFR BLD AUTO: 0.3 % (ref 0–0.5)
LYMPHOCYTES # BLD AUTO: 1 10*3/MM3 (ref 0.7–3.1)
LYMPHOCYTES NFR BLD AUTO: 15.6 % (ref 19.6–45.3)
MCH RBC QN AUTO: 25.7 PG (ref 26.6–33)
MCHC RBC AUTO-ENTMCNC: 31 G/DL (ref 31.5–35.7)
MCV RBC AUTO: 83.1 FL (ref 79–97)
MONOCYTES # BLD AUTO: 0.52 10*3/MM3 (ref 0.1–0.9)
MONOCYTES NFR BLD AUTO: 8.1 % (ref 5–12)
NEUTROPHILS NFR BLD AUTO: 4.46 10*3/MM3 (ref 1.7–7)
NEUTROPHILS NFR BLD AUTO: 69.9 % (ref 42.7–76)
NRBC BLD AUTO-RTO: 0 /100 WBC (ref 0–0.2)
PHOSPHATE SERPL-MCNC: 3.8 MG/DL (ref 2.5–4.5)
PLATELET # BLD AUTO: 296 10*3/MM3 (ref 140–450)
PMV BLD AUTO: 9.4 FL (ref 6–12)
POTASSIUM SERPL-SCNC: 4.2 MMOL/L (ref 3.5–5.2)
PROT SERPL-MCNC: 6.1 G/DL (ref 6–8.5)
RBC # BLD AUTO: 3.07 10*6/MM3 (ref 4.14–5.8)
SODIUM SERPL-SCNC: 139 MMOL/L (ref 136–145)
VANCOMYCIN SERPL-MCNC: 18.3 MCG/ML (ref 5–40)
WBC # BLD AUTO: 6.39 10*3/MM3 (ref 3.4–10.8)

## 2021-02-09 PROCEDURE — 80076 HEPATIC FUNCTION PANEL: CPT | Performed by: INTERNAL MEDICINE

## 2021-02-09 PROCEDURE — 94799 UNLISTED PULMONARY SVC/PX: CPT

## 2021-02-09 PROCEDURE — 25010000002 CEFEPIME 2 G/NS 100 ML SOLUTION: Performed by: INTERNAL MEDICINE

## 2021-02-09 PROCEDURE — 25010000002 CEFEPIME PER 500 MG: Performed by: INTERNAL MEDICINE

## 2021-02-09 PROCEDURE — 84100 ASSAY OF PHOSPHORUS: CPT | Performed by: INTERNAL MEDICINE

## 2021-02-09 PROCEDURE — 97110 THERAPEUTIC EXERCISES: CPT

## 2021-02-09 PROCEDURE — 25010000002 VANCOMYCIN 10 G RECONSTITUTED SOLUTION: Performed by: INTERNAL MEDICINE

## 2021-02-09 PROCEDURE — 25010000002 MORPHINE PER 10 MG: Performed by: INTERNAL MEDICINE

## 2021-02-09 PROCEDURE — 94760 N-INVAS EAR/PLS OXIMETRY 1: CPT

## 2021-02-09 PROCEDURE — 85025 COMPLETE CBC W/AUTO DIFF WBC: CPT | Performed by: INTERNAL MEDICINE

## 2021-02-09 PROCEDURE — 80202 ASSAY OF VANCOMYCIN: CPT | Performed by: HOSPITALIST

## 2021-02-09 PROCEDURE — 99233 SBSQ HOSP IP/OBS HIGH 50: CPT | Performed by: INTERNAL MEDICINE

## 2021-02-09 PROCEDURE — 97163 PT EVAL HIGH COMPLEX 45 MIN: CPT

## 2021-02-09 PROCEDURE — 80048 BASIC METABOLIC PNL TOTAL CA: CPT | Performed by: INTERNAL MEDICINE

## 2021-02-09 RX ORDER — MORPHINE SULFATE 2 MG/ML
2 INJECTION, SOLUTION INTRAMUSCULAR; INTRAVENOUS EVERY 4 HOURS PRN
Status: DISCONTINUED | OUTPATIENT
Start: 2021-02-09 | End: 2021-02-10

## 2021-02-09 RX ADMIN — OXYCODONE HYDROCHLORIDE 5 MG: 5 SOLUTION ORAL at 04:45

## 2021-02-09 RX ADMIN — MORPHINE SULFATE 2 MG: 2 INJECTION, SOLUTION INTRAMUSCULAR; INTRAVENOUS at 04:45

## 2021-02-09 RX ADMIN — CEFEPIME 2 G: 2 INJECTION, POWDER, FOR SOLUTION INTRAVENOUS at 09:30

## 2021-02-09 RX ADMIN — PROPRANOLOL HYDROCHLORIDE 20 MG: 20 TABLET ORAL at 08:56

## 2021-02-09 RX ADMIN — SERTRALINE 100 MG: 100 TABLET, FILM COATED ORAL at 08:57

## 2021-02-09 RX ADMIN — ACETAMINOPHEN ORAL SOLUTION 650 MG: 325 SOLUTION ORAL at 23:56

## 2021-02-09 RX ADMIN — GABAPENTIN 800 MG: 400 CAPSULE ORAL at 13:33

## 2021-02-09 RX ADMIN — ALPRAZOLAM 1 MG: 0.5 TABLET ORAL at 17:54

## 2021-02-09 RX ADMIN — OXYCODONE HYDROCHLORIDE 5 MG: 5 SOLUTION ORAL at 15:34

## 2021-02-09 RX ADMIN — GABAPENTIN 800 MG: 400 CAPSULE ORAL at 21:26

## 2021-02-09 RX ADMIN — ALPRAZOLAM 1 MG: 0.5 TABLET ORAL at 08:57

## 2021-02-09 RX ADMIN — MORPHINE SULFATE 2 MG: 2 INJECTION, SOLUTION INTRAMUSCULAR; INTRAVENOUS at 23:27

## 2021-02-09 RX ADMIN — SODIUM CHLORIDE 75 ML/HR: 9 INJECTION, SOLUTION INTRAVENOUS at 13:33

## 2021-02-09 RX ADMIN — GABAPENTIN 800 MG: 400 CAPSULE ORAL at 06:30

## 2021-02-09 RX ADMIN — MORPHINE SULFATE 2 MG: 2 INJECTION, SOLUTION INTRAMUSCULAR; INTRAVENOUS at 08:58

## 2021-02-09 RX ADMIN — DOCUSATE SODIUM 50MG AND SENNOSIDES 8.6MG 2 TABLET: 8.6; 5 TABLET, FILM COATED ORAL at 21:26

## 2021-02-09 RX ADMIN — VANCOMYCIN HYDROCHLORIDE 1250 MG: 10 INJECTION, POWDER, LYOPHILIZED, FOR SOLUTION INTRAVENOUS at 13:33

## 2021-02-09 RX ADMIN — CEFEPIME 2 G: 2 INJECTION, POWDER, FOR SOLUTION INTRAVENOUS at 02:39

## 2021-02-09 RX ADMIN — Medication: at 09:31

## 2021-02-09 RX ADMIN — CEFEPIME 2 G: 2 INJECTION, POWDER, FOR SOLUTION INTRAVENOUS at 17:46

## 2021-02-09 RX ADMIN — MORPHINE SULFATE 2 MG: 2 INJECTION, SOLUTION INTRAMUSCULAR; INTRAVENOUS at 18:25

## 2021-02-09 RX ADMIN — CYCLOBENZAPRINE 10 MG: 10 TABLET, FILM COATED ORAL at 08:57

## 2021-02-09 RX ADMIN — PROPRANOLOL HYDROCHLORIDE 20 MG: 20 TABLET ORAL at 23:56

## 2021-02-09 RX ADMIN — ASPIRIN 81 MG: 81 TABLET, CHEWABLE ORAL at 08:56

## 2021-02-09 RX ADMIN — OXYCODONE HYDROCHLORIDE 5 MG: 5 SOLUTION ORAL at 21:35

## 2021-02-09 RX ADMIN — LANSOPRAZOLE 30 MG: KIT at 06:30

## 2021-02-09 RX ADMIN — OXYCODONE HYDROCHLORIDE AND ACETAMINOPHEN 1000 MG: 500 TABLET ORAL at 08:55

## 2021-02-09 RX ADMIN — HYDROXYZINE HYDROCHLORIDE 25 MG: 25 TABLET ORAL at 08:56

## 2021-02-09 RX ADMIN — PROPRANOLOL HYDROCHLORIDE 20 MG: 20 TABLET ORAL at 17:46

## 2021-02-09 NOTE — DISCHARGE PLACEMENT REQUEST
"Maxim Hardin (35 y.o. Male)     Date of Birth Social Security Number Address Home Phone MRN    1985  [id number] [phone] [id number]    Faith Marital Status          None Single       Admission Date Admission Type Admitting Provider Attending Provider Department, Room/Bed    2/2/21 Emergency Joshua Vasquez MD Baumann, Patrick D, MD [facility] INTENSIVE CARE, I382/1    Discharge Date Discharge Disposition Discharge Destination                       Attending Provider: Rober Blanc MD    Allergies: No Known Allergies    Isolation: Contact   Infection: MRSA (02/02/21)   Code Status: No CPR    Ht: 182.9 cm (72\")   Wt: 71.4 kg (157 lb 6.5 oz)    Admission Cmt: None   Principal Problem: HCAP (healthcare-associated pneumonia) [J18.9]                 Active Insurance as of 2/2/2021     Primary Coverage     Payor Plan Insurance Group Employer/Plan Group    [place] [id number]     Payor Plan Address Payor Plan Phone Number Payor Plan Fax Number Effective Dates    [employer] [address] [address] [phone]  4/1/2020 - None Entered    [facility] [id number]       Subscriber Name Subscriber Birth Date Member ID       MAXIM HARDIN 1985 [id number]                 Emergency Contacts      (Rel.) Home Phone Work Phone Mobile Phone    Kinjal Hardin (Father) -- -- [phone]    MeShaista montemayor (Mother) [phone] -- [phone]              "

## 2021-02-09 NOTE — PROGRESS NOTES
Vancomycin Pharmacokinetic Note    Vancomycin random level 20.3 (seems to be clearing vancomycin better than before).  Vancomycin 1250 mg IV once as level should be below 20 and will recheck vancomycin random level today at noon along with morning BMP to have a better idea of clearance.    Preston Wolfe, PharmD, BCIDP, BCPS

## 2021-02-09 NOTE — PLAN OF CARE
Goal Outcome Evaluation:  Plan of Care Reviewed With: patient  Progress: improving  ID consulted for fevers overnight. ABX changed. Blood cultures drawn. Tested again for COVID. Negative. VSS. Afebrile on day shift. Frequent suctioning required. Only requiring 28% FiO2 on the T-piece. Frequent PRNs for pain. Possibly move out tomorrow? Emi Nguyen RN

## 2021-02-09 NOTE — PROGRESS NOTES
Vancomycin Pharmacokinetic Note    Maxim Carvajal is a 35 y.o. male who is on day 6 pharmacy to dose vancomycin for HAP.  Target level: AUC24 400-600  Consulting Provider: Joshua Vasquez MD  Current Vancomycin Dose: intermittent    Other Antimicrobials: Zosyn 3.375 g IV q8h infused over 4 hours    Allergies  Allergies as of 02/02/2021   • (No Known Allergies)       Microbiology  Microbiology Results (last 10 days)     Procedure Component Value - Date/Time    Respiratory Panel PCR w/COVID-19(SARS-CoV-2) BG/[facility]ZIA/BIBI/PAD/[facility] In-House, NP Swab in UTM/VTM, 3-4 HR TAT - Swab, Nasopharynx [[id number]]  (Normal) Collected: 02/08/21 1720    Lab Status: Final result Specimen: Swab from Nasopharynx Updated: 02/08/21 1833     ADENOVIRUS, PCR Not Detected     Coronavirus 229E Not Detected     Coronavirus HKU1 Not Detected     Coronavirus NL63 Not Detected     Coronavirus OC43 Not Detected     COVID19 Not Detected     Human Metapneumovirus Not Detected     Human Rhinovirus/Enterovirus Not Detected     Influenza A PCR Not Detected     Influenza B PCR Not Detected     Parainfluenza Virus 1 Not Detected     Parainfluenza Virus 2 Not Detected     Parainfluenza Virus 3 Not Detected     Parainfluenza Virus 4 Not Detected     RSV, PCR Not Detected     Bordetella pertussis pcr Not Detected     Bordetella parapertussis PCR Not Detected     Chlamydophila pneumoniae PCR Not Detected     Mycoplasma pneumo by PCR Not Detected    Narrative:      Fact sheet for providers: https://docs.mySugr/wp-content/uploads/IXP5922-7621-HX8.1-EUA-Provider-Fact-Sheet-3.pdf    Fact sheet for patients: https://docs.mySugr/wp-content/uploads/SHM9196-6874-OM0.1-EUA-Patient-Fact-Sheet-1.pdf    Test performed by PCR.    Respiratory Culture - Lavage, Lung, Right Middle Lobe [[id number]]  (Abnormal) Collected: 02/04/21 1837    Lab Status: Final result Specimen: Lavage from Lung, Right Middle Lobe Updated: 02/06/21 1307     Respiratory Culture  Scant growth (1+) Staphylococcus aureus, MRSA     Comment: Methicillin resistant Staphylococcus aureus, Patient may be an isolation risk.        Gram Stain No WBCs or organisms seen      No epithelial cells seen    Narrative:      Refer to Respiratory Culture from 2/2/21 for MICS    Respiratory Culture - Sputum, ET Suction [[id number]]  (Abnormal)  (Susceptibility) Collected: 02/02/21 2250    Lab Status: Final result Specimen: Sputum from ET Suction Updated: 02/06/21 0916     Respiratory Culture Moderate growth (3+) Pseudomonas aeruginosa      Light growth (2+) Staphylococcus aureus, MRSA     Comment: Methicillin resistant Staphylococcus aureus, Patient may be an isolation risk         No Normal Respiratory Lisa     Gram Stain No WBCs or organisms seen      No epithelial cells seen    Susceptibility      Pseudomonas aeruginosa     YOGI     Cefepime Susceptible     Ceftazidime Susceptible     Ciprofloxacin Resistant     Gentamicin Susceptible     Levofloxacin Resistant     Piperacillin + Tazobactam Susceptible [1]             [1]   Appended report. These results have been appended to a previously preliminary verified report.             Susceptibility      Staphylococcus aureus, MRSA     YOGI     Clindamycin Resistant     Linezolid Susceptible     Oxacillin Resistant     Penicillin G Resistant     Tetracycline Susceptible     Trimethoprim + Sulfamethoxazole Susceptible     Vancomycin Susceptible                    MRSA Screen, PCR (Inpatient) - Swab, Nares [[id number]]  (Abnormal) Collected: 02/02/21 2120    Lab Status: Final result Specimen: Swab from Nares Updated: 02/02/21 2350     MRSA PCR MRSA Detected    Blood Culture - Blood, Arm, Left [[id number]] Collected: 02/02/21 0656    Lab Status: Final result Specimen: Blood from Arm, Left Updated: 02/07/21 0715     Blood Culture No growth at 5 days    Blood Culture - Blood, Arm, Left [[id number]]  (Abnormal) Collected: 02/02/21 0656    Lab Status: Final result Specimen:  Blood from Arm, Left Updated: 02/04/21 0921     Blood Culture Staphylococcus, coagulase negative     Comment: Probable contaminant requires clinical correlation, susceptibility not performed unless requested by physician.          Isolated from Anaerobic Bottle     Gram Stain Anaerobic Bottle Gram positive cocci in clusters    Blood Culture ID, PCR - Blood, Arm, Left [[id number]]  (Abnormal) Collected: 02/02/21 0656    Lab Status: Edited Result - FINAL Specimen: Blood from Arm, Left Updated: 02/03/21 1202     BCID, PCR Staphylococcus spp, not aureus. Identification by BCID PCR.     BOTTLE TYPE Anaerobic Bottle     Comment: Appended report. These results have been appended to a previously final verified report.       Respiratory Panel PCR w/COVID-19(SARS-CoV-2) BG/[facility]ZIA/BIBI/PAD/[facility] In-House, NP Swab in UTM/VTM, 3-4 HR TAT - Swab, Nasopharynx [[id number]]  (Normal) Collected: 02/02/21 0641    Lab Status: Final result Specimen: Swab from Nasopharynx Updated: 02/02/21 0822     ADENOVIRUS, PCR Not Detected     Coronavirus 229E Not Detected     Coronavirus HKU1 Not Detected     Coronavirus NL63 Not Detected     Coronavirus OC43 Not Detected     COVID19 Not Detected     Human Metapneumovirus Not Detected     Human Rhinovirus/Enterovirus Not Detected     Influenza A PCR Not Detected     Influenza B PCR Not Detected     Parainfluenza Virus 1 Not Detected     Parainfluenza Virus 2 Not Detected     Parainfluenza Virus 3 Not Detected     Parainfluenza Virus 4 Not Detected     RSV, PCR Not Detected     Bordetella pertussis pcr Not Detected     Bordetella parapertussis PCR Not Detected     Chlamydophila pneumoniae PCR Not Detected     Mycoplasma pneumo by PCR Not Detected    Narrative:      Fact sheet for providers: https://docs.W[employer]forum/wp-content/uploads/ZRO8537-3951-CH2.1-EUA-Provider-Fact-Sheet-3.pdf    Fact sheet for patients:  "https://docs.[employer]/wp-content/uploads/EPG8559-8497-CK6.1-EUA-Patient-Fact-Sheet-1.pdf    Test performed by PCR.        Vitals/Labs/I&O  182.9 cm (72\")  71.4 kg (157 lb 6.5 oz)  Body mass index is 21.35 kg/m².   Temp:  [99.2 °F (37.3 °C)-100.4 °F (38 °C)] 99.2 °F (37.3 °C)  Heart Rate:  [] 80  Resp:  [16-20] 18  BP: (126-163)/() 144/88    Results from last 7 days   Lab Units 02/07/21  0855 02/06/21  0725 02/05/21  0755   WBC 10*3/mm3 6.32 7.45 6.51     Results from last 7 days   Lab Units 02/04/21  0515   LACTATE mmol/L 0.6      Results from last 7 days   Lab Units 02/08/21  0926 02/04/21  0837   PROCALCITONIN ng/mL 0.07 0.07                  Estimated Creatinine Clearance: 176.1 mL/min (A) (by C-G formula based on SCr of 0.58 mg/dL (L)).  Results from last 7 days   Lab Units 02/09/21  0719 02/08/21  0926 02/07/21  0855   BUN mg/dL 20 19 16   CREATININE mg/dL 0.58* 0.73* 0.73*     Intake & Output (last 3 days)       02/06 0701 - 02/07 0700 02/07 0701 - 02/08 0700 02/08 0701 - 02/09 0700 02/09 0701 - 02/10 0700    P.O.   0     I.V. (mL/kg) 2924 (39.8) 2346 (31.9) 2248 (31.5)     Other 771 786 475 120    NG/GT 1346 1289 1107     Total Intake(mL/kg) 5041 (68.6) 4421 (60.1) 3830 (53.6) 120 (1.7)    Urine (mL/kg/hr) 400 (0.2) 2050 (1.2) 2750 (1.6)     Stool  0 0     Total Output 400 2050 2750     Net +4641 +2371 +1080 +120            Urine Unmeasured Occurrence 1 x 2 x 1 x     Stool Unmeasured Occurrence  2 x 2 x         Vancomycin Dosing & Level History  1500 mg (~20 mg/kg) IV x1 dose               02/02 1408  1250 mg (17 mg/kg) IV q12h               02/03 0248, 1531              02/04 0332                          Trough 02/04 1459: 4.8 mg/L   1000 mg (~14 mg/kg) IV q8h (tommie 0300, 1100, 1900)              02/04 1924              02/05 0346                          Trough 02/05 1024: 10.1 mg/L               02/05 1215  1250 mg (17 mg/kg) IV q8h               02/05 2005 02/06 0439        "                   Trough 02/06 1140: 18.4 mg/L               02/06 1252, 2000 02/07 0345, 1219                          Trough 02/07 1922: 35.1 mg/L               02/07 2003                          Random 02/08 0006: 39.3 mg/L     Random 02/08 1716: 20.3 mg/L   1250 mg (17 mg/kg) IV x1 dose   02/08 2325    Random 02/09 1157: 18.3 mg/L    Results from last 7 days   Lab Units 02/09/21  1157 02/08/21  1716 02/08/21  0006   VANCOMYCIN RM mcg/mL 18.30 20.30 39.30         Results from last 7 days   Lab Units 02/07/21  1922 02/06/21  1140 02/05/21  1024 02/04/21  1459   VANCOMYCIN TR mcg/mL 35.10* 18.40 10.10 4.80*     Assessment/Plan  1) Continue vancomycin intermittent dosing based on elevated SCr (trending down today). Ordered 1250 mg (17 mg/kg) IV x1 dose to be given this afternoon.     2) Ordered vancomycin random level for tomorrow with AM labs. Plan to re-dose when level is anticipated to be < 20 mg/L.      3) SCr ordered for tomorrow with AM labs.     Thank you for involving pharmacy in this patient's care. Please contact pharmacy with any questions or concerns.           Damir Wolfe, Vivian, EUGENIO, BCPS  02/09/21 11:16 EST

## 2021-02-09 NOTE — DISCHARGE PLACEMENT REQUEST
"Maxim Hardin (35 y.o. Male)     Date of Birth Social Security Number Address Home Phone MRN    1985  [id number] [facility] [address] [phone] [id number]    [employer] Marital Status          None Single       Admission Date Admission Type Admitting Provider Attending Provider Department, Room/Bed    2/2/21 Emergency Joshua Vasquez MD Baumann, Patrick D, MD [facility] INTENSIVE CARE, I382/1    Discharge Date Discharge Disposition Discharge Destination                       Attending Provider: Rober Blnac MD    Allergies: No Known Allergies    Isolation: Contact   Infection: MRSA (02/02/21)   Code Status: No CPR    Ht: 182.9 cm (72\")   Wt: 71.4 kg (157 lb 6.5 oz)    Admission Cmt: None   Principal Problem: HCAP (healthcare-associated pneumonia) [J18.9]                 Active Insurance as of 2/2/2021     Primary Coverage     Payor Plan Insurance Group Employer/Plan Group    [place] [id number]     Payor Plan Address Payor Plan Phone Number Payor Plan Fax Number Effective Dates    [employer] [address] [address] [phone]  4/1/2020 - None Entered    [facility] [id number]       Subscriber Name Subscriber Birth Date Member ID       MAXIM HARDIN 1985 [id number]                 Emergency Contacts      (Rel.) Home Phone Work Phone Mobile Phone    Kinjal Hardin (Father) -- -- [phone]    MeShaista montemayor (Mother) [phone] -- [phone]              "
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
## 2021-02-09 NOTE — PROGRESS NOTES
Continued Stay Note  [facility]     Patient Name: Maxim Carvajal  MRN: [id number]  Today's Date: 2/9/2021    Admit Date: 2/2/2021    Discharge Plan     Row Name 02/09/21 1455       Plan    Plan Comments  mother requests [facility]ATER[facility] bañuelos  spoke with Ren  He will evaluate    Row Name 02/09/21 1257       Plan    Plan Comments  for medicaid bed  CCP following for acceptance  Mother states that the paient felt he did not obtain good chare and would like new facility    Row Name 02/09/21 1246       Plan    Plan  Discharge plans pending clinical course. Pt mother request referrals to facilities She does not want him to return to [place]    Plan Comments  Referrals to facilities        Discharge Codes    No documentation.             Noelle Contreras RN

## 2021-02-09 NOTE — SIGNIFICANT NOTE
Discussed with RN patient is not appropriate for PMV evaluation at this time due to amount of secretions. Please reconsult ST when patient is appropriate   02/09/21 7322   OTHER   Discipline speech language pathologist

## 2021-02-09 NOTE — PLAN OF CARE
Goal Outcome Evaluation:    Pt remains in ICU. On T-piece at 28% FiO2. VSS at this time. Max temp 100.4; cooling methods promoted. Skin care provided. TF continued. UOP= 1650ml this shift; BM X1. PRN pain meds given for generalized chronic pain. Continue to monitor.

## 2021-02-09 NOTE — CONSULTS
Acute rehab referral received. Patient status and care needs noted. Unfortunately due to his quadriplegia it would not be appropriate to admit to acute rehab. Will sign off.    Thank you.    Ren Lamas RN  p

## 2021-02-09 NOTE — PROGRESS NOTES
LOS: 7 days     Chief Complaint:  Follow-up fever    Interval History:  Temperature curve is improved. He wakes with verbal stimuli and answers questions. He shakes his head no to any new symptoms. His T-Piece support is stable with FiO2 28%.     ROS: no rashes or vomiting    Vital Signs  Temp:  [99.4 °F (37.4 °C)-100.4 °F (38 °C)] 99.5 °F (37.5 °C)  Heart Rate:  [] 80  Resp:  [16-20] 18  BP: (126-163)/() 144/88    Physical Exam:  General: awake, alert, pleasant, calm  Eyes:  no scleral icterus.   ENT: MMM  Neck: tracheostomy  Cardiovascular: NR, no murmur  Respiratory: Bilateral lower lobe rales; no wheezing  GI: Abdomen is soft, nontender, G-tube in place without erythema or purulence  Skin: No rashes, sacral decubitus ulcer present on admission  Neurological: quadriplegic  Psychiatric: calm and pleasant  : External urinary catheter    Antibiotics:  •  cefepime (MAXIPIME) 2 g/100 mL 0.9% NS (mbp), 2 g, Intravenous, Q8H  •  Pharmacy to dose vancomycin    LABS:  CBC, CMP, micro reviewed today  Lab Results   Component Value Date    WBC 6.32 02/07/2021    HGB 8.6 (L) 02/07/2021    HCT 26.9 (L) 02/07/2021    MCV 82.5 02/07/2021     02/07/2021     Lab Results   Component Value Date    GLUCOSE 119 (H) 02/09/2021    BUN 20 02/09/2021    CREATININE 0.58 (L) 02/09/2021    EGFRIFNONA >150 02/09/2021    BCR 34.5 (H) 02/09/2021    CO2 28.5 02/09/2021    CALCIUM 9.4 02/09/2021    ALBUMIN 3.20 (L) 02/09/2021    LABIL2 2.0 01/28/2020    AST 23 02/09/2021    ALT 25 02/09/2021     Procalcitonin 0.12 -> 0.07 -> 0.07  HIV antibodies negative  Hep C antibody negative  Hep B surface antigen negative    Microbiology:  2/8 BCx: NGTD  2/8 RPP: negative  2/4 SCx MRSA  2/2 SCx moderate Pseudomonas, light growth MRSA  2/2 BCx CoNS 1/2   2/2 RVP/COVID neg    Radiology (personally reviewed report):   CXR 2/8 with mild bilateral basilar atelectasis/infiltrate/effusion per report    Assessment/Plan   Fever in  adult  Left-sided pneumonia with sputum cultures positive for Pseudomonas and MRSA  Tracheostomy in place  Quadriplegia - complicating above  -All problems new to me today      Fever curve is better with Tm 100.4 F at 0014 and Tc 99.5 F. He is neither tachycardic nor hypotensive. His RPP from yesterday is negative and his blood cultures are NGTD. His O2 needs are stable. I recommend that he continue spot-dosed vancomycin per pharmacy for MRSA found in sputum culture. Continue cefepime 2 g IV every 8 hours targetig Pseudomonas on his sputum culture. Duration TBD. If respiratory status or fevers worsen, next step would be a CT of the chest. I will order a CBC for the AM.     ID will follow.

## 2021-02-09 NOTE — PROGRESS NOTES
Continued Stay Note  [facility]     Patient Name: Maxim Carvajal  MRN: [id number]  Today's Date: 2/9/2021    Admit Date: 2/2/2021    Discharge Plan     Row Name 02/09/21 1257       Plan    Plan Comments  for medicaid bed  CCP following for acceptance  Mother states that the paient felt he did not obtain good chare and would like new facility    Row Name 02/09/21 1246       Plan    Plan  Discharge plans pending clinical course. Pt mother request referrals to facilities She does not want him to return to [place]    Plan Comments  Referrals to facilities        Discharge Codes    No documentation.             Noelle Contreras RN
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
## 2021-02-09 NOTE — PROGRESS NOTES
"  Daily Progress Note.   [facility] INTENSIVE CARE  2/9/2021    Patient:  Name:  Maxim Carvajal  MRN:  [id number]  1985  35 y.o.  male         CC: Sepsis, hypoxemia, pneumonia with tracheitis and mucous plug  Respiratory failure  Quadriplegia      Interval History:  Patient did well on trach collar now T-piece    tmax 38  Asleep awakens, appears tired still  Has received pain meds, anxiolytics today  Maintains on T piece  Still with some secretions.    ROS:  no diarrhea, no chest pain  PMFSSH: no change    Physical Exam:  /80   Pulse 77   Temp 99.2 °F (37.3 °C) (Oral)   Resp 18   Ht 182.9 cm (72\")   Wt 71.4 kg (157 lb 6.5 oz)   SpO2 97%   BMI 21.35 kg/m²   Body mass index is 21.35 kg/m².    Intake/Output Summary (Last 24 hours) at 2/9/2021 1248  Last data filed at 2/9/2021 1133  Gross per 24 hour   Intake 3880 ml   Output 3300 ml   Net 580 ml   T-piece  tracheostomy  General appearance:non toxic, conversant   Eyes: anicteric sclerae, moist conjunctivae; no lid-lag; PERRLA  HENT: Atraumatic; oropharynx clear with moist mucous membranes and no mucosal ulcerations; normal hard and soft palate  Neck: Trachea midline; +tracheostomy  Lungs: no sig crackles symmetric mech air entry however significant rhonchi, with normal respiratory effort and no intercostal retractions  CV: RRR, no MRGs   Abdomen: Soft, non-tender; no masses or HSM  Extremities: No peripheral edema or extremity lymphadenopathy thin musculature contracted chronic changes  Skin: wwp no diffuse rash  Psych: Appropriate affect, alert  Neuro nodes heads, mouths answers to questions    Data Review:  Notable Labs:  Results from last 7 days   Lab Units 02/09/21  0719 02/07/21  0855 02/06/21  0725 02/05/21  0755 02/04/21  0407 02/03/21  0612   WBC 10*3/mm3 6.39 6.32 7.45 6.51 9.54 8.49   HEMOGLOBIN g/dL 7.9* 8.6* 8.8* 8.7* 9.1* 8.4*   PLATELETS 10*3/mm3 296 299 282 311 339 315     Results from last 7 days   Lab Units 02/09/21  0719 " 02/08/21  0926 02/07/21  0855 02/06/21  1655 02/05/21  0755 02/04/21  0515 02/04/21  0407   SODIUM mmol/L 139 143 145 142 137 135* 136   POTASSIUM mmol/L 4.2 4.3 3.4* 3.3* 2.9* 3.3* 3.3*   CHLORIDE mmol/L 103 108* 110* 109* 107 105 103   CO2 mmol/L 28.5 26.9 23.4 22.9 21.1* 14.0* 13.1*   BUN mg/dL 20 19 16 10 5* 4* 5*   CREATININE mg/dL 0.58* 0.73* 0.73* 0.45* 0.23* 0.21* 0.18*   GLUCOSE mg/dL 119* 105* 99 126* 96 83 79   CALCIUM mg/dL 9.4 9.2 8.9 8.7 9.1 9.3 9.1   PHOSPHORUS mg/dL 3.8 3.7 4.2 3.7  --   --   --    Estimated Creatinine Clearance: 176.1 mL/min (A) (by C-G formula based on SCr of 0.58 mg/dL (L)).    Results from last 7 days   Lab Units 02/09/21  0719 02/08/21  0926 02/07/21  0855 02/06/21  0725 02/05/21  0755 02/04/21  0837 02/04/21  0515 02/04/21  0407 02/03/21  2131   LDH U/L  --   --   --   --  201  --   --  245*  --    AST (SGOT) U/L 23  --   --   --  10  --   --  14  --    ALT (SGPT) U/L 25  --   --   --  11  --   --  10  --    PROCALCITONIN ng/mL  --  0.07  --   --   --  0.07  --   --   --    LACTATE mmol/L  --   --   --   --   --   --  0.6  --   --    D DIMER QUANT MCGFEU/mL  --   --   --   --   --   --   --   --  3.90*   PLATELETS 10*3/mm3 296  --  299 282 311  --   --  339  --        Results from last 7 days   Lab Units 02/07/21  1026 02/04/21  1628 02/04/21  0437   PH, ARTERIAL pH units 7.387 7.348* 7.355   PCO2, ARTERIAL mm Hg 41.8 36.2 27.0*   PO2 ART mm Hg 93.7 131.7* 63.8*   HCO3 ART mmol/L 25.1 19.9* 15.1*       Imaging:  Reviewed chest images personally from past 3 days    Scheduled meds:    ascorbic acid, 1,000 mg, Oral, Daily  aspirin, 81 mg, Nasogastric, Daily  cefepime, 2 g, Intravenous, Q8H  cyclobenzaprine, 10 mg, Oral, Daily  Eucerin original healing lotion, , Topical, Daily  fentaNYL, 1 patch, Transdermal, Q72H  gabapentin, 800 mg, Oral, Q8H  hydrOXYzine, 25 mg, Oral, Daily  lansoprazole, 30 mg, Nasogastric, QAM  propranolol, 20 mg, Per G Tube, TID  sennosides-docusate, 2  tablet, Oral, Nightly  sertraline, 100 mg, Oral, Daily        ASSESSMENT  /  PLAN:  1. Tracheitis and pneumonia with mucous plugging status post bronchoscopy on 2/4/2021: Growing both Pseudomonas and MRSA  2. Exchange to a cuffed tracheostomy and now on the ventilator as of 2/4/2021  3. Bilateral pleural effusion left more than right  4. Acute hypoxemic respiratory failure  5. Sepsis  6. Quadriplegia  7. Anemia  8. Essential hypertension  9. Gluteal muscle bleed  10. fever    Holding plavix due to acute hemorrhage if no further bleeding and hemoglobin stable would like to resume this however hemoglobin is still downtrending  Prn transfusions  abx per infectious disease  bp control  Asa cont  To floor if bed available    Plan of care and patient course was reviewed with multidisciplinary team in morning rounds.        Claudio Sam MD  [facility]  02/09/21  1502

## 2021-02-10 ENCOUNTER — APPOINTMENT (OUTPATIENT)
Dept: CARDIOLOGY | Facility: HOSPITAL | Age: 36
End: 2021-02-10

## 2021-02-10 ENCOUNTER — APPOINTMENT (OUTPATIENT)
Dept: CT IMAGING | Facility: HOSPITAL | Age: 36
End: 2021-02-10

## 2021-02-10 LAB
ANION GAP SERPL CALCULATED.3IONS-SCNC: 8.5 MMOL/L (ref 5–15)
BASOPHILS # BLD AUTO: 0.01 10*3/MM3 (ref 0–0.2)
BASOPHILS NFR BLD AUTO: 0.2 % (ref 0–1.5)
BH CV LOWER VASCULAR LEFT COMMON FEMORAL AUGMENT: NORMAL
BH CV LOWER VASCULAR LEFT COMMON FEMORAL COMPETENT: NORMAL
BH CV LOWER VASCULAR LEFT COMMON FEMORAL COMPRESS: NORMAL
BH CV LOWER VASCULAR LEFT COMMON FEMORAL PHASIC: NORMAL
BH CV LOWER VASCULAR LEFT COMMON FEMORAL SPONT: NORMAL
BH CV LOWER VASCULAR LEFT DISTAL FEMORAL COMPRESS: NORMAL
BH CV LOWER VASCULAR LEFT GASTRONEMIUS COMPRESS: NORMAL
BH CV LOWER VASCULAR LEFT GREATER SAPH AK COMPRESS: NORMAL
BH CV LOWER VASCULAR LEFT GREATER SAPH BK COMPRESS: NORMAL
BH CV LOWER VASCULAR LEFT LESSER SAPH COMPRESS: NORMAL
BH CV LOWER VASCULAR LEFT MID FEMORAL AUGMENT: NORMAL
BH CV LOWER VASCULAR LEFT MID FEMORAL COMPETENT: NORMAL
BH CV LOWER VASCULAR LEFT MID FEMORAL COMPRESS: NORMAL
BH CV LOWER VASCULAR LEFT MID FEMORAL PHASIC: NORMAL
BH CV LOWER VASCULAR LEFT MID FEMORAL SPONT: NORMAL
BH CV LOWER VASCULAR LEFT PERONEAL COMPRESS: NORMAL
BH CV LOWER VASCULAR LEFT POPLITEAL AUGMENT: NORMAL
BH CV LOWER VASCULAR LEFT POPLITEAL COMPETENT: NORMAL
BH CV LOWER VASCULAR LEFT POPLITEAL COMPRESS: NORMAL
BH CV LOWER VASCULAR LEFT POPLITEAL PHASIC: NORMAL
BH CV LOWER VASCULAR LEFT POPLITEAL SPONT: NORMAL
BH CV LOWER VASCULAR LEFT POSTERIOR TIBIAL COMPRESS: NORMAL
BH CV LOWER VASCULAR LEFT PROFUNDA FEMORAL COMPRESS: NORMAL
BH CV LOWER VASCULAR LEFT PROXIMAL FEMORAL COMPRESS: NORMAL
BH CV LOWER VASCULAR LEFT SAPHENOFEMORAL JUNCTION COMPRESS: NORMAL
BH CV LOWER VASCULAR RIGHT COMMON FEMORAL AUGMENT: NORMAL
BH CV LOWER VASCULAR RIGHT COMMON FEMORAL COMPETENT: NORMAL
BH CV LOWER VASCULAR RIGHT COMMON FEMORAL COMPRESS: NORMAL
BH CV LOWER VASCULAR RIGHT COMMON FEMORAL PHASIC: NORMAL
BH CV LOWER VASCULAR RIGHT COMMON FEMORAL SPONT: NORMAL
BH CV LOWER VASCULAR RIGHT DISTAL FEMORAL COMPRESS: NORMAL
BH CV LOWER VASCULAR RIGHT GASTRONEMIUS COMPRESS: NORMAL
BH CV LOWER VASCULAR RIGHT GREATER SAPH AK COMPRESS: NORMAL
BH CV LOWER VASCULAR RIGHT GREATER SAPH BK COMPRESS: NORMAL
BH CV LOWER VASCULAR RIGHT LESSER SAPH COMPRESS: NORMAL
BH CV LOWER VASCULAR RIGHT MID FEMORAL AUGMENT: NORMAL
BH CV LOWER VASCULAR RIGHT MID FEMORAL COMPETENT: NORMAL
BH CV LOWER VASCULAR RIGHT MID FEMORAL COMPRESS: NORMAL
BH CV LOWER VASCULAR RIGHT MID FEMORAL PHASIC: NORMAL
BH CV LOWER VASCULAR RIGHT MID FEMORAL SPONT: NORMAL
BH CV LOWER VASCULAR RIGHT PERONEAL COMPRESS: NORMAL
BH CV LOWER VASCULAR RIGHT POPLITEAL AUGMENT: NORMAL
BH CV LOWER VASCULAR RIGHT POPLITEAL COMPETENT: NORMAL
BH CV LOWER VASCULAR RIGHT POPLITEAL COMPRESS: NORMAL
BH CV LOWER VASCULAR RIGHT POPLITEAL PHASIC: NORMAL
BH CV LOWER VASCULAR RIGHT POPLITEAL SPONT: NORMAL
BH CV LOWER VASCULAR RIGHT POSTERIOR TIBIAL COMPRESS: NORMAL
BH CV LOWER VASCULAR RIGHT PROFUNDA FEMORAL COMPRESS: NORMAL
BH CV LOWER VASCULAR RIGHT PROXIMAL FEMORAL COMPRESS: NORMAL
BH CV LOWER VASCULAR RIGHT SAPHENOFEMORAL JUNCTION COMPRESS: NORMAL
BH CV UPPER VENOUS LEFT AXILLARY AUGMENT: NORMAL
BH CV UPPER VENOUS LEFT AXILLARY COMPETENT: NORMAL
BH CV UPPER VENOUS LEFT AXILLARY COMPRESS: NORMAL
BH CV UPPER VENOUS LEFT AXILLARY PHASIC: NORMAL
BH CV UPPER VENOUS LEFT AXILLARY SPONT: NORMAL
BH CV UPPER VENOUS LEFT BRACHIAL COMPRESS: NORMAL
BH CV UPPER VENOUS LEFT INTERNAL JUGULAR AUGMENT: NORMAL
BH CV UPPER VENOUS LEFT INTERNAL JUGULAR COMPETENT: NORMAL
BH CV UPPER VENOUS LEFT INTERNAL JUGULAR COMPRESS: NORMAL
BH CV UPPER VENOUS LEFT INTERNAL JUGULAR PHASIC: NORMAL
BH CV UPPER VENOUS LEFT INTERNAL JUGULAR SPONT: NORMAL
BH CV UPPER VENOUS LEFT RADIAL COMPRESS: NORMAL
BH CV UPPER VENOUS LEFT SUBCLAVIAN AUGMENT: NORMAL
BH CV UPPER VENOUS LEFT SUBCLAVIAN COMPETENT: NORMAL
BH CV UPPER VENOUS LEFT SUBCLAVIAN COMPRESS: NORMAL
BH CV UPPER VENOUS LEFT SUBCLAVIAN PHASIC: NORMAL
BH CV UPPER VENOUS LEFT SUBCLAVIAN SPONT: NORMAL
BH CV UPPER VENOUS LEFT ULNAR COMPRESS: NORMAL
BH CV UPPER VENOUS RIGHT AXILLARY AUGMENT: NORMAL
BH CV UPPER VENOUS RIGHT AXILLARY COMPETENT: NORMAL
BH CV UPPER VENOUS RIGHT AXILLARY COMPRESS: NORMAL
BH CV UPPER VENOUS RIGHT AXILLARY PHASIC: NORMAL
BH CV UPPER VENOUS RIGHT AXILLARY SPONT: NORMAL
BH CV UPPER VENOUS RIGHT BRACHIAL COMPRESS: NORMAL
BH CV UPPER VENOUS RIGHT INTERNAL JUGULAR AUGMENT: NORMAL
BH CV UPPER VENOUS RIGHT INTERNAL JUGULAR COMPETENT: NORMAL
BH CV UPPER VENOUS RIGHT INTERNAL JUGULAR COMPRESS: NORMAL
BH CV UPPER VENOUS RIGHT INTERNAL JUGULAR PHASIC: NORMAL
BH CV UPPER VENOUS RIGHT INTERNAL JUGULAR SPONT: NORMAL
BH CV UPPER VENOUS RIGHT MED CUBITAL COLOR: 1
BH CV UPPER VENOUS RIGHT MED CUBITAL COMPRESS: NORMAL
BH CV UPPER VENOUS RIGHT MED CUBITAL THROMBUS: NORMAL
BH CV UPPER VENOUS RIGHT RADIAL COMPRESS: NORMAL
BH CV UPPER VENOUS RIGHT SUBCLAVIAN AUGMENT: NORMAL
BH CV UPPER VENOUS RIGHT SUBCLAVIAN COMPETENT: NORMAL
BH CV UPPER VENOUS RIGHT SUBCLAVIAN COMPRESS: NORMAL
BH CV UPPER VENOUS RIGHT SUBCLAVIAN PHASIC: NORMAL
BH CV UPPER VENOUS RIGHT SUBCLAVIAN SPONT: NORMAL
BH CV UPPER VENOUS RIGHT ULNAR COMPRESS: NORMAL
BUN SERPL-MCNC: 20 MG/DL (ref 6–20)
BUN/CREAT SERPL: 39.2 (ref 7–25)
CALCIUM SPEC-SCNC: 9.3 MG/DL (ref 8.6–10.5)
CHLORIDE SERPL-SCNC: 99 MMOL/L (ref 98–107)
CO2 SERPL-SCNC: 30.5 MMOL/L (ref 22–29)
CREAT SERPL-MCNC: 0.51 MG/DL (ref 0.76–1.27)
DEPRECATED RDW RBC AUTO: 51.7 FL (ref 37–54)
EOSINOPHIL # BLD AUTO: 0.19 10*3/MM3 (ref 0–0.4)
EOSINOPHIL NFR BLD AUTO: 4.1 % (ref 0.3–6.2)
ERYTHROCYTE [DISTWIDTH] IN BLOOD BY AUTOMATED COUNT: 17.5 % (ref 12.3–15.4)
GFR SERPL CREATININE-BSD FRML MDRD: >150 ML/MIN/1.73
GLUCOSE SERPL-MCNC: 116 MG/DL (ref 65–99)
HCT VFR BLD AUTO: 23.3 % (ref 37.5–51)
HGB BLD-MCNC: 7.3 G/DL (ref 13–17.7)
IMM GRANULOCYTES # BLD AUTO: 0.01 10*3/MM3 (ref 0–0.05)
IMM GRANULOCYTES NFR BLD AUTO: 0.2 % (ref 0–0.5)
LYMPHOCYTES # BLD AUTO: 0.81 10*3/MM3 (ref 0.7–3.1)
LYMPHOCYTES NFR BLD AUTO: 17.4 % (ref 19.6–45.3)
MCH RBC QN AUTO: 25.4 PG (ref 26.6–33)
MCHC RBC AUTO-ENTMCNC: 31.3 G/DL (ref 31.5–35.7)
MCV RBC AUTO: 81.2 FL (ref 79–97)
MONOCYTES # BLD AUTO: 0.42 10*3/MM3 (ref 0.1–0.9)
MONOCYTES NFR BLD AUTO: 9 % (ref 5–12)
NEUTROPHILS NFR BLD AUTO: 3.21 10*3/MM3 (ref 1.7–7)
NEUTROPHILS NFR BLD AUTO: 69.1 % (ref 42.7–76)
NRBC BLD AUTO-RTO: 0 /100 WBC (ref 0–0.2)
PLATELET # BLD AUTO: 262 10*3/MM3 (ref 140–450)
PMV BLD AUTO: 9.6 FL (ref 6–12)
POTASSIUM SERPL-SCNC: 4.1 MMOL/L (ref 3.5–5.2)
RBC # BLD AUTO: 2.87 10*6/MM3 (ref 4.14–5.8)
SODIUM SERPL-SCNC: 138 MMOL/L (ref 136–145)
VANCOMYCIN SERPL-MCNC: 16.7 MCG/ML (ref 5–40)
WBC # BLD AUTO: 4.65 10*3/MM3 (ref 3.4–10.8)

## 2021-02-10 PROCEDURE — 80048 BASIC METABOLIC PNL TOTAL CA: CPT | Performed by: INTERNAL MEDICINE

## 2021-02-10 PROCEDURE — 94799 UNLISTED PULMONARY SVC/PX: CPT

## 2021-02-10 PROCEDURE — 93970 EXTREMITY STUDY: CPT

## 2021-02-10 PROCEDURE — 74176 CT ABD & PELVIS W/O CONTRAST: CPT

## 2021-02-10 PROCEDURE — 97535 SELF CARE MNGMENT TRAINING: CPT

## 2021-02-10 PROCEDURE — 25010000002 CEFEPIME PER 500 MG: Performed by: INTERNAL MEDICINE

## 2021-02-10 PROCEDURE — 97530 THERAPEUTIC ACTIVITIES: CPT

## 2021-02-10 PROCEDURE — 25010000002 VANCOMYCIN 10 G RECONSTITUTED SOLUTION: Performed by: HOSPITALIST

## 2021-02-10 PROCEDURE — 25010000002 MORPHINE PER 10 MG: Performed by: INTERNAL MEDICINE

## 2021-02-10 PROCEDURE — 80202 ASSAY OF VANCOMYCIN: CPT | Performed by: INTERNAL MEDICINE

## 2021-02-10 PROCEDURE — 71250 CT THORAX DX C-: CPT

## 2021-02-10 PROCEDURE — 99232 SBSQ HOSP IP/OBS MODERATE 35: CPT | Performed by: INTERNAL MEDICINE

## 2021-02-10 PROCEDURE — 97166 OT EVAL MOD COMPLEX 45 MIN: CPT

## 2021-02-10 PROCEDURE — 85025 COMPLETE CBC W/AUTO DIFF WBC: CPT | Performed by: INTERNAL MEDICINE

## 2021-02-10 PROCEDURE — 25010000002 HYDROMORPHONE PER 4 MG: Performed by: INTERNAL MEDICINE

## 2021-02-10 RX ORDER — HYDROMORPHONE HYDROCHLORIDE 1 MG/ML
0.5 INJECTION, SOLUTION INTRAMUSCULAR; INTRAVENOUS; SUBCUTANEOUS EVERY 4 HOURS PRN
Status: DISCONTINUED | OUTPATIENT
Start: 2021-02-10 | End: 2021-02-11

## 2021-02-10 RX ADMIN — GABAPENTIN 800 MG: 400 CAPSULE ORAL at 06:34

## 2021-02-10 RX ADMIN — MORPHINE SULFATE 2 MG: 2 INJECTION, SOLUTION INTRAMUSCULAR; INTRAVENOUS at 12:15

## 2021-02-10 RX ADMIN — MORPHINE SULFATE 2 MG: 2 INJECTION, SOLUTION INTRAMUSCULAR; INTRAVENOUS at 16:22

## 2021-02-10 RX ADMIN — PROPRANOLOL HYDROCHLORIDE 20 MG: 20 TABLET ORAL at 09:19

## 2021-02-10 RX ADMIN — SODIUM CHLORIDE 75 ML/HR: 9 INJECTION, SOLUTION INTRAVENOUS at 06:34

## 2021-02-10 RX ADMIN — PROPRANOLOL HYDROCHLORIDE 20 MG: 20 TABLET ORAL at 20:42

## 2021-02-10 RX ADMIN — GABAPENTIN 800 MG: 400 CAPSULE ORAL at 21:03

## 2021-02-10 RX ADMIN — OXYCODONE HYDROCHLORIDE 5 MG: 5 SOLUTION ORAL at 06:40

## 2021-02-10 RX ADMIN — VANCOMYCIN HYDROCHLORIDE 1250 MG: 10 INJECTION, POWDER, LYOPHILIZED, FOR SOLUTION INTRAVENOUS at 12:15

## 2021-02-10 RX ADMIN — PROPRANOLOL HYDROCHLORIDE 20 MG: 20 TABLET ORAL at 16:22

## 2021-02-10 RX ADMIN — ALPRAZOLAM 1 MG: 0.5 TABLET ORAL at 16:32

## 2021-02-10 RX ADMIN — MORPHINE SULFATE 2 MG: 2 INJECTION, SOLUTION INTRAMUSCULAR; INTRAVENOUS at 07:58

## 2021-02-10 RX ADMIN — MORPHINE SULFATE 2 MG: 2 INJECTION, SOLUTION INTRAMUSCULAR; INTRAVENOUS at 03:26

## 2021-02-10 RX ADMIN — HYDROMORPHONE HYDROCHLORIDE 0.5 MG: 10 INJECTION INTRAMUSCULAR; INTRAVENOUS; SUBCUTANEOUS at 20:42

## 2021-02-10 RX ADMIN — Medication: at 09:19

## 2021-02-10 RX ADMIN — ASPIRIN 81 MG: 81 TABLET, CHEWABLE ORAL at 09:19

## 2021-02-10 RX ADMIN — DOCUSATE SODIUM 50MG AND SENNOSIDES 8.6MG 2 TABLET: 8.6; 5 TABLET, FILM COATED ORAL at 20:42

## 2021-02-10 RX ADMIN — CEFEPIME 2 G: 2 INJECTION, POWDER, FOR SOLUTION INTRAVENOUS at 02:29

## 2021-02-10 RX ADMIN — CYCLOBENZAPRINE 10 MG: 10 TABLET, FILM COATED ORAL at 09:19

## 2021-02-10 RX ADMIN — LANSOPRAZOLE 30 MG: KIT at 06:34

## 2021-02-10 RX ADMIN — OXYCODONE HYDROCHLORIDE 5 MG: 5 SOLUTION ORAL at 21:49

## 2021-02-10 RX ADMIN — OXYCODONE HYDROCHLORIDE 5 MG: 5 SOLUTION ORAL at 15:12

## 2021-02-10 RX ADMIN — OXYCODONE HYDROCHLORIDE 5 MG: 5 SOLUTION ORAL at 02:29

## 2021-02-10 RX ADMIN — GABAPENTIN 800 MG: 400 CAPSULE ORAL at 16:23

## 2021-02-10 RX ADMIN — CEFEPIME 2 G: 2 INJECTION, POWDER, FOR SOLUTION INTRAVENOUS at 15:16

## 2021-02-10 RX ADMIN — HYDROXYZINE HYDROCHLORIDE 25 MG: 25 TABLET ORAL at 09:19

## 2021-02-10 RX ADMIN — SERTRALINE 100 MG: 100 TABLET, FILM COATED ORAL at 09:19

## 2021-02-10 RX ADMIN — OXYCODONE HYDROCHLORIDE AND ACETAMINOPHEN 1000 MG: 500 TABLET ORAL at 09:19

## 2021-02-10 RX ADMIN — CEFEPIME 2 G: 2 INJECTION, POWDER, FOR SOLUTION INTRAVENOUS at 20:42

## 2021-02-10 NOTE — PLAN OF CARE
Goal Outcome Evaluation:  Plan of Care Reviewed With: patient  Progress: no change  Outcome Summary: d/c fluids and dopplers, morphine changed to dilaudid, airloss bed, plan for thoracentesis tomorrow, PRN suctioning, vss but O2 recovery slow after moving, will continue to monitor

## 2021-02-10 NOTE — PROGRESS NOTES
Bilateral upper extremity venous doppler preliminary positive for acute SVT in the right upper extremity. Left upper extremity preliminary negative. Called report to Cinda Reyes RN.

## 2021-02-10 NOTE — PROGRESS NOTES
Vancomycin Pharmacokinetic Note    Maxim Carvajal is a 35 y.o. male who is on day 7 pharmacy to dose vancomycin for HAP.  Target level: AUC24 400-600  Consulting Provider: Joshua Vasquez MD  Current Vancomycin Dose: intermittent    Other Antimicrobials: cefepime 2 g IV q8h infused over 4 hours    Allergies  Allergies as of 02/02/2021   • (No Known Allergies)     Microbiology  Microbiology Results (last 10 days)     Procedure Component Value - Date/Time    Respiratory Panel PCR w/COVID-19(SARS-CoV-2) BG/RAZIA/BIBI/PAD/COR/MAD/[facility] In-House, NP Swab in UTM/VTM, 3-4 HR TAT - Swab, Nasopharynx [[id number]]  (Normal) Collected: 02/08/21 1720    Lab Status: Final result Specimen: Swab from Nasopharynx Updated: 02/08/21 1833     ADENOVIRUS, PCR Not Detected     Coronavirus 229E Not Detected     Coronavirus HKU1 Not Detected     Coronavirus NL63 Not Detected     Coronavirus OC43 Not Detected     COVID19 Not Detected     Human Metapneumovirus Not Detected     Human Rhinovirus/Enterovirus Not Detected     Influenza A PCR Not Detected     Influenza B PCR Not Detected     Parainfluenza Virus 1 Not Detected     Parainfluenza Virus 2 Not Detected     Parainfluenza Virus 3 Not Detected     Parainfluenza Virus 4 Not Detected     RSV, PCR Not Detected     Bordetella pertussis pcr Not Detected     Bordetella parapertussis PCR Not Detected     Chlamydophila pneumoniae PCR Not Detected     Mycoplasma pneumo by PCR Not Detected    Narrative:      Fact sheet for providers: https://docs.[employer]/wp-content/uploads/HZJ6218-1997-JA1.1-EUA-Provider-Fact-Sheet-3.pdf    Fact sheet for patients: https://docs.[employer]/wp-content/uploads/JLR0452-5662-PI2.1-EUA-Patient-Fact-Sheet-1.pdf    Test performed by PCR.    Blood Culture - Blood, Hand, Right [[id number]] Collected: 02/08/21 1716    Lab Status: Preliminary result Specimen: Blood from Hand, Right Updated: 02/09/21 1746     Blood Culture No growth at 24 hours    Blood Culture - Blood,  Hand, Left [[id number]] Collected: 02/08/21 1716    Lab Status: Preliminary result Specimen: Blood from Hand, Left Updated: 02/09/21 1746     Blood Culture No growth at 24 hours    Respiratory Culture - Lavage, Lung, Right Middle Lobe [[id number]]  (Abnormal) Collected: 02/04/21 1837    Lab Status: Final result Specimen: Lavage from Lung, Right Middle Lobe Updated: 02/06/21 1307     Respiratory Culture Scant growth (1+) Staphylococcus aureus, MRSA     Comment: Methicillin resistant Staphylococcus aureus, Patient may be an isolation risk.        Gram Stain No WBCs or organisms seen      No epithelial cells seen    Narrative:      Refer to Respiratory Culture from 2/2/21 for MICS    Respiratory Culture - Sputum, ET Suction [[id number]]  (Abnormal)  (Susceptibility) Collected: 02/02/21 2250    Lab Status: Final result Specimen: Sputum from ET Suction Updated: 02/06/21 0916     Respiratory Culture Moderate growth (3+) Pseudomonas aeruginosa      Light growth (2+) Staphylococcus aureus, MRSA     Comment: Methicillin resistant Staphylococcus aureus, Patient may be an isolation risk         No Normal Respiratory Lisa     Gram Stain No WBCs or organisms seen      No epithelial cells seen    Susceptibility      Pseudomonas aeruginosa     YOGI     Cefepime Susceptible     Ceftazidime Susceptible     Ciprofloxacin Resistant     Gentamicin Susceptible     Levofloxacin Resistant     Piperacillin + Tazobactam Susceptible [1]             [1]   Appended report. These results have been appended to a previously preliminary verified report.             Susceptibility      Staphylococcus aureus, MRSA     YOGI     Clindamycin Resistant     Linezolid Susceptible     Oxacillin Resistant     Penicillin G Resistant     Tetracycline Susceptible     Trimethoprim + Sulfamethoxazole Susceptible     Vancomycin Susceptible                    MRSA Screen, PCR (Inpatient) - Swab, Nares [[id number]]  (Abnormal) Collected: 02/02/21 2120    Lab Status:  Final result Specimen: Swab from Nares Updated: 02/02/21 2350     MRSA PCR MRSA Detected    Blood Culture - Blood, Arm, Left [[id number]] Collected: 02/02/21 0656    Lab Status: Final result Specimen: Blood from Arm, Left Updated: 02/07/21 0715     Blood Culture No growth at 5 days    Blood Culture - Blood, Arm, Left [[id number]]  (Abnormal) Collected: 02/02/21 0656    Lab Status: Final result Specimen: Blood from Arm, Left Updated: 02/04/21 0921     Blood Culture Staphylococcus, coagulase negative     Comment: Probable contaminant requires clinical correlation, susceptibility not performed unless requested by physician.          Isolated from Anaerobic Bottle     Gram Stain Anaerobic Bottle Gram positive cocci in clusters    Blood Culture ID, PCR - Blood, Arm, Left [[id number]]  (Abnormal) Collected: 02/02/21 0656    Lab Status: Edited Result - FINAL Specimen: Blood from Arm, Left Updated: 02/03/21 1202     BCID, PCR Staphylococcus spp, not aureus. Identification by BCID PCR.     BOTTLE TYPE Anaerobic Bottle     Comment: Appended report. These results have been appended to a previously final verified report.       Respiratory Panel PCR w/COVID-19(SARS-CoV-2) BG/RAZIA/BIBI/PAD/COR/[facility] In-House, NP Swab in UTM/VTM, 3-4 HR TAT - Swab, Nasopharynx [[id number]]  (Normal) Collected: 02/02/21 0641    Lab Status: Final result Specimen: Swab from Nasopharynx Updated: 02/02/21 0822     ADENOVIRUS, PCR Not Detected     Coronavirus 229E Not Detected     Coronavirus HKU1 Not Detected     Coronavirus NL63 Not Detected     Coronavirus OC43 Not Detected     COVID19 Not Detected     Human Metapneumovirus Not Detected     Human Rhinovirus/Enterovirus Not Detected     Influenza A PCR Not Detected     Influenza B PCR Not Detected     Parainfluenza Virus 1 Not Detected     Parainfluenza Virus 2 Not Detected     Parainfluenza Virus 3 Not Detected     Parainfluenza Virus 4 Not Detected     RSV, PCR Not Detected     Bordetella pertussis pcr  "Not Detected     Bordetella parapertussis PCR Not Detected     Chlamydophila pneumoniae PCR Not Detected     Mycoplasma pneumo by PCR Not Detected    Narrative:      Fact sheet for providers: https://docs.[employer]/wp-content/uploads/MMW0420-5089-VY8.1-EUA-Provider-Fact-Sheet-3.pdf    Fact sheet for patients: https://docs.[employer]/wp-content/uploads/WPI9519-4746-FX7.1-EUA-Patient-Fact-Sheet-1.pdf    Test performed by PCR.        Vitals/Labs/I&O  182.9 cm (72\")  71.4 kg (157 lb 6.5 oz)  Body mass index is 21.35 kg/m².   Temp:  [97.5 °F (36.4 °C)-100.9 °F (38.3 °C)] 98.8 °F (37.1 °C)  Heart Rate:  [63-96] 86  Resp:  [20-26] 26  BP: (126-163)/(80-98) 126/85    Results from last 7 days   Lab Units 02/10/21  0644 02/09/21  0719 02/07/21  0855   WBC 10*3/mm3 4.65 6.39 6.32     Results from last 7 days   Lab Units 02/04/21  0515   LACTATE mmol/L 0.6      Results from last 7 days   Lab Units 02/08/21  0926 02/04/21  0837   PROCALCITONIN ng/mL 0.07 0.07                  Estimated Creatinine Clearance: 200.3 mL/min (A) (by C-G formula based on SCr of 0.51 mg/dL (L)).  Results from last 7 days   Lab Units 02/10/21  0644 02/09/21  0719 02/08/21  0926   BUN mg/dL 20 20 19   CREATININE mg/dL 0.51* 0.58* 0.73*     Intake & Output (last 3 days)       02/07 0701 - 02/08 0700 02/08 0701 - 02/09 0700 02/09 0701 - 02/10 0700 02/10 0701 - 02/11 0700    P.O.  0 0     I.V. (mL/kg) 2346 (31.9) 2248 (31.5) 1961 (27.5)     Other 786 475 540     NG/GT 1289 1107 652     IV Piggyback   304     Total Intake(mL/kg) 4421 (60.1) 3830 (53.6) 3457 (48.4)     Urine (mL/kg/hr) 2050 (1.2) 2750 (1.6) 2200 (1.3)     Stool 0 0 0     Total Output 2050 2750 2200     Net +2371 +1080 +1257             Urine Unmeasured Occurrence 2 x 1 x 3 x     Stool Unmeasured Occurrence 2 x 2 x 2 x         Vancomycin Dosing & Level History  1500 mg (~20 mg/kg) IV x1 dose               02/02 1408  1250 mg (17 mg/kg) IV q12h               02/03 0248, " 1531              02/04 0332                          Trough 02/04 1459: 4.8 mg/L   1000 mg (~14 mg/kg) IV q8h (tommie 0300, 1100, 1900)              02/04 1924              02/05 0346                          Trough 02/05 1024: 10.1 mg/L               02/05 1215  1250 mg (17 mg/kg) IV q8h               02/05 2005 02/06 0439                          Trough 02/06 1140: 18.4 mg/L               02/06 1252, 2000 02/07 0345, 1219                          Trough 02/07 1922: 35.1 mg/L               02/07 2003                          Random 02/08 0006: 39.3 mg/L     Random 02/08 1716: 20.3 mg/L   1250 mg (17 mg/kg) IV x1 dose   02/08 2325     Random 02/09 1157: 18.3 mg/L  1250 mg (17 mg/kg) IV x1 dose   02/09 1333    Random 02/10 0644: 16.7 mg/L (~17 hrs)    Results from last 7 days   Lab Units 02/10/21  0644 02/09/21  1157 02/08/21  1716 02/08/21  0006   VANCOMYCIN RM mcg/mL 16.70 18.30 20.30 39.30       Results from last 7 days   Lab Units 02/07/21  1922 02/06/21  1140 02/05/21  1024 02/04/21  1459   VANCOMYCIN TR mcg/mL 35.10* 18.40 10.10 4.80*     Assessment/Plan  1) Continue vancomycin intermittent dosing based on elevated SCr. Ordered 1250 mg (17 mg/kg) IV x1 dose today.     2) Ordered vancomycin random level for tomorrow with AM labs. Plan to re-dose when level is anticipated to be < 20 mg/L.      3) SCr ordered for tomorrow with AM labs.     Thank you for involving pharmacy in this patient's care. Please contact pharmacy with any questions or concerns.       [facility], located within [facility]
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
## 2021-02-10 NOTE — PROGRESS NOTES
Continued Stay Note  [facility]     Patient Name: Maxim Carvajal  MRN: [id number]  Today's Date: 2/10/2021    Admit Date: 2/2/2021    Discharge Plan     Row Name 02/10/21 1418       Plan    Plan  Referrals are pending for multiple  facilities    Plan Comments  Spoke with Merissa with [facility] about pending referrals for [facility] and [facility].  Spoke with Alexis about referrals for [place] and [facility].   for Deb to follow up referral for [facility].  [facility] declined patient.  Mother does not want patient to return to [place].  [facility] following pending referrals.....................Margarette Garcia RN        Discharge Codes    No documentation.             Margarette Garcia RN

## 2021-02-10 NOTE — PROGRESS NOTES
Bilateral lower extremity venous doppler preliminary negative for acute DVT. Called report to Cinda Reyes RN.

## 2021-02-10 NOTE — PLAN OF CARE
Goal Outcome Evaluation:  Plan of Care Reviewed With: patient  Progress: improving  Pt continues to improve. Still having moderate amount of  secretions so remained on T-piece for ease of suctioning. Minimal amount of O2. Pt needs plavix restarted, but hgb still trendiing down. SCDs in place for now. Vanc and cefepime remain. Pt worked w/ patient. VSS. Low grade fever. NSR to ST at times w/ activity. Transferred to  late in the shift on trach collar. Emi Nguyen RN
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
## 2021-02-10 NOTE — PLAN OF CARE
Goal Outcome Evaluation:     Progress: no change  Outcome Summary: Remains on continuous IVF and scheduled IV abx. On continuous TF via GT, no residual this shift. Needs Q2 hour repositioning, but patient refuses. T 100.9 this shift, successfully treated with PRN Tylenol. Asks for pain medicine every 4 hours, alternating between IV Morphine and PO Roxicodone. Trach care completed during episode of SOA after turning patient for incontinence care, O2 increased, tracheal suctioning provided, and O2 recovered. O2 successfully weaned back down. Wound care provided. SCDs and accumax pump in place. VSS. Awaiting AM labs.
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
## 2021-02-10 NOTE — PROGRESS NOTES
"  Daily Progress Note.   [address]  2/10/2021    Patient:  Name:  Maxim Carvajal  MRN:  [id number]  1985  35 y.o.  male         CC: Sepsis, hypoxemia, pneumonia with tracheitis and mucous plug  Respiratory failure  Quadriplegia      Interval History:  Patient did well on trach collar now T-piece    tmax 38  Asleep awakens, appears tired still  Maintains on T piece  Still with some secretions cough rattling.  Appears alert and answers questions appropriately.  Mood seems down.    ROS:  no diarrhea, no chest pain  PMFSSH: no change    Physical Exam:  /85 (BP Location: Right arm, Patient Position: Lying)   Pulse 74   Temp 98.8 °F (37.1 °C) (Oral)   Resp 20   Ht 182.9 cm (72\")   Wt 71.4 kg (157 lb 6.5 oz)   SpO2 98%   BMI 21.35 kg/m²   Body mass index is 21.35 kg/m².    Intake/Output Summary (Last 24 hours) at 2/10/2021 0831  Last data filed at 2/10/2021 0640  Gross per 24 hour   Intake 3457 ml   Output 2200 ml   Net 1257 ml   T-piece  tracheostomy  General appearance:non toxic, conversant   Eyes: anicteric sclerae, moist conjunctivae; no lid-lag; PERRLA  HENT: Atraumatic; oropharynx clear with moist mucous membranes and no mucosal ulcerations; normal hard and soft palate  Neck: Trachea midline; +tracheostomy  Lungs: no sig crackles symmetric mech air entry ++ rhonchi, with normal respiratory effort and no intercostal retractions  CV: RRR, no MRGs   Abdomen: Soft, non-tender; no masses or HSM  Extremities: No peripheral edema or extremity lymphadenopathy thin musculature contracted chronic changes  Skin: wwp no diffuse rash  Psych: Appropriate affect, alert  Neuro nodes heads, mouths answers to questions    Data Review:  Notable Labs:  Results from last 7 days   Lab Units 02/10/21  0644 02/09/21  0719 02/07/21  0855 02/06/21  0725 02/05/21  0755 02/04/21  0407   WBC 10*3/mm3 4.65 6.39 6.32 7.45 6.51 9.54   HEMOGLOBIN g/dL 7.3* 7.9* 8.6* 8.8* 8.7* 9.1*   PLATELETS 10*3/mm3 262 296 " 299 282 311 339     Results from last 7 days   Lab Units 02/10/21  0644 02/09/21  0719 02/08/21  0926 02/07/21  0855 02/06/21  1655 02/05/21  0755 02/04/21  0515   SODIUM mmol/L 138 139 143 145 142 137 135*   POTASSIUM mmol/L 4.1 4.2 4.3 3.4* 3.3* 2.9* 3.3*   CHLORIDE mmol/L 99 103 108* 110* 109* 107 105   CO2 mmol/L 30.5* 28.5 26.9 23.4 22.9 21.1* 14.0*   BUN mg/dL 20 20 19 16 10 5* 4*   CREATININE mg/dL 0.51* 0.58* 0.73* 0.73* 0.45* 0.23* 0.21*   GLUCOSE mg/dL 116* 119* 105* 99 126* 96 83   CALCIUM mg/dL 9.3 9.4 9.2 8.9 8.7 9.1 9.3   PHOSPHORUS mg/dL  --  3.8 3.7 4.2 3.7  --   --    Estimated Creatinine Clearance: 200.3 mL/min (A) (by C-G formula based on SCr of 0.51 mg/dL (L)).    Results from last 7 days   Lab Units 02/10/21  0644 02/09/21  0719 02/08/21  0926 02/07/21  0855  02/05/21  0755 02/04/21  0837 02/04/21  0515 02/04/21  0407  02/03/21  2131   LDH U/L  --   --   --   --   --  201  --   --  245*  --   --    AST (SGOT) U/L  --  23  --   --   --  10  --   --  14  --   --    ALT (SGPT) U/L  --  25  --   --   --  11  --   --  10  --   --    PROCALCITONIN ng/mL  --   --  0.07  --   --   --  0.07  --   --   --   --    LACTATE mmol/L  --   --   --   --   --   --   --  0.6  --   --   --    D DIMER QUANT MCGFEU/mL  --   --   --   --   --   --   --   --   --   --  3.90*   PLATELETS 10*3/mm3 262 296  --  299   < > 311  --   --  339   < >  --     < > = values in this interval not displayed.       Results from last 7 days   Lab Units 02/07/21  1026 02/04/21  1628 02/04/21  0437   PH, ARTERIAL pH units 7.387 7.348* 7.355   PCO2, ARTERIAL mm Hg 41.8 36.2 27.0*   PO2 ART mm Hg 93.7 131.7* 63.8*   HCO3 ART mmol/L 25.1 19.9* 15.1*       Imaging:  Reviewed chest images personally from past 3 days    Scheduled meds:    ascorbic acid, 1,000 mg, Oral, Daily  aspirin, 81 mg, Nasogastric, Daily  cefepime, 2 g, Intravenous, Q8H  cyclobenzaprine, 10 mg, Oral, Daily  Eucerin original healing lotion, , Topical, Daily  fentaNYL, 1  patch, Transdermal, Q72H  gabapentin, 800 mg, Oral, Q8H  hydrOXYzine, 25 mg, Oral, Daily  lansoprazole, 30 mg, Nasogastric, QAM  propranolol, 20 mg, Per G Tube, TID  sennosides-docusate, 2 tablet, Oral, Nightly  sertraline, 100 mg, Oral, Daily        ASSESSMENT  /  PLAN:  1. Tracheitis and pneumonia with mucous plugging status post bronchoscopy on 2/4/2021: Growing both Pseudomonas and MRSA  2. Exchange to a cuffed tracheostomy and now on the ventilator as of 2/4/2021  3. Bilateral pleural effusion left more than right  4. Acute hypoxemic respiratory failure  5. Sepsis  6. Quadriplegia  7. Anemia  8. Essential hypertension  9. Gluteal muscle bleed  10. fever    Holding plavix due to acute hemorrhage if no further bleeding and hemoglobin stable would like to resume this however hemoglobin is still downtrending  Prn transfusions  abx per infectious disease  bp control  Asa cont  Fever still ct chest abd pelvis - rule out abscess or loculated effusion  Duplex extremities.        Claudio Sam MD  [facility]  02/10/21  1505      Ct back dw Dr Kenia Chaidez, dont see a ton of secretions in MARGY.  A lot of motion artifact.  Given recurrent fevers, I do think that a thoracentesis on the right side is likely a good idea to rule out empyema.  Ordered.    Claudio Sam MD  [facility]  02/10/21  15:32 EST

## 2021-02-10 NOTE — PROGRESS NOTES
LOS: 8 days     Chief Complaint:  Follow-up fever    Interval History:  T max 100.9, stable oxygen requirements. No diarrhea.  No new abdominal pain.  No nausea and vomiting.  Tolerating antibiotics without a rash    Vital Signs  Temp:  [97.5 °F (36.4 °C)-100.9 °F (38.3 °C)] 98.8 °F (37.1 °C)  Heart Rate:  [63-86] 74  Resp:  [20-26] 20  BP: (126-163)/(82-94) 126/85    Physical Exam:  General: in NAD  Neck: tracheostomy  Cardiovascular: NR, no murmur  Respiratory: Bilateral lower lobe rales; no wheezing  GI: Abdomen is soft, nontender, G-tube in place without erythema or purulence  Skin: No rashes,    Antibiotics:  •  Cefepime 2g IV q8hrs  •  Pharmacy to dose vancomycin    LABS:  CBC, CMP, micro reviewed today  Lab Results   Component Value Date    WBC 4.65 02/10/2021    HGB 7.3 (L) 02/10/2021    HCT 23.3 (L) 02/10/2021    MCV 81.2 02/10/2021     02/10/2021     Lab Results   Component Value Date    GLUCOSE 116 (H) 02/10/2021    BUN 20 02/10/2021    CREATININE 0.51 (L) 02/10/2021    EGFRIFNONA >150 02/10/2021    BCR 39.2 (H) 02/10/2021    CO2 30.5 (H) 02/10/2021    CALCIUM 9.3 02/10/2021    ALBUMIN 3.20 (L) 02/09/2021    LABIL2 2.0 01/28/2020    AST 23 02/09/2021    ALT 25 02/09/2021     Procalcitonin 0.12 -> 0.07 -> 0.07  HIV antibodies negative  Hep C antibody negative  Hep B surface antigen negative    Microbiology:  2/8 BCx: NGTD x2  2/8 RPP: negative  2/4 SCx MRSA  2/2 SCx moderate Pseudomonas, light growth MRSA  2/2 BCx CoNS 1/2   2/2 RVP/COVID neg    Radiology (personally reviewed report):   2/10 CT C/A/P Smucus/secretions within the main stem bronchi  and the bronchi at the lower lobes and right middle lobe. Lower lobes and RML are completely  opacified. Small-moderate-sized bilateral pleural. Mild colonic ileus without evidence for colitis. Splenomegaly.     Assessment/Plan   Fever in adult  Left-sided pneumonia with sputum cultures positive for Pseudomonas and MRSA  Tracheostomy in  place  Quadriplegia - complicating above    Blood cultures are NGTD.  Chest CT reviewed.  Bilateral pleural effusions persist right greater than left.. Small/moderate b/l pleural effusion. Dopplers pending.    Continue vancomycin dosing per pharmacy and cefepime 2 g IV every 8 hours.Overall day 9 of antibiotic therapy.  Antibiotic duration to be determined     ID will follow.     Addendum: Discussed the case with Dr. Bradley Sam at this time given the patient's persistent fever we will repeat a thoracentesis to exclude empyema

## 2021-02-11 ENCOUNTER — APPOINTMENT (OUTPATIENT)
Dept: ULTRASOUND IMAGING | Facility: HOSPITAL | Age: 36
End: 2021-02-11

## 2021-02-11 LAB
ALBUMIN FLD-MCNC: 2 G/DL
ANION GAP SERPL CALCULATED.3IONS-SCNC: 3 MMOL/L (ref 5–15)
APPEARANCE FLD: ABNORMAL
BASOPHILS # BLD AUTO: 0.01 10*3/MM3 (ref 0–0.2)
BASOPHILS NFR BLD AUTO: 0.2 % (ref 0–1.5)
BUN SERPL-MCNC: 22 MG/DL (ref 6–20)
BUN/CREAT SERPL: 40.7 (ref 7–25)
CALCIUM SPEC-SCNC: 9.4 MG/DL (ref 8.6–10.5)
CHLORIDE SERPL-SCNC: 98 MMOL/L (ref 98–107)
CO2 SERPL-SCNC: 34 MMOL/L (ref 22–29)
COLOR FLD: YELLOW
CREAT SERPL-MCNC: 0.54 MG/DL (ref 0.76–1.27)
DEPRECATED RDW RBC AUTO: 51.4 FL (ref 37–54)
EOSINOPHIL # BLD AUTO: 0.25 10*3/MM3 (ref 0–0.4)
EOSINOPHIL NFR BLD AUTO: 4.9 % (ref 0.3–6.2)
EOSINOPHIL NFR FLD MANUAL: 2 %
ERYTHROCYTE [DISTWIDTH] IN BLOOD BY AUTOMATED COUNT: 17.6 % (ref 12.3–15.4)
GFR SERPL CREATININE-BSD FRML MDRD: >150 ML/MIN/1.73
GLUCOSE FLD-MCNC: 91 MG/DL
GLUCOSE SERPL-MCNC: 88 MG/DL (ref 65–99)
HCT VFR BLD AUTO: 23.1 % (ref 37.5–51)
HGB BLD-MCNC: 7.6 G/DL (ref 13–17.7)
IMM GRANULOCYTES # BLD AUTO: 0.01 10*3/MM3 (ref 0–0.05)
IMM GRANULOCYTES NFR BLD AUTO: 0.2 % (ref 0–0.5)
LDH FLD-CCNC: 181 U/L
LYMPHOCYTES # BLD AUTO: 0.86 10*3/MM3 (ref 0.7–3.1)
LYMPHOCYTES NFR BLD AUTO: 16.7 % (ref 19.6–45.3)
LYMPHOCYTES NFR FLD MANUAL: 43 %
MCH RBC QN AUTO: 26.4 PG (ref 26.6–33)
MCHC RBC AUTO-ENTMCNC: 32.9 G/DL (ref 31.5–35.7)
MCV RBC AUTO: 80.2 FL (ref 79–97)
METHOD: ABNORMAL
MONOCYTES # BLD AUTO: 0.5 10*3/MM3 (ref 0.1–0.9)
MONOCYTES NFR BLD AUTO: 9.7 % (ref 5–12)
MONOCYTES NFR FLD: 6 %
MONOS+MACROS NFR FLD: 44 %
NEUTROPHILS NFR BLD AUTO: 3.52 10*3/MM3 (ref 1.7–7)
NEUTROPHILS NFR BLD AUTO: 68.3 % (ref 42.7–76)
NEUTROPHILS NFR FLD MANUAL: 5 %
NRBC BLD AUTO-RTO: 0 /100 WBC (ref 0–0.2)
NUC CELL # FLD: 888 /MM3
PH FLD: 7.8 [PH]
PLATELET # BLD AUTO: 260 10*3/MM3 (ref 140–450)
PMV BLD AUTO: 9.4 FL (ref 6–12)
POTASSIUM SERPL-SCNC: 4.4 MMOL/L (ref 3.5–5.2)
PROT FLD-MCNC: 3.8 G/DL
RBC # BLD AUTO: 2.88 10*6/MM3 (ref 4.14–5.8)
RBC # FLD AUTO: 5575 /MM3
SODIUM SERPL-SCNC: 135 MMOL/L (ref 136–145)
VANCOMYCIN SERPL-MCNC: 16.5 MCG/ML (ref 5–40)
WBC # BLD AUTO: 5.15 10*3/MM3 (ref 3.4–10.8)

## 2021-02-11 PROCEDURE — 84157 ASSAY OF PROTEIN OTHER: CPT | Performed by: INTERNAL MEDICINE

## 2021-02-11 PROCEDURE — 80048 BASIC METABOLIC PNL TOTAL CA: CPT | Performed by: INTERNAL MEDICINE

## 2021-02-11 PROCEDURE — 76942 ECHO GUIDE FOR BIOPSY: CPT

## 2021-02-11 PROCEDURE — 82945 GLUCOSE OTHER FLUID: CPT | Performed by: INTERNAL MEDICINE

## 2021-02-11 PROCEDURE — 80202 ASSAY OF VANCOMYCIN: CPT | Performed by: HOSPITALIST

## 2021-02-11 PROCEDURE — 0W993ZZ DRAINAGE OF RIGHT PLEURAL CAVITY, PERCUTANEOUS APPROACH: ICD-10-PCS | Performed by: HOSPITALIST

## 2021-02-11 PROCEDURE — 87206 SMEAR FLUORESCENT/ACID STAI: CPT | Performed by: INTERNAL MEDICINE

## 2021-02-11 PROCEDURE — 97110 THERAPEUTIC EXERCISES: CPT

## 2021-02-11 PROCEDURE — 25010000002 HYDROMORPHONE 1 MG/ML SOLUTION: Performed by: INTERNAL MEDICINE

## 2021-02-11 PROCEDURE — 25010000002 HYDROMORPHONE PER 4 MG: Performed by: INTERNAL MEDICINE

## 2021-02-11 PROCEDURE — 83986 ASSAY PH BODY FLUID NOS: CPT | Performed by: INTERNAL MEDICINE

## 2021-02-11 PROCEDURE — 87102 FUNGUS ISOLATION CULTURE: CPT | Performed by: INTERNAL MEDICINE

## 2021-02-11 PROCEDURE — 85025 COMPLETE CBC W/AUTO DIFF WBC: CPT | Performed by: INTERNAL MEDICINE

## 2021-02-11 PROCEDURE — 87075 CULTR BACTERIA EXCEPT BLOOD: CPT | Performed by: INTERNAL MEDICINE

## 2021-02-11 PROCEDURE — 88305 TISSUE EXAM BY PATHOLOGIST: CPT | Performed by: INTERNAL MEDICINE

## 2021-02-11 PROCEDURE — 89051 BODY FLUID CELL COUNT: CPT | Performed by: INTERNAL MEDICINE

## 2021-02-11 PROCEDURE — 25010000002 CEFEPIME PER 500 MG: Performed by: INTERNAL MEDICINE

## 2021-02-11 PROCEDURE — 94799 UNLISTED PULMONARY SVC/PX: CPT

## 2021-02-11 PROCEDURE — 82042 OTHER SOURCE ALBUMIN QUAN EA: CPT | Performed by: INTERNAL MEDICINE

## 2021-02-11 PROCEDURE — 25010000002 VANCOMYCIN PER 500 MG: Performed by: HOSPITALIST

## 2021-02-11 PROCEDURE — 87205 SMEAR GRAM STAIN: CPT | Performed by: INTERNAL MEDICINE

## 2021-02-11 PROCEDURE — 25010000003 LIDOCAINE 1 % SOLUTION: Performed by: RADIOLOGY

## 2021-02-11 PROCEDURE — 87015 SPECIMEN INFECT AGNT CONCNTJ: CPT | Performed by: INTERNAL MEDICINE

## 2021-02-11 PROCEDURE — 83615 LACTATE (LD) (LDH) ENZYME: CPT | Performed by: INTERNAL MEDICINE

## 2021-02-11 PROCEDURE — 87116 MYCOBACTERIA CULTURE: CPT | Performed by: INTERNAL MEDICINE

## 2021-02-11 PROCEDURE — 88112 CYTOPATH CELL ENHANCE TECH: CPT | Performed by: INTERNAL MEDICINE

## 2021-02-11 PROCEDURE — 99232 SBSQ HOSP IP/OBS MODERATE 35: CPT | Performed by: INTERNAL MEDICINE

## 2021-02-11 PROCEDURE — 87070 CULTURE OTHR SPECIMN AEROBIC: CPT | Performed by: INTERNAL MEDICINE

## 2021-02-11 RX ORDER — VANCOMYCIN HYDROCHLORIDE 1 G/200ML
15 INJECTION, SOLUTION INTRAVENOUS EVERY 12 HOURS
Status: DISCONTINUED | OUTPATIENT
Start: 2021-02-11 | End: 2021-02-12 | Stop reason: DRUGHIGH

## 2021-02-11 RX ORDER — LIDOCAINE HYDROCHLORIDE 10 MG/ML
10 INJECTION, SOLUTION INFILTRATION; PERINEURAL ONCE
Status: COMPLETED | OUTPATIENT
Start: 2021-02-11 | End: 2021-02-11

## 2021-02-11 RX ORDER — ALPRAZOLAM 0.5 MG/1
0.5 TABLET ORAL EVERY 8 HOURS PRN
Status: DISCONTINUED | OUTPATIENT
Start: 2021-02-11 | End: 2021-02-22

## 2021-02-11 RX ADMIN — GABAPENTIN 800 MG: 400 CAPSULE ORAL at 05:17

## 2021-02-11 RX ADMIN — GABAPENTIN 800 MG: 400 CAPSULE ORAL at 21:27

## 2021-02-11 RX ADMIN — HYDROMORPHONE HYDROCHLORIDE 0.75 MG: 10 INJECTION INTRAMUSCULAR; INTRAVENOUS; SUBCUTANEOUS at 13:32

## 2021-02-11 RX ADMIN — OXYCODONE HYDROCHLORIDE AND ACETAMINOPHEN 1000 MG: 500 TABLET ORAL at 11:16

## 2021-02-11 RX ADMIN — HYDROMORPHONE HYDROCHLORIDE 0.5 MG: 10 INJECTION INTRAMUSCULAR; INTRAVENOUS; SUBCUTANEOUS at 04:53

## 2021-02-11 RX ADMIN — FENTANYL 1 PATCH: 25 PATCH TRANSDERMAL at 13:38

## 2021-02-11 RX ADMIN — HYDROMORPHONE HYDROCHLORIDE 0.75 MG: 10 INJECTION INTRAMUSCULAR; INTRAVENOUS; SUBCUTANEOUS at 17:41

## 2021-02-11 RX ADMIN — CEFEPIME 2 G: 2 INJECTION, POWDER, FOR SOLUTION INTRAVENOUS at 21:26

## 2021-02-11 RX ADMIN — GABAPENTIN 800 MG: 400 CAPSULE ORAL at 13:38

## 2021-02-11 RX ADMIN — LANSOPRAZOLE 30 MG: KIT at 06:34

## 2021-02-11 RX ADMIN — HYDROXYZINE HYDROCHLORIDE 25 MG: 25 TABLET ORAL at 11:16

## 2021-02-11 RX ADMIN — Medication: at 11:17

## 2021-02-11 RX ADMIN — VANCOMYCIN HYDROCHLORIDE 1000 MG: 1 INJECTION, SOLUTION INTRAVENOUS at 21:27

## 2021-02-11 RX ADMIN — ALPRAZOLAM 1 MG: 0.5 TABLET ORAL at 09:35

## 2021-02-11 RX ADMIN — CYCLOBENZAPRINE 10 MG: 10 TABLET, FILM COATED ORAL at 11:16

## 2021-02-11 RX ADMIN — HYDROMORPHONE HYDROCHLORIDE 0.5 MG: 10 INJECTION INTRAMUSCULAR; INTRAVENOUS; SUBCUTANEOUS at 08:55

## 2021-02-11 RX ADMIN — SERTRALINE 100 MG: 100 TABLET, FILM COATED ORAL at 11:16

## 2021-02-11 RX ADMIN — PROPRANOLOL HYDROCHLORIDE 20 MG: 20 TABLET ORAL at 16:28

## 2021-02-11 RX ADMIN — PROPRANOLOL HYDROCHLORIDE 20 MG: 20 TABLET ORAL at 11:16

## 2021-02-11 RX ADMIN — CEFEPIME 2 G: 2 INJECTION, POWDER, FOR SOLUTION INTRAVENOUS at 13:32

## 2021-02-11 RX ADMIN — DOCUSATE SODIUM 50MG AND SENNOSIDES 8.6MG 2 TABLET: 8.6; 5 TABLET, FILM COATED ORAL at 21:27

## 2021-02-11 RX ADMIN — OXYCODONE HYDROCHLORIDE 5 MG: 5 SOLUTION ORAL at 16:29

## 2021-02-11 RX ADMIN — CEFEPIME 2 G: 2 INJECTION, POWDER, FOR SOLUTION INTRAVENOUS at 05:17

## 2021-02-11 RX ADMIN — VANCOMYCIN HYDROCHLORIDE 1000 MG: 1 INJECTION, SOLUTION INTRAVENOUS at 11:16

## 2021-02-11 RX ADMIN — HYDROMORPHONE HYDROCHLORIDE 0.75 MG: 10 INJECTION INTRAMUSCULAR; INTRAVENOUS; SUBCUTANEOUS at 21:27

## 2021-02-11 RX ADMIN — ASPIRIN 81 MG: 81 TABLET, CHEWABLE ORAL at 11:16

## 2021-02-11 RX ADMIN — LIDOCAINE HYDROCHLORIDE 4 ML: 10 INJECTION, SOLUTION INFILTRATION; PERINEURAL at 09:51

## 2021-02-11 RX ADMIN — HYDROMORPHONE HYDROCHLORIDE 0.5 MG: 10 INJECTION INTRAMUSCULAR; INTRAVENOUS; SUBCUTANEOUS at 00:39

## 2021-02-11 RX ADMIN — OXYCODONE HYDROCHLORIDE 5 MG: 5 SOLUTION ORAL at 07:22

## 2021-02-11 NOTE — PLAN OF CARE
Goal Outcome Evaluation:  Plan of Care Reviewed With: patient  Progress: no change  Outcome Summary: Patient asked PT to return this PM for treatment and patient is agreeable. Patient completed rolling R<>L with mod-maxAx2 and long sitting x5 reps with use of bicpes with maxAx2 for complete long sitting motion. PT completed PROM BLE for DF and knee flexion. Will continue to progress patient as tolerable. Patient would benefit from inpatient rehab facility. Will progress patient as appropriate and tolerable.    Patient was not wearing a face mask during this therapy encounter. Therapist used appropriate personal protective equipment including gown, eye protection, mask and gloves.  Mask used was standard procedure mask. Appropriate PPE was worn during the entire therapy session. Hand hygiene was completed before and after therapy session. Patient is not in enhanced droplet precautions.  Glenis lindsey.

## 2021-02-11 NOTE — THERAPY TREATMENT NOTE
Patient Name: Maxim Carvajal  : 1985    MRN: [id number]                              Today's Date: 2021       Admit Date: 2021    Visit Dx:     ICD-10-CM ICD-9-CM   1. Tracheitis  J04.10 464.10   2. Community acquired pneumonia, unspecified laterality  J18.9 486   3. Anemia, unspecified type  D64.9 285.9     Patient Active Problem List   Diagnosis   • Tracheitis   • Acute on chronic respiratory failure with hypoxemia (CMS/HCC)   • Anemia   • Leukocytosis   • Quadriplegia (CMS/HCC)   • Essential hypertension   • HCAP (healthcare-associated pneumonia)   • Sepsis, unspecified organism (CMS/HCC)     Past Medical History:   Diagnosis Date   • Hypertension      History reviewed. No pertinent surgical history.  General Information     Row Name 21 1542          Physical Therapy Time and Intention    Document Type  therapy note (daily note)  -CB     Mode of Treatment  individual therapy;physical therapy  -CB     Row Name 21 1542          General Information    Patient Profile Reviewed  yes  -CB     Existing Precautions/Restrictions  fall;oxygen therapy device and L/min  -CB     Row Name 21 1542          Cognition    Orientation Status (Cognition)  oriented x 3  -CB     Row Name 21 1542          Safety Issues, Functional Mobility    Impairments Affecting Function (Mobility)  balance;coordination;endurance/activity tolerance;grasp;motor control;muscle tone abnormal;pain;postural/trunk control;range of motion (ROM);strength;shortness of breath  -CB     Comment, Safety Issues/Impairments (Mobility)  C6 quadraplegic  -CB       User Key  (r) = Recorded By, (t) = Taken By, (c) = Cosigned By    Initials Name Provider Type    CB Edna Jean Physical Therapist        Mobility     Row Name 21 1542          Bed Mobility    Bed Mobility  scooting/bridging;rolling left;rolling right  -CB     Rolling Left [place] (Bed Mobility)  maximum assist (25% patient effort);2 person assist;moderate  assist (50% patient effort)  -CB     Rolling Right [place] (Bed Mobility)  maximum assist (25% patient effort);1 person assist;2 person assist;moderate assist (50% patient effort)  -CB     Assistive Device (Bed Mobility)  bed rails;draw sheet  -CB       User Key  (r) = Recorded By, (t) = Taken By, (c) = Cosigned By    Initials Name Provider Type    Edna Darby Physical Therapist        Obj/Interventions     Row Name 02/11/21 1543          Motor Skills    Therapeutic Exercise  -- Pt completed long sitting x5 reps with use of bicpes and maxAx2 to complete full long sitting position. Pt able to hold for 5-20 seconds. PROM DF/PF and knee flexion  -CB     Row Name 02/11/21 1543          Balance    Balance Assessment  sitting static balance  -CB     Static Sitting Balance  long sitting;mild impairment;moderate impairment;supported  -CB       User Key  (r) = Recorded By, (t) = Taken By, (c) = Cosigned By    Initials Name Provider Type    Edna Darby Physical Therapist        Goals/Plan    No documentation.       Clinical Impression     Row Name 02/11/21 1544          Pain    Additional Documentation  Pain Scale: FACES Pre/Post-Treatment (Group)  -CB     Row Name 02/11/21 1544          Pain Scale: FACES Pre/Post-Treatment    Pain: FACES Scale, Pretreatment  4-->hurts little more  -CB     Posttreatment Pain Rating  4-->hurts little more  -CB     Row Name 02/11/21 1543          Plan of Care Review    Plan of Care Reviewed With  patient  -CB     Progress  no change  -CB     Outcome Summary  Patient asked PT to return this PM for treatment and patient is agreeable. Patient completed rolling R<>L with mod-maxAx2 and long sitting x5 reps with use of bicpes with maxAx2 for complete long sitting motion. PT completed PROM BLE for DF and knee flexion. Will continue to progress patient as tolerable. Patient would benefit from inpatient rehab facility. Will progress patient as appropriate and tolerable.  -CB     Zonia  Name 02/11/21 1544          Vital Signs    Pre SpO2 (%)  94  -CB     O2 Delivery Pre Treatment  trach collar  -CB     Intra SpO2 (%)  90  -CB     O2 Delivery Intra Treatment  trach collar  -CB     Post SpO2 (%)  92  -CB     O2 Delivery Post Treatment  trach collar  -CB     Row Name 02/11/21 1544          Positioning and Restraints    Pre-Treatment Position  in bed  -CB     Post Treatment Position  bed  -CB     In Bed  call light within reach;encouraged to call for assist;exit alarm on  -CB       User Key  (r) = Recorded By, (t) = Taken By, (c) = Cosigned By    Initials Name Provider Type    Edna Darby Physical Therapist        Outcome Measures     Row Name 02/11/21 1548          How much help from another person do you currently need...    Turning from your back to your side while in flat bed without using bedrails?  1  -CB     Moving from lying on back to sitting on the side of a flat bed without bedrails?  1  -CB     Moving to and from a bed to a chair (including a wheelchair)?  1  -CB     Standing up from a chair using your arms (e.g., wheelchair, bedside chair)?  1  -CB     Climbing 3-5 steps with a railing?  1  -CB     To walk in hospital room?  1  -CB     AM-PAC 6 Clicks Score (PT)  6  -CB     Row Name 02/11/21 1548          Functional Assessment    Outcome Measure Options  AM-PAC 6 Clicks Basic Mobility (PT)  -CB       User Key  (r) = Recorded By, (t) = Taken By, (c) = Cosigned By    Initials Name Provider Type    Edna Darby Physical Therapist        Physical Therapy Education                 Title: PT OT SLP Therapies (In Progress)     Topic: Physical Therapy (In Progress)     Point: Mobility training (Done)     Learning Progress Summary           Patient Acceptance, E,TB,D, VU,NR by CB at 2/11/2021 1549                   Point: Home exercise program (In Progress)     Learning Progress Summary           Patient Acceptance, E,D, NR by PC at 2/9/2021 1639   Family Acceptance, E,D, NR by PC at  2/9/2021 1639                   Point: Body mechanics (Done)     Learning Progress Summary           Patient Acceptance, E,TB,D, VU,NR by CB at 2/11/2021 1549                   Point: Precautions (Done)     Learning Progress Summary           Patient Acceptance, E,TB,D, VU,NR by CB at 2/11/2021 1549                               User Key     Initials Effective Dates Name Provider Type Discipline    PC 04/03/18 -  Randi Christensen, PT Physical Therapist PT    CB 12/30/20 -  Edna Jean Physical Therapist PT              PT Recommendation and Plan  Planned Therapy Interventions (PT): bed mobility training, strengthening  Plan of Care Reviewed With: patient  Progress: no change  Outcome Summary: Patient asked PT to return this PM for treatment and patient is agreeable. Patient completed rolling R<>L with mod-maxAx2 and long sitting x5 reps with use of bicpes with maxAx2 for complete long sitting motion. PT completed PROM BLE for DF and knee flexion. Will continue to progress patient as tolerable. Patient would benefit from inpatient rehab facility. Will progress patient as appropriate and tolerable.     Time Calculation:   PT Charges     Row Name 02/11/21 1550             Time Calculation    Start Time  1452  -CB      Stop Time  1509  -CB      Time Calculation (min)  17 min  -CB      PT Received On  02/11/21  -CB      PT - Next Appointment  02/13/21  -CB         Time Calculation- PT    Total Timed Code Minutes- PT  15 minute(s)  -CB        User Key  (r) = Recorded By, (t) = Taken By, (c) = Cosigned By    Initials Name Provider Type    CB Edna Jean Physical Therapist        Therapy Charges for Today     Code Description Service Date Service Provider Modifiers Qty    44975410374 HC PT THER PROC EA 15 MIN 2/11/2021 Edna Jean GP 1    76113059179 HC PT THER SUPP EA 15 MIN 2/11/2021 Edna Jean GP 1          PT G-Codes  Outcome Measure Options: AM-PAC 6 Clicks Basic Mobility (PT)  AM-PAC 6 Clicks Score (PT):  6  AM-PAC 6 Clicks Score (OT): 8    Edna Jean  2/11/2021

## 2021-02-11 NOTE — PROGRESS NOTES
"  Daily Progress Note.   [address]  2/11/2021    Patient:  Name:  Maxim Carvajal  MRN:  [id number]  1985  35 y.o.  male         CC: Sepsis, hypoxemia, pneumonia with tracheitis and mucous plug  Respiratory failure  Quadriplegia      Interval History:  Patient did well on trach collar now T-piece    Says he feels better. Asleep again upon ientry to room  Feels less secretions  No hemoptysis he says  No high fever he is aware of.  ROS:  no diarrhea, no chest pain  PMFSSH: no change    Physical Exam:  /75 (BP Location: Left arm, Patient Position: Lying)   Pulse 79   Temp 97 °F (36.1 °C) (Oral)   Resp 16   Ht 182.9 cm (72\")   Wt 71.4 kg (157 lb 6.5 oz)   SpO2 97%   BMI 21.35 kg/m²   Body mass index is 21.35 kg/m².    Intake/Output Summary (Last 24 hours) at 2/11/2021 1514  Last data filed at 2/11/2021 1304  Gross per 24 hour   Intake 1261 ml   Output 2300 ml   Net -1039 ml   T-piece  tracheostomy  General appearance:non toxic, conversant   Eyes: anicteric sclerae, moist conjunctivae; no lid-lag; PERRLA  HENT: Atraumatic; oropharynx clear with moist mucous membranes and no mucosal ulcerations; normal hard and soft palate  Neck: Trachea midline; +tracheostomy  Lungs: no sig crackles symmetric mech air entry less  rhonchi, with normal respiratory effort and no intercostal retractions  CV: RRR, no MRGs   Abdomen: Soft, non-tender; no masses or HSM  Extremities: No peripheral edema or extremity lymphadenopathy thin musculature contracted chronic changes  Skin: wwp no diffuse rash  Psych: Appropriate affect, alert  Neuro nodes heads, mouths answers to questions    Data Review:  Notable Labs:  Results from last 7 days   Lab Units 02/11/21  0316 02/10/21  0644 02/09/21  0719 02/07/21  0855 02/06/21  0725 02/05/21  0755   WBC 10*3/mm3 5.15 4.65 6.39 6.32 7.45 6.51   HEMOGLOBIN g/dL 7.6* 7.3* 7.9* 8.6* 8.8* 8.7*   PLATELETS 10*3/mm3 260 262 296 299 282 311     Results from last 7 days   Lab " Units 02/11/21  0316 02/10/21  0644 02/09/21  0719 02/08/21  0926 02/07/21  0855 02/06/21  1655 02/05/21  0755   SODIUM mmol/L 135* 138 139 143 145 142 137   POTASSIUM mmol/L 4.4 4.1 4.2 4.3 3.4* 3.3* 2.9*   CHLORIDE mmol/L 98 99 103 108* 110* 109* 107   CO2 mmol/L 34.0* 30.5* 28.5 26.9 23.4 22.9 21.1*   BUN mg/dL 22* 20 20 19 16 10 5*   CREATININE mg/dL 0.54* 0.51* 0.58* 0.73* 0.73* 0.45* 0.23*   GLUCOSE mg/dL 88 116* 119* 105* 99 126* 96   CALCIUM mg/dL 9.4 9.3 9.4 9.2 8.9 8.7 9.1   PHOSPHORUS mg/dL  --   --  3.8 3.7 4.2 3.7  --    Estimated Creatinine Clearance: 189.2 mL/min (A) (by C-G formula based on SCr of 0.54 mg/dL (L)).    Results from last 7 days   Lab Units 02/11/21  0316 02/10/21  0644 02/09/21  0719 02/08/21  0926  02/05/21  0755   LDH U/L  --   --   --   --   --  201   AST (SGOT) U/L  --   --  23  --   --  10   ALT (SGPT) U/L  --   --  25  --   --  11   PROCALCITONIN ng/mL  --   --   --  0.07  --   --    PLATELETS 10*3/mm3 260 262 296  --    < > 311    < > = values in this interval not displayed.       Results from last 7 days   Lab Units 02/07/21  1026 02/04/21  1628   PH, ARTERIAL pH units 7.387 7.348*   PCO2, ARTERIAL mm Hg 41.8 36.2   PO2 ART mm Hg 93.7 131.7*   HCO3 ART mmol/L 25.1 19.9*       Imaging:  Reviewed chest images personally from past 3 days    Scheduled meds:    ascorbic acid, 1,000 mg, Oral, Daily  aspirin, 81 mg, Nasogastric, Daily  cefepime, 2 g, Intravenous, Q8H  cyclobenzaprine, 10 mg, Oral, Daily  Eucerin original healing lotion, , Topical, Daily  fentaNYL, 1 patch, Transdermal, Q72H  gabapentin, 800 mg, Oral, Q8H  hydrOXYzine, 25 mg, Oral, Daily  lansoprazole, 30 mg, Nasogastric, QAM  propranolol, 20 mg, Per G Tube, TID  sennosides-docusate, 2 tablet, Oral, Nightly  sertraline, 100 mg, Oral, Daily  vancomycin, 15 mg/kg, Intravenous, Q12H        ASSESSMENT  /  PLAN:  1. Tracheitis and pneumonia with mucous plugging status post bronchoscopy on 2/4/2021: Growing both Pseudomonas  and MRSA  2. Exchange to a cuffed tracheostomy and now on the ventilator as of 2/4/2021  3. Bilateral pleural effusion left more than right  4. Acute hypoxemic respiratory failure  5. Sepsis  6. Quadriplegia  7. Anemia  8. Essential hypertension  9. Gluteal muscle bleed  10. fever    Pleural fluid is exudative but doesn't appear ot be zoila empyema, suspect parapneumonic, will follow cultures  Pt needs to participate and allow more airway clearance,  Will reduce sedating medications to allow this.  D/w bedside rn.  Resumption of plavix per primary when they feel appropriate  cxr tomorrow am evaluate other side    Claudio Sam MD  [facility]  02/11/21  9995

## 2021-02-11 NOTE — NURSING NOTE
Spoke with Dr. Wagner and informed of upper extremity duplex results. Per Dr. Wagner, IV site should be moved to left arm. Patient to be NPO at midnight for thoracentesis. See chart for orders.

## 2021-02-11 NOTE — NURSING NOTE
Pt in x ray triage for US right thoracentesis.  Patient is wearing a mask and RN is wearing mask and goggles with each patient encounter.

## 2021-02-11 NOTE — PROGRESS NOTES
LOS: 9 days     Chief Complaint:  Follow-up fever    Interval History: Afebrile, status post thoracentesis with 400 cc of straw-colored fluid removed.  Patient sleeping after the procedure.  Continues to have thick respiratory secretions.  Oxygenation stable.  Tolerating antibiotics without abdominal pain nausea vomiting or diarrhea    Vital Signs  Temp:  [97 °F (36.1 °C)-99.2 °F (37.3 °C)] 97 °F (36.1 °C)  Heart Rate:  [69-92] 79  Resp:  [16-24] 16  BP: (116-136)/(75-83) 116/75    Physical Exam:  General: in NAD  Neck: tracheostomy  Cardiovascular: NR, no murmur  Respiratory: Bilateral lower lobe rales; no wheezing  GI: Abdomen is soft, nontender, G-tube in place without erythema or purulence  Skin: No rashes,    Antibiotics:  •  Cefepime 2g IV q8hrs  •  Pharmacy to dose vancomycin    LABS:  CBC, CMP, micro reviewed today  Lab Results   Component Value Date    WBC 5.15 02/11/2021    HGB 7.6 (L) 02/11/2021    HCT 23.1 (L) 02/11/2021    MCV 80.2 02/11/2021     02/11/2021     Lab Results   Component Value Date    GLUCOSE 88 02/11/2021    BUN 22 (H) 02/11/2021    CREATININE 0.54 (L) 02/11/2021    EGFRIFNONA >150 02/11/2021    BCR 40.7 (H) 02/11/2021    CO2 34.0 (H) 02/11/2021    CALCIUM 9.4 02/11/2021    ALBUMIN 3.20 (L) 02/09/2021    LABIL2 2.0 01/28/2020    AST 23 02/09/2021    ALT 25 02/09/2021     Procalcitonin 0.12 -> 0.07 -> 0.07  HIV antibodies negative  Hep C antibody negative  Hep B surface antigen negative    Microbiology:  2/8 BCx: NGTD x2  2/8 RPP: negative  2/4 SCx MRSA  2/2 SCx moderate Pseudomonas, light growth MRSA  2/2 BCx CoNS 1/2   2/2 RVP/COVID neg    Radiology (personally reviewed report):   Dopplers negative for lower extremity DVT, acute right upper extremity superficial thrombophlebitis    Assessment/Plan   Fever in adult  Left-sided pneumonia with sputum cultures positive for Pseudomonas and MRSA  Tracheostomy in place  Quadriplegia - complicating above    Blood cultures are NGTD.   Follow-up thoracentesis results.  Dopplers with acute right upper extremity superficial thrombophlebitis.      Continue vancomycin dosing per pharmacy and cefepime 2 g IV every 8 hours.Overall day 10 of antibiotic therapy.  Antibiotic duration to be determined     ID will follow.
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
## 2021-02-11 NOTE — PROGRESS NOTES
Continued Stay Note  [facility]     Patient Name: Maxim Carvajal  MRN: [id number]  Today's Date: 2/11/2021    Admit Date: 2/2/2021    Discharge Plan     Row Name 02/11/21 1320       Plan    Plan  Referrals are pending for multiple facilites    Plan Comments  Spoke with mother, Stephanie Carvajal [phone].  Explained that [facility] declines due to patient care needs per Nicky and Pavan Miller is evaluating and has RT and Dr Davey.  She asked that [facility] call her about the evaluation for acute rehab.  She states she will call if she would like other referrals and CCP will continue to follow and assist.  Partial packet in office.......................Margarette Garcia RN        Discharge Codes    No documentation.             Margarette Garcia RN

## 2021-02-11 NOTE — PROGRESS NOTES
Vancomycin Pharmacokinetic Note    Maxim Carvajal is a 35 y.o. male who is on day 8 pharmacy to dose vancomycin for HAP.  Target level: AUC24 400-600  Consulting Provider: Joshua Vasquez MD (ID following)  Current Vancomycin Dose: Vancomycin 1000 mg Q12  Other Antimicrobials: Cefepime 2 gm Q8    Allergies  Allergies as of 02/02/2021   • (No Known Allergies)     Microbiology  Microbiology Results (last 10 days)     Procedure Component Value - Date/Time    Respiratory Panel PCR w/COVID-19(SARS-CoV-2) [facility] In-House, NP Swab in UTM/VTM, 3-4 HR TAT - Swab, Nasopharynx [[id number]]  (Normal) Collected: 02/08/21 1720    Lab Status: Final result Specimen: Swab from Nasopharynx Updated: 02/08/21 1833     ADENOVIRUS, PCR Not Detected     Coronavirus 229E Not Detected     Coronavirus HKU1 Not Detected     Coronavirus NL63 Not Detected     Coronavirus OC43 Not Detected     COVID19 Not Detected     Human Metapneumovirus Not Detected     Human Rhinovirus/Enterovirus Not Detected     Influenza A PCR Not Detected     Influenza B PCR Not Detected     Parainfluenza Virus 1 Not Detected     Parainfluenza Virus 2 Not Detected     Parainfluenza Virus 3 Not Detected     Parainfluenza Virus 4 Not Detected     RSV, PCR Not Detected     Bordetella pertussis pcr Not Detected     Bordetella parapertussis PCR Not Detected     Chlamydophila pneumoniae PCR Not Detected     Mycoplasma pneumo by PCR Not Detected    Narrative:      Fact sheet for providers: https://docs.[employer]/wp-content/uploads/BSH9050-9751-LZ4.1-EUA-Provider-Fact-Sheet-3.pdf    Fact sheet for patients: https://docs.[employer]/wp-content/uploads/SFV6842-8561-OS5.1-EUA-Patient-Fact-Sheet-1.pdf    Test performed by PCR.    Blood Culture - Blood, Hand, Right [[id number]] Collected: 02/08/21 1716    Lab Status: Preliminary result Specimen: Blood from Hand, Right Updated: 02/10/21 1745     Blood Culture No growth at 2 days    Blood Culture - Blood,  Hand, Left [[id number]] Collected: 02/08/21 1716    Lab Status: Preliminary result Specimen: Blood from Hand, Left Updated: 02/10/21 1745     Blood Culture No growth at 2 days    Respiratory Culture - Lavage, Lung, Right Middle Lobe [[id number]]  (Abnormal) Collected: 02/04/21 1837    Lab Status: Final result Specimen: Lavage from Lung, Right Middle Lobe Updated: 02/06/21 1307     Respiratory Culture Scant growth (1+) Staphylococcus aureus, MRSA     Comment: Methicillin resistant Staphylococcus aureus, Patient may be an isolation risk.        Gram Stain No WBCs or organisms seen      No epithelial cells seen    Narrative:      Refer to Respiratory Culture from 2/2/21 for MICS    Respiratory Culture - Sputum, ET Suction [[id number]]  (Abnormal)  (Susceptibility) Collected: 02/02/21 2250    Lab Status: Final result Specimen: Sputum from ET Suction Updated: 02/06/21 0916     Respiratory Culture Moderate growth (3+) Pseudomonas aeruginosa      Light growth (2+) Staphylococcus aureus, MRSA     Comment: Methicillin resistant Staphylococcus aureus, Patient may be an isolation risk         No Normal Respiratory Lisa     Gram Stain No WBCs or organisms seen      No epithelial cells seen    Susceptibility      Pseudomonas aeruginosa     YOGI     Cefepime Susceptible     Ceftazidime Susceptible     Ciprofloxacin Resistant     Gentamicin Susceptible     Levofloxacin Resistant     Piperacillin + Tazobactam Susceptible [1]             [1]   Appended report. These results have been appended to a previously preliminary verified report.             Susceptibility      Staphylococcus aureus, MRSA     YOGI     Clindamycin Resistant     Linezolid Susceptible     Oxacillin Resistant     Penicillin G Resistant     Tetracycline Susceptible     Trimethoprim + Sulfamethoxazole Susceptible     Vancomycin Susceptible                    MRSA Screen, PCR (Inpatient) - Swab, Nares [[id number]]  (Abnormal) Collected: 02/02/21 2120    Lab Status: Final  result Specimen: Swab from Nares Updated: 02/02/21 2350     MRSA PCR MRSA Detected    Blood Culture - Blood, Arm, Left [[id number]] Collected: 02/02/21 0656    Lab Status: Final result Specimen: Blood from Arm, Left Updated: 02/07/21 0715     Blood Culture No growth at 5 days    Blood Culture - Blood, Arm, Left [[id number]]  (Abnormal) Collected: 02/02/21 0656    Lab Status: Final result Specimen: Blood from Arm, Left Updated: 02/04/21 0921     Blood Culture Staphylococcus, coagulase negative     Comment: Probable contaminant requires clinical correlation, susceptibility not performed unless requested by physician.          Isolated from Anaerobic Bottle     Gram Stain Anaerobic Bottle Gram positive cocci in clusters    Blood Culture ID, PCR - Blood, Arm, Left [[id number]]  (Abnormal) Collected: 02/02/21 0656    Lab Status: Edited Result - FINAL Specimen: Blood from Arm, Left Updated: 02/03/21 1202     BCID, PCR Staphylococcus spp, not aureus. Identification by BCID PCR.     BOTTLE TYPE Anaerobic Bottle     Comment: Appended report. These results have been appended to a previously final verified report.       Respiratory Panel PCR w/COVID-19(SARS-CoV-2) [facility] In-House, NP Swab in UTM/VTM, 3-4 HR TAT - Swab, Nasopharynx [[id number]]  (Normal) Collected: 02/02/21 0641    Lab Status: Final result Specimen: Swab from Nasopharynx Updated: 02/02/21 0822     ADENOVIRUS, PCR Not Detected     Coronavirus 229E Not Detected     Coronavirus HKU1 Not Detected     Coronavirus NL63 Not Detected     Coronavirus OC43 Not Detected     COVID19 Not Detected     Human Metapneumovirus Not Detected     Human Rhinovirus/Enterovirus Not Detected     Influenza A PCR Not Detected     Influenza B PCR Not Detected     Parainfluenza Virus 1 Not Detected     Parainfluenza Virus 2 Not Detected     Parainfluenza Virus 3 Not Detected     Parainfluenza Virus 4 Not Detected     RSV, PCR Not Detected     Bordetella pertussis pcr Not  "Detected     Bordetella parapertussis PCR Not Detected     Chlamydophila pneumoniae PCR Not Detected     Mycoplasma pneumo by PCR Not Detected    Narrative:      Fact sheet for providers: https://docs.[employer]/wp-content/uploads/LHG0059-6399-RV6.1-EUA-Provider-Fact-Sheet-3.pdf    Fact sheet for patients: https://docs.[employer]/wp-content/uploads/TQH0147-6137-CN2.1-EUA-Patient-Fact-Sheet-1.pdf    Test performed by PCR.        Vitals/Labs/I&O  182.9 cm (72\")  71.4 kg (157 lb 6.5 oz)  Body mass index is 21.35 kg/m².   Temp:  [98.8 °F (37.1 °C)-99.2 °F (37.3 °C)] 98.8 °F (37.1 °C)  Heart Rate:  [72-92] 75  Resp:  [16-24] 20  BP: (122-123)/(76-81) 122/81    Results from last 7 days   Lab Units 02/11/21  0316 02/10/21  0644 02/09/21  0719   WBC 10*3/mm3 5.15 4.65 6.39          Results from last 7 days   Lab Units 02/08/21  0926 02/04/21  0837   PROCALCITONIN ng/mL 0.07 0.07     Estimated Creatinine Clearance: 189.2 mL/min (A) (by C-G formula based on SCr of 0.54 mg/dL (L)).  Results from last 7 days   Lab Units 02/11/21  0316 02/10/21  0644 02/09/21  0719   BUN mg/dL 22* 20 20   CREATININE mg/dL 0.54* 0.51* 0.58*     Intake & Output (last 3 days)       02/08 0701 - 02/09 0700 02/09 0701 - 02/10 0700 02/10 0701 - 02/11 0700 02/11 0701 - 02/12 0700    P.O. 0 0 0     I.V. (mL/kg) 2248 (31.5) 1961 (27.5)      Other 475 540 282     NG/GT 1107 652 779     IV Piggyback  304 200     Total Intake(mL/kg) 3830 (53.6) 3457 (48.4) 1261 (17.7)     Urine (mL/kg/hr) 2750 (1.6) 2200 (1.3) 1900 (1.1)     Stool 0 0 0     Total Output 2750 2200 1900     Net +1080 +1257 -639             Urine Unmeasured Occurrence 1 x 3 x 1 x     Stool Unmeasured Occurrence 2 x 2 x 2 x         Vancomycin Dosing & Level History  1500 mg (~20 mg/kg) IV x1 dose               02/02 1408  1250 mg (17 mg/kg) IV q12h               02/03 0248, 1531              02/04 0332                          Trough 02/04 1459: 4.8 mg/L   1000 mg (~14 mg/kg) IV q8h " "([facility] 0300, 1100, 1900)              02/04 1924              02/05 0346                          Trough 02/05 1024: 10.1 mg/L               02/05 1215  1250 mg (17 mg/kg) IV q8h               02/05 2005 02/06 0439                          Trough 02/06 1140: 18.4 mg/L               02/06 1252, 2000 02/07 0345, 1219                          Trough 02/07 1922: 35.1 mg/L               02/07 2003                          Random 02/08 0006: 39.3 mg/L     Random 02/08 1716: 20.3 mg/L   1250 mg (17 mg/kg) IV x1 dose   02/08 2325     Random 02/09 1157: 18.3 mg/L  1250 mg (17 mg/kg) IV x1 dose   02/09 1333    Random 02/10 0644: 16.7 mg/L (~17 hrs)  1250 mg (17 mg/kg) IV x1 dose    2/10 1215    Random 2/11 0316: 16.5 mg/L (~15 hrs)    Assessment/Plan    · Patient's Scr remains elevated from baseline but has trended down and is seemingly stable for the last ~72 hrs. Patient previously pulse dosed due to acute renal changes. Random trough collected this morning returned therapeutic at 16.5 mg/L and is reflective of a ~15 hour level.   · Based on the above mentioned information and pattern of pulse doses and subsequent troughs I am inclined to schedule a tentative regimen of vancomycin 1000 mg (15 mg/kg) every 12 hours. This is estimated to achieve an AUC of 457 mg/L*hr and a steady state trough of 15.2 mg/L.   · Scr will be monitored daily and if acute changes occur we may be forced back into intermittent dosing--we will see. A trough is planned for prior to the third dose of the newly scheduled regimen or sooner if clinically indicated.       Bayesian analysis of the most recent level(s) using [employer] provides the following patient-specific pharmacokinetic parameters:   CL: 4.31 L/h   Vd: 95.3 L   T1/2: 17 h  If the predicted trough on this regimen is not within what was previously considered a \"target trough range\", the AUC24 (a stronger predictor of vancomycin efficacy) is predicted to achieve the " accepted target of 400-600 mg*h/L. To best balance efficacy and toxicity, we will be targeting AUC24 in this patient rather than steady-state trough levels.     Thank you for the consult, pharmacy will continue to follow while therapy is active.    Lorna Narvaez, PharmD  Clinical Pharmacist

## 2021-02-12 ENCOUNTER — APPOINTMENT (OUTPATIENT)
Dept: GENERAL RADIOLOGY | Facility: HOSPITAL | Age: 36
End: 2021-02-12

## 2021-02-12 LAB
ALBUMIN SERPL-MCNC: 3.4 G/DL (ref 3.5–5.2)
ALBUMIN/GLOB SERPL: 1.3 G/DL
ALP SERPL-CCNC: 83 U/L (ref 39–117)
ALT SERPL W P-5'-P-CCNC: 13 U/L (ref 1–41)
ANION GAP SERPL CALCULATED.3IONS-SCNC: 5.9 MMOL/L (ref 5–15)
AST SERPL-CCNC: 8 U/L (ref 1–40)
BASOPHILS # BLD AUTO: 0.01 10*3/MM3 (ref 0–0.2)
BASOPHILS NFR BLD AUTO: 0.2 % (ref 0–1.5)
BILIRUB SERPL-MCNC: 1.3 MG/DL (ref 0–1.2)
BUN SERPL-MCNC: 27 MG/DL (ref 6–20)
BUN/CREAT SERPL: 45 (ref 7–25)
CALCIUM SPEC-SCNC: 9 MG/DL (ref 8.6–10.5)
CHLORIDE SERPL-SCNC: 95 MMOL/L (ref 98–107)
CO2 SERPL-SCNC: 32.1 MMOL/L (ref 22–29)
CREAT SERPL-MCNC: 0.6 MG/DL (ref 0.76–1.27)
CYTO UR: NORMAL
DEPRECATED RDW RBC AUTO: 55 FL (ref 37–54)
EOSINOPHIL # BLD AUTO: 0.18 10*3/MM3 (ref 0–0.4)
EOSINOPHIL NFR BLD AUTO: 3.4 % (ref 0.3–6.2)
ERYTHROCYTE [DISTWIDTH] IN BLOOD BY AUTOMATED COUNT: 17.9 % (ref 12.3–15.4)
GFR SERPL CREATININE-BSD FRML MDRD: >150 ML/MIN/1.73
GLOBULIN UR ELPH-MCNC: 2.6 GM/DL
GLUCOSE SERPL-MCNC: 106 MG/DL (ref 65–99)
HCT VFR BLD AUTO: 22.2 % (ref 37.5–51)
HGB BLD-MCNC: 7 G/DL (ref 13–17.7)
IMM GRANULOCYTES # BLD AUTO: 0.01 10*3/MM3 (ref 0–0.05)
IMM GRANULOCYTES NFR BLD AUTO: 0.2 % (ref 0–0.5)
LAB AP CASE REPORT: NORMAL
LYMPHOCYTES # BLD AUTO: 0.99 10*3/MM3 (ref 0.7–3.1)
LYMPHOCYTES NFR BLD AUTO: 18.8 % (ref 19.6–45.3)
MAGNESIUM SERPL-MCNC: 2.1 MG/DL (ref 1.6–2.6)
MCH RBC QN AUTO: 26.2 PG (ref 26.6–33)
MCHC RBC AUTO-ENTMCNC: 31.5 G/DL (ref 31.5–35.7)
MCV RBC AUTO: 83.1 FL (ref 79–97)
MONOCYTES # BLD AUTO: 0.53 10*3/MM3 (ref 0.1–0.9)
MONOCYTES NFR BLD AUTO: 10.1 % (ref 5–12)
NEUTROPHILS NFR BLD AUTO: 3.54 10*3/MM3 (ref 1.7–7)
NEUTROPHILS NFR BLD AUTO: 67.3 % (ref 42.7–76)
NRBC BLD AUTO-RTO: 0 /100 WBC (ref 0–0.2)
PATH REPORT.FINAL DX SPEC: NORMAL
PATH REPORT.GROSS SPEC: NORMAL
PLATELET # BLD AUTO: 249 10*3/MM3 (ref 140–450)
PMV BLD AUTO: 10.4 FL (ref 6–12)
POTASSIUM SERPL-SCNC: 5.1 MMOL/L (ref 3.5–5.2)
PROT SERPL-MCNC: 6 G/DL (ref 6–8.5)
RBC # BLD AUTO: 2.67 10*6/MM3 (ref 4.14–5.8)
SODIUM SERPL-SCNC: 133 MMOL/L (ref 136–145)
VANCOMYCIN SERPL-MCNC: 14.8 MCG/ML (ref 5–40)
VANCOMYCIN TROUGH SERPL-MCNC: 24.1 MCG/ML (ref 5–20)
WBC # BLD AUTO: 5.26 10*3/MM3 (ref 3.4–10.8)

## 2021-02-12 PROCEDURE — 25010000002 HYDROMORPHONE 1 MG/ML SOLUTION: Performed by: INTERNAL MEDICINE

## 2021-02-12 PROCEDURE — 25010000002 CEFEPIME PER 500 MG: Performed by: INTERNAL MEDICINE

## 2021-02-12 PROCEDURE — 83735 ASSAY OF MAGNESIUM: CPT | Performed by: INTERNAL MEDICINE

## 2021-02-12 PROCEDURE — 71045 X-RAY EXAM CHEST 1 VIEW: CPT

## 2021-02-12 PROCEDURE — 80202 ASSAY OF VANCOMYCIN: CPT | Performed by: HOSPITALIST

## 2021-02-12 PROCEDURE — 85025 COMPLETE CBC W/AUTO DIFF WBC: CPT | Performed by: INTERNAL MEDICINE

## 2021-02-12 PROCEDURE — 99232 SBSQ HOSP IP/OBS MODERATE 35: CPT | Performed by: INTERNAL MEDICINE

## 2021-02-12 PROCEDURE — 80053 COMPREHEN METABOLIC PANEL: CPT | Performed by: INTERNAL MEDICINE

## 2021-02-12 PROCEDURE — 94799 UNLISTED PULMONARY SVC/PX: CPT

## 2021-02-12 RX ADMIN — CEFEPIME 2 G: 2 INJECTION, POWDER, FOR SOLUTION INTRAVENOUS at 22:19

## 2021-02-12 RX ADMIN — LANSOPRAZOLE 30 MG: KIT at 08:46

## 2021-02-12 RX ADMIN — PROPRANOLOL HYDROCHLORIDE 20 MG: 20 TABLET ORAL at 08:47

## 2021-02-12 RX ADMIN — HYDROMORPHONE HYDROCHLORIDE 0.75 MG: 10 INJECTION INTRAMUSCULAR; INTRAVENOUS; SUBCUTANEOUS at 11:00

## 2021-02-12 RX ADMIN — ACETAMINOPHEN 650 MG: 325 TABLET, FILM COATED ORAL at 22:19

## 2021-02-12 RX ADMIN — OXYCODONE HYDROCHLORIDE 5 MG: 5 SOLUTION ORAL at 04:11

## 2021-02-12 RX ADMIN — Medication: at 08:46

## 2021-02-12 RX ADMIN — HYDROMORPHONE HYDROCHLORIDE 0.75 MG: 10 INJECTION INTRAMUSCULAR; INTRAVENOUS; SUBCUTANEOUS at 15:15

## 2021-02-12 RX ADMIN — SERTRALINE 100 MG: 100 TABLET, FILM COATED ORAL at 08:46

## 2021-02-12 RX ADMIN — GABAPENTIN 800 MG: 400 CAPSULE ORAL at 22:18

## 2021-02-12 RX ADMIN — PROPRANOLOL HYDROCHLORIDE 20 MG: 20 TABLET ORAL at 22:18

## 2021-02-12 RX ADMIN — ALPRAZOLAM 0.5 MG: 0.5 TABLET ORAL at 16:37

## 2021-02-12 RX ADMIN — GABAPENTIN 800 MG: 400 CAPSULE ORAL at 13:38

## 2021-02-12 RX ADMIN — CEFEPIME 2 G: 2 INJECTION, POWDER, FOR SOLUTION INTRAVENOUS at 13:38

## 2021-02-12 RX ADMIN — CYCLOBENZAPRINE 10 MG: 10 TABLET, FILM COATED ORAL at 08:46

## 2021-02-12 RX ADMIN — CEFEPIME 2 G: 2 INJECTION, POWDER, FOR SOLUTION INTRAVENOUS at 04:11

## 2021-02-12 RX ADMIN — ALPRAZOLAM 0.5 MG: 0.5 TABLET ORAL at 00:10

## 2021-02-12 RX ADMIN — ASPIRIN 81 MG: 81 TABLET, CHEWABLE ORAL at 08:47

## 2021-02-12 RX ADMIN — HYDROXYZINE HYDROCHLORIDE 25 MG: 25 TABLET ORAL at 08:46

## 2021-02-12 RX ADMIN — GABAPENTIN 800 MG: 400 CAPSULE ORAL at 06:44

## 2021-02-12 RX ADMIN — OXYCODONE HYDROCHLORIDE 5 MG: 5 SOLUTION ORAL at 00:10

## 2021-02-12 RX ADMIN — OXYCODONE HYDROCHLORIDE 5 MG: 5 SOLUTION ORAL at 10:11

## 2021-02-12 RX ADMIN — DOCUSATE SODIUM 50MG AND SENNOSIDES 8.6MG 2 TABLET: 8.6; 5 TABLET, FILM COATED ORAL at 22:18

## 2021-02-12 RX ADMIN — HYDROMORPHONE HYDROCHLORIDE 0.75 MG: 10 INJECTION INTRAMUSCULAR; INTRAVENOUS; SUBCUTANEOUS at 06:46

## 2021-02-12 RX ADMIN — OXYCODONE HYDROCHLORIDE 5 MG: 5 SOLUTION ORAL at 22:18

## 2021-02-12 RX ADMIN — HYDROMORPHONE HYDROCHLORIDE 0.75 MG: 10 INJECTION INTRAMUSCULAR; INTRAVENOUS; SUBCUTANEOUS at 02:53

## 2021-02-12 RX ADMIN — OXYCODONE HYDROCHLORIDE AND ACETAMINOPHEN 1000 MG: 500 TABLET ORAL at 08:47

## 2021-02-12 RX ADMIN — OXYCODONE HYDROCHLORIDE 5 MG: 5 SOLUTION ORAL at 16:37

## 2021-02-12 RX ADMIN — HYDROMORPHONE HYDROCHLORIDE 0.75 MG: 10 INJECTION INTRAMUSCULAR; INTRAVENOUS; SUBCUTANEOUS at 23:44

## 2021-02-12 RX ADMIN — HYDROMORPHONE HYDROCHLORIDE 0.75 MG: 10 INJECTION INTRAMUSCULAR; INTRAVENOUS; SUBCUTANEOUS at 19:41

## 2021-02-12 NOTE — CONSULTS
Acute rehab referral received per patient's mother via [facility]. Spoke with patient's mother about admission criteria and program focus. Informed her that regretfully we would not be able to admit her son to acute rehab. Recommend [facility] spinal program, though given that they have already declined he may need sub-acute. Also communicated to Margarette with [facility]. Will sign off.     Thank you.    Ren Lamas RN  p

## 2021-02-12 NOTE — NURSING NOTE
CWOCN follow up to recheck skin.  Coccyx area blanchable pink with 2 small partial thickness wounds now clean of slough layer.  Will modify order today to hydrofiber moistened with saline over wound, cover with sacral optifoam.  Right heel with callused skin layer flaked away and intact skin underneath.  Both heels now just appear very dry.  Please use eucerin daily to heels.  Continue to off load.
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
## 2021-02-12 NOTE — PROGRESS NOTES
Continued Stay Note  [facility]     Patient Name: Maxim Carvajal  MRN: [id number]  Today's Date: 2/12/2021    Admit Date: 2/2/2021    Discharge Plan     Row Name 02/12/21 1156       Plan    Plan  Referrals are pending for multiple facilities for subacute rehab, [facility] and [facility] decline    Plan Comments  Spoke bryson Mcclure with [facility] and he declines, he spoke with Stephanie, patient's mother.  [facility] rehab discharged patient on 1/29 and could not readmit for acute.  Subacute referrals are pending.  ..........................Margarette Garcia RN        Discharge Codes    No documentation.             Margarette Garcia RN

## 2021-02-12 NOTE — PROGRESS NOTES
Vancomycin Pharmacokinetic Note    Maxim Carvajal is a 35 y.o. male who is on day 9 pharmacy to dose vancomycin for HAP.  Target level: AUC24 400-600  Consulting Provider: Joshua Vasquez MD (ID following)  Current Vancomycin Dose: Vancomycin 1000 mg Q12  Other Antimicrobials: Cefepime 2 gm Q8    Allergies  Allergies as of 02/02/2021   • (No Known Allergies)     Microbiology  Microbiology Results (last 10 days)     Procedure Component Value - Date/Time    Body Fluid Culture - Body Fluid, Pleural Cavity [[id number]] Collected: 02/11/21 0955    Lab Status: Preliminary result Specimen: Body Fluid from Pleural Cavity Updated: 02/11/21 1203     Gram Stain No WBCs or organisms seen    Respiratory Panel PCR w/COVID-19(SARS-CoV-2) [facility] In-House, NP Swab in UTM/VTM, 3-4 HR TAT - Swab, Nasopharynx [[id number]]  (Normal) Collected: 02/08/21 1720    Lab Status: Final result Specimen: Swab from Nasopharynx Updated: 02/08/21 1833     ADENOVIRUS, PCR Not Detected     Coronavirus 229E Not Detected     Coronavirus HKU1 Not Detected     Coronavirus NL63 Not Detected     Coronavirus OC43 Not Detected     COVID19 Not Detected     Human Metapneumovirus Not Detected     Human Rhinovirus/Enterovirus Not Detected     Influenza A PCR Not Detected     Influenza B PCR Not Detected     Parainfluenza Virus 1 Not Detected     Parainfluenza Virus 2 Not Detected     Parainfluenza Virus 3 Not Detected     Parainfluenza Virus 4 Not Detected     RSV, PCR Not Detected     Bordetella pertussis pcr Not Detected     Bordetella parapertussis PCR Not Detected     Chlamydophila pneumoniae PCR Not Detected     Mycoplasma pneumo by PCR Not Detected    Narrative:      Fact sheet for providers: https://docs.[employer]/wp-content/uploads/RGA4550-8519-ZD0.1-EUA-Provider-Fact-Sheet-3.pdf    Fact sheet for patients: https://docs.[employer]/wp-content/uploads/GQI7640-5267-OS2.1-EUA-Patient-Fact-Sheet-1.pdf    Test performed by PCR.     Blood Culture - Blood, Hand, Right [[id number]] Collected: 02/08/21 1716    Lab Status: Preliminary result Specimen: Blood from Hand, Right Updated: 02/11/21 1746     Blood Culture No growth at 3 days    Blood Culture - Blood, Hand, Left [[id number]] Collected: 02/08/21 1716    Lab Status: Preliminary result Specimen: Blood from Hand, Left Updated: 02/11/21 1746     Blood Culture No growth at 3 days    Respiratory Culture - Lavage, Lung, Right Middle Lobe [[id number]]  (Abnormal) Collected: 02/04/21 1837    Lab Status: Final result Specimen: Lavage from Lung, Right Middle Lobe Updated: 02/06/21 1307     Respiratory Culture Scant growth (1+) Staphylococcus aureus, MRSA     Comment: Methicillin resistant Staphylococcus aureus, Patient may be an isolation risk.        Gram Stain No WBCs or organisms seen      No epithelial cells seen    Narrative:      Refer to Respiratory Culture from 2/2/21 for MICS    Respiratory Culture - Sputum, ET Suction [[id number]]  (Abnormal)  (Susceptibility) Collected: 02/02/21 2250    Lab Status: Final result Specimen: Sputum from ET Suction Updated: 02/06/21 0916     Respiratory Culture Moderate growth (3+) Pseudomonas aeruginosa      Light growth (2+) Staphylococcus aureus, MRSA     Comment: Methicillin resistant Staphylococcus aureus, Patient may be an isolation risk         No Normal Respiratory Lisa     Gram Stain No WBCs or organisms seen      No epithelial cells seen    Susceptibility      Pseudomonas aeruginosa     YOGI     Cefepime Susceptible     Ceftazidime Susceptible     Ciprofloxacin Resistant     Gentamicin Susceptible     Levofloxacin Resistant     Piperacillin + Tazobactam Susceptible [1]             [1]   Appended report. These results have been appended to a previously preliminary verified report.             Susceptibility      Staphylococcus aureus, MRSA     YOGI     Clindamycin Resistant     Linezolid Susceptible     Oxacillin Resistant     Penicillin G Resistant      "Tetracycline Susceptible     Trimethoprim + Sulfamethoxazole Susceptible     Vancomycin Susceptible                    MRSA Screen, PCR (Inpatient) - Swab, Nares [[id number]]  (Abnormal) Collected: 02/02/21 2120    Lab Status: Final result Specimen: Swab from Nares Updated: 02/02/21 2350     MRSA PCR MRSA Detected        Vitals/Labs/I&O  182.9 cm (72\")  71.4 kg (157 lb 6.5 oz)  Body mass index is 21.35 kg/m².   Temp:  [97 °F (36.1 °C)-99.1 °F (37.3 °C)] 98.1 °F (36.7 °C)  Heart Rate:  [69-89] 80  Resp:  [16-19] 16  BP: ()/(55-83) 105/56    Results from last 7 days   Lab Units 02/11/21  0316 02/10/21  0644 02/09/21  0719   WBC 10*3/mm3 5.15 4.65 6.39          Results from last 7 days   Lab Units 02/08/21  0926   PROCALCITONIN ng/mL 0.07     Estimated Creatinine Clearance: 189.2 mL/min (A) (by C-G formula based on SCr of 0.54 mg/dL (L)).  Results from last 7 days   Lab Units 02/11/21  0316 02/10/21  0644 02/09/21  0719   BUN mg/dL 22* 20 20   CREATININE mg/dL 0.54* 0.51* 0.58*     Intake & Output (last 3 days)       02/09 0701 - 02/10 0700 02/10 0701 - 02/11 0700 02/11 0701 - 02/12 0700 02/12 0701 - 02/13 0700    P.O. 0 0 0     I.V. (mL/kg) 1961 (27.5)       Other 540 282 200     NG/ 779 581     IV Piggyback 304 200 400     Total Intake(mL/kg) 3457 (48.4) 1261 (17.7) 1181 (16.5)     Urine (mL/kg/hr) 2200 (1.3) 1900 (1.1) 1750 (1)     Other   400     Stool 0 0 0     Total Output 2200 1900 2150     Net +1257 -639 -969             Urine Unmeasured Occurrence 3 x 1 x 1 x     Stool Unmeasured Occurrence 2 x 2 x 1 x         Vancomycin Dosing & Level History  1500 mg (~20 mg/kg) IV x1 dose               02/02 1408  1250 mg (17 mg/kg) IV q12h               02/03 0248, 1531              02/04 0332                          Trough 02/04 1459: 4.8 mg/L   1000 mg (~14 mg/kg) IV q8h (tommie 0300, 1100, 1900)              02/04 1924              02/05 0346                          Trough 02/05 1024: 10.1 mg/L "               02/05 1215  1250 mg (17 mg/kg) IV q8h               02/05 2005 02/06 0439                          Trough 02/06 1140: 18.4 mg/L               02/06 1252, 2000 02/07 0345, 1219                          Trough 02/07 1922: 35.1 mg/L               02/07 2003                          Random 02/08 0006: 39.3 mg/L     Random 02/08 1716: 20.3 mg/L   1250 mg (17 mg/kg) IV x1 dose   02/08 2325     Random 02/09 1157: 18.3 mg/L  1250 mg (17 mg/kg) IV x1 dose   02/09 1333    Random 02/10 0644: 16.7 mg/L (~17 hrs)  1250 mg (17 mg/kg) IV x1 dose    2/10 1215    Random 2/11 0316: 16.5 mg/L (~15 hrs)  1000 mg (15 mg/kg) IV q12h   2/11 1116, 2127    Trough 2/12 = 24.1 mg/L (~11 hr level)    Assessment/Plan    · Patient's Scr remains elevated from baseline but has trended down and is seemingly stable for the last few days. Tentative regimen was scheduled yesterday and a trough collected this AM returned supratherapeutic at 24.1 mg/L and is reflective of an accurate collection.   · Unfortunately will have to return to pulse dosing. Inability to reliably trust Scr results secondary to quadriplegia makes vancomycin management difficult. I will discontinue scheduled regimen and get a random trough this evening to see where patient is.  · Continue routine Scr monitoring while on active vancomycin therapy.      Thank you for the consult, pharmacy will continue to follow while therapy is active.    Lorna Narvaez, PharmD  Clinical Pharmacist

## 2021-02-12 NOTE — PROGRESS NOTES
LOS: 10 days     Chief Complaint:  Follow-up fever    Interval History: Afebrile, states he feels a lot better today.  Denies any new pain.  Respiratory status is stable.  Denies any abdominal pain nausea vomiting or diarrhea.  Tolerating antibiotics that are rash    Vital Signs  Temp:  [97 °F (36.1 °C)-99.1 °F (37.3 °C)] 98.1 °F (36.7 °C)  Heart Rate:  [79-89] 86  Resp:  [16-19] 16  BP: ()/(55-75) 105/56    Physical Exam:  General: in NAD  Neck: tracheostomy  Cardiovascular: NR, no murmur  Respiratory: Bilateral lower lobe rales; no wheezing  GI: Abdomen is soft, nontender, G-tube in place without erythema or purulence  Skin: No rashes,    Antibiotics:  •  Cefepime 2g IV q8hrs  •  Pharmacy to dose vancomycin    LABS:  CBC, CMP, micro reviewed today  Lab Results   Component Value Date    WBC 5.26 02/12/2021    HGB 7.0 (L) 02/12/2021    HCT 22.2 (L) 02/12/2021    MCV 83.1 02/12/2021     02/12/2021     Lab Results   Component Value Date    GLUCOSE 106 (H) 02/12/2021    BUN 27 (H) 02/12/2021    CREATININE 0.60 (L) 02/12/2021    EGFRIFNONA >150 02/12/2021    BCR 45.0 (H) 02/12/2021    CO2 32.1 (H) 02/12/2021    CALCIUM 9.0 02/12/2021    ALBUMIN 3.40 (L) 02/12/2021    LABIL2 2.0 01/28/2020    AST 8 02/12/2021    ALT 13 02/12/2021     Procalcitonin 0.12 -> 0.07 -> 0.07  HIV antibodies negative  Hep C antibody negative  Hep B surface antigen negative    2/11 thoracentesis with 888 nucleated cells 43% lymphocytes 44% mononuclear cells red blood cells 5575 pH 7.8 glucose 91 LDH 81 total protein 3.8    Microbiology:  2/11 thoracentesis bacterial cultures NGTD          AFB cx P          Fungal cx P  2/8 BCx: NGTD x2  2/8 RPP: negative  2/4 SCx MRSA  2/2 SCx moderate Pseudomonas, light growth MRSA  2/2 BCx CoNS 1/2   2/2 RVP/COVID neg    Radiology (personally reviewed report):   2/12 chest x-ray personally reviewed by me shows tracheostomy in place.  Right-sided pleural effusion.  No pneumothorax.  Bibasilar  opacities.      Assessment/Plan   Fever in adult  Left-sided pneumonia with sputum cultures positive for Pseudomonas and MRSA  Tracheostomy in place  Quadriplegia - complicating above    Pleural fluid exudative per lights criteria.  Not consistent with empyema given glucose and pH.  Will await culture results.    No more episodes of fever.  White blood cell count remains normal.  Respiratory status is overall stable. Continue vancomycin dosing per pharmacy and cefepime 2 g IV every 8 hours.Overall day 10 of antibiotic therapy.  Anticipate a 14-day course but will reevaluate on Monday.    Please call us with questions or concerns in the interim

## 2021-02-12 NOTE — PROGRESS NOTES
"  Daily Progress Note.   [address]  2/12/2021    Patient:  Name:  Maxim Carvajal  MRN:  [id number]  1985  35 y.o.  male         CC: Sepsis, hypoxemia, pneumonia with tracheitis and mucous plug  Respiratory failure  Quadriplegia      Interval History:/ROS:  Patient did well on trach collar now T-piece  No fevers now however does complain of some pain on the right side after his thoracentesis.    He says he has been able to clear up a few mucous plugs today with coughing.  No hemoptysis.  Appears more alert today however was asleep again upon entry to room    No diarrhea  PMFSSH: no change    Physical Exam:  /56 (BP Location: Right arm, Patient Position: Lying)   Pulse 86   Temp 98.1 °F (36.7 °C) (Oral)   Resp 16   Ht 182.9 cm (72\")   Wt 71.4 kg (157 lb 6.5 oz)   SpO2 97%   BMI 21.35 kg/m²   Body mass index is 21.35 kg/m².    Intake/Output Summary (Last 24 hours) at 2/12/2021 1234  Last data filed at 2/12/2021 0840  Gross per 24 hour   Intake 1221 ml   Output 1750 ml   Net -529 ml   T-piece  tracheostomy  General appearance:non toxic, conversant   Eyes: anicteric sclerae, moist conjunctivae; no lid-lag; PERRLA  HENT: Atraumatic; oropharynx clear with moist mucous membranes and no mucosal ulcerations; normal hard and soft palate  Neck: Trachea midline; +tracheostomy  Lungs: no sig crackles symmetric mech air entry no rhonchi, with normal respiratory effort and no intercostal retractions  CV: RRR, no MRGs   Abdomen: Soft, non-tender; no masses or HSM  Extremities: No peripheral edema or extremity lymphadenopathy thin musculature contracted chronic changes  Skin: wwp no diffuse rash  Psych: Appropriate affect, alert  Neuro nodes heads, mouths answers to questions    Data Review:  Notable Labs:  Results from last 7 days   Lab Units 02/12/21  0820 02/11/21  0316 02/10/21  0644 02/09/21  0719 02/07/21  0855 02/06/21  0725   WBC 10*3/mm3 5.26 5.15 4.65 6.39 6.32 7.45   HEMOGLOBIN g/dL " 7.0* 7.6* 7.3* 7.9* 8.6* 8.8*   PLATELETS 10*3/mm3 249 260 262 296 299 282     Results from last 7 days   Lab Units 02/12/21  0820 02/11/21  0316 02/10/21  0644 02/09/21  0719 02/08/21  0926 02/07/21  0855 02/06/21  1655   SODIUM mmol/L 133* 135* 138 139 143 145 142   POTASSIUM mmol/L 5.1 4.4 4.1 4.2 4.3 3.4* 3.3*   CHLORIDE mmol/L 95* 98 99 103 108* 110* 109*   CO2 mmol/L 32.1* 34.0* 30.5* 28.5 26.9 23.4 22.9   BUN mg/dL 27* 22* 20 20 19 16 10   CREATININE mg/dL 0.60* 0.54* 0.51* 0.58* 0.73* 0.73* 0.45*   GLUCOSE mg/dL 106* 88 116* 119* 105* 99 126*   CALCIUM mg/dL 9.0 9.4 9.3 9.4 9.2 8.9 8.7   MAGNESIUM mg/dL 2.1  --   --   --   --   --   --    PHOSPHORUS mg/dL  --   --   --  3.8 3.7 4.2 3.7   Estimated Creatinine Clearance: 170.2 mL/min (A) (by C-G formula based on SCr of 0.6 mg/dL (L)).    Results from last 7 days   Lab Units 02/12/21  0820 02/11/21  0316 02/10/21  0644 02/09/21  0719 02/08/21  0926   AST (SGOT) U/L 8  --   --  23  --    ALT (SGPT) U/L 13  --   --  25  --    PROCALCITONIN ng/mL  --   --   --   --  0.07   PLATELETS 10*3/mm3 249 260 262 296  --        Results from last 7 days   Lab Units 02/07/21  1026   PH, ARTERIAL pH units 7.387   PCO2, ARTERIAL mm Hg 41.8   PO2 ART mm Hg 93.7   HCO3 ART mmol/L 25.1       Imaging:  Reviewed chest images personally from past 3 days    Scheduled meds:    ascorbic acid, 1,000 mg, Oral, Daily  aspirin, 81 mg, Nasogastric, Daily  cefepime, 2 g, Intravenous, Q8H  cyclobenzaprine, 10 mg, Oral, Daily  Eucerin original healing lotion, , Topical, Daily  fentaNYL, 1 patch, Transdermal, Q72H  gabapentin, 800 mg, Oral, Q8H  hydrOXYzine, 25 mg, Oral, Daily  lansoprazole, 30 mg, Nasogastric, QAM  propranolol, 20 mg, Per G Tube, TID  sennosides-docusate, 2 tablet, Oral, Nightly  sertraline, 100 mg, Oral, Daily  Vancomycin Pharmacy Intermittent Dosing, , Does not apply, Daily        ASSESSMENT  /  PLAN:  1. Tracheitis and pneumonia with mucous plugging status post bronchoscopy  on 2/4/2021: Growing both Pseudomonas and MRSA  2. Exchange to a cuffed tracheostomy and now on the ventilator as of 2/4/2021  3. Bilateral pleural effusion left more than right  4. Acute hypoxemic respiratory failure  5. Sepsis  6. Quadriplegia  7. Anemia  8. Essential hypertension  9. Gluteal muscle bleed  10. fever    Pleural fluid is exudative but doesn't appear ot be zoila empyema, suspect parapneumonic, will follow cultures  Pt needs to participate and allow more airway clearance-seems to make him progress in this regard today   yesterday reduced sedating medications to allow this.  May need to taper further  Hg drop still - transfuse one unit prbc .  Resumption of plavix per primary when they feel appropriate  Having pain in same status thoracentesis Tetris morning without a pneumothorax however he has persistence of this will order repeat chest x-ray  Reviewed prior CT scan not impressed with terrible mucus plugging as much as report would suggest.  Continue good airway clearance in suctioning through trach      Claudio Sam MD  [facility]  02/12/21  1401

## 2021-02-13 LAB
ABO GROUP BLD: NORMAL
ANION GAP SERPL CALCULATED.3IONS-SCNC: 9.4 MMOL/L (ref 5–15)
BACTERIA SPEC AEROBE CULT: NORMAL
BACTERIA SPEC AEROBE CULT: NORMAL
BASOPHILS # BLD AUTO: 0.01 10*3/MM3 (ref 0–0.2)
BASOPHILS NFR BLD AUTO: 0.1 % (ref 0–1.5)
BLD GP AB SCN SERPL QL: NEGATIVE
BUN SERPL-MCNC: 29 MG/DL (ref 6–20)
BUN/CREAT SERPL: 42.6 (ref 7–25)
CALCIUM SPEC-SCNC: 9.7 MG/DL (ref 8.6–10.5)
CHLORIDE SERPL-SCNC: 98 MMOL/L (ref 98–107)
CO2 SERPL-SCNC: 27.6 MMOL/L (ref 22–29)
CREAT SERPL-MCNC: 0.68 MG/DL (ref 0.76–1.27)
DEPRECATED RDW RBC AUTO: 54.7 FL (ref 37–54)
EOSINOPHIL # BLD AUTO: 0.16 10*3/MM3 (ref 0–0.4)
EOSINOPHIL NFR BLD AUTO: 2.4 % (ref 0.3–6.2)
ERYTHROCYTE [DISTWIDTH] IN BLOOD BY AUTOMATED COUNT: 18 % (ref 12.3–15.4)
GFR SERPL CREATININE-BSD FRML MDRD: 131 ML/MIN/1.73
GLUCOSE SERPL-MCNC: 104 MG/DL (ref 65–99)
HCT VFR BLD AUTO: 21.9 % (ref 37.5–51)
HGB BLD-MCNC: 7 G/DL (ref 13–17.7)
IMM GRANULOCYTES # BLD AUTO: 0.02 10*3/MM3 (ref 0–0.05)
IMM GRANULOCYTES NFR BLD AUTO: 0.3 % (ref 0–0.5)
LYMPHOCYTES # BLD AUTO: 0.98 10*3/MM3 (ref 0.7–3.1)
LYMPHOCYTES NFR BLD AUTO: 14.6 % (ref 19.6–45.3)
MCH RBC QN AUTO: 26.3 PG (ref 26.6–33)
MCHC RBC AUTO-ENTMCNC: 32 G/DL (ref 31.5–35.7)
MCV RBC AUTO: 82.3 FL (ref 79–97)
MONOCYTES # BLD AUTO: 0.68 10*3/MM3 (ref 0.1–0.9)
MONOCYTES NFR BLD AUTO: 10.1 % (ref 5–12)
NEUTROPHILS NFR BLD AUTO: 4.88 10*3/MM3 (ref 1.7–7)
NEUTROPHILS NFR BLD AUTO: 72.5 % (ref 42.7–76)
NRBC BLD AUTO-RTO: 0 /100 WBC (ref 0–0.2)
PLATELET # BLD AUTO: 262 10*3/MM3 (ref 140–450)
PMV BLD AUTO: 10.3 FL (ref 6–12)
POTASSIUM SERPL-SCNC: 5.3 MMOL/L (ref 3.5–5.2)
POTASSIUM SERPL-SCNC: 5.4 MMOL/L (ref 3.5–5.2)
POTASSIUM SERPL-SCNC: 5.7 MMOL/L (ref 3.5–5.2)
RBC # BLD AUTO: 2.66 10*6/MM3 (ref 4.14–5.8)
RH BLD: POSITIVE
SODIUM SERPL-SCNC: 135 MMOL/L (ref 136–145)
T&S EXPIRATION DATE: NORMAL
VANCOMYCIN SERPL-MCNC: 19 MCG/ML (ref 5–40)
WBC # BLD AUTO: 6.73 10*3/MM3 (ref 3.4–10.8)

## 2021-02-13 PROCEDURE — 86900 BLOOD TYPING SEROLOGIC ABO: CPT | Performed by: INTERNAL MEDICINE

## 2021-02-13 PROCEDURE — 36430 TRANSFUSION BLD/BLD COMPNT: CPT

## 2021-02-13 PROCEDURE — 63710000001 INSULIN REGULAR HUMAN PER 5 UNITS: Performed by: INTERNAL MEDICINE

## 2021-02-13 PROCEDURE — 25010000002 CALCIUM GLUCONATE-NACL 1-0.675 GM/50ML-% SOLUTION: Performed by: INTERNAL MEDICINE

## 2021-02-13 PROCEDURE — 25010000002 VANCOMYCIN 750 MG RECONSTITUTED SOLUTION: Performed by: HOSPITALIST

## 2021-02-13 PROCEDURE — 80202 ASSAY OF VANCOMYCIN: CPT | Performed by: INTERNAL MEDICINE

## 2021-02-13 PROCEDURE — 94799 UNLISTED PULMONARY SVC/PX: CPT

## 2021-02-13 PROCEDURE — P9016 RBC LEUKOCYTES REDUCED: HCPCS

## 2021-02-13 PROCEDURE — 86923 COMPATIBILITY TEST ELECTRIC: CPT

## 2021-02-13 PROCEDURE — 25010000002 HYDROMORPHONE 1 MG/ML SOLUTION: Performed by: INTERNAL MEDICINE

## 2021-02-13 PROCEDURE — 85025 COMPLETE CBC W/AUTO DIFF WBC: CPT | Performed by: INTERNAL MEDICINE

## 2021-02-13 PROCEDURE — 86901 BLOOD TYPING SEROLOGIC RH(D): CPT | Performed by: INTERNAL MEDICINE

## 2021-02-13 PROCEDURE — 84132 ASSAY OF SERUM POTASSIUM: CPT | Performed by: INTERNAL MEDICINE

## 2021-02-13 PROCEDURE — 97110 THERAPEUTIC EXERCISES: CPT

## 2021-02-13 PROCEDURE — 25010000002 CEFEPIME PER 500 MG: Performed by: INTERNAL MEDICINE

## 2021-02-13 PROCEDURE — 86850 RBC ANTIBODY SCREEN: CPT | Performed by: INTERNAL MEDICINE

## 2021-02-13 PROCEDURE — 86900 BLOOD TYPING SEROLOGIC ABO: CPT

## 2021-02-13 PROCEDURE — 80048 BASIC METABOLIC PNL TOTAL CA: CPT | Performed by: INTERNAL MEDICINE

## 2021-02-13 RX ORDER — IPRATROPIUM BROMIDE AND ALBUTEROL SULFATE 2.5; .5 MG/3ML; MG/3ML
3 SOLUTION RESPIRATORY (INHALATION)
Status: DISCONTINUED | OUTPATIENT
Start: 2021-02-13 | End: 2021-02-23 | Stop reason: HOSPADM

## 2021-02-13 RX ORDER — CALCIUM GLUCONATE 20 MG/ML
1 INJECTION, SOLUTION INTRAVENOUS ONCE
Status: COMPLETED | OUTPATIENT
Start: 2021-02-13 | End: 2021-02-13

## 2021-02-13 RX ORDER — FLUCONAZOLE 100 MG/1
100 TABLET ORAL EVERY 24 HOURS
Status: DISCONTINUED | OUTPATIENT
Start: 2021-02-13 | End: 2021-02-22

## 2021-02-13 RX ORDER — SODIUM CHLORIDE FOR INHALATION 7 %
4 VIAL, NEBULIZER (ML) INHALATION
Status: DISCONTINUED | OUTPATIENT
Start: 2021-02-13 | End: 2021-02-15

## 2021-02-13 RX ORDER — DEXTROSE MONOHYDRATE 25 G/50ML
50 INJECTION, SOLUTION INTRAVENOUS ONCE
Status: COMPLETED | OUTPATIENT
Start: 2021-02-13 | End: 2021-02-13

## 2021-02-13 RX ADMIN — GABAPENTIN 800 MG: 400 CAPSULE ORAL at 21:35

## 2021-02-13 RX ADMIN — CALCIUM GLUCONATE 1 G: 20 INJECTION, SOLUTION INTRAVENOUS at 10:32

## 2021-02-13 RX ADMIN — HYDROMORPHONE HYDROCHLORIDE 0.75 MG: 10 INJECTION INTRAMUSCULAR; INTRAVENOUS; SUBCUTANEOUS at 07:52

## 2021-02-13 RX ADMIN — SODIUM CHLORIDE 750 MG: 900 INJECTION, SOLUTION INTRAVENOUS at 21:36

## 2021-02-13 RX ADMIN — GABAPENTIN 800 MG: 400 CAPSULE ORAL at 13:52

## 2021-02-13 RX ADMIN — CEFEPIME 2 G: 2 INJECTION, POWDER, FOR SOLUTION INTRAVENOUS at 21:36

## 2021-02-13 RX ADMIN — HYDROMORPHONE HYDROCHLORIDE 0.75 MG: 10 INJECTION INTRAMUSCULAR; INTRAVENOUS; SUBCUTANEOUS at 03:45

## 2021-02-13 RX ADMIN — ACETAMINOPHEN 650 MG: 325 TABLET, FILM COATED ORAL at 06:26

## 2021-02-13 RX ADMIN — HYDROXYZINE HYDROCHLORIDE 25 MG: 25 TABLET ORAL at 09:12

## 2021-02-13 RX ADMIN — OXYCODONE HYDROCHLORIDE 5 MG: 5 SOLUTION ORAL at 06:25

## 2021-02-13 RX ADMIN — HYDROMORPHONE HYDROCHLORIDE 0.75 MG: 10 INJECTION INTRAMUSCULAR; INTRAVENOUS; SUBCUTANEOUS at 16:21

## 2021-02-13 RX ADMIN — OXYCODONE HYDROCHLORIDE 5 MG: 5 SOLUTION ORAL at 02:35

## 2021-02-13 RX ADMIN — PROPRANOLOL HYDROCHLORIDE 20 MG: 20 TABLET ORAL at 16:32

## 2021-02-13 RX ADMIN — CYCLOBENZAPRINE 10 MG: 10 TABLET, FILM COATED ORAL at 09:14

## 2021-02-13 RX ADMIN — INSULIN HUMAN 10 UNITS: 100 INJECTION, SOLUTION PARENTERAL at 10:32

## 2021-02-13 RX ADMIN — OXYCODONE HYDROCHLORIDE 5 MG: 5 SOLUTION ORAL at 18:33

## 2021-02-13 RX ADMIN — FLUCONAZOLE 100 MG: 100 TABLET ORAL at 16:32

## 2021-02-13 RX ADMIN — OXYCODONE HYDROCHLORIDE AND ACETAMINOPHEN 1000 MG: 500 TABLET ORAL at 09:15

## 2021-02-13 RX ADMIN — SODIUM CHLORIDE 750 MG: 900 INJECTION, SOLUTION INTRAVENOUS at 02:34

## 2021-02-13 RX ADMIN — HYDROMORPHONE HYDROCHLORIDE 0.75 MG: 10 INJECTION INTRAMUSCULAR; INTRAVENOUS; SUBCUTANEOUS at 21:36

## 2021-02-13 RX ADMIN — CEFEPIME 2 G: 2 INJECTION, POWDER, FOR SOLUTION INTRAVENOUS at 13:51

## 2021-02-13 RX ADMIN — LANSOPRAZOLE 30 MG: KIT at 06:37

## 2021-02-13 RX ADMIN — DOCUSATE SODIUM 50MG AND SENNOSIDES 8.6MG 2 TABLET: 8.6; 5 TABLET, FILM COATED ORAL at 21:35

## 2021-02-13 RX ADMIN — SERTRALINE 100 MG: 100 TABLET, FILM COATED ORAL at 09:15

## 2021-02-13 RX ADMIN — Medication: at 10:48

## 2021-02-13 RX ADMIN — ALPRAZOLAM 0.5 MG: 0.5 TABLET ORAL at 06:37

## 2021-02-13 RX ADMIN — HYDROMORPHONE HYDROCHLORIDE 0.75 MG: 10 INJECTION INTRAMUSCULAR; INTRAVENOUS; SUBCUTANEOUS at 12:10

## 2021-02-13 RX ADMIN — PROPRANOLOL HYDROCHLORIDE 20 MG: 20 TABLET ORAL at 09:13

## 2021-02-13 RX ADMIN — ALPRAZOLAM 0.5 MG: 0.5 TABLET ORAL at 18:33

## 2021-02-13 RX ADMIN — PROPRANOLOL HYDROCHLORIDE 20 MG: 20 TABLET ORAL at 21:34

## 2021-02-13 RX ADMIN — IPRATROPIUM BROMIDE AND ALBUTEROL SULFATE 3 ML: 2.5; .5 SOLUTION RESPIRATORY (INHALATION) at 15:29

## 2021-02-13 RX ADMIN — GABAPENTIN 800 MG: 400 CAPSULE ORAL at 06:33

## 2021-02-13 RX ADMIN — CEFEPIME 2 G: 2 INJECTION, POWDER, FOR SOLUTION INTRAVENOUS at 07:35

## 2021-02-13 RX ADMIN — ACETAMINOPHEN 650 MG: 325 TABLET, FILM COATED ORAL at 21:35

## 2021-02-13 RX ADMIN — OXYCODONE HYDROCHLORIDE 5 MG: 5 SOLUTION ORAL at 14:34

## 2021-02-13 RX ADMIN — ASPIRIN 81 MG: 81 TABLET, CHEWABLE ORAL at 09:14

## 2021-02-13 RX ADMIN — DEXTROSE MONOHYDRATE 50 ML: 500 INJECTION PARENTERAL at 10:31

## 2021-02-13 NOTE — PROGRESS NOTES
Vancomycin Pharmacokinetic Note    Maxim Carvajal is a 35 y.o. male who is on day 12 pharmacy to dose vancomycin for HAP.  Target level: AUC24 400-600  Consulting Provider: Dr. Vasquez  Current Vancomycin Dose: intermittent     Allergies  Allergies as of 02/02/2021   • (No Known Allergies)       Microbiology  Microbiology Results (last 10 days)     Procedure Component Value - Date/Time    AFB Culture - Body Fluid, Pleural Cavity [[id number]] Collected: 02/11/21 0955    Lab Status: Preliminary result Specimen: Body Fluid from Pleural Cavity Updated: 02/12/21 1509     AFB Stain No acid fast bacilli seen on direct smear    Body Fluid Culture - Body Fluid, Pleural Cavity [[id number]] Collected: 02/11/21 0955    Lab Status: Preliminary result Specimen: Body Fluid from Pleural Cavity Updated: 02/13/21 1033     Body Fluid Culture No growth at 2 days     Gram Stain No WBCs or organisms seen    Respiratory Panel PCR w/COVID-19(SARS-CoV-2) [facility] In-House, NP Swab in UTM/VTM, 3-4 HR TAT - Swab, Nasopharynx [[id number]]  (Normal) Collected: 02/08/21 1720    Lab Status: Final result Specimen: Swab from Nasopharynx Updated: 02/08/21 1833     ADENOVIRUS, PCR Not Detected     Coronavirus 229E Not Detected     Coronavirus HKU1 Not Detected     Coronavirus NL63 Not Detected     Coronavirus OC43 Not Detected     COVID19 Not Detected     Human Metapneumovirus Not Detected     Human Rhinovirus/Enterovirus Not Detected     Influenza A PCR Not Detected     Influenza B PCR Not Detected     Parainfluenza Virus 1 Not Detected     Parainfluenza Virus 2 Not Detected     Parainfluenza Virus 3 Not Detected     Parainfluenza Virus 4 Not Detected     RSV, PCR Not Detected     Bordetella pertussis pcr Not Detected     Bordetella parapertussis PCR Not Detected     Chlamydophila pneumoniae PCR Not Detected     Mycoplasma pneumo by PCR Not Detected    Narrative:      Fact sheet for providers:  "https://docs.[employer]/wp-content/uploads/XZX7462-0625-OA4.1-EUA-Provider-Fact-Sheet-3.pdf    Fact sheet for patients: https://docs.[employer]/wp-content/uploads/OJX2863-1268-BV7.1-EUA-Patient-Fact-Sheet-1.pdf    Test performed by PCR.    Blood Culture - Blood, Hand, Right [[id number]] Collected: 02/08/21 1716    Lab Status: Preliminary result Specimen: Blood from Hand, Right Updated: 02/12/21 1746     Blood Culture No growth at 4 days    Blood Culture - Blood, Hand, Left [[id number]] Collected: 02/08/21 1716    Lab Status: Preliminary result Specimen: Blood from Hand, Left Updated: 02/12/21 1746     Blood Culture No growth at 4 days    Respiratory Culture - Lavage, Lung, Right Middle Lobe [[id number]]  (Abnormal) Collected: 02/04/21 1837    Lab Status: Final result Specimen: Lavage from Lung, Right Middle Lobe Updated: 02/06/21 1307     Respiratory Culture Scant growth (1+) Staphylococcus aureus, MRSA     Comment: Methicillin resistant Staphylococcus aureus, Patient may be an isolation risk.        Gram Stain No WBCs or organisms seen      No epithelial cells seen    Narrative:      Refer to Respiratory Culture from 2/2/21 for MICS        Vitals/Labs/I&O  182.9 cm (72\")  71.4 kg (157 lb 6.5 oz)  Body mass index is 21.35 kg/m².   Temp:  [97.8 °F (36.6 °C)-98.9 °F (37.2 °C)] 98 °F (36.7 °C)  Heart Rate:  [79-94] 82  Resp:  [16-24] 24  BP: ()/(0-62) 114/61    Results from last 7 days   Lab Units 02/13/21  0514 02/12/21  0820 02/11/21  0316   WBC 10*3/mm3 6.73 5.26 5.15          Results from last 7 days   Lab Units 02/08/21  0926   PROCALCITONIN ng/mL 0.07                  Estimated Creatinine Clearance: 150.2 mL/min (A) (by C-G formula based on SCr of 0.68 mg/dL (L)).  Results from last 7 days   Lab Units 02/13/21  0514 02/12/21  0820 02/11/21  0316   BUN mg/dL 29* 27* 22*   CREATININE mg/dL 0.68* 0.60* 0.54*     Intake & Output (last 3 days)       02/10 0701 - 02/11 0700 02/11 0701 - 02/12 0700 02/12 0701 " - 02/13 0700 02/13 0701 - 02/14 0700    P.O. 0 0      I.V. (mL/kg)        Other 282 200 130     NG/ 581      IV Piggyback 200 400      Total Intake(mL/kg) 1261 (17.7) 1181 (16.5) 130 (1.8)     Urine (mL/kg/hr) 1900 (1.1) 1750 (1) 2850 (1.7) 600 (0.8)    Other  400      Stool 0 0      Total Output 1900 2150 2850 600    Net -639 -969 -2720 -600            Urine Unmeasured Occurrence 1 x 1 x      Stool Unmeasured Occurrence 2 x 1 x          Vancomycin Dosing & Level History  1500 mg (~20 mg/kg) IV x1 dose               02/02 1408  1250 mg (17 mg/kg) IV q12h               02/03 0248, 1531              02/04 0332                          Trough 02/04 1459: 4.8 mg/L   1000 mg (~14 mg/kg) IV q8h (tommie 0300, 1100, 1900)              02/04 1924              02/05 0346                          Trough 02/05 1024: 10.1 mg/L               02/05 1215  1250 mg (17 mg/kg) IV q8h               02/05 2005 02/06 0439                          Trough 02/06 1140: 18.4 mg/L               02/06 1252, 2000 02/07 0345, 1219                          Trough 02/07 1922: 35.1 mg/L               02/07 2003                          Random 02/08 0006: 39.3 mg/L                           Random 02/08 1716: 20.3 mg/L   1250 mg (17 mg/kg) IV x1 dose              02/08 2325                          Random 02/09 1157: 18.3 mg/L  1250 mg (17 mg/kg) IV x1 dose   02/09 1333    Random 02/10 0644: 16.7 mg/L   1250 mg (17 mg/kg) IV x1 dose   02/10 1215    Random 02/11 0316: 16.5 mg/L   1000 mg (~14 mg/kg) IV q12h   02/11 1116   02/11 2127    Trough 02/12 0820: 24.1 mg/L     Random 02/12 2155: 14.8 mg/L   750 mg (~11 mg/kg) IV x1 dose   02/13 0234    Random 02/13 1158: 19 mg/L     Results from last 7 days   Lab Units 02/13/21  1158 02/12/21  2155 02/11/21  0316 02/10/21  0644 02/09/21  1157 02/08/21  1716 02/08/21  0006   VANCOMYCIN RM mcg/mL 19.00 14.80 16.50 16.70 18.30 20.30 39.30         Results from last 7 days   Lab Units  02/12/21  0820 02/07/21  1922   VANCOMYCIN TR mcg/mL 24.10* 35.10*       Assessment/Plan  1) Continue vancomycin intermittent dosing. Ordered 750 mg (~11 mg/kg) IV x1 dose now.     2) Random level ordered with AM labs. Plan to re-dose when level is anticipated to be < 20 mg/L.    Thank you for involving pharmacy in this patient's care. Please contact pharmacy with any questions or concerns.           Damir Wolfe, Vivian, EUGENIO, BCPS  02/13/21 17:23 EST

## 2021-02-13 NOTE — PROGRESS NOTES
[facility]     Mar/chart reviewed  Follow up Acute respiratory failure, pneumonia, mucous plugging  Pleural effusion  Less chest discomfort  Still with cough, throat discomfort    Vital Sign Min/Max for last 24 hours  Temp  Min: 97.8 °F (36.6 °C)  Max: 98.9 °F (37.2 °C)   BP  Min: 100/55  Max: 155/0   Pulse  Min: 79  Max: 94   Resp  Min: 16  Max: 18   SpO2  Min: 93 %  Max: 100 %   Flow (L/min)  Min: 8  Max: 10   No data recorded   130/2850  Appears ill, alert, appropriate  perrl, eomi, normal sclear, +trush  mmm, no jvd, trachea midline, neck supple,  chest fair ae coarse bilaterally, no crackles, no wheezes,   rrr,   soft, nt, nd +bs,  no c/c/ e  Skin warm, dry no rashes    Labs: 2/13: reviewed:  Glucose 104  Bun 29  Cr 0.68  Na 135  k 5.3  Wbc 6.7  hgb 7  plts 262    ASSESSMENT  /  PLAN:  1. Tracheitis and pneumonia with mucous plugging status post bronchoscopy on 2/4/2021: Growing both Pseudomonas and MRSA  2. Exchange to a cuffed tracheostomy  as of 2/4/2021  3. Bilateral pleural effusion left more than right  4. Acute hypoxemic respiratory failure  5. Sepsis  6. Quadriplegia  7. Anemia  8. Essential hypertension  9. Gluteal muscle bleed  10. fever    Increase pulmonary toilet  Treat for thrush

## 2021-02-13 NOTE — PLAN OF CARE
"Goal Outcome Evaluation:  Plan of Care Reviewed With: patient  Progress: no change  Outcome Summary: Patient agreeable to PT this date. Patient completed long sitting x3 with maxAx2 using biceps. Patient O2 decreased to 80- 85% with first long sittign trial and patient only able to tolerate long sitting 10seconds. Pt stated in long sitting \"I need to be suctioned.\" Nursing notified and entered room to perform suctioning. PT completed PROM BLE. Will continue to progress as tolerated and appropriate.    Patient was not wearing a face mask during this therapy encounter. Therapist used appropriate personal protective equipment including gown, eye protection, mask and gloves.  Mask used was standard procedure mask. Appropriate PPE was worn during the entire therapy session. Hand hygiene was completed before and after therapy session. Patient is not in enhanced droplet precautions.  Glenis present.   "

## 2021-02-13 NOTE — THERAPY TREATMENT NOTE
Patient Name: Maxim Carvajal  : 1985    MRN: [id number]                              Today's Date: 2021       Admit Date: 2021    Visit Dx:     ICD-10-CM ICD-9-CM   1. Tracheitis  J04.10 464.10   2. Community acquired pneumonia, unspecified laterality  J18.9 486   3. Anemia, unspecified type  D64.9 285.9     Patient Active Problem List   Diagnosis   • Tracheitis   • Acute on chronic respiratory failure with hypoxemia (CMS/HCC)   • Anemia   • Leukocytosis   • Quadriplegia (CMS/HCC)   • Essential hypertension   • HCAP (healthcare-associated pneumonia)   • Sepsis, unspecified organism (CMS/[facility])     Past Medical History:   Diagnosis Date   • Hypertension      History reviewed. No pertinent surgical history.  General Information     Row Name 21 1516          Physical Therapy Time and Intention    Document Type  therapy note (daily note)  -CB     Mode of Treatment  individual therapy;physical therapy  -CB     Row Name 21 1516          General Information    Patient Profile Reviewed  yes  -CB     Existing Precautions/Restrictions  fall;oxygen therapy device and L/min  -CB     Row Name 21 1516          Safety Issues, Functional Mobility    Impairments Affecting Function (Mobility)  balance;coordination;endurance/activity tolerance;grasp;motor control;muscle tone abnormal;pain;postural/trunk control;range of motion (ROM);strength;shortness of breath  -CB       User Key  (r) = Recorded By, (t) = Taken By, (c) = Cosigned By    Initials Name Provider Type    CB Edna Jean Physical Therapist        Mobility     Row Name 21 1517          Bed Mobility    Comment (Bed Mobility)  not performed this date due to nursing just in room rolling to perform linen changes  -CB       User Key  (r) = Recorded By, (t) = Taken By, (c) = Cosigned By    Initials Name Provider Type    Edna Darby Physical Therapist        Obj/Interventions     Row Name 21 1517          Motor Skills     "Therapeutic Exercise  -- PT performed PROM DF and knee flexion 5min.  -CB     Row Name 02/13/21 1517          Balance    Balance Assessment  sitting static balance  -CB     Static Sitting Balance  severe impairment;moderate impairment;long sitting Pt completed long sitting x3 using biceps and maxAx2. During first long sitting trial the patient stated \"I need to be suctioned.\"  -CB       User Key  (r) = Recorded By, (t) = Taken By, (c) = Cosigned By    Initials Name Provider Type    Edna Darby Physical Therapist        Goals/Plan    No documentation.       Clinical Impression     Row Name 02/13/21 1520          Pain    Additional Documentation  Pain Scale: FACES Pre/Post-Treatment (Group)  -CB     Row Name 02/13/21 1520          Pain Scale: FACES Pre/Post-Treatment    Pain: FACES Scale, Pretreatment  4-->hurts little more  -CB     Posttreatment Pain Rating  4-->hurts little more  -CB     Pre/Posttreatment Pain Comment  neck  -CB     Row Name 02/13/21 1520          Plan of Care Review    Plan of Care Reviewed With  patient  -CB     Progress  no change  -CB     Outcome Summary  Patient agreeable to PT this date. Patient completed long sitting x3 with maxAx2 using biceps. Patient O2 decreased to 80- 85% with first long sittign trial and patient only able to tolerate long sitting 10seconds. Pt stated in long sitting \"I need to be suctioned.\" Nursing notified and entered room to perform suctioning. PT completed PROM BLE. Will continue to progress as tolerated and appropriate.  -CB     Row Name 02/13/21 1520          Positioning and Restraints    Pre-Treatment Position  in bed  -CB     Post Treatment Position  bed  -CB     In Bed  notified nsg;call light within reach;encouraged to call for assist;exit alarm on;fowlers;with family/caregiver father present  -CB       User Key  (r) = Recorded By, (t) = Taken By, (c) = Cosigned By    Initials Name Provider Type    Edna Darby Physical Therapist        Outcome " Measures     Row Name 02/13/21 1524          How much help from another person do you currently need...    Turning from your back to your side while in flat bed without using bedrails?  1  -CB     Moving from lying on back to sitting on the side of a flat bed without bedrails?  1  -CB     Moving to and from a bed to a chair (including a wheelchair)?  1  -CB     Standing up from a chair using your arms (e.g., wheelchair, bedside chair)?  1  -CB     Climbing 3-5 steps with a railing?  1  -CB     To walk in hospital room?  1  -CB     AM-PAC 6 Clicks Score (PT)  6  -CB     Row Name 02/13/21 1524          Functional Assessment    Outcome Measure Options  AM-PAC 6 Clicks Basic Mobility (PT)  -CB       User Key  (r) = Recorded By, (t) = Taken By, (c) = Cosigned By    Initials Name Provider Type    CB Edna Jean Physical Therapist        Physical Therapy Education                 Title: PT OT SLP Therapies (In Progress)     Topic: Physical Therapy (In Progress)     Point: Mobility training (Done)     Learning Progress Summary           Patient Acceptance, E, VU,NR by CB at 2/13/2021 1525    Acceptance, E,TB,D, VU,NR by CB at 2/11/2021 1549                   Point: Home exercise program (In Progress)     Learning Progress Summary           Patient Acceptance, E,D, NR by PC at 2/9/2021 1639   Family Acceptance, E,D, NR by PC at 2/9/2021 1639                   Point: Body mechanics (Done)     Learning Progress Summary           Patient Acceptance, E, VU,NR by CB at 2/13/2021 1525    Acceptance, E,TB,D, VU,NR by CB at 2/11/2021 1549                   Point: Precautions (Done)     Learning Progress Summary           Patient Acceptance, E, VU,NR by CB at 2/13/2021 1525    Acceptance, E,TB,D, VU,NR by CB at 2/11/2021 1549                               User Key     Initials Effective Dates Name Provider Type Discipline    PC 04/03/18 -  Randi Christensen, PT Physical Therapist PT    CB 12/30/20 -  Edna Jean Physical  "Therapist PT              PT Recommendation and Plan  Planned Therapy Interventions (PT): bed mobility training, strengthening, patient/family education  Plan of Care Reviewed With: patient  Progress: no change  Outcome Summary: Patient agreeable to PT this date. Patient completed long sitting x3 with maxAx2 using biceps. Patient O2 decreased to 80- 85% with first long sittign trial and patient only able to tolerate long sitting 10seconds. Pt stated in long sitting \"I need to be suctioned.\" Nursing notified and entered room to perform suctioning. PT completed PROM BLE. Will continue to progress as tolerated and appropriate.     Time Calculation:   PT Charges     Row Name 02/13/21 1525             Time Calculation    Start Time  1420  -CB      Stop Time  1446  -CB      Time Calculation (min)  26 min  -CB      PT Received On  02/13/21  -CB      PT - Next Appointment  02/16/21  -CB         Time Calculation- PT    Total Timed Code Minutes- PT  23 minute(s)  -CB        User Key  (r) = Recorded By, (t) = Taken By, (c) = Cosigned By    Initials Name Provider Type    CB Edna Jean Physical Therapist        Therapy Charges for Today     Code Description Service Date Service Provider Modifiers Qty    87643358943 HC PT THER PROC EA 15 MIN 2/13/2021 Edna Jean GP 2    76760399565 HC PT THER SUPP EA 15 MIN 2/13/2021 Edna Jean GP 1          PT G-Codes  Outcome Measure Options: AM-PAC 6 Clicks Basic Mobility (PT)  AM-PAC 6 Clicks Score (PT): 6  AM-PAC 6 Clicks Score (OT): 8    Edna Jean  2/13/2021    "

## 2021-02-13 NOTE — PROGRESS NOTES
[facility]  Clinical Pharmacy Department     Vancomycin Pharmacokinetic Note    Maxim Carvajal is a 35 y.o. male who is on day 10 of pharmacy to dose vancomycin for HAP.    Target level: AUC24 400-600  Consulting Provider:  Dr. Joshua Lawrence (ID following)  Current Vancomycin Dose: Intermittent dosing  Other Antimicrobials: Cefepime 2gm q8h    Allergies  Allergies as of 02/02/2021    (No Known Allergies)       Microbiology:  Microbiology Results (last 10 days)       Procedure Component Value - Date/Time    AFB Culture - Body Fluid, Pleural Cavity [[id number]] Collected: 02/11/21 0955    Lab Status: Preliminary result Specimen: Body Fluid from Pleural Cavity Updated: 02/12/21 1509     AFB Stain No acid fast bacilli seen on direct smear    Body Fluid Culture - Body Fluid, Pleural Cavity [[id number]] Collected: 02/11/21 0955    Lab Status: Preliminary result Specimen: Body Fluid from Pleural Cavity Updated: 02/12/21 0940     Body Fluid Culture No growth     Gram Stain No WBCs or organisms seen    Respiratory Panel PCR w/COVID-19(SARS-CoV-2) BG/RAZIA/BIBI/PAD/COR/[facility] In-House, NP Swab in UTM/VTM, 3-4 HR TAT - Swab, Nasopharynx [[id number]]  (Normal) Collected: 02/08/21 1720    Lab Status: Final result Specimen: Swab from Nasopharynx Updated: 02/08/21 1833     ADENOVIRUS, PCR Not Detected     Coronavirus 229E Not Detected     Coronavirus HKU1 Not Detected     Coronavirus NL63 Not Detected     Coronavirus OC43 Not Detected     COVID19 Not Detected     Human Metapneumovirus Not Detected     Human Rhinovirus/Enterovirus Not Detected     Influenza A PCR Not Detected     Influenza B PCR Not Detected     Parainfluenza Virus 1 Not Detected     Parainfluenza Virus 2 Not Detected     Parainfluenza Virus 3 Not Detected     Parainfluenza Virus 4 Not Detected     RSV, PCR Not Detected     Bordetella pertussis pcr Not Detected     Bordetella parapertussis PCR Not Detected     Chlamydophila pneumoniae PCR Not Detected     Mycoplasma  "pneumo by PCR Not Detected    Narrative:      Fact sheet for providers: https://docs.[employer]/wp-content/uploads/UHY3554-4966-VM9.1-EUA-Provider-Fact-Sheet-3.pdf    Fact sheet for patients: https://docs.[employer]/wp-content/uploads/IMF0233-1256-MR9.1-EUA-Patient-Fact-Sheet-1.pdf    Test performed by PCR.    Blood Culture - Blood, Hand, Right [[id number]] Collected: 02/08/21 1716    Lab Status: Preliminary result Specimen: Blood from Hand, Right Updated: 02/12/21 1746     Blood Culture No growth at 4 days    Blood Culture - Blood, Hand, Left [[id number]] Collected: 02/08/21 1716    Lab Status: Preliminary result Specimen: Blood from Hand, Left Updated: 02/12/21 1746     Blood Culture No growth at 4 days    Respiratory Culture - Lavage, Lung, Right Middle Lobe [[id number]]  (Abnormal) Collected: 02/04/21 1837    Lab Status: Final result Specimen: Lavage from Lung, Right Middle Lobe Updated: 02/06/21 1307     Respiratory Culture Scant growth (1+) Staphylococcus aureus, MRSA     Comment: Methicillin resistant Staphylococcus aureus, Patient may be an isolation risk.        Gram Stain No WBCs or organisms seen      No epithelial cells seen    Narrative:      Refer to Respiratory Culture from 2/2/21 for MICS            Vitals/Labs/I&O  182.9 cm (72\")  71.4 kg (157 lb 6.5 oz)  Body mass index is 21.35 kg/m².   [unfilled]    Results from last 7 days   Lab Units 02/12/21  0820 02/11/21 0316 02/10/21  0644   WBC 10*3/mm3 5.26 5.15 4.65          Results from last 7 days   Lab Units 02/08/21  0926   PROCALCITONIN ng/mL 0.07                  Estimated Creatinine Clearance: 170.2 mL/min (A) (by C-G formula based on SCr of 0.6 mg/dL (L)).  Results from last 7 days   Lab Units 02/12/21  0820 02/11/21  0316 02/10/21  0644   BUN mg/dL 27* 22* 20   CREATININE mg/dL 0.60* 0.54* 0.51*     Intake & Output (last 3 days)         02/10 0701 - 02/11 0700 02/11 0701 - 02/12 0700 02/12 0701 - 02/13 0700    P.O. 0 0     Other 282 200 80 "    NG/ 581     IV Piggyback 200 400     Total Intake(mL/kg) 1261 (17.7) 1181 (16.5) 80 (1.1)    Urine (mL/kg/hr) 1900 (1.1) 1750 (1) 1550 (0.9)    Other  400     Stool 0 0     Total Output 1900 2150 1550    Net -807 -969 -1130           Urine Unmeasured Occurrence 1 x 1 x     Stool Unmeasured Occurrence 2 x 1 x             Vancomycin Dosing and Level History:  1500 mg (~20 mg/kg) IV x1 dose               02/02 1408  1250 mg (17 mg/kg) IV q12h               02/03 0248, 1531              02/04 0332                          Trough 02/04 1459: 4.8 mg/L   1000 mg (~14 mg/kg) IV q8h (tommie 0300, 1100, 1900)              02/04 1924              02/05 0346                          Trough 02/05 1024: 10.1 mg/L               02/05 1215  1250 mg (17 mg/kg) IV q8h               02/05 2005 02/06 0439                          Trough 02/06 1140: 18.4 mg/L               02/06 1252, 2000 02/07 0345, 1219                          Trough 02/07 1922: 35.1 mg/L               02/07 2003                          Random 02/08 0006: 39.3 mg/L                           Random 02/08 1716: 20.3 mg/L   1250 mg (17 mg/kg) IV x1 dose              02/08 2325                           Random 02/09 1157: 18.3 mg/L  1250 mg (17 mg/kg) IV x1 dose              02/09 1333                          Random 02/10 0644: 16.7 mg/L (~17 hrs)  1250 mg (17 mg/kg) IV x1 dose               2/10 1215                          Random 2/11 0316: 16.5 mg/L (~15 hrs)  1000 mg (15 mg/kg) IV q12h              2/11 1116, 2127                          Trough 2/12 = 24.1 mg/L (~11 hr level)                          Random 2/12 @ 2155: 14.8mcg/ml (~24hrs since last dose)        Results from last 7 days   Lab Units 02/12/21  2155 02/11/21  0316 02/10/21  0644 02/09/21  1157 02/08/21  1716 02/08/21  0006   VANCOMYCIN RM mcg/mL 14.80 16.50 16.70 18.30 20.30 39.30         Results from last 7 days   Lab Units 02/12/21  0820 02/07/21  1922  02/06/21  1140   VANCOMYCIN TR mcg/mL 24.10* 35.10* 18.40       Assessment/Plan:    Scr slightly up since trending down over the past three days. Renal function appears to be stable. Due to quadriplegia, Scr and CrCl estimates are limited and unreliable.    Random level drawn ~24hrs since last dose = 14.8mcg/ml. Will re-dose at this time with Vancomycin 750mg (10.5mg/kg) IV once. Plan to draw repeat level 12 hours after this dose.    Thank you for involving pharmacy in this patient's care. Please contact pharmacy with any questions or concerns.                           Rodrick Singh, [facility]  Clinical Pharmacist  02/13/21 00:32 EST

## 2021-02-13 NOTE — PROGRESS NOTES
"Pharmacokinetic Evaluation - Vancomycin    Maxim Carvajal is a 35 y.o. male on vancomycin pharmacy to dose.  MRN: [id number]  : 1985    Day of vancomycin therapy:   Indication: HAP  Target level: AUC24 400-600  Consulting Provider:  Dr. Vasquez  Current Vancomycin Dose:  intermittent  Other Antimicrobials: cefepime 2g iv q8     Blood pressure (!) 155/0, pulse 88, temperature 98.8 °F (37.1 °C), temperature source Oral, resp. rate 16, height 182.9 cm (72\"), weight 71.4 kg (157 lb 6.5 oz), SpO2 97 %.  Results from last 7 days   Lab Units 21  0521  0316   CREATININE mg/dL 0.68* 0.60* 0.54*     Estimated Creatinine Clearance: 150.2 mL/min (A) (by C-G formula based on SCr of 0.68 mg/dL (L)).  Results from last 7 days   Lab Units 21  0521  0821  0316   WBC 10*3/mm3 6.73 5.26 5.15   HEMOGLOBIN g/dL 7.0* 7.0* 7.6*   HEMATOCRIT % 21.9* 22.2* 23.1*   PLATELETS 10*3/mm3 262 249 260           Cultures:      Microbiology Results (last 10 days)     Procedure Component Value - Date/Time    AFB Culture - Body Fluid, Pleural Cavity [[id number]] Collected: 21 09    Lab Status: Preliminary result Specimen: Body Fluid from Pleural Cavity Updated: 21 1509     AFB Stain No acid fast bacilli seen on direct smear    Body Fluid Culture - Body Fluid, Pleural Cavity [[id number]] Collected: 21 0955    Lab Status: Preliminary result Specimen: Body Fluid from Pleural Cavity Updated: 21 0940     Body Fluid Culture No growth     Gram Stain No WBCs or organisms seen    Respiratory Panel PCR w/COVID-19(SARS-CoV-2) BG/RAZIA/BIBI/PAD/COR/[facility] In-House, NP Swab in UTM/VTM, 3-4 HR TAT - Swab, Nasopharynx [[id number]]  (Normal) Collected: 21 1720    Lab Status: Final result Specimen: Swab from Nasopharynx Updated: 21 1833     ADENOVIRUS, PCR Not Detected     Coronavirus 229E Not Detected     Coronavirus HKU1 Not Detected     Coronavirus NL63 Not Detected     " Coronavirus OC43 Not Detected     COVID19 Not Detected     Human Metapneumovirus Not Detected     Human Rhinovirus/Enterovirus Not Detected     Influenza A PCR Not Detected     Influenza B PCR Not Detected     Parainfluenza Virus 1 Not Detected     Parainfluenza Virus 2 Not Detected     Parainfluenza Virus 3 Not Detected     Parainfluenza Virus 4 Not Detected     RSV, PCR Not Detected     Bordetella pertussis pcr Not Detected     Bordetella parapertussis PCR Not Detected     Chlamydophila pneumoniae PCR Not Detected     Mycoplasma pneumo by PCR Not Detected    Narrative:      Fact sheet for providers: https://docs.[employer]./wp-content/uploads/LTO9911-6805-BC9.1-EUA-Provider-Fact-Sheet-3.pdf    Fact sheet for patients: https://docs.[employer]./wp-content/uploads/WVV8956-7644-DW4.1-EUA-Patient-Fact-Sheet-1.pdf    Test performed by PCR.    Blood Culture - Blood, Hand, Right [[id number]] Collected: 02/08/21 1716    Lab Status: Preliminary result Specimen: Blood from Hand, Right Updated: 02/12/21 1746     Blood Culture No growth at 4 days    Blood Culture - Blood, Hand, Left [[id number]] Collected: 02/08/21 1716    Lab Status: Preliminary result Specimen: Blood from Hand, Left Updated: 02/12/21 1746     Blood Culture No growth at 4 days    Respiratory Culture - Lavage, Lung, Right Middle Lobe [[id number]]  (Abnormal) Collected: 02/04/21 1837    Lab Status: Final result Specimen: Lavage from Lung, Right Middle Lobe Updated: 02/06/21 1307     Respiratory Culture Scant growth (1+) Staphylococcus aureus, MRSA     Comment: Methicillin resistant Staphylococcus aureus, Patient may be an isolation risk.        Gram Stain No WBCs or organisms seen      No epithelial cells seen    Narrative:      Refer to Respiratory Culture from 2/2/21 for MICS            Dosing hx (include troughs if drawn):  1500 mg (~20 mg/kg) IV x1 dose               02/02 1408  1250 mg (17 mg/kg) IV q12h               02/03 0248, 1531              02/04  0332                          Trough 02/04 1459: 4.8 mg/L   1000 mg (~14 mg/kg) IV q8h (tommie 0300, 1100, 1900)              02/04 1924              02/05 0346                          Trough 02/05 1024: 10.1 mg/L               02/05 1215  1250 mg (17 mg/kg) IV q8h               02/05 2005 02/06 0439                          Trough 02/06 1140: 18.4 mg/L               02/06 1252, 2000 02/07 0345, 1219                          Trough 02/07 1922: 35.1 mg/L               02/07 2003                          Random 02/08 0006: 39.3 mg/L                           Random 02/08 1716: 20.3 mg/L   1250 mg (17 mg/kg) IV x1 dose              02/08 2325                           Random 02/09 1157: 18.3 mg/L  1250 mg (17 mg/kg) IV x1 dose              02/09 1333                          Random 02/10 0644: 16.7 mg/L (~17 hrs)  1250 mg (17 mg/kg) IV x1 dose               2/10 1215                          Random 2/11 0316: 16.5 mg/L (~15 hrs)  1000 mg (15 mg/kg) IV q12h              2/11 1116, 2127                          Trough 0820 2/12 = 24.1 mg/L (~11 hr level)                          Random 2/12 @ 2155: 14.8mcg/ml (~24hrs since last dose)  2/13  0234 750 mg x 1.          Plan:  -waiting on 1400 random level to reassess but lab has not drawn it yet. Will pass off to 2nd shift [facility] to follow up.    Thanks for this consult, will follow until mayra,  Cameron Das Pharm.D, BCCCP

## 2021-02-13 NOTE — PLAN OF CARE
Goal Outcome Evaluation:  Plan of Care Reviewed With: patient  Progress: no change  Outcome Summary: recieved 1 unit RBC, medication given to lower potassium, wound care performed, PRN suctioning, refusing turns, iv abx, vss, will continue to monitor

## 2021-02-14 LAB
ALBUMIN SERPL-MCNC: 3.7 G/DL (ref 3.5–5.2)
ALBUMIN/GLOB SERPL: 1.1 G/DL
ALP SERPL-CCNC: 83 U/L (ref 39–117)
ALT SERPL W P-5'-P-CCNC: 11 U/L (ref 1–41)
ANION GAP SERPL CALCULATED.3IONS-SCNC: 9.1 MMOL/L (ref 5–15)
AST SERPL-CCNC: 14 U/L (ref 1–40)
BASOPHILS # BLD AUTO: 0.01 10*3/MM3 (ref 0–0.2)
BASOPHILS NFR BLD AUTO: 0.2 % (ref 0–1.5)
BH BB BLOOD EXPIRATION DATE: NORMAL
BH BB BLOOD TYPE BARCODE: 5100
BH BB DISPENSE STATUS: NORMAL
BH BB PRODUCT CODE: NORMAL
BH BB UNIT NUMBER: NORMAL
BILIRUB SERPL-MCNC: 1.3 MG/DL (ref 0–1.2)
BUN SERPL-MCNC: 34 MG/DL (ref 6–20)
BUN/CREAT SERPL: 46.6 (ref 7–25)
CALCIUM SPEC-SCNC: 10 MG/DL (ref 8.6–10.5)
CHLORIDE SERPL-SCNC: 95 MMOL/L (ref 98–107)
CO2 SERPL-SCNC: 26.9 MMOL/L (ref 22–29)
CREAT SERPL-MCNC: 0.73 MG/DL (ref 0.76–1.27)
CROSSMATCH INTERPRETATION: NORMAL
DEPRECATED RDW RBC AUTO: 50.9 FL (ref 37–54)
EOSINOPHIL # BLD AUTO: 0.26 10*3/MM3 (ref 0–0.4)
EOSINOPHIL NFR BLD AUTO: 4.2 % (ref 0.3–6.2)
ERYTHROCYTE [DISTWIDTH] IN BLOOD BY AUTOMATED COUNT: 17.5 % (ref 12.3–15.4)
GFR SERPL CREATININE-BSD FRML MDRD: 121 ML/MIN/1.73
GLOBULIN UR ELPH-MCNC: 3.5 GM/DL
GLUCOSE SERPL-MCNC: 93 MG/DL (ref 65–99)
HCT VFR BLD AUTO: 23.5 % (ref 37.5–51)
HGB BLD-MCNC: 7.5 G/DL (ref 13–17.7)
IMM GRANULOCYTES # BLD AUTO: 0.02 10*3/MM3 (ref 0–0.05)
IMM GRANULOCYTES NFR BLD AUTO: 0.3 % (ref 0–0.5)
LYMPHOCYTES # BLD AUTO: 1.09 10*3/MM3 (ref 0.7–3.1)
LYMPHOCYTES NFR BLD AUTO: 17.7 % (ref 19.6–45.3)
MAGNESIUM SERPL-MCNC: 2.5 MG/DL (ref 1.6–2.6)
MCH RBC QN AUTO: 25.6 PG (ref 26.6–33)
MCHC RBC AUTO-ENTMCNC: 31.9 G/DL (ref 31.5–35.7)
MCV RBC AUTO: 80.2 FL (ref 79–97)
MONOCYTES # BLD AUTO: 0.69 10*3/MM3 (ref 0.1–0.9)
MONOCYTES NFR BLD AUTO: 11.2 % (ref 5–12)
NEUTROPHILS NFR BLD AUTO: 4.08 10*3/MM3 (ref 1.7–7)
NEUTROPHILS NFR BLD AUTO: 66.4 % (ref 42.7–76)
NRBC BLD AUTO-RTO: 0 /100 WBC (ref 0–0.2)
PLATELET # BLD AUTO: 284 10*3/MM3 (ref 140–450)
PMV BLD AUTO: 10.6 FL (ref 6–12)
POTASSIUM SERPL-SCNC: 5.6 MMOL/L (ref 3.5–5.2)
POTASSIUM SERPL-SCNC: 5.7 MMOL/L (ref 3.5–5.2)
PROT SERPL-MCNC: 7.2 G/DL (ref 6–8.5)
RBC # BLD AUTO: 2.93 10*6/MM3 (ref 4.14–5.8)
SODIUM SERPL-SCNC: 131 MMOL/L (ref 136–145)
UNIT  ABO: NORMAL
UNIT  RH: NORMAL
VANCOMYCIN SERPL-MCNC: 19.5 MCG/ML (ref 5–40)
WBC # BLD AUTO: 6.15 10*3/MM3 (ref 3.4–10.8)

## 2021-02-14 PROCEDURE — 25010000002 CALCIUM GLUCONATE-NACL 1-0.675 GM/50ML-% SOLUTION: Performed by: INTERNAL MEDICINE

## 2021-02-14 PROCEDURE — 83735 ASSAY OF MAGNESIUM: CPT | Performed by: INTERNAL MEDICINE

## 2021-02-14 PROCEDURE — 25010000002 HYDROMORPHONE 1 MG/ML SOLUTION: Performed by: INTERNAL MEDICINE

## 2021-02-14 PROCEDURE — 80202 ASSAY OF VANCOMYCIN: CPT | Performed by: HOSPITALIST

## 2021-02-14 PROCEDURE — 25010000002 CEFEPIME PER 500 MG: Performed by: INTERNAL MEDICINE

## 2021-02-14 PROCEDURE — 94799 UNLISTED PULMONARY SVC/PX: CPT

## 2021-02-14 PROCEDURE — 25010000002 VANCOMYCIN PER 500 MG: Performed by: INTERNAL MEDICINE

## 2021-02-14 PROCEDURE — 63710000001 INSULIN REGULAR HUMAN PER 5 UNITS: Performed by: INTERNAL MEDICINE

## 2021-02-14 PROCEDURE — 84132 ASSAY OF SERUM POTASSIUM: CPT | Performed by: INTERNAL MEDICINE

## 2021-02-14 PROCEDURE — 85025 COMPLETE CBC W/AUTO DIFF WBC: CPT | Performed by: INTERNAL MEDICINE

## 2021-02-14 PROCEDURE — 80053 COMPREHEN METABOLIC PANEL: CPT | Performed by: INTERNAL MEDICINE

## 2021-02-14 RX ORDER — VANCOMYCIN HYDROCHLORIDE 1 G/200ML
1000 INJECTION, SOLUTION INTRAVENOUS
Status: COMPLETED | OUTPATIENT
Start: 2021-02-14 | End: 2021-02-16

## 2021-02-14 RX ORDER — DEXTROSE MONOHYDRATE 25 G/50ML
50 INJECTION, SOLUTION INTRAVENOUS ONCE
Status: COMPLETED | OUTPATIENT
Start: 2021-02-14 | End: 2021-02-14

## 2021-02-14 RX ORDER — GUAIFENESIN 600 MG/1
1200 TABLET, EXTENDED RELEASE ORAL EVERY 12 HOURS SCHEDULED
Status: DISCONTINUED | OUTPATIENT
Start: 2021-02-14 | End: 2021-02-18 | Stop reason: ALTCHOICE

## 2021-02-14 RX ORDER — SODIUM POLYSTYRENE SULFONATE 15 G/60ML
15 SUSPENSION ORAL; RECTAL ONCE
Status: COMPLETED | OUTPATIENT
Start: 2021-02-14 | End: 2021-02-14

## 2021-02-14 RX ORDER — CALCIUM GLUCONATE 20 MG/ML
1 INJECTION, SOLUTION INTRAVENOUS ONCE
Status: COMPLETED | OUTPATIENT
Start: 2021-02-14 | End: 2021-02-14

## 2021-02-14 RX ADMIN — OXYCODONE HYDROCHLORIDE 5 MG: 5 SOLUTION ORAL at 05:11

## 2021-02-14 RX ADMIN — PROPRANOLOL HYDROCHLORIDE 20 MG: 20 TABLET ORAL at 15:44

## 2021-02-14 RX ADMIN — FENTANYL 1 PATCH: 25 PATCH TRANSDERMAL at 11:23

## 2021-02-14 RX ADMIN — ALPRAZOLAM 0.5 MG: 0.5 TABLET ORAL at 05:27

## 2021-02-14 RX ADMIN — SODIUM POLYSTYRENE SULFONATE 15 G: 15 SUSPENSION ORAL; RECTAL at 16:45

## 2021-02-14 RX ADMIN — HYDROMORPHONE HYDROCHLORIDE 0.75 MG: 10 INJECTION INTRAMUSCULAR; INTRAVENOUS; SUBCUTANEOUS at 02:17

## 2021-02-14 RX ADMIN — DOCUSATE SODIUM 50MG AND SENNOSIDES 8.6MG 2 TABLET: 8.6; 5 TABLET, FILM COATED ORAL at 20:03

## 2021-02-14 RX ADMIN — PROPRANOLOL HYDROCHLORIDE 20 MG: 20 TABLET ORAL at 09:39

## 2021-02-14 RX ADMIN — CEFEPIME 2 G: 2 INJECTION, POWDER, FOR SOLUTION INTRAVENOUS at 05:11

## 2021-02-14 RX ADMIN — HYDROXYZINE HYDROCHLORIDE 25 MG: 25 TABLET ORAL at 09:39

## 2021-02-14 RX ADMIN — OXYCODONE HYDROCHLORIDE 5 MG: 5 SOLUTION ORAL at 14:09

## 2021-02-14 RX ADMIN — CEFEPIME 2 G: 2 INJECTION, POWDER, FOR SOLUTION INTRAVENOUS at 20:03

## 2021-02-14 RX ADMIN — HYDROMORPHONE HYDROCHLORIDE 0.75 MG: 10 INJECTION INTRAMUSCULAR; INTRAVENOUS; SUBCUTANEOUS at 11:22

## 2021-02-14 RX ADMIN — SERTRALINE 100 MG: 100 TABLET, FILM COATED ORAL at 09:39

## 2021-02-14 RX ADMIN — FLUCONAZOLE 100 MG: 100 TABLET ORAL at 15:44

## 2021-02-14 RX ADMIN — CALCIUM GLUCONATE 1 G: 20 INJECTION, SOLUTION INTRAVENOUS at 16:45

## 2021-02-14 RX ADMIN — PROPRANOLOL HYDROCHLORIDE 20 MG: 20 TABLET ORAL at 20:11

## 2021-02-14 RX ADMIN — OXYCODONE HYDROCHLORIDE AND ACETAMINOPHEN 1000 MG: 500 TABLET ORAL at 09:39

## 2021-02-14 RX ADMIN — CEFEPIME 2 G: 2 INJECTION, POWDER, FOR SOLUTION INTRAVENOUS at 12:51

## 2021-02-14 RX ADMIN — HYDROMORPHONE HYDROCHLORIDE 0.75 MG: 10 INJECTION INTRAMUSCULAR; INTRAVENOUS; SUBCUTANEOUS at 15:44

## 2021-02-14 RX ADMIN — DEXTROSE MONOHYDRATE 50 ML: 500 INJECTION PARENTERAL at 15:44

## 2021-02-14 RX ADMIN — GABAPENTIN 800 MG: 400 CAPSULE ORAL at 14:09

## 2021-02-14 RX ADMIN — ACETAMINOPHEN 650 MG: 325 TABLET, FILM COATED ORAL at 05:11

## 2021-02-14 RX ADMIN — GUAIFENESIN 1200 MG: 600 TABLET, EXTENDED RELEASE ORAL at 20:03

## 2021-02-14 RX ADMIN — OXYCODONE HYDROCHLORIDE 5 MG: 5 SOLUTION ORAL at 09:39

## 2021-02-14 RX ADMIN — GABAPENTIN 800 MG: 400 CAPSULE ORAL at 05:11

## 2021-02-14 RX ADMIN — ASPIRIN 81 MG: 81 TABLET, CHEWABLE ORAL at 09:39

## 2021-02-14 RX ADMIN — OXYCODONE HYDROCHLORIDE 5 MG: 5 SOLUTION ORAL at 22:30

## 2021-02-14 RX ADMIN — INSULIN HUMAN 10 UNITS: 100 INJECTION, SOLUTION PARENTERAL at 18:44

## 2021-02-14 RX ADMIN — HYDROMORPHONE HYDROCHLORIDE 0.75 MG: 10 INJECTION INTRAMUSCULAR; INTRAVENOUS; SUBCUTANEOUS at 20:03

## 2021-02-14 RX ADMIN — VANCOMYCIN HYDROCHLORIDE 1000 MG: 1 INJECTION, SOLUTION INTRAVENOUS at 11:23

## 2021-02-14 RX ADMIN — HYDROMORPHONE HYDROCHLORIDE 0.75 MG: 10 INJECTION INTRAMUSCULAR; INTRAVENOUS; SUBCUTANEOUS at 07:09

## 2021-02-14 RX ADMIN — OXYCODONE HYDROCHLORIDE 5 MG: 5 SOLUTION ORAL at 00:18

## 2021-02-14 RX ADMIN — GUAIFENESIN 1200 MG: 600 TABLET, EXTENDED RELEASE ORAL at 14:09

## 2021-02-14 RX ADMIN — ALPRAZOLAM 0.5 MG: 0.5 TABLET ORAL at 16:44

## 2021-02-14 RX ADMIN — GABAPENTIN 800 MG: 400 CAPSULE ORAL at 20:11

## 2021-02-14 RX ADMIN — Medication: at 09:40

## 2021-02-14 RX ADMIN — CYCLOBENZAPRINE 10 MG: 10 TABLET, FILM COATED ORAL at 09:39

## 2021-02-14 RX ADMIN — LANSOPRAZOLE 30 MG: KIT at 07:09

## 2021-02-14 NOTE — PROGRESS NOTES
Vancomycin Pharmacokinetic Note    Maxim Carvajal is a 35 y.o. male who is on day 13 pharmacy to dose vancomycin for HAP.  Target level: AUC24 400-600  Consulting Provider: Dr. Vasquez  Current Vancomycin Dose: intermittent     Allergies  Allergies as of 02/02/2021   • (No Known Allergies)       Microbiology  Microbiology Results (last 10 days)     Procedure Component Value - Date/Time    AFB Culture - Body Fluid, Pleural Cavity [[id number]] Collected: 02/11/21 0955    Lab Status: Preliminary result Specimen: Body Fluid from Pleural Cavity Updated: 02/12/21 1509     AFB Stain No acid fast bacilli seen on direct smear    Body Fluid Culture - Body Fluid, Pleural Cavity [[id number]] Collected: 02/11/21 0955    Lab Status: Preliminary result Specimen: Body Fluid from Pleural Cavity Updated: 02/14/21 1022     Body Fluid Culture No growth at 3 days     Gram Stain No WBCs or organisms seen    Anaerobic Culture - Pleural Fluid, Pleural Cavity [[id number]] Collected: 02/11/21 0955    Lab Status: Preliminary result Specimen: Pleural Fluid from Pleural Cavity Updated: 02/14/21 0727     Anaerobic Culture No anaerobes isolated at 3 days    Respiratory Panel PCR w/COVID-19(SARS-CoV-2) [facility] In-House, NP Swab in UTM/VTM, 3-4 HR TAT - Swab, Nasopharynx [[id number]]  (Normal) Collected: 02/08/21 1720    Lab Status: Final result Specimen: Swab from Nasopharynx Updated: 02/08/21 1833     ADENOVIRUS, PCR Not Detected     Coronavirus 229E Not Detected     Coronavirus HKU1 Not Detected     Coronavirus NL63 Not Detected     Coronavirus OC43 Not Detected     COVID19 Not Detected     Human Metapneumovirus Not Detected     Human Rhinovirus/Enterovirus Not Detected     Influenza A PCR Not Detected     Influenza B PCR Not Detected     Parainfluenza Virus 1 Not Detected     Parainfluenza Virus 2 Not Detected     Parainfluenza Virus 3 Not Detected     Parainfluenza Virus 4 Not Detected     RSV, PCR Not Detected     Bordetella  "pertussis pcr Not Detected     Bordetella parapertussis PCR Not Detected     Chlamydophila pneumoniae PCR Not Detected     Mycoplasma pneumo by PCR Not Detected    Narrative:      Fact sheet for providers: https://docs.[employer]/wp-content/uploads/TEG8702-6263-TG1.1-EUA-Provider-Fact-Sheet-3.pdf    Fact sheet for patients: https://docs.[employer]/wp-content/uploads/EIM5378-4968-OA1.1-EUA-Patient-Fact-Sheet-1.pdf    Test performed by PCR.    Blood Culture - Blood, Hand, Right [[id number]] Collected: 02/08/21 1716    Lab Status: Final result Specimen: Blood from Hand, Right Updated: 02/13/21 1745     Blood Culture No growth at 5 days    Blood Culture - Blood, Hand, Left [[id number]] Collected: 02/08/21 1716    Lab Status: Final result Specimen: Blood from Hand, Left Updated: 02/13/21 1745     Blood Culture No growth at 5 days    Respiratory Culture - Lavage, Lung, Right Middle Lobe [[id number]]  (Abnormal) Collected: 02/04/21 1837    Lab Status: Final result Specimen: Lavage from Lung, Right Middle Lobe Updated: 02/06/21 1307     Respiratory Culture Scant growth (1+) Staphylococcus aureus, MRSA     Comment: Methicillin resistant Staphylococcus aureus, Patient may be an isolation risk.        Gram Stain No WBCs or organisms seen      No epithelial cells seen    Narrative:      Refer to Respiratory Culture from 2/2/21 for MICS        Vitals/Labs/I&O  182.9 cm (72\")  71.4 kg (157 lb 6.5 oz)  Body mass index is 21.35 kg/m².   Temp:  [97.4 °F (36.3 °C)-98 °F (36.7 °C)] 97.7 °F (36.5 °C)  Heart Rate:  [66-84] 68  Resp:  [16-24] 18  BP: ()/(50-93) 105/61    Results from last 7 days   Lab Units 02/14/21  0703 02/13/21  0514 02/12/21  0820   WBC 10*3/mm3 6.15 6.73 5.26          Results from last 7 days   Lab Units 02/08/21  0926   PROCALCITONIN ng/mL 0.07                  Estimated Creatinine Clearance: 139.9 mL/min (A) (by C-G formula based on SCr of 0.73 mg/dL (L)).  Results from last 7 days   Lab Units " 02/14/21  0703 02/13/21  0514 02/12/21  0820   BUN mg/dL 34* 29* 27*   CREATININE mg/dL 0.73* 0.68* 0.60*     Intake & Output (last 3 days)       02/11 0701 - 02/12 0700 02/12 0701 - 02/13 0700 02/13 0701 - 02/14 0700 02/14 0701 - 02/15 0700    P.O. 0       Blood   300     Other 200 130      NG/       IV Piggyback 400       Total Intake(mL/kg) 1181 (16.5) 130 (1.8) 300 (4.2)     Urine (mL/kg/hr) 1750 (1) 2850 (1.7) 1900 (1.1)     Other 400       Stool 0       Total Output 2150 2850 1900     Net -517 -2720 -1600             Urine Unmeasured Occurrence 1 x  2 x     Stool Unmeasured Occurrence 1 x           Vancomycin Dosing & Level History  1500 mg (~20 mg/kg) IV x1 dose               02/02 1408  1250 mg (17 mg/kg) IV q12h               02/03 0248, 1531              02/04 0332                          Trough 02/04 1459: 4.8 mg/L   1000 mg (~14 mg/kg) IV q8h (tommie 0300, 1100, 1900)              02/04 1924              02/05 0346                          Trough 02/05 1024: 10.1 mg/L               02/05 1215  1250 mg (17 mg/kg) IV q8h               02/05 2005 02/06 0439                          Trough 02/06 1140: 18.4 mg/L               02/06 1252, 2000 02/07 0345, 1219                          Trough 02/07 1922: 35.1 mg/L               02/07 2003                          Random 02/08 0006: 39.3 mg/L                           Random 02/08 1716: 20.3 mg/L   1250 mg (17 mg/kg) IV x1 dose              02/08 2325                          Random 02/09 1157: 18.3 mg/L  1250 mg (17 mg/kg) IV x1 dose   02/09 1333    Random 02/10 0644: 16.7 mg/L   1250 mg (17 mg/kg) IV x1 dose   02/10 1215    Random 02/11 0316: 16.5 mg/L   1000 mg (~14 mg/kg) IV q12h   02/11 1116   02/11 2127    Trough 02/12 0820: 24.1 mg/L     Random 02/12 2155: 14.8 mg/L   750 mg (~11 mg/kg) IV x1 dose   02/13 0234    Random 02/13 1158: 19 mg/L   750 mg (~11 mg/kg) IV x1 dose   02/13 21:36    Random 2/14   0703:19.5  mg/L    Results from last 7 days   Lab Units 02/14/21  0703 02/13/21  1158 02/12/21  2155 02/11/21  0316 02/10/21  0644 02/09/21  1157 02/08/21  1716 02/08/21  0006   VANCOMYCIN RM mcg/mL 19.50 19.00 14.80 16.50 16.70 18.30 20.30 39.30         Results from last 7 days   Lab Units 02/12/21  0820 02/07/21  1922   VANCOMYCIN TR mcg/mL 24.10* 35.10*       Assessment/Plan  1) Vanc 1000  mg (14 mg/kg) q18 for  trough 13.9 mg/L w/ Cl 3.16 l/hr, V 102L and t1/2=23.5     2) Vt 2/15 0500    Thank you for involving pharmacy in this patient's care. Please contact pharmacy with any questions or concerns.

## 2021-02-14 NOTE — PROGRESS NOTES
[facility]      Mar/chart reviewed  Follow up Acute respiratory failure, pneumonia, mucous plugging  Pleural effusion  Less chest discomfort  Still with cough, throat discomfort    Vital Sign Min/Max for last 24 hours  Temp  Min: 97.4 °F (36.3 °C)  Max: 98 °F (36.7 °C)   BP  Min: 94/50  Max: 142/93   Pulse  Min: 66  Max: 84   Resp  Min: 16  Max: 24   SpO2  Min: 95 %  Max: 100 %   Flow (L/min)  Min: 8  Max: 8   No data recorded     Appears ill, alert, appropriate  perrl, eomi, normal sclear, +trush  mmm, no jvd, trachea midline, neck supple,  chest fair ae coarse bilaterally, no crackles, no wheezes,   rrr,   soft, nt, nd +bs,  no c/c/ e  Skin warm, dry no rashes    Labs: 2/14: reviewed:  Bun 34  Cr 0.73  Na 131  k 5.7  Bicarb 26  Wbc 6  hgb 7.5  plts 284    ASSESSMENT  /  PLAN:  1. Tracheitis and pneumonia with mucous plugging status post bronchoscopy on 2/4/2021: Growing both Pseudomonas and MRSA  2. Exchange to a cuffed tracheostomy  as of 2/4/2021  3. Bilateral pleural effusion left more than right  4. Acute hypoxemic respiratory failure  5. Sepsis  6. Quadriplegia  7. Anemia  8. Essential hypertension  9. Gluteal muscle bleed  10. Fever    Antibiotics as per ID, continue pulm toilet
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
## 2021-02-15 PROBLEM — J18.9 COMMUNITY ACQUIRED PNEUMONIA: Status: ACTIVE | Noted: 2021-02-02

## 2021-02-15 LAB
ALBUMIN SERPL-MCNC: 3.5 G/DL (ref 3.5–5.2)
ANION GAP SERPL CALCULATED.3IONS-SCNC: 8.6 MMOL/L (ref 5–15)
BASOPHILS # BLD AUTO: 0.01 10*3/MM3 (ref 0–0.2)
BASOPHILS NFR BLD AUTO: 0.1 % (ref 0–1.5)
BUN SERPL-MCNC: 41 MG/DL (ref 6–20)
BUN/CREAT SERPL: 65.1 (ref 7–25)
CALCIUM SPEC-SCNC: 10.1 MG/DL (ref 8.6–10.5)
CHLORIDE SERPL-SCNC: 95 MMOL/L (ref 98–107)
CO2 SERPL-SCNC: 26.4 MMOL/L (ref 22–29)
CREAT SERPL-MCNC: 0.63 MG/DL (ref 0.76–1.27)
DEPRECATED RDW RBC AUTO: 48.9 FL (ref 37–54)
EOSINOPHIL # BLD AUTO: 0.3 10*3/MM3 (ref 0–0.4)
EOSINOPHIL NFR BLD AUTO: 3.9 % (ref 0.3–6.2)
ERYTHROCYTE [DISTWIDTH] IN BLOOD BY AUTOMATED COUNT: 17.1 % (ref 12.3–15.4)
GFR SERPL CREATININE-BSD FRML MDRD: 143 ML/MIN/1.73
GLUCOSE SERPL-MCNC: 100 MG/DL (ref 65–99)
HCT VFR BLD AUTO: 24.7 % (ref 37.5–51)
HGB BLD-MCNC: 8.1 G/DL (ref 13–17.7)
IMM GRANULOCYTES # BLD AUTO: 0.03 10*3/MM3 (ref 0–0.05)
IMM GRANULOCYTES NFR BLD AUTO: 0.4 % (ref 0–0.5)
LYMPHOCYTES # BLD AUTO: 1.3 10*3/MM3 (ref 0.7–3.1)
LYMPHOCYTES NFR BLD AUTO: 17.1 % (ref 19.6–45.3)
MAGNESIUM SERPL-MCNC: 2.3 MG/DL (ref 1.6–2.6)
MCH RBC QN AUTO: 26 PG (ref 26.6–33)
MCHC RBC AUTO-ENTMCNC: 32.8 G/DL (ref 31.5–35.7)
MCV RBC AUTO: 79.4 FL (ref 79–97)
MONOCYTES # BLD AUTO: 0.91 10*3/MM3 (ref 0.1–0.9)
MONOCYTES NFR BLD AUTO: 12 % (ref 5–12)
NEUTROPHILS NFR BLD AUTO: 5.05 10*3/MM3 (ref 1.7–7)
NEUTROPHILS NFR BLD AUTO: 66.5 % (ref 42.7–76)
NRBC BLD AUTO-RTO: 0 /100 WBC (ref 0–0.2)
PHOSPHATE SERPL-MCNC: 4.1 MG/DL (ref 2.5–4.5)
PLATELET # BLD AUTO: 286 10*3/MM3 (ref 140–450)
PMV BLD AUTO: 10.4 FL (ref 6–12)
POTASSIUM SERPL-SCNC: 5.4 MMOL/L (ref 3.5–5.2)
POTASSIUM SERPL-SCNC: 5.5 MMOL/L (ref 3.5–5.2)
RBC # BLD AUTO: 3.11 10*6/MM3 (ref 4.14–5.8)
SODIUM SERPL-SCNC: 130 MMOL/L (ref 136–145)
VANCOMYCIN TROUGH SERPL-MCNC: 20.1 MCG/ML (ref 5–20)
WBC # BLD AUTO: 7.6 10*3/MM3 (ref 3.4–10.8)

## 2021-02-15 PROCEDURE — 85025 COMPLETE CBC W/AUTO DIFF WBC: CPT | Performed by: INTERNAL MEDICINE

## 2021-02-15 PROCEDURE — 99232 SBSQ HOSP IP/OBS MODERATE 35: CPT | Performed by: INTERNAL MEDICINE

## 2021-02-15 PROCEDURE — 83735 ASSAY OF MAGNESIUM: CPT | Performed by: INTERNAL MEDICINE

## 2021-02-15 PROCEDURE — 25010000002 HYDROMORPHONE 1 MG/ML SOLUTION: Performed by: INTERNAL MEDICINE

## 2021-02-15 PROCEDURE — 80069 RENAL FUNCTION PANEL: CPT | Performed by: INTERNAL MEDICINE

## 2021-02-15 PROCEDURE — 94799 UNLISTED PULMONARY SVC/PX: CPT

## 2021-02-15 PROCEDURE — 84132 ASSAY OF SERUM POTASSIUM: CPT | Performed by: STUDENT IN AN ORGANIZED HEALTH CARE EDUCATION/TRAINING PROGRAM

## 2021-02-15 PROCEDURE — 25010000002 VANCOMYCIN PER 500 MG: Performed by: INTERNAL MEDICINE

## 2021-02-15 PROCEDURE — 80202 ASSAY OF VANCOMYCIN: CPT | Performed by: INTERNAL MEDICINE

## 2021-02-15 PROCEDURE — 25010000002 CEFEPIME PER 500 MG: Performed by: INTERNAL MEDICINE

## 2021-02-15 RX ORDER — SODIUM CHLORIDE FOR INHALATION 7 %
4 VIAL, NEBULIZER (ML) INHALATION
Status: DISCONTINUED | OUTPATIENT
Start: 2021-02-15 | End: 2021-02-17

## 2021-02-15 RX ORDER — SODIUM POLYSTYRENE SULFONATE 15 G/60ML
15 SUSPENSION ORAL; RECTAL ONCE
Status: COMPLETED | OUTPATIENT
Start: 2021-02-15 | End: 2021-02-15

## 2021-02-15 RX ADMIN — PROPRANOLOL HYDROCHLORIDE 20 MG: 20 TABLET ORAL at 16:16

## 2021-02-15 RX ADMIN — CEFEPIME 2 G: 2 INJECTION, POWDER, FOR SOLUTION INTRAVENOUS at 20:37

## 2021-02-15 RX ADMIN — HYDROMORPHONE HYDROCHLORIDE 0.75 MG: 10 INJECTION INTRAMUSCULAR; INTRAVENOUS; SUBCUTANEOUS at 00:32

## 2021-02-15 RX ADMIN — OXYCODONE HYDROCHLORIDE 5 MG: 5 SOLUTION ORAL at 20:36

## 2021-02-15 RX ADMIN — HYDROMORPHONE HYDROCHLORIDE 0.75 MG: 10 INJECTION INTRAMUSCULAR; INTRAVENOUS; SUBCUTANEOUS at 13:59

## 2021-02-15 RX ADMIN — ASPIRIN 81 MG: 81 TABLET, CHEWABLE ORAL at 09:08

## 2021-02-15 RX ADMIN — SERTRALINE 100 MG: 100 TABLET, FILM COATED ORAL at 09:08

## 2021-02-15 RX ADMIN — HYDROXYZINE HYDROCHLORIDE 25 MG: 25 TABLET ORAL at 09:04

## 2021-02-15 RX ADMIN — PROPRANOLOL HYDROCHLORIDE 20 MG: 20 TABLET ORAL at 09:08

## 2021-02-15 RX ADMIN — DOCUSATE SODIUM 50MG AND SENNOSIDES 8.6MG 2 TABLET: 8.6; 5 TABLET, FILM COATED ORAL at 20:36

## 2021-02-15 RX ADMIN — Medication: at 09:09

## 2021-02-15 RX ADMIN — OXYCODONE HYDROCHLORIDE 5 MG: 5 SOLUTION ORAL at 16:16

## 2021-02-15 RX ADMIN — CEFEPIME 2 G: 2 INJECTION, POWDER, FOR SOLUTION INTRAVENOUS at 13:59

## 2021-02-15 RX ADMIN — HYDROMORPHONE HYDROCHLORIDE 0.75 MG: 10 INJECTION INTRAMUSCULAR; INTRAVENOUS; SUBCUTANEOUS at 09:08

## 2021-02-15 RX ADMIN — ALPRAZOLAM 0.5 MG: 0.5 TABLET ORAL at 20:36

## 2021-02-15 RX ADMIN — HYDROMORPHONE HYDROCHLORIDE 0.75 MG: 10 INJECTION INTRAMUSCULAR; INTRAVENOUS; SUBCUTANEOUS at 04:47

## 2021-02-15 RX ADMIN — OXYCODONE HYDROCHLORIDE AND ACETAMINOPHEN 1000 MG: 500 TABLET ORAL at 09:08

## 2021-02-15 RX ADMIN — GABAPENTIN 800 MG: 400 CAPSULE ORAL at 04:55

## 2021-02-15 RX ADMIN — ALPRAZOLAM 0.5 MG: 0.5 TABLET ORAL at 09:15

## 2021-02-15 RX ADMIN — GABAPENTIN 800 MG: 400 CAPSULE ORAL at 21:59

## 2021-02-15 RX ADMIN — CEFEPIME 2 G: 2 INJECTION, POWDER, FOR SOLUTION INTRAVENOUS at 04:48

## 2021-02-15 RX ADMIN — LANSOPRAZOLE 30 MG: KIT at 06:10

## 2021-02-15 RX ADMIN — PROPRANOLOL HYDROCHLORIDE 20 MG: 20 TABLET ORAL at 20:36

## 2021-02-15 RX ADMIN — GUAIFENESIN 1200 MG: 600 TABLET, EXTENDED RELEASE ORAL at 20:36

## 2021-02-15 RX ADMIN — OXYCODONE HYDROCHLORIDE 5 MG: 5 SOLUTION ORAL at 06:50

## 2021-02-15 RX ADMIN — FLUCONAZOLE 100 MG: 100 TABLET ORAL at 16:16

## 2021-02-15 RX ADMIN — ALPRAZOLAM 0.5 MG: 0.5 TABLET ORAL at 00:32

## 2021-02-15 RX ADMIN — HYDROMORPHONE HYDROCHLORIDE 0.75 MG: 10 INJECTION INTRAMUSCULAR; INTRAVENOUS; SUBCUTANEOUS at 17:46

## 2021-02-15 RX ADMIN — SODIUM POLYSTYRENE SULFONATE 15 G: 15 SUSPENSION ORAL; RECTAL at 13:59

## 2021-02-15 RX ADMIN — HYDROMORPHONE HYDROCHLORIDE 0.75 MG: 10 INJECTION INTRAMUSCULAR; INTRAVENOUS; SUBCUTANEOUS at 21:59

## 2021-02-15 RX ADMIN — OXYCODONE HYDROCHLORIDE 5 MG: 5 SOLUTION ORAL at 11:47

## 2021-02-15 RX ADMIN — CYCLOBENZAPRINE 10 MG: 10 TABLET, FILM COATED ORAL at 09:08

## 2021-02-15 RX ADMIN — VANCOMYCIN HYDROCHLORIDE 1000 MG: 1 INJECTION, SOLUTION INTRAVENOUS at 06:47

## 2021-02-15 RX ADMIN — GUAIFENESIN 1200 MG: 600 TABLET, EXTENDED RELEASE ORAL at 09:04

## 2021-02-15 RX ADMIN — GABAPENTIN 800 MG: 400 CAPSULE ORAL at 13:58

## 2021-02-15 NOTE — PROGRESS NOTES
"Pharmacokinetic Evaluation: Vancomycin IV  Patient: Maxim Carvajal  Admission Date: 202  MRN: [id number]  : 1985    Day of vancomycin therapy: 14  Consult for Dr. Vasquez  (Dr Kenia Chaidez following)  Treating: PNA  Goal AUC24, ss: 400-600 mg/L.hr     Current regimen: Vancomycin 1000 mg IV q18hrs  Other antimicrobials: Cefepime 2gm IV q8hrs EI    Relevant clinical data and objective history reviewed:  35 y.o. male 182.9 cm (72\") 71.4 kg (157 lb 6.5 oz)  Body mass index is 21.35 kg/m².  Results from last 7 days   Lab Units 02/15/21  0500 21  0703 21  0514   CREATININE mg/dL 0.63* 0.73* 0.68*     Estimated Creatinine Clearance: 162.1 mL/min (A) (by C-G formula based on SCr of 0.63 mg/dL (L)).    WBC   Date Value Ref Range Status   02/15/2021 7.60 3.40 - 10.80 10*3/mm3 Final   2021 6.15 3.40 - 10.80 10*3/mm3 Final   2021 6.73 3.40 - 10.80 10*3/mm3 Final     Temp:  [97.3 °F (36.3 °C)-97.7 °F (36.5 °C)] 97.3 °F (36.3 °C)  Heart Rate:  [73-94] 94  Resp:  [18-20] 18  BP: (101-119)/(51-78) 101/52    Intake/Output Summary (Last 24 hours) at 2/15/2021 1253  Last data filed at 2/15/2021 1236  Gross per 24 hour   Intake --   Output 2350 ml   Net -2350 ml     Baseline labs/radiology/cultures:    Lab Results   Component Value Date    PROCALCITO 0.07 2021    PROCALCITO 0.07 2021    PROCALCITO 0.12 2021     Lab Results   Component Value Date    LACTATE 0.6 2021    LACTATE 0.7 2021     Cultures:    ET suction sputum cx: Pseud aerug/MRSA   RML lavage: MRSA+    Assessment/Plan:   PMH: Hypertension.     1. Last Vanc dose due tonight @ 23:30  2. Predicted AUC of 440 mg/L.hr  3. Continue current Vanc IV regimen.    Lab Results   Component Value Date    VANCO TROUGH 20.10 mcg/mL 02/15/2021     Bayesian analysis of the most recent level using [employer] software gives the following patient-specific pharmacokinetic parameters:              CL: 3.07 L/hr              Vd: 95.7 L   "            T1/2: 22.9 hr  Using these values, the current regimen gives a predicted steady-state trough of 14.5 mcg/mL and AUC24, ss of 440 mg/L.hr     Vancomycin IV Dosing & Levels History:  1500 mg (~20 mg/kg) IV x1 dose               02/02 1408  1250 mg (17 mg/kg) IV q12h               02/03 0248, 1531              02/04 0332                          Trough 02/04 1459: 4.8 mg/L   1000 mg (~14 mg/kg) IV q8h (tommie 0300, 1100, 1900)              02/04 1924              02/05 0346                          Trough 02/05 1024: 10.1 mg/L               02/05 1215  1250 mg (17 mg/kg) IV q8h               02/05 2005 02/06 0439                          Trough 02/06 1140: 18.4 mg/L               02/06 1252, 2000 02/07 0345, 1219                          Trough 02/07 1922: 35.1 mg/L               02/07 2003                          Random 02/08 0006: 39.3 mg/L                           Random 02/08 1716: 20.3 mg/L   1250 mg (17 mg/kg) IV x1 dose              02/08 2325                          Random 02/09 1157: 18.3 mg/L  1250 mg (17 mg/kg) IV x1 dose              02/09 1333                          Random 02/10 0644: 16.7 mg/L   1250 mg (17 mg/kg) IV x1 dose              02/10 1215                          Random 02/11 0316: 16.5 mg/L   1000 mg (~14 mg/kg) IV q12h              02/11 1116              02/11 2127                          Trough 02/12 0820: 24.1 mg/L                           Random 02/12 2155: 14.8 mg/L   750 mg (~11 mg/kg) IV x1 dose              02/13 0234                          Random 02/13 1158: 19 mg/L   750 mg (~11 mg/kg) IV x1 dose              02/13 21:36                          Random 2/14   0703:19.5 mg/L  1000 mg q18hrs   2/14 11:23    Trough 2/15  0500: 20.10 mg/L   2/15 06:47    Thank you for your consult,    Aminata García [facility]

## 2021-02-15 NOTE — PROGRESS NOTES
LOS: 13 days     Chief Complaint:  Follow-up fever    Interval History: Afebrile, stable respiratory status over the weekend but the patient states he does not feel like his breathing is better.  Continues to have copious respiratory secretions.  Tolerating antibiotics without abdominal pain nausea vomiting or diarrhea.    Vital Signs  Temp:  [97.3 °F (36.3 °C)-97.7 °F (36.5 °C)] 97.3 °F (36.3 °C)  Heart Rate:  [73-94] 94  Resp:  [18-20] 18  BP: (101-119)/(51-78) 101/52    Physical Exam:  General: in NAD  Neck: tracheostomy  Cardiovascular: NR, no murmur  Respiratory: Bilateral lower lobe rales; no wheezing  GI: Abdomen is soft, nontender, G-tube in place without erythema or purulence  Skin: No rashes,    Antibiotics:  •  Cefepime 2g IV q8hrs  •  Pharmacy to dose vancomycin    LABS:  CBC, CMP, micro reviewed today  Lab Results   Component Value Date    WBC 7.60 02/15/2021    HGB 8.1 (L) 02/15/2021    HCT 24.7 (L) 02/15/2021    MCV 79.4 02/15/2021     02/15/2021     Lab Results   Component Value Date    GLUCOSE 100 (H) 02/15/2021    BUN 41 (H) 02/15/2021    CREATININE 0.63 (L) 02/15/2021    EGFRIFNONA 143 02/15/2021    BCR 65.1 (H) 02/15/2021    CO2 26.4 02/15/2021    CALCIUM 10.1 02/15/2021    ALBUMIN 3.50 02/15/2021    LABIL2 2.0 01/28/2020    AST 14 02/14/2021    ALT 11 02/14/2021     Procalcitonin 0.12 -> 0.07 -> 0.07  HIV antibodies negative  Hep C antibody negative  Hep B surface antigen negative    2/11 thoracentesis with 888 nucleated cells 43% lymphocytes 44% mononuclear cells red blood cells 5575 pH 7.8 glucose 91 LDH 81 total protein 3.8    Microbiology:  2/11 thoracentesis bacterial cultures NGTD          AFB cx P          Fungal cx P  2/8 BCx: NGTD x2  2/8 RPP: negative  2/4 SCx MRSA  2/2 SCx moderate Pseudomonas, light growth MRSA  2/2 BCx CoNS 1/2   2/2 RVP/COVID neg    Radiology (personally reviewed report):   2/12 chest x-ray personally reviewed by me shows tracheostomy in place.  Right-sided  pleural effusion.  No pneumothorax.  Bibasilar opacities.      Assessment/Plan   Fever in adult - resolved   Left-sided pneumonia with sputum cultures positive for Pseudomonas and MRSA  Tracheostomy in place  Quadriplegia - complicating above    Pleural fluid exudative per lights criteria.  Cultures negative to date.    Patient remains afebrile with a normal white blood cell count.  Day 13 of antibiotics today.  Would stop antibiotics after 14 days and monitor off antibiotics to see how he does.  Anticipate that his tracheostomy will stay colonized but we can monitor signs of infection such as worsening respiratory status, leukocytosis or fever.

## 2021-02-15 NOTE — PROGRESS NOTES
[facility]      Mar/chart reviewed  Follow up Acute respiratory failure, pneumonia, mucous plugging  Pleural effusion  Less chest discomfort  Still with cough, throat discomfort  Says mucous is not getting better, he is requesting  A bronch    Vital Sign Min/Max for last 24 hours  Temp  Min: 97.3 °F (36.3 °C)  Max: 97.7 °F (36.5 °C)   BP  Min: 101/52  Max: 119/78   Pulse  Min: 73  Max: 94   Resp  Min: 18  Max: 20   SpO2  Min: 73 %  Max: 100 %   Flow (L/min)  Min: 7  Max: 8   No data recorded     Appears ill, alert, appropriate  perrl, eomi, normal sclear, +trush  mmm, no jvd, trachea midline, neck supple,  chest fair ae coarse bilaterally, no crackles, no wheezes,   rrr,   soft, nt, nd +bs,  no c/c/ e  Skin warm, dry no rashes    Labs: 2/15: reviewed:  Na 130  Bicarb 26  k 5.5  Wbc 7.6  hgb 8.1  plts 286    ASSESSMENT  /  PLAN:  1. Tracheitis and pneumonia with mucous plugging status post bronchoscopy on 2/4/2021: Growing both Pseudomonas and MRSA  2. Exchange to a cuffed tracheostomy  as of 2/4/2021  3. Bilateral pleural effusion left more than right  4. Acute hypoxemic respiratory failure  5. Sepsis  6. Quadriplegia  7. Anemia  8. Essential hypertension  9. Gluteal muscle bleed  10. Fever     Antibiotics as per ID, continue pulm toilet  Bronch tomorrow.
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
## 2021-02-15 NOTE — PROGRESS NOTES
Adult Nutrition  Assessment/PES    Patient Name:  Maxim Carvajal  YOB: 1985  MRN: [id number]  Admit Date:  2/2/2021    Assessment Date:  2/15/2021  Nutrition follow up.   Reason for Assessment     Row Name 02/15/21 1109          Reason for Assessment    Reason For Assessment  follow-up protocol;TF/PN         Nutrition/Diet History     Row Name 02/15/21 1109          Nutrition/Diet History    Typical Food/Fluid Intake  tolerating TF-Nutren 1.5 @ 55 cc/hr. hyponatremia continues-decreased fluid flushes to 30 cc q 4 hours           Labs/Tests/Procedures/Meds     Row Name 02/15/21 1110          Labs/Procedures/Meds    Lab Results Reviewed  reviewed, pertinent     Lab Results Comments  Na, K        Diagnostic Tests/Procedures    Diagnostic Test/Procedure Reviewed  reviewed, pertinent        Medications    Pertinent Medications Reviewed  reviewed, pertinent         Physical Findings     Row Name 02/15/21 1110          Physical Findings    Overall Physical Appearance  tetraplegia (quadriplegia)     Gastrointestinal  feeding tube     Tubes  PEG           Nutrition Prescription Ordered     Row Name 02/15/21 1110          Nutrition Prescription PO    Current PO Diet  NPO        Nutrition Prescription EN    Enteral Route  PEG     Product  Nutren 1.5 (Osmolite 1.5)     TF Delivery Method  Continuous     Continuous TF Goal Rate (mL/hr)  55 mL/hr     Water flush (mL)   20 mL     Water Flush Frequency  Per hour         Evaluation of Received Nutrient/Fluid Intake     Row Name 02/15/21 1110          Calories Evaluation    Enteral Calories (kcal)  1800     % of Kcal Needs  100        Protein Evaluation    Enteral Protein (gm)  81.6     % of Protein Needs  100        Fluid Intake Evaluation    Enteral (Free Water) Fluid (mL)  912     Free Water Flush Fluid (mL)  480     Total Free Water Intake (mL)  1392 mL         Problem/Interventions:    Intervention Goal     Row Name 02/15/21 1110          Intervention Goal     General  Maintain nutrition;Nutrition support treatment     TF/PN  Maintain TF/PN;Tolerate TF at goal     Weight  Maintain weight         Nutrition Intervention     Row Name 02/15/21 1111          Nutrition Intervention    RD/Tech Action  Care plan reviewd;Follow Tx progress         Nutrition Prescription     Row Name 02/15/21 1111          Nutrition Prescription EN    Water flush (mL)   30 mL     Water Flush Frequency  Every 4 hours     New EN Prescription Ordered?  Yes         Education/Evaluation     Row Name 02/15/21 1111          Education    Education  Will Instruct as appropriate        Monitor/Evaluation    Monitor  Per protocol;I&O;Pertinent labs;TF delivery/tolerance;Weight;Skin status;Symptoms           Electronically signed by:  Jennifer Martinez RD  02/15/21 11:11 EST

## 2021-02-16 ENCOUNTER — ANESTHESIA (OUTPATIENT)
Dept: GASTROENTEROLOGY | Facility: HOSPITAL | Age: 36
End: 2021-02-16

## 2021-02-16 ENCOUNTER — ANESTHESIA EVENT (OUTPATIENT)
Dept: GASTROENTEROLOGY | Facility: HOSPITAL | Age: 36
End: 2021-02-16

## 2021-02-16 LAB
ANION GAP SERPL CALCULATED.3IONS-SCNC: 10.2 MMOL/L (ref 5–15)
B PARAPERT DNA SPEC QL NAA+PROBE: NOT DETECTED
B PERT DNA SPEC QL NAA+PROBE: NOT DETECTED
BACTERIA FLD CULT: NORMAL
BACTERIA SPEC ANAEROBE CULT: NORMAL
BASOPHILS # BLD AUTO: 0.01 10*3/MM3 (ref 0–0.2)
BASOPHILS NFR BLD AUTO: 0.1 % (ref 0–1.5)
BUN SERPL-MCNC: 47 MG/DL (ref 6–20)
BUN/CREAT SERPL: 61.8 (ref 7–25)
C PNEUM DNA NPH QL NAA+NON-PROBE: NOT DETECTED
CALCIUM SPEC-SCNC: 9.8 MG/DL (ref 8.6–10.5)
CHLORIDE SERPL-SCNC: 94 MMOL/L (ref 98–107)
CO2 SERPL-SCNC: 26.8 MMOL/L (ref 22–29)
CREAT SERPL-MCNC: 0.76 MG/DL (ref 0.76–1.27)
DEPRECATED RDW RBC AUTO: 50.3 FL (ref 37–54)
EOSINOPHIL # BLD AUTO: 0.23 10*3/MM3 (ref 0–0.4)
EOSINOPHIL NFR BLD AUTO: 3.1 % (ref 0.3–6.2)
ERYTHROCYTE [DISTWIDTH] IN BLOOD BY AUTOMATED COUNT: 17.1 % (ref 12.3–15.4)
FLUAV SUBTYP SPEC NAA+PROBE: NOT DETECTED
FLUBV RNA ISLT QL NAA+PROBE: NOT DETECTED
GFR SERPL CREATININE-BSD FRML MDRD: 115 ML/MIN/1.73
GIE STN SPEC: NORMAL
GLUCOSE SERPL-MCNC: 93 MG/DL (ref 65–99)
GRAM STN SPEC: NORMAL
HADV DNA SPEC NAA+PROBE: NOT DETECTED
HCOV 229E RNA SPEC QL NAA+PROBE: NOT DETECTED
HCOV HKU1 RNA SPEC QL NAA+PROBE: NOT DETECTED
HCOV NL63 RNA SPEC QL NAA+PROBE: NOT DETECTED
HCOV OC43 RNA SPEC QL NAA+PROBE: NOT DETECTED
HCT VFR BLD AUTO: 23.5 % (ref 37.5–51)
HGB BLD-MCNC: 7.7 G/DL (ref 13–17.7)
HMPV RNA NPH QL NAA+NON-PROBE: NOT DETECTED
HPIV1 RNA SPEC QL NAA+PROBE: NOT DETECTED
HPIV2 RNA SPEC QL NAA+PROBE: NOT DETECTED
HPIV3 RNA NPH QL NAA+PROBE: NOT DETECTED
HPIV4 P GENE NPH QL NAA+PROBE: NOT DETECTED
IMM GRANULOCYTES # BLD AUTO: 0.02 10*3/MM3 (ref 0–0.05)
IMM GRANULOCYTES NFR BLD AUTO: 0.3 % (ref 0–0.5)
LYMPHOCYTES # BLD AUTO: 1.35 10*3/MM3 (ref 0.7–3.1)
LYMPHOCYTES NFR BLD AUTO: 18.3 % (ref 19.6–45.3)
M PNEUMO IGG SER IA-ACNC: NOT DETECTED
MCH RBC QN AUTO: 26.3 PG (ref 26.6–33)
MCHC RBC AUTO-ENTMCNC: 32.8 G/DL (ref 31.5–35.7)
MCV RBC AUTO: 80.2 FL (ref 79–97)
MONOCYTES # BLD AUTO: 0.67 10*3/MM3 (ref 0.1–0.9)
MONOCYTES NFR BLD AUTO: 9.1 % (ref 5–12)
NEUTROPHILS NFR BLD AUTO: 5.1 10*3/MM3 (ref 1.7–7)
NEUTROPHILS NFR BLD AUTO: 69.1 % (ref 42.7–76)
NRBC BLD AUTO-RTO: 0 /100 WBC (ref 0–0.2)
PLATELET # BLD AUTO: 285 10*3/MM3 (ref 140–450)
PMV BLD AUTO: 10.5 FL (ref 6–12)
POTASSIUM SERPL-SCNC: 5.3 MMOL/L (ref 3.5–5.2)
RBC # BLD AUTO: 2.93 10*6/MM3 (ref 4.14–5.8)
RHINOVIRUS RNA SPEC NAA+PROBE: NOT DETECTED
RSV RNA NPH QL NAA+NON-PROBE: NOT DETECTED
SARS-COV-2 RNA RESP QL NAA+PROBE: NOT DETECTED
SODIUM SERPL-SCNC: 131 MMOL/L (ref 136–145)
WBC # BLD AUTO: 7.38 10*3/MM3 (ref 3.4–10.8)

## 2021-02-16 PROCEDURE — 25010000002 CEFEPIME PER 500 MG: Performed by: INTERNAL MEDICINE

## 2021-02-16 PROCEDURE — 87186 SC STD MICRODIL/AGAR DIL: CPT | Performed by: INTERNAL MEDICINE

## 2021-02-16 PROCEDURE — 94799 UNLISTED PULMONARY SVC/PX: CPT

## 2021-02-16 PROCEDURE — 25010000002 HYDROMORPHONE 1 MG/ML SOLUTION: Performed by: INTERNAL MEDICINE

## 2021-02-16 PROCEDURE — 87205 SMEAR GRAM STAIN: CPT | Performed by: INTERNAL MEDICINE

## 2021-02-16 PROCEDURE — 88305 TISSUE EXAM BY PATHOLOGIST: CPT | Performed by: INTERNAL MEDICINE

## 2021-02-16 PROCEDURE — 87102 FUNGUS ISOLATION CULTURE: CPT | Performed by: INTERNAL MEDICINE

## 2021-02-16 PROCEDURE — 87147 CULTURE TYPE IMMUNOLOGIC: CPT | Performed by: INTERNAL MEDICINE

## 2021-02-16 PROCEDURE — 0100U HC BIOFIRE FILMARRAY RESP PANEL 2: CPT | Performed by: INTERNAL MEDICINE

## 2021-02-16 PROCEDURE — 80048 BASIC METABOLIC PNL TOTAL CA: CPT | Performed by: STUDENT IN AN ORGANIZED HEALTH CARE EDUCATION/TRAINING PROGRAM

## 2021-02-16 PROCEDURE — 25010000002 HYDROMORPHONE 1 MG/ML SOLUTION: Performed by: STUDENT IN AN ORGANIZED HEALTH CARE EDUCATION/TRAINING PROGRAM

## 2021-02-16 PROCEDURE — 25010000002 VANCOMYCIN PER 500 MG: Performed by: INTERNAL MEDICINE

## 2021-02-16 PROCEDURE — 85025 COMPLETE CBC W/AUTO DIFF WBC: CPT | Performed by: INTERNAL MEDICINE

## 2021-02-16 PROCEDURE — 87206 SMEAR FLUORESCENT/ACID STAI: CPT | Performed by: INTERNAL MEDICINE

## 2021-02-16 PROCEDURE — 88312 SPECIAL STAINS GROUP 1: CPT | Performed by: INTERNAL MEDICINE

## 2021-02-16 PROCEDURE — 87071 CULTURE AEROBIC QUANT OTHER: CPT | Performed by: INTERNAL MEDICINE

## 2021-02-16 PROCEDURE — U0003 INFECTIOUS AGENT DETECTION BY NUCLEIC ACID (DNA OR RNA); SEVERE ACUTE RESPIRATORY SYNDROME CORONAVIRUS 2 (SARS-COV-2) (CORONAVIRUS DISEASE [COVID-19]), AMPLIFIED PROBE TECHNIQUE, MAKING USE OF HIGH THROUGHPUT TECHNOLOGIES AS DESCRIBED BY CMS-2020-01-R: HCPCS | Performed by: STUDENT IN AN ORGANIZED HEALTH CARE EDUCATION/TRAINING PROGRAM

## 2021-02-16 PROCEDURE — 0B9D8ZX DRAINAGE OF RIGHT MIDDLE LUNG LOBE, VIA NATURAL OR ARTIFICIAL OPENING ENDOSCOPIC, DIAGNOSTIC: ICD-10-PCS | Performed by: INTERNAL MEDICINE

## 2021-02-16 PROCEDURE — 25010000002 PROPOFOL 10 MG/ML EMULSION: Performed by: NURSE ANESTHETIST, CERTIFIED REGISTERED

## 2021-02-16 PROCEDURE — 88112 CYTOPATH CELL ENHANCE TECH: CPT | Performed by: INTERNAL MEDICINE

## 2021-02-16 RX ORDER — LIDOCAINE HYDROCHLORIDE 20 MG/ML
INJECTION, SOLUTION INFILTRATION; PERINEURAL AS NEEDED
Status: DISCONTINUED | OUTPATIENT
Start: 2021-02-16 | End: 2021-02-16 | Stop reason: SURG

## 2021-02-16 RX ORDER — SODIUM CHLORIDE 9 MG/ML
30 INJECTION, SOLUTION INTRAVENOUS CONTINUOUS
Status: DISCONTINUED | OUTPATIENT
Start: 2021-02-16 | End: 2021-02-17

## 2021-02-16 RX ORDER — PROPOFOL 10 MG/ML
VIAL (ML) INTRAVENOUS AS NEEDED
Status: DISCONTINUED | OUTPATIENT
Start: 2021-02-16 | End: 2021-02-16 | Stop reason: SURG

## 2021-02-16 RX ORDER — PROPOFOL 10 MG/ML
VIAL (ML) INTRAVENOUS CONTINUOUS PRN
Status: DISCONTINUED | OUTPATIENT
Start: 2021-02-16 | End: 2021-02-16 | Stop reason: SURG

## 2021-02-16 RX ADMIN — PROPRANOLOL HYDROCHLORIDE 20 MG: 20 TABLET ORAL at 08:04

## 2021-02-16 RX ADMIN — IPRATROPIUM BROMIDE AND ALBUTEROL SULFATE 3 ML: 2.5; .5 SOLUTION RESPIRATORY (INHALATION) at 19:25

## 2021-02-16 RX ADMIN — OXYCODONE HYDROCHLORIDE 5 MG: 5 SOLUTION ORAL at 21:27

## 2021-02-16 RX ADMIN — ALPRAZOLAM 0.5 MG: 0.5 TABLET ORAL at 23:35

## 2021-02-16 RX ADMIN — HYDROMORPHONE HYDROCHLORIDE 0.75 MG: 10 INJECTION INTRAMUSCULAR; INTRAVENOUS; SUBCUTANEOUS at 06:12

## 2021-02-16 RX ADMIN — IPRATROPIUM BROMIDE AND ALBUTEROL SULFATE 3 ML: 2.5; .5 SOLUTION RESPIRATORY (INHALATION) at 14:09

## 2021-02-16 RX ADMIN — PROPOFOL 60 MG: 10 INJECTION, EMULSION INTRAVENOUS at 11:12

## 2021-02-16 RX ADMIN — DOCUSATE SODIUM 50MG AND SENNOSIDES 8.6MG 2 TABLET: 8.6; 5 TABLET, FILM COATED ORAL at 21:10

## 2021-02-16 RX ADMIN — HYDROMORPHONE HYDROCHLORIDE 0.75 MG: 10 INJECTION INTRAMUSCULAR; INTRAVENOUS; SUBCUTANEOUS at 10:44

## 2021-02-16 RX ADMIN — CEFEPIME 2 G: 2 INJECTION, POWDER, FOR SOLUTION INTRAVENOUS at 21:11

## 2021-02-16 RX ADMIN — FLUCONAZOLE 100 MG: 100 TABLET ORAL at 16:18

## 2021-02-16 RX ADMIN — PROPOFOL 140 MCG/KG/MIN: 10 INJECTION, EMULSION INTRAVENOUS at 11:13

## 2021-02-16 RX ADMIN — VANCOMYCIN HYDROCHLORIDE 1000 MG: 1 INJECTION, SOLUTION INTRAVENOUS at 00:42

## 2021-02-16 RX ADMIN — PROPOFOL 40 MG: 10 INJECTION, EMULSION INTRAVENOUS at 11:10

## 2021-02-16 RX ADMIN — GUAIFENESIN 1200 MG: 600 TABLET, EXTENDED RELEASE ORAL at 21:11

## 2021-02-16 RX ADMIN — Medication: at 09:13

## 2021-02-16 RX ADMIN — SODIUM CHLORIDE 4 ML: 7 NEBU SOLN,3 % NEBU at 19:25

## 2021-02-16 RX ADMIN — OXYCODONE HYDROCHLORIDE 5 MG: 5 SOLUTION ORAL at 12:42

## 2021-02-16 RX ADMIN — OXYCODONE HYDROCHLORIDE 5 MG: 5 SOLUTION ORAL at 00:42

## 2021-02-16 RX ADMIN — SERTRALINE 100 MG: 100 TABLET, FILM COATED ORAL at 14:31

## 2021-02-16 RX ADMIN — HYDROMORPHONE HYDROCHLORIDE 1 MG: 1 INJECTION, SOLUTION INTRAMUSCULAR; INTRAVENOUS; SUBCUTANEOUS at 23:35

## 2021-02-16 RX ADMIN — GUAIFENESIN 1200 MG: 600 TABLET, EXTENDED RELEASE ORAL at 14:32

## 2021-02-16 RX ADMIN — PROPRANOLOL HYDROCHLORIDE 20 MG: 20 TABLET ORAL at 21:11

## 2021-02-16 RX ADMIN — OXYCODONE HYDROCHLORIDE 5 MG: 5 SOLUTION ORAL at 04:45

## 2021-02-16 RX ADMIN — HYDROMORPHONE HYDROCHLORIDE 1 MG: 1 INJECTION, SOLUTION INTRAMUSCULAR; INTRAVENOUS; SUBCUTANEOUS at 18:49

## 2021-02-16 RX ADMIN — HYDROXYZINE HYDROCHLORIDE 25 MG: 25 TABLET ORAL at 14:31

## 2021-02-16 RX ADMIN — ASPIRIN 81 MG: 81 TABLET, CHEWABLE ORAL at 14:31

## 2021-02-16 RX ADMIN — ALPRAZOLAM 0.5 MG: 0.5 TABLET ORAL at 14:31

## 2021-02-16 RX ADMIN — ALPRAZOLAM 0.5 MG: 0.5 TABLET ORAL at 04:54

## 2021-02-16 RX ADMIN — HYDROMORPHONE HYDROCHLORIDE 1 MG: 1 INJECTION, SOLUTION INTRAMUSCULAR; INTRAVENOUS; SUBCUTANEOUS at 14:45

## 2021-02-16 RX ADMIN — LIDOCAINE HYDROCHLORIDE 60 MG: 20 INJECTION, SOLUTION INFILTRATION; PERINEURAL at 11:10

## 2021-02-16 RX ADMIN — GABAPENTIN 800 MG: 400 CAPSULE ORAL at 21:11

## 2021-02-16 RX ADMIN — SODIUM CHLORIDE 4 ML: 7 NEBU SOLN,3 % NEBU at 07:16

## 2021-02-16 RX ADMIN — SODIUM CHLORIDE 4 ML: 7 NEBU SOLN,3 % NEBU at 14:15

## 2021-02-16 RX ADMIN — HYDROMORPHONE HYDROCHLORIDE 0.75 MG: 10 INJECTION INTRAMUSCULAR; INTRAVENOUS; SUBCUTANEOUS at 02:04

## 2021-02-16 RX ADMIN — CEFEPIME 2 G: 2 INJECTION, POWDER, FOR SOLUTION INTRAVENOUS at 14:34

## 2021-02-16 RX ADMIN — GABAPENTIN 800 MG: 400 CAPSULE ORAL at 14:31

## 2021-02-16 RX ADMIN — CEFEPIME 2 G: 2 INJECTION, POWDER, FOR SOLUTION INTRAVENOUS at 05:01

## 2021-02-16 RX ADMIN — IPRATROPIUM BROMIDE AND ALBUTEROL SULFATE 3 ML: 2.5; .5 SOLUTION RESPIRATORY (INHALATION) at 07:06

## 2021-02-16 RX ADMIN — PROPRANOLOL HYDROCHLORIDE 20 MG: 20 TABLET ORAL at 16:18

## 2021-02-16 RX ADMIN — OXYCODONE HYDROCHLORIDE AND ACETAMINOPHEN 1000 MG: 500 TABLET ORAL at 14:31

## 2021-02-16 RX ADMIN — SODIUM CHLORIDE 30 ML/HR: 9 INJECTION, SOLUTION INTRAVENOUS at 10:29

## 2021-02-16 RX ADMIN — CYCLOBENZAPRINE 10 MG: 10 TABLET, FILM COATED ORAL at 14:34
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
## 2021-02-16 NOTE — ANESTHESIA POSTPROCEDURE EVALUATION
"Patient: Maxim Carvajal    Procedure Summary     Date: 02/16/21 Room / Location:  [facility] /  [facility]    Anesthesia Start: 1101 Anesthesia Stop: 1141    Procedure: BRONCHOSCOPY (N/A Bronchus) Diagnosis:       Community acquired pneumonia, unspecified laterality      (Community acquired pneumonia, unspecified laterality [J18.9])    Surgeon: Darci Sam MD Provider: Rober Louie MD    Anesthesia Type: MAC ASA Status: 4          Anesthesia Type: MAC    Vitals  Vitals Value Taken Time   BP 94/57 02/16/21 1152   Temp     Pulse 88 02/16/21 1152   Resp 20 02/16/21 1152   SpO2 94 % 02/16/21 1152           Post Anesthesia Care and Evaluation    Patient location during evaluation: PACU  Patient participation: complete - patient participated  Level of consciousness: awake  Pain score: 0  Pain management: adequate  Airway patency: patent  Anesthetic complications: No anesthetic complications  PONV Status: none  Cardiovascular status: acceptable  Respiratory status: acceptable and trach  Hydration status: acceptable    Comments: BP 94/57 (BP Location: Left arm, Patient Position: Lying)   Pulse 88   Temp 36.8 °C (98.3 °F) (Oral)   Resp 20   Ht 182.9 cm (72\")   Wt 71.4 kg (157 lb 6.5 oz)   SpO2 94%   BMI 21.35 kg/m²       "
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
## 2021-02-16 NOTE — PROGRESS NOTES
[facility]      Mar/chart reviewed  Follow up Acute respiratory failure, pneumonia, mucous plugging  Pleural effusion    Still with cough    Vital Sign Min/Max for last 24 hours  Temp  Min: 98.3 °F (36.8 °C)  Max: 98.7 °F (37.1 °C)   BP  Min: 104/55  Max: 114/58   Pulse  Min: 71  Max: 96   Resp  Min: 18  Max: 18   SpO2  Min: 72 %  Max: 99 %   Flow (L/min)  Min: 8  Max: 8   No data recorded     Appears ill, alert, appropriate  perrl, eomi, normal sclear, +trush  mmm, no jvd, trachea midline, neck supple,  chest fair ae coarse bilaterally, no crackles, no wheezes,   rrr,   soft, nt, nd +bs,  no c/c/ e  Skin warm, dry no rashes    ASSESSMENT  /  PLAN:  1. Tracheitis and pneumonia with mucous plugging status post bronchoscopy on 2/4/2021: Growing both Pseudomonas and MRSA  2. Exchange to a cuffed tracheostomy  as of 2/4/2021  3. Bilateral pleural effusion left more than right  4. Acute hypoxemic respiratory failure  5. Sepsis  6. Quadriplegia  7. Anemia  8. Essential hypertension  9. Gluteal muscle bleed  10. Fever    Bronch today --mucous plugging mainly in the right lower lobe and right mainstem bronchi.

## 2021-02-16 NOTE — SIGNIFICANT NOTE
02/16/21 0902   OTHER   Discipline physical therapist   Rehab Time/Intention   Session Not Performed patient unavailable for treatment  (pt leaving for bronchoscopy this am. will follow up tomorrow. Discussed with RN.)   Recommendation   PT - Next Appointment 02/17/21

## 2021-02-16 NOTE — ANESTHESIA PREPROCEDURE EVALUATION
Anesthesia Evaluation     Patient summary reviewed and Nursing notes reviewed   NPO Solid Status: > 8 hours  NPO Liquid Status: > 8 hours           Airway   Mallampati: I  TM distance: >3 FB  Neck ROM: full  No difficulty expected  Comment: tracheostomy pt  Dental - normal exam     Pulmonary - normal exam   (+) pneumonia , pleural effusion, a smoker Current,   Cardiovascular - normal exam    (+) hypertension,       Neuro/Psych- negative ROS    ROS Comment: quadriplegic  GI/Hepatic/Renal/Endo - negative ROS     Musculoskeletal (-) negative ROS    Abdominal  - normal exam    Bowel sounds: normal.   Substance History - negative use     OB/GYN negative ob/gyn ROS         Other                      Anesthesia Plan    ASA 4     MAC       Anesthetic plan, all risks, benefits, and alternatives have been provided, discussed and informed consent has been obtained with: patient.

## 2021-02-17 LAB
ANION GAP SERPL CALCULATED.3IONS-SCNC: 10.6 MMOL/L (ref 5–15)
BASOPHILS # BLD AUTO: 0 10*3/MM3 (ref 0–0.2)
BASOPHILS NFR BLD AUTO: 0 % (ref 0–1.5)
BUN SERPL-MCNC: 49 MG/DL (ref 6–20)
BUN/CREAT SERPL: 69 (ref 7–25)
CALCIUM SPEC-SCNC: 9.8 MG/DL (ref 8.6–10.5)
CHLORIDE SERPL-SCNC: 97 MMOL/L (ref 98–107)
CO2 SERPL-SCNC: 27.4 MMOL/L (ref 22–29)
CREAT SERPL-MCNC: 0.71 MG/DL (ref 0.76–1.27)
CYTO UR: NORMAL
DEPRECATED RDW RBC AUTO: 49 FL (ref 37–54)
EOSINOPHIL # BLD AUTO: 0.21 10*3/MM3 (ref 0–0.4)
EOSINOPHIL NFR BLD AUTO: 3.7 % (ref 0.3–6.2)
ERYTHROCYTE [DISTWIDTH] IN BLOOD BY AUTOMATED COUNT: 16.9 % (ref 12.3–15.4)
GFR SERPL CREATININE-BSD FRML MDRD: 125 ML/MIN/1.73
GLUCOSE SERPL-MCNC: 117 MG/DL (ref 65–99)
HCT VFR BLD AUTO: 23.7 % (ref 37.5–51)
HGB BLD-MCNC: 7.6 G/DL (ref 13–17.7)
IMM GRANULOCYTES # BLD AUTO: 0.02 10*3/MM3 (ref 0–0.05)
IMM GRANULOCYTES NFR BLD AUTO: 0.4 % (ref 0–0.5)
LAB AP CASE REPORT: NORMAL
LAB AP CLINICAL INFORMATION: NORMAL
LAB AP SPECIAL STAINS: NORMAL
LYMPHOCYTES # BLD AUTO: 1.2 10*3/MM3 (ref 0.7–3.1)
LYMPHOCYTES NFR BLD AUTO: 21.1 % (ref 19.6–45.3)
MCH RBC QN AUTO: 25.7 PG (ref 26.6–33)
MCHC RBC AUTO-ENTMCNC: 32.1 G/DL (ref 31.5–35.7)
MCV RBC AUTO: 80.1 FL (ref 79–97)
MONOCYTES # BLD AUTO: 0.6 10*3/MM3 (ref 0.1–0.9)
MONOCYTES NFR BLD AUTO: 10.5 % (ref 5–12)
NEUTROPHILS NFR BLD AUTO: 3.66 10*3/MM3 (ref 1.7–7)
NEUTROPHILS NFR BLD AUTO: 64.3 % (ref 42.7–76)
NRBC BLD AUTO-RTO: 0 /100 WBC (ref 0–0.2)
PATH REPORT.FINAL DX SPEC: NORMAL
PATH REPORT.GROSS SPEC: NORMAL
PLATELET # BLD AUTO: 270 10*3/MM3 (ref 140–450)
PMV BLD AUTO: 10.5 FL (ref 6–12)
POTASSIUM SERPL-SCNC: 4.4 MMOL/L (ref 3.5–5.2)
RBC # BLD AUTO: 2.96 10*6/MM3 (ref 4.14–5.8)
SODIUM SERPL-SCNC: 135 MMOL/L (ref 136–145)
WBC # BLD AUTO: 5.69 10*3/MM3 (ref 3.4–10.8)

## 2021-02-17 PROCEDURE — 25010000002 HYDROMORPHONE 1 MG/ML SOLUTION: Performed by: STUDENT IN AN ORGANIZED HEALTH CARE EDUCATION/TRAINING PROGRAM

## 2021-02-17 PROCEDURE — 94799 UNLISTED PULMONARY SVC/PX: CPT

## 2021-02-17 PROCEDURE — 85025 COMPLETE CBC W/AUTO DIFF WBC: CPT | Performed by: INTERNAL MEDICINE

## 2021-02-17 PROCEDURE — 25010000002 HYDROMORPHONE 1 MG/ML SOLUTION: Performed by: NURSE PRACTITIONER

## 2021-02-17 PROCEDURE — 99232 SBSQ HOSP IP/OBS MODERATE 35: CPT | Performed by: INTERNAL MEDICINE

## 2021-02-17 PROCEDURE — 80048 BASIC METABOLIC PNL TOTAL CA: CPT | Performed by: STUDENT IN AN ORGANIZED HEALTH CARE EDUCATION/TRAINING PROGRAM

## 2021-02-17 RX ORDER — ACETYLCYSTEINE 200 MG/ML
3 SOLUTION ORAL; RESPIRATORY (INHALATION)
Status: DISCONTINUED | OUTPATIENT
Start: 2021-02-17 | End: 2021-02-23 | Stop reason: HOSPADM

## 2021-02-17 RX ADMIN — HYDROMORPHONE HYDROCHLORIDE 1 MG: 1 INJECTION, SOLUTION INTRAMUSCULAR; INTRAVENOUS; SUBCUTANEOUS at 08:02

## 2021-02-17 RX ADMIN — GABAPENTIN 800 MG: 400 CAPSULE ORAL at 13:07

## 2021-02-17 RX ADMIN — ASPIRIN 81 MG: 81 TABLET, CHEWABLE ORAL at 08:03

## 2021-02-17 RX ADMIN — GABAPENTIN 800 MG: 400 CAPSULE ORAL at 20:30

## 2021-02-17 RX ADMIN — GUAIFENESIN 1200 MG: 600 TABLET, EXTENDED RELEASE ORAL at 08:02

## 2021-02-17 RX ADMIN — PROPRANOLOL HYDROCHLORIDE 20 MG: 20 TABLET ORAL at 20:34

## 2021-02-17 RX ADMIN — HYDROXYZINE HYDROCHLORIDE 25 MG: 25 TABLET ORAL at 08:03

## 2021-02-17 RX ADMIN — LANSOPRAZOLE 30 MG: KIT at 06:29

## 2021-02-17 RX ADMIN — OXYCODONE HYDROCHLORIDE 5 MG: 5 SOLUTION ORAL at 20:30

## 2021-02-17 RX ADMIN — SODIUM CHLORIDE 4 ML: 7 NEBU SOLN,3 % NEBU at 09:19

## 2021-02-17 RX ADMIN — HYDROMORPHONE HYDROCHLORIDE 1 MG: 1 INJECTION, SOLUTION INTRAMUSCULAR; INTRAVENOUS; SUBCUTANEOUS at 23:20

## 2021-02-17 RX ADMIN — IPRATROPIUM BROMIDE AND ALBUTEROL SULFATE 3 ML: 2.5; .5 SOLUTION RESPIRATORY (INHALATION) at 15:34

## 2021-02-17 RX ADMIN — OXYCODONE HYDROCHLORIDE 5 MG: 5 SOLUTION ORAL at 03:43

## 2021-02-17 RX ADMIN — GUAIFENESIN 1200 MG: 600 TABLET, EXTENDED RELEASE ORAL at 20:30

## 2021-02-17 RX ADMIN — SERTRALINE 100 MG: 100 TABLET, FILM COATED ORAL at 08:02

## 2021-02-17 RX ADMIN — PROPRANOLOL HYDROCHLORIDE 20 MG: 20 TABLET ORAL at 16:25

## 2021-02-17 RX ADMIN — FLUCONAZOLE 100 MG: 100 TABLET ORAL at 16:25

## 2021-02-17 RX ADMIN — HYDROMORPHONE HYDROCHLORIDE 1 MG: 1 INJECTION, SOLUTION INTRAMUSCULAR; INTRAVENOUS; SUBCUTANEOUS at 18:19

## 2021-02-17 RX ADMIN — CYCLOBENZAPRINE 10 MG: 10 TABLET, FILM COATED ORAL at 08:03

## 2021-02-17 RX ADMIN — PROPRANOLOL HYDROCHLORIDE 20 MG: 20 TABLET ORAL at 08:03

## 2021-02-17 RX ADMIN — IPRATROPIUM BROMIDE AND ALBUTEROL SULFATE 3 ML: 2.5; .5 SOLUTION RESPIRATORY (INHALATION) at 09:11

## 2021-02-17 RX ADMIN — ACETYLCYSTEINE 3 ML: 200 SOLUTION ORAL; RESPIRATORY (INHALATION) at 19:36

## 2021-02-17 RX ADMIN — GABAPENTIN 800 MG: 400 CAPSULE ORAL at 06:29

## 2021-02-17 RX ADMIN — OXYCODONE HYDROCHLORIDE AND ACETAMINOPHEN 1000 MG: 500 TABLET ORAL at 08:03

## 2021-02-17 RX ADMIN — OXYCODONE HYDROCHLORIDE 5 MG: 5 SOLUTION ORAL at 08:02

## 2021-02-17 RX ADMIN — FENTANYL 1 PATCH: 25 PATCH TRANSDERMAL at 13:08

## 2021-02-17 RX ADMIN — DOCUSATE SODIUM 50MG AND SENNOSIDES 8.6MG 2 TABLET: 8.6; 5 TABLET, FILM COATED ORAL at 20:29

## 2021-02-17 RX ADMIN — IPRATROPIUM BROMIDE AND ALBUTEROL SULFATE 3 ML: 2.5; .5 SOLUTION RESPIRATORY (INHALATION) at 19:35

## 2021-02-17 RX ADMIN — ALPRAZOLAM 0.5 MG: 0.5 TABLET ORAL at 22:25

## 2021-02-17 RX ADMIN — ALPRAZOLAM 0.5 MG: 0.5 TABLET ORAL at 08:02

## 2021-02-17 RX ADMIN — Medication: at 08:11

## 2021-02-17 RX ADMIN — HYDROMORPHONE HYDROCHLORIDE 1 MG: 1 INJECTION, SOLUTION INTRAMUSCULAR; INTRAVENOUS; SUBCUTANEOUS at 14:11

## 2021-02-17 RX ADMIN — HYDROMORPHONE HYDROCHLORIDE 1 MG: 1 INJECTION, SOLUTION INTRAMUSCULAR; INTRAVENOUS; SUBCUTANEOUS at 03:43

## 2021-02-17 NOTE — PLAN OF CARE
Goal Outcome Evaluation:  Plan of Care Reviewed With: patient  Progress: improving  Outcome Summary: VSS, on t piece. Pt stating he is in a lot of pain and requesting PRNs q4hr. Remains q2hr turn. incont. care, placed on purewick. trach care as ordered. will continue to monitor

## 2021-02-17 NOTE — PROGRESS NOTES
LOS: 15 days     Chief Complaint:  Follow-up fever    Interval History: Afebrile, status post bronchoscopy yesterday with mucous plugging observed.  Respiratory status stable.  No diarrhea no rash    Vital Signs  Temp:  [97.3 °F (36.3 °C)-98.5 °F (36.9 °C)] 97.3 °F (36.3 °C)  Heart Rate:  [70-91] 74  Resp:  [18-20] 20  BP: ()/(36-73) 106/67    Physical Exam:  General: in NAD  Neck: tracheostomy  Cardiovascular: NR, no murmur  Respiratory: Bilateral lower lobe rales; no wheezing  GI: Abdomen is soft, nontender, G-tube in place without erythema or purulence  Skin: No rashes,    Antibiotics:  none    LABS:  CBC, CMP, micro reviewed today  Lab Results   Component Value Date    WBC 5.69 02/17/2021    HGB 7.6 (L) 02/17/2021    HCT 23.7 (L) 02/17/2021    MCV 80.1 02/17/2021     02/17/2021     Lab Results   Component Value Date    GLUCOSE 117 (H) 02/17/2021    BUN 49 (H) 02/17/2021    CREATININE 0.71 (L) 02/17/2021    EGFRIFNONA 125 02/17/2021    BCR 69.0 (H) 02/17/2021    CO2 27.4 02/17/2021    CALCIUM 9.8 02/17/2021    ALBUMIN 3.50 02/15/2021    LABIL2 2.0 01/28/2020    AST 14 02/14/2021    ALT 11 02/14/2021     Procalcitonin 0.12 -> 0.07 -> 0.07  HIV antibodies negative  Hep C antibody negative  Hep B surface antigen negative    2/11 thoracentesis with 888 nucleated cells 43% lymphocytes 44% mononuclear cells red blood cells 5575 pH 7.8 glucose 91 LDH 81 total protein 3.8    Microbiology:  2/16 BAL MRSA   2/11 thoracentesis bacterial cultures neg          AFB cx NGTD          Fungal cx NGTD  2/8 BCx: neg x2  2/8 RPP: negative  2/4 SCx MRSA  2/2 SCx moderate Pseudomonas, light growth MRSA  2/2 BCx CoNS 1/2   2/2 RVP/COVID neg    Radiology (personally reviewed report):   2/12 chest x-ray personally reviewed by me shows tracheostomy in place.  Right-sided pleural effusion.  No pneumothorax.  Bibasilar opacities.      Assessment/Plan   Fever in adult - resolved   Left-sided pneumonia with sputum cultures  positive for Pseudomonas and MRSA  Tracheostomy in place  Quadriplegia - complicating above    Bronchoscopy cultures from yesterday with MRSA.  This is not surprising given the presence of tracheostomy.  The patient is most likely colonized.  I do not recommend antimicrobial treatment unless the patient shows signs or symptoms consistent with infection.  He is now status post a 14-day course of vancomycin and cefepime which targeted MRSA and Pseudomonas previously isolated.    ID will follow as needed.  Please do not hesitate to call us with questions or concerns

## 2021-02-17 NOTE — SIGNIFICANT NOTE
02/17/21 1347   OTHER   Discipline physical therapist   Rehab Time/Intention   Session Not Performed patient/family declined treatment  (Patient declined treatment this AM and PM. Patient states he is in pain and would like PT to check back tomorrow. Nursing notified. Will check back tomorrow 2/18/2021.)   Recommendation   PT - Next Appointment 02/18/21
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
## 2021-02-17 NOTE — PROGRESS NOTES
[facility]      Mar/chart reviewed  Follow up Acute respiratory failure, pneumonia, mucous plugging  Pleural effusion     Still with cough, improved some today    Vital Sign Min/Max for last 24 hours  Temp  Min: 97.3 °F (36.3 °C)  Max: 98.5 °F (36.9 °C)   BP  Min: 106/67  Max: 112/73   Pulse  Min: 62  Max: 81   Resp  Min: 18  Max: 20   SpO2  Min: 95 %  Max: 100 %   Flow (L/min)  Min: 6  Max: 8   No data recorded     Appears ill, alert, appropriate  perrl, eomi, normal sclear, +trush  mmm, no jvd, trachea midline, neck supple,  chest fair ae coarse bilaterally, no crackles, no wheezes,   rrr,   soft, nt, nd +bs,  no c/c/ e  Skin warm, dry no rashes    ASSESSMENT  /  PLAN:  1. Tracheitis and pneumonia with mucous plugging status post bronchoscopy on 2/4/2021: Growing both Pseudomonas and MRSA  2. Exchange to a cuffed tracheostomy  as of 2/4/2021  3. Bilateral pleural effusion left more than right  4. Acute hypoxemic respiratory failure  5. Sepsis  6. Quadriplegia  7. Anemia  8. Essential hypertension  9. Gluteal muscle bleed  10. Fever    Wean oxygen as able.  Continue pulmonary toilet as able

## 2021-02-17 NOTE — PROGRESS NOTES
Continued Stay Note  [facility]     Patient Name: Maxim Carvajal  MRN: [id number]  Today's Date: 2/17/2021    Admit Date: 2/2/2021    Discharge Plan     Row Name 02/17/21 1149       Plan    Plan  [facility] has accepted for subacute rehab, family declines    Plan Comments  CCP asked Dr White to discuss plan of care with patient.  [facility], [facility] and [facility] have declined.  Has accepting facility for subacute at [facility].  Nicky is to reeval for [facility].  Partial packet in the office and patient will need EMS for transport.........................Margarette Garcia RN        Discharge Codes    No documentation.             Margarette Garcia RN

## 2021-02-17 NOTE — PLAN OF CARE
Goal Outcome Evaluation:  Plan of Care Reviewed With: patient     Outcome Summary: TF at 55 (goal rate). Trach with T piece at 8L and 28%. See MAR for pain medication management. VSS

## 2021-02-18 LAB
BACTERIA SPEC AEROBE CULT: ABNORMAL
BACTERIA SPEC AEROBE CULT: ABNORMAL
BASOPHILS # BLD AUTO: 0.01 10*3/MM3 (ref 0–0.2)
BASOPHILS NFR BLD AUTO: 0.2 % (ref 0–1.5)
DEPRECATED RDW RBC AUTO: 50.8 FL (ref 37–54)
EOSINOPHIL # BLD AUTO: 0.44 10*3/MM3 (ref 0–0.4)
EOSINOPHIL NFR BLD AUTO: 8.1 % (ref 0.3–6.2)
ERYTHROCYTE [DISTWIDTH] IN BLOOD BY AUTOMATED COUNT: 17.1 % (ref 12.3–15.4)
GRAM STN SPEC: ABNORMAL
GRAM STN SPEC: ABNORMAL
HCT VFR BLD AUTO: 22.9 % (ref 37.5–51)
HGB BLD-MCNC: 7.3 G/DL (ref 13–17.7)
IMM GRANULOCYTES # BLD AUTO: 0.01 10*3/MM3 (ref 0–0.05)
IMM GRANULOCYTES NFR BLD AUTO: 0.2 % (ref 0–0.5)
LYMPHOCYTES # BLD AUTO: 1.17 10*3/MM3 (ref 0.7–3.1)
LYMPHOCYTES NFR BLD AUTO: 21.5 % (ref 19.6–45.3)
MCH RBC QN AUTO: 25.8 PG (ref 26.6–33)
MCHC RBC AUTO-ENTMCNC: 31.9 G/DL (ref 31.5–35.7)
MCV RBC AUTO: 80.9 FL (ref 79–97)
MONOCYTES # BLD AUTO: 0.43 10*3/MM3 (ref 0.1–0.9)
MONOCYTES NFR BLD AUTO: 7.9 % (ref 5–12)
NEUTROPHILS NFR BLD AUTO: 3.38 10*3/MM3 (ref 1.7–7)
NEUTROPHILS NFR BLD AUTO: 62.1 % (ref 42.7–76)
NRBC BLD AUTO-RTO: 0.2 /100 WBC (ref 0–0.2)
PLATELET # BLD AUTO: 271 10*3/MM3 (ref 140–450)
PMV BLD AUTO: 10.6 FL (ref 6–12)
RBC # BLD AUTO: 2.83 10*6/MM3 (ref 4.14–5.8)
WBC # BLD AUTO: 5.44 10*3/MM3 (ref 3.4–10.8)

## 2021-02-18 PROCEDURE — 97110 THERAPEUTIC EXERCISES: CPT

## 2021-02-18 PROCEDURE — 94799 UNLISTED PULMONARY SVC/PX: CPT

## 2021-02-18 PROCEDURE — 85025 COMPLETE CBC W/AUTO DIFF WBC: CPT | Performed by: INTERNAL MEDICINE

## 2021-02-18 PROCEDURE — 97535 SELF CARE MNGMENT TRAINING: CPT | Performed by: OCCUPATIONAL THERAPIST

## 2021-02-18 PROCEDURE — 25010000002 HYDROMORPHONE 1 MG/ML SOLUTION: Performed by: NURSE PRACTITIONER

## 2021-02-18 PROCEDURE — 97110 THERAPEUTIC EXERCISES: CPT | Performed by: OCCUPATIONAL THERAPIST

## 2021-02-18 PROCEDURE — 90791 PSYCH DIAGNOSTIC EVALUATION: CPT

## 2021-02-18 RX ORDER — GABAPENTIN 250 MG/5ML
800 SOLUTION ORAL EVERY 8 HOURS SCHEDULED
Status: DISCONTINUED | OUTPATIENT
Start: 2021-02-18 | End: 2021-02-23 | Stop reason: HOSPADM

## 2021-02-18 RX ORDER — POLYETHYLENE GLYCOL 3350 17 G/17G
17 POWDER, FOR SOLUTION ORAL DAILY
Status: DISCONTINUED | OUTPATIENT
Start: 2021-02-18 | End: 2021-02-23 | Stop reason: HOSPADM

## 2021-02-18 RX ORDER — CLOTRIMAZOLE 10 MG/1
10 LOZENGE ORAL; TOPICAL
Status: DISCONTINUED | OUTPATIENT
Start: 2021-02-18 | End: 2021-02-23 | Stop reason: HOSPADM

## 2021-02-18 RX ORDER — OXYCODONE HCL 5 MG/5 ML
10 SOLUTION, ORAL ORAL EVERY 4 HOURS PRN
Status: DISCONTINUED | OUTPATIENT
Start: 2021-02-18 | End: 2021-02-22

## 2021-02-18 RX ORDER — DOCUSATE SODIUM 50 MG/5 ML
100 LIQUID (ML) ORAL 2 TIMES DAILY
Status: DISCONTINUED | OUTPATIENT
Start: 2021-02-18 | End: 2021-02-23 | Stop reason: HOSPADM

## 2021-02-18 RX ADMIN — ASPIRIN 81 MG: 81 TABLET, CHEWABLE ORAL at 08:14

## 2021-02-18 RX ADMIN — ACETYLCYSTEINE 3 ML: 200 SOLUTION ORAL; RESPIRATORY (INHALATION) at 20:59

## 2021-02-18 RX ADMIN — IPRATROPIUM BROMIDE AND ALBUTEROL SULFATE 3 ML: 2.5; .5 SOLUTION RESPIRATORY (INHALATION) at 08:50

## 2021-02-18 RX ADMIN — CLOTRIMAZOLE 10 MG: 10 LOZENGE ORAL at 23:09

## 2021-02-18 RX ADMIN — PROPRANOLOL HYDROCHLORIDE 20 MG: 20 TABLET ORAL at 08:14

## 2021-02-18 RX ADMIN — POLYETHYLENE GLYCOL 3350 17 G: 17 POWDER, FOR SOLUTION ORAL at 18:20

## 2021-02-18 RX ADMIN — Medication: at 08:32

## 2021-02-18 RX ADMIN — OXYCODONE HYDROCHLORIDE 10 MG: 5 SOLUTION ORAL at 23:08

## 2021-02-18 RX ADMIN — CLOTRIMAZOLE 10 MG: 10 LOZENGE ORAL at 15:59

## 2021-02-18 RX ADMIN — GABAPENTIN 800 MG: 400 CAPSULE ORAL at 06:03

## 2021-02-18 RX ADMIN — ACETAMINOPHEN ORAL SOLUTION 650 MG: 325 SOLUTION ORAL at 18:20

## 2021-02-18 RX ADMIN — ACETYLCYSTEINE 3 ML: 200 SOLUTION ORAL; RESPIRATORY (INHALATION) at 08:51

## 2021-02-18 RX ADMIN — GUAIFENESIN 1200 MG: 600 TABLET, EXTENDED RELEASE ORAL at 08:14

## 2021-02-18 RX ADMIN — OXYCODONE HYDROCHLORIDE AND ACETAMINOPHEN 1000 MG: 500 TABLET ORAL at 08:14

## 2021-02-18 RX ADMIN — OXYCODONE HYDROCHLORIDE 5 MG: 5 SOLUTION ORAL at 06:03

## 2021-02-18 RX ADMIN — PROPRANOLOL HYDROCHLORIDE 20 MG: 20 TABLET ORAL at 16:23

## 2021-02-18 RX ADMIN — ALPRAZOLAM 0.5 MG: 0.5 TABLET ORAL at 10:17

## 2021-02-18 RX ADMIN — OXYCODONE HYDROCHLORIDE 5 MG: 5 SOLUTION ORAL at 18:20

## 2021-02-18 RX ADMIN — IPRATROPIUM BROMIDE AND ALBUTEROL SULFATE 3 ML: 2.5; .5 SOLUTION RESPIRATORY (INHALATION) at 14:47

## 2021-02-18 RX ADMIN — ALPRAZOLAM 0.5 MG: 0.5 TABLET ORAL at 18:20

## 2021-02-18 RX ADMIN — DOCUSATE SODIUM 100 MG: 50 LIQUID ORAL at 19:55

## 2021-02-18 RX ADMIN — OXYCODONE HYDROCHLORIDE 5 MG: 5 SOLUTION ORAL at 01:34

## 2021-02-18 RX ADMIN — FLUCONAZOLE 100 MG: 100 TABLET ORAL at 16:24

## 2021-02-18 RX ADMIN — OXYCODONE HYDROCHLORIDE 5 MG: 5 SOLUTION ORAL at 13:28

## 2021-02-18 RX ADMIN — GUAIFENESIN 400 MG: 100 SOLUTION ORAL at 23:17

## 2021-02-18 RX ADMIN — HYDROMORPHONE HYDROCHLORIDE 1 MG: 1 INJECTION, SOLUTION INTRAMUSCULAR; INTRAVENOUS; SUBCUTANEOUS at 03:16

## 2021-02-18 RX ADMIN — SERTRALINE 100 MG: 100 TABLET, FILM COATED ORAL at 08:14

## 2021-02-18 RX ADMIN — ACETAMINOPHEN ORAL SOLUTION 650 MG: 325 SOLUTION ORAL at 13:28

## 2021-02-18 RX ADMIN — CYCLOBENZAPRINE 10 MG: 10 TABLET, FILM COATED ORAL at 08:14

## 2021-02-18 RX ADMIN — GUAIFENESIN 400 MG: 100 SOLUTION ORAL at 19:55

## 2021-02-18 RX ADMIN — IPRATROPIUM BROMIDE AND ALBUTEROL SULFATE 3 ML: 2.5; .5 SOLUTION RESPIRATORY (INHALATION) at 20:58

## 2021-02-18 RX ADMIN — GUAIFENESIN 400 MG: 100 SOLUTION ORAL at 16:23

## 2021-02-18 RX ADMIN — GABAPENTIN 800 MG: 400 CAPSULE ORAL at 13:28

## 2021-02-18 RX ADMIN — PROPRANOLOL HYDROCHLORIDE 20 MG: 20 TABLET ORAL at 19:55

## 2021-02-18 RX ADMIN — ACETAMINOPHEN ORAL SOLUTION 650 MG: 325 SOLUTION ORAL at 08:17

## 2021-02-18 RX ADMIN — HYDROXYZINE HYDROCHLORIDE 25 MG: 25 TABLET ORAL at 08:14

## 2021-02-18 RX ADMIN — CLOTRIMAZOLE 10 MG: 10 LOZENGE ORAL at 18:20

## 2021-02-18 RX ADMIN — LANSOPRAZOLE 30 MG: KIT at 06:03

## 2021-02-18 RX ADMIN — GABAPENTIN 800 MG: 250 SOLUTION ORAL at 23:08

## 2021-02-18 NOTE — PROGRESS NOTES
Continued Stay Note  [facility]     Patient Name: Maxim Carvajal  MRN: [id number]  Today's Date: 2/18/2021    Admit Date: 2/2/2021    Discharge Plan     Row Name 02/18/21 1258       Plan    Plan  [facility] accepets pending bed availability, [facility] accepts, mother declines subacute    Plan Comments  Spoke with Ivon, she states that [facility] accepts however beds now are tight.  She states that [facility] can accept and has pulmonary and RT and an available bed.  ...........................Margarette Garcia RN    Row Name 02/18/21 1232       Plan    Plan  [facility] has accepted for subacute rehab, mother declines subacute    Plan Comments  Spoke with patient's mother Marilyn Carvajal [phone].  Discussed that [facility] has declined patient.  [facility] states that patient can is subacute appropriate and does not accept for LTACH.  Dr Montanez notified and asked that he speak with mother per her request.  At this time [facility] is the only accepting family........................Margarette Garcia RN        Discharge Codes    No documentation.             Margarette Garcia RN

## 2021-02-18 NOTE — THERAPY TREATMENT NOTE
Patient Name: Maxim Carvajal  : 1985    MRN: [id number]                              Today's Date: 2021       Admit Date: 2021    Visit Dx:     ICD-10-CM ICD-9-CM   1. Tracheitis  J04.10 464.10   2. Community acquired pneumonia, unspecified laterality  J18.9 486   3. Anemia, unspecified type  D64.9 285.9     Patient Active Problem List   Diagnosis   • Tracheitis   • Acute on chronic respiratory failure with hypoxemia (CMS/HCC)   • Anemia   • Leukocytosis   • Quadriplegia (CMS/[facility])   • Essential hypertension   • HCAP (healthcare-associated pneumonia)   • Sepsis, unspecified organism (CMS/[facility])   • Community acquired pneumonia     Past Medical History:   Diagnosis Date   • Hypertension      Past Surgical History:   Procedure Laterality Date   • BRONCHOSCOPY N/A 2021    Procedure: BRONCHOSCOPY;  Surgeon: Darci Sam MD;  Location: [facility];  Service: Pulmonary;  Laterality: N/A;  PRE- MUCUS PLUG  POST- SAME     General Information     Row Name 21 1550          OT Time and Intention    Document Type  therapy note (daily note)  -     Mode of Treatment  individual therapy;occupational therapy  -     Row Name 21 1550          General Information    Existing Precautions/Restrictions  fall;NPO;other (see comments) trach  -     Row Name 21 1550          Cognition    Orientation Status (Cognition)  oriented x 4  -       User Key  (r) = Recorded By, (t) = Taken By, (c) = Cosigned By    Initials Name Provider Type     Alicia Duron, OTR Occupational Therapist          Mobility/ADL's     Row Name 21 1550          Bed Mobility    Comment (Bed Mobility)  NT  [facility]     Row Name 21 1550          Transfers    Comment (Transfers)  [facility]     Row Name 21 1550          Activities of Daily Living    BADL Assessment/Intervention  grooming  -     Row Name 21 1550          Grooming Assessment/Training    Caroline Level (Grooming)  grooming  skills;oral care regimen;wash face, hands;dependent (less than 25% patient effort)  -     Comment (Grooming)  pt able to use suction on his own that is taped to his hand, but total A w. washing his face and using toothette to clean his mouth  -       User Key  (r) = Recorded By, (t) = Taken By, (c) = Cosigned By    Initials Name Provider Type     Alicia Duron, RACHELR Occupational Therapist        Obj/Interventions     Row Name 02/18/21 1554          Shoulder (Therapeutic Exercise)    Shoulder (Therapeutic Exercise)  AAROM (active assistive range of motion)  -     Shoulder AAROM (Therapeutic Exercise)  right;left;extension;flexion;10 repetitions;supine 2 sets  -[facility] Name 02/18/21 1554          Elbow/Forearm (Therapeutic Exercise)    Elbow/Forearm (Therapeutic Exercise)  AAROM (active assistive range of motion)  -     Elbow/Forearm AAROM (Therapeutic Exercise)  extension;flexion;supination;pronation;supine;right;left;10 repetitions 2 sets  -[facility] Name 02/18/21 1554          Therapeutic Exercise    Therapeutic Exercise  shoulder;elbow/forearm  -       User Key  (r) = Recorded By, (t) = Taken By, (c) = Cosigned By    Initials Name Provider Type     Alicia Duron, OTR Occupational Therapist        Goals/Plan    No documentation.       Clinical Impression     [facility] Name 02/18/21 1551          Pain Assessment    Additional Documentation  Pain Scale: FACES Pre/Post-Treatment (Group)  -KP     Row Name 02/18/21 1557          Pain Scale: Numbers Pre/Post-Treatment    Pretreatment Pain Rating  0/10 - no pain  -     Posttreatment Pain Rating  0/10 - no pain  -[facility] Name 02/18/21 1550          Plan of Care Review    Plan of Care Reviewed With  patient  -     Progress  improving  -     Outcome Summary  pt completed AAROM B UE to incr strength and endurance 10x3, he was dep w. washing his face and using the toothette to clean his mouth. He can use the suction that is taped to his  hand w SBA. He cont to benefit from OT to incr ADL, strength, balance and FMC  -KP     Row Name 02/18/21 1551          Positioning and Restraints    Pre-Treatment Position  in bed  -KP     Post Treatment Position  bed  -KP     In Bed  supine;call light within reach;encouraged to call for assist;exit alarm on  -KP       User Key  (r) = Recorded By, (t) = Taken By, (c) = Cosigned By    Initials Name Provider Type    Alicia Carcamo OTR Occupational Therapist        Outcome Measures     Row Name 02/18/21 1553          How much help from another is currently needed...    Putting on and taking off regular lower body clothing?  1  -KP     Bathing (including washing, rinsing, and drying)  1  -KP     Toileting (which includes using toilet bed pan or urinal)  1  -KP     Putting on and taking off regular upper body clothing  1  -KP     Taking care of personal grooming (such as brushing teeth)  2  -KP     Eating meals  1  -KP     AM-PAC 6 Clicks Score (OT)  7  -KP     Row Name 02/18/21 1552          Functional Assessment    Outcome Measure Options  AM-PAC 6 Clicks Daily Activity (OT)  -KP       User Key  (r) = Recorded By, (t) = Taken By, (c) = Cosigned By    Initials Name Provider Type    Alicia Carcamo OTR Occupational Therapist        Occupational Therapy Education                 Title: PT OT SLP Therapies (In Progress)     Topic: Occupational Therapy (Done)     Point: ADL training (Done)     Description:   Instruct learner(s) on proper safety adaptation and remediation techniques during self care or transfers.   Instruct in proper use of assistive devices.              Learning Progress Summary           Patient Acceptance, E,TB, VU by PAIGE at 2/10/2021 1540                               User Key     Initials Effective Dates Name Provider Type Discipline    PAIGE 07/15/20 -  Eri Batista OT Occupational Therapist OT              OT Recommendation and Plan     Plan of Care Review  Plan of Care  Reviewed With: patient  Progress: improving  Outcome Summary: pt completed AAROM B UE to incr strength and endurance 10x3, he was dep w. washing his face and using the toothette to clean his mouth. He can use the suction that is taped to his hand w SBA. He cont to benefit from OT to incr ADL, strength, balance and FMC     Time Calculation:   Time Calculation- OT     Row Name 02/18/21 1554             Time Calculation- OT    OT Start Time  1510  -      OT Stop Time  1536  -      OT Time Calculation (min)  26 min  -      Total Timed Code Minutes- OT  26 minute(s)  -      OT Received On  02/18/21  -      OT - Next Appointment  02/19/21  -        User Key  (r) = Recorded By, (t) = Taken By, (c) = Cosigned By    Initials Name Provider Type    Alicia Carcamo OTR Occupational Therapist        Therapy Charges for Today     Code Description Service Date Service Provider Modifiers Qty    90227681348 HC OT THER PROC EA 15 MIN 2/18/2021 Alicia Duron OTR GO 1    44541887690 HC OT SELF CARE/MGMT/TRAIN EA 15 MIN 2/18/2021 Alicia Duron OTR GO 1               LUCAS Myrick  2/18/2021

## 2021-02-18 NOTE — THERAPY TREATMENT NOTE
Patient Name: Maxim Carvajal  : 1985    MRN: [id number]                              Today's Date: 2021       Admit Date: 2021    Visit Dx:     ICD-10-CM ICD-9-CM   1. Tracheitis  J04.10 464.10   2. Community acquired pneumonia, unspecified laterality  J18.9 486   3. Anemia, unspecified type  D64.9 285.9     Patient Active Problem List   Diagnosis   • Tracheitis   • Acute on chronic respiratory failure with hypoxemia (CMS/HCC)   • Anemia   • Leukocytosis   • Quadriplegia (CMS/[facility])   • Essential hypertension   • HCAP (healthcare-associated pneumonia)   • Sepsis, unspecified organism (CMS/[facility])   • Community acquired pneumonia     Past Medical History:   Diagnosis Date   • Hypertension      Past Surgical History:   Procedure Laterality Date   • BRONCHOSCOPY N/A 2021    Procedure: BRONCHOSCOPY;  Surgeon: Darci Sam MD;  Location: [facility];  Service: Pulmonary;  Laterality: N/A;  PRE- MUCUS PLUG  POST- SAME     General Information     Row Name 21 1618          Physical Therapy Time and Intention    Document Type  therapy note (daily note)  -     Mode of Treatment  individual therapy;physical therapy  -     Row Name 21 1618          General Information    Patient Profile Reviewed  yes  -     Existing Precautions/Restrictions  fall  -     Row Name 21 1618          Cognition    Orientation Status (Cognition)  oriented x 4  -       User Key  (r) = Recorded By, (t) = Taken By, (c) = Cosigned By    Initials Name Provider Type    Brittaney Beal PTA Physical Therapy Assistant        Mobility    No documentation.       Obj/Interventions     Row Name 21 1622          Motor Skills    Therapeutic Exercise  -- LE stretching , PROM x10 reps; HS, hip abd/add  -       User Key  (r) = Recorded By, (t) = Taken By, (c) = Cosigned By    Initials Name Provider Type    Brittaney Beal PTA Physical Therapy Assistant        Goals/Plan    No documentation.        Clinical Impression     Row Name 02/18/21 1623          Pain Scale: Numbers Pre/Post-Treatment    Pretreatment Pain Rating  8/10  -     Posttreatment Pain Rating  9/10  -     Pain Location  neck  -     Pre/Posttreatment Pain Comment  unable to renata long sitting today due to neck pn and meds not due  -     Pain Intervention(s)  Repositioned  -     Row Name 02/18/21 1623          Plan of Care Review    Plan of Care Reviewed With  patient  -       User Key  (r) = Recorded By, (t) = Taken By, (c) = Cosigned By    Initials Name Provider Type    Brittaney Beal PTA Physical Therapy Assistant        Outcome Measures    No documentation.       Physical Therapy Education                 Title: PT OT SLP Therapies (In Progress)     Topic: Physical Therapy (In Progress)     Point: Mobility training (Done)     Learning Progress Summary           Patient Acceptance, E, VU,NR by CB at 2/13/2021 1525    Acceptance, E,TB,D, VU,NR by CB at 2/11/2021 1549                   Point: Home exercise program (In Progress)     Learning Progress Summary           Patient Acceptance, E,D, NR by PC at 2/9/2021 1639   Family Acceptance, E,D, NR by PC at 2/9/2021 1639                   Point: Body mechanics (Done)     Learning Progress Summary           Patient Acceptance, E, VU,NR by CB at 2/13/2021 1525    Acceptance, E,TB,D, VU,NR by CB at 2/11/2021 1549                   Point: Precautions (Done)     Learning Progress Summary           Patient Acceptance, E, VU,NR by CB at 2/13/2021 1525    Acceptance, E,TB,D, VU,NR by CB at 2/11/2021 1549                               User Key     Initials Effective Dates Name Provider Type Discipline    PC 04/03/18 -  Randi Christensen PT Physical Therapist PT    CB 12/30/20 -  Edna Jean Physical Therapist PT              PT Recommendation and Plan     Plan of Care Reviewed With: patient     Time Calculation:   PT Charges     Row Name 02/18/21 1614             Time Calculation     Start Time  1030  -WANDER      Stop Time  1101  -WANDER      Time Calculation (min)  31 min  -WANDER      PT Received On  02/18/21  -WANDER      PT - Next Appointment  02/19/21  -WANDER        User Key  (r) = Recorded By, (t) = Taken By, (c) = Cosigned By    Initials Name Provider Type    Brittaney Beal PTA Physical Therapy Assistant        Therapy Charges for Today     Code Description Service Date Service Provider Modifiers Qty    38502566647 HC PT THER PROC EA 15 MIN 2/18/2021 Brittaney Jaffe PTA GP 2          PT G-Codes  Outcome Measure Options: AM-PAC 6 Clicks Daily Activity (OT)  AM-PAC 6 Clicks Score (PT): 6  AM-PAC 6 Clicks Score (OT): 7    Brittaney Jaffe PTA  2/18/2021

## 2021-02-18 NOTE — PLAN OF CARE
"Goal Outcome Evaluation:  Plan of Care Reviewed With: patient  Progress: improving   Outcome Summary: Pt remains A/Ox4, VSS on 7L nc 28% FiO2 via trach. Lungs coarse throughout. Abd soft, non tender. G tube flushed. Tube feeds running at 55mL/hr, tolerating well. External catheter in place, adequate amounts of yellow urine noted. No BM this shift. Pt claims last BM was 2/12, however unsure how accurate this is. Bowel regimen ordered per MD Montanez. IV pain meds d/c, patient educated regarding pain management for discharge planning. Pt aware he needs to find a PO schedule that works for pain, receiving multiple different narcotics/sedatives/antianxiety meds. Pt able to sleep intermittently throughout shift but states 10/10 pain whenever awake. Heels elevated with heel boots and SCDs. R heel appears to be improving with Eucerin cream. Pt refusing to be turned enough this shift to changed coccyx dressing. Pt also refusing trach care this shift. States he will \"do them later.\" Pt has COVID swab pending for placement. Anticipating d/c tomorrow per MD Montanez. Will continue to monitor closely.    18:42 Pt agreeable to trach care this shift. Trach care completed. However patient still refusing coccyx dressing this shift. Pain meds given, however patient very tearful that \"you all think I'm drug seeking, but don't understand that I have a lot of pain. How come nothing has been changed to help manage my pain?\" Pt informed that I will call MD Montanez to ask for an increase in oxycodone. Call placed, awaiting call back. Will report to night RN to follow up with if call is not returned by 19:00.    19:05 Call back from MD Montanez, orders for 10mg oxy given. MD also made aware that patient is currently refusing COVID swab (for potential d/c placement tomorrow.) Night RN aware, will re assess in the morning.  "

## 2021-02-18 NOTE — NURSING NOTE
"Pt asking for pain medication  Brought patient his oxycodone 5mg/5ml and xanax     Pt telling nurse \" you are nasty and mean. We agreed that you'd bring me my dilaudid.\"    This RN states \"no I did not. I am offering you pain medication now- oxicodone\"    Pt repetitively says \"you are nasty and mean\" \"call my doctor\" \"give me dilaudid\" \"get the manager\" \"give me dilaudid\" \"you haven't done nothing for me\" \"you haven't given me water in four hours  \"you haven't done anything\" while this RN attempts to educate on pain medications.    This RN to call physician    Dr. Montanez on floor 11am and notified  "

## 2021-02-18 NOTE — PROGRESS NOTES
"Adult Nutrition  Assessment/PES    Patient Name:  Maxim Carvajal  YOB: 1985  MRN: [id number]  Admit Date:  2/2/2021    Assessment Date:  2/18/2021    Comments:  Nutrition TF follow-up:   Continues on Nutren 1.5 @ 55ml/hr, goal, and 30mL free H20 every 4hrs. Sodium trending up.  Na: 135. Continue current TF regimen. Last BM recorded 2/14 per RN.     Rec: Bowel Regimen    RD to continue to follow.     This RD currently working remotely and can be reached via secure chat or email.    Reason for Assessment     Row Name 02/18/21 0949          Reason for Assessment    Reason For Assessment  follow-up protocol;TF/PN         Nutrition/Diet History     Row Name 02/18/21 0949          Nutrition/Diet History    Typical Food/Fluid Intake  Tolerating Nutren 1.5 @ 55ml/hr, goal. Minimal residual.  Hyponatremia improving with decreased water flushes.  Na: 135.  Continues on 30mL every 4 hours free H20     Factors Affecting Nutritional Intake  compromised airway;difficulty/impaired swallowing         Anthropometrics     Row Name 02/18/21 0950          Anthropometrics    Height  182.9 cm (72.01\")        Admit Weight    Admit Weight  71.3 kg (157 lb 3 oz) 2/9        Ideal Body Weight (IBW)    Ideal Body Weight (IBW) (kg)  82.09        Body Mass Index (BMI)    BMI Assessment  BMI 18.5-24.9: normal         Labs/Tests/Procedures/Meds     Row Name 02/18/21 0951          Labs/Procedures/Meds    Lab Results Comments  Na: 135 (trending up), Cl: 97, Gluc, BUN, Cr        Diagnostic Tests/Procedures    Diagnostic Test/Procedure Reviewed  reviewed     Diagnostic Test/Procedures Comments  chest Xray 2/12        Medications    Pertinent Medications Reviewed  reviewed, pertinent     Pertinent Medications Comments  Vit C, Fentanyl, Mucinex, Pericolace, Dilaudid PRN,         Physical Findings     Row Name 02/18/21 0953          Physical Findings    Overall Physical Appearance  tetraplegia (quadriplegia)     Gastrointestinal  feeding " "tube     Tubes  PEG     Skin  pressure injury;other (see comments) [place], R heel, B: 11         Estimated/Assessed Needs     Row Name 02/18/21 0950          Calculation Measurements    Height  182.9 cm (72.01\")         Nutrition Prescription Ordered     Row Name 02/18/21 0955          Nutrition Prescription EN    Enteral Route  PEG     Product  Nutren 1.5 (Osmolite 1.5)     TF Delivery Method  Continuous     Continuous TF Goal Rate (mL/hr)  55 mL/hr     Continuous TF Current Rate (mL/hr)  55 mL/hr     Water flush (mL)   30 mL     Water Flush Frequency  Every 4 hours         Evaluation of Received Nutrient/Fluid Intake     Row Name 02/18/21 0956          Calories Evaluation    Enteral Calories (kcal)  1980 Not completely recorded in intakes     % of Kcal Needs  100        Protein Evaluation    Enteral Protein (gm)  89.8 Intake not completely recorded     % of Protein Needs  100        Intake Assessment    Energy/Calorie Requirement Assessment  meeting needs     Protein Requirement Assessment  meeting needs        Fluid Intake Evaluation    Enteral (Free Water) Fluid (mL)  1003     Free Water Flush Fluid (mL)  180     Total Free Water Intake (mL)  1183 mL               Problem/Interventions:          Intervention Goal     Row Name 02/18/21 0959          Intervention Goal    General  Maintain nutrition;Improved nutrition related lab(s);Meet nutritional needs for age/condition     TF/PN  Maintain TF/PN     Weight  Maintain weight         Nutrition Intervention     Row Name 02/18/21 0959          Nutrition Intervention    RD/Tech Action  Follow Tx progress;Care plan reviewd         Nutrition Prescription     Row Name 02/18/21 0959          Nutrition Prescription EN    Enteral Prescription  Continue same protocol         Education/Evaluation     Row Name 02/18/21 1000          Education    Education  Will Instruct as appropriate        Monitor/Evaluation    Monitor  Per protocol;I&O;TF delivery/tolerance;Weight;Skin " status     Education Follow-up  Reinforce PRN           Electronically signed by:  Shannon Ayala RD  02/18/21 10:00 EST

## 2021-02-18 NOTE — PROGRESS NOTES
[facility]      Mar/chart reviewed  Follow up Acute respiratory failure, pneumonia, mucous plugging  Pleural effusion     Still with cough, but improved,  Complains about not getting his dialuadid    Vital Sign Min/Max for last 24 hours  Temp  Min: 97 °F (36.1 °C)  Max: 98.3 °F (36.8 °C)   BP  Min: 106/65  Max: 113/81   Pulse  Min: 61  Max: 85   Resp  Min: 18  Max: 20   SpO2  Min: 96 %  Max: 100 %   Flow (L/min)  Min: 6  Max: 7   No data recorded     Appears ill, alert, appropriate  perrl, eomi, normal sclear, +trush  mmm, no jvd, trachea midline, neck supple,  chest fair ae coarse bilaterally, no crackles, no wheezes,   rrr,   soft, nt, nd +bs,  no c/c/ e  Skin warm, dry no rashes     ASSESSMENT  /  PLAN:  1. Tracheitis and pneumonia with mucous plugging status post bronchoscopy on 2/4/2021: Growing both Pseudomonas and MRSA  2. Exchange to a cuffed tracheostomy  as of 2/4/2021  3. Bilateral pleural effusion left more than right  4. Acute hypoxemic respiratory failure  5. Sepsis  6. Quadriplegia  7. Anemia  8. Essential hypertension  9. Gluteal muscle bleed  10. Fever     Wean oxygen as able.  Continue pulmonary toilet as able

## 2021-02-18 NOTE — PROGRESS NOTES
"DAILY PROGRESS NOTE  [facility]    Patient Identification:  Name: Maxim Carvajal  Age: 35 y.o.  Sex: male  :  1985  MRN: [id number]         Primary Care Physician: Sheron Perez MD    Subjective:  Interval History:He complains of pain.  Hurts all over.    Objective:    Scheduled Meds:acetylcysteine, 3 mL, Nebulization, BID - RT  ascorbic acid, 1,000 mg, Oral, Daily  aspirin, 81 mg, Nasogastric, Daily  clotrimazole, 10 mg, Oral, 5x Daily  cyclobenzaprine, 10 mg, Oral, Daily  Eucerin original healing lotion, , Topical, Daily  fentaNYL, 1 patch, Transdermal, Q72H  fluconazole, 100 mg, Oral, Q24H  Gabapentin, 800 mg, Per G Tube, Q8H  guaifenesin, 400 mg, Per G Tube, Q4H  hydrOXYzine, 25 mg, Oral, Daily  ipratropium-albuterol, 3 mL, Nebulization, TID - RT  lansoprazole, 30 mg, Nasogastric, QAM  propranolol, 20 mg, Per G Tube, TID  sennosides-docusate, 2 tablet, Oral, Nightly  sertraline, 100 mg, Oral, Daily      Continuous Infusions:hold, 1 each        Vital signs in last 24 hours:  Temp:  [97 °F (36.1 °C)-98.3 °F (36.8 °C)] 98.3 °F (36.8 °C)  Heart Rate:  [64-85] 79  Resp:  [18-20] 18  BP: (105-113)/(55-81) 105/55    Intake/Output:    Intake/Output Summary (Last 24 hours) at 2021 1632  Last data filed at 2021 1328  Gross per 24 hour   Intake 470 ml   Output 1150 ml   Net -680 ml       Exam:  /55 (BP Location: Right arm, Patient Position: Lying)   Pulse 79   Temp 98.3 °F (36.8 °C) (Oral)   Resp 18   Ht 182.9 cm (72.01\")   Wt 71.4 kg (157 lb 6.5 oz)   SpO2 97%   BMI 21.34 kg/m²     General Appearance:    Alert, cooperative, no distress   Head:    Normocephalic, without obvious abnormality, atraumatic   Eyes:       Throat:   Lips, tongue, gums normal   Neck:   Supple, symmetrical, trachea midline, no JVD   Lungs:     rhonchi bilaterally, respirations unlabored   Chest Wall:    No tenderness or deformity    Heart:    Regular rate and rhythm, S1 and S2 normal, no " murmur,no  Rub or gallop   Abdomen:     Soft, nontender, bowel sounds active, no masses, no organomegaly    Extremities:   Extremities normal, atraumatic, no cyanosis or edema   Pulses:      Skin:   Skin is warm and dry,  no rashes or palpable lesions   Neurologic:   quadraplegia.      Lab Results (last 72 hours)     Procedure Component Value Units Date/Time    AFB Culture - Body Fluid, Pleural Cavity [[id number]] Collected: 02/11/21 0955    Specimen: Body Fluid from Pleural Cavity Updated: 02/18/21 1030     AFB Culture No AFB isolated at 1 week     AFB Stain No acid fast bacilli seen on direct smear    Fungus Culture - Body Fluid, Pleural Cavity [[id number]] Collected: 02/11/21 0955    Specimen: Body Fluid from Pleural Cavity Updated: 02/18/21 1030     Fungus Culture No fungus isolated at 1 week    BAL Culture, Quantitative - Lavage, Lung, Right Middle Lobe [[id number]]  (Abnormal)  (Susceptibility) Collected: 02/16/21 1129    Specimen: Lavage from Lung, Right Middle Lobe Updated: 02/18/21 0802     BAL Culture <10,000 CFU/mL Staphylococcus aureus, MRSA     Comment: Methicillin resistant Staphylococcus aureus, Patient may be an isolation risk.         No Normal Respiratory Lisa     Gram Stain Rare (1+) WBCs seen      No organisms seen    Susceptibility      Staphylococcus aureus, MRSA     YOGI     Clindamycin Resistant     Linezolid Susceptible     Oxacillin Resistant     Penicillin G Resistant     Tetracycline Susceptible     Trimethoprim + Sulfamethoxazole Susceptible     Vancomycin Susceptible                    CBC & Differential [[id number]]  (Abnormal) Collected: 02/18/21 0511    Specimen: Blood Updated: 02/18/21 0541    Narrative:      The following orders were created for panel order CBC & Differential.  Procedure                               Abnormality         Status                     ---------                               -----------         ------                     CBC Auto Differential[[id number]]         Abnormal            Final result                 Please view results for these tests on the individual orders.    CBC Auto Differential [[id number]]  (Abnormal) Collected: 02/18/21 0511    Specimen: Blood Updated: 02/18/21 0541     WBC 5.44 10*3/mm3      RBC 2.83 10*6/mm3      Hemoglobin 7.3 g/dL      Hematocrit 22.9 %      MCV 80.9 fL      MCH 25.8 pg      MCHC 31.9 g/dL      RDW 17.1 %      RDW-SD 50.8 fl      MPV 10.6 fL      Platelets 271 10*3/mm3      Neutrophil % 62.1 %      Lymphocyte % 21.5 %      Monocyte % 7.9 %      Eosinophil % 8.1 %      Basophil % 0.2 %      Immature Grans % 0.2 %      Neutrophils, Absolute 3.38 10*3/mm3      Lymphocytes, Absolute 1.17 10*3/mm3      Monocytes, Absolute 0.43 10*3/mm3      Eosinophils, Absolute 0.44 10*3/mm3      Basophils, Absolute 0.01 10*3/mm3      Immature Grans, Absolute 0.01 10*3/mm3      nRBC 0.2 /100 WBC     Non-gynecologic Cytology [[id number]] Collected: 02/16/21 1129    Specimen: Lavage from Lung, Right Middle Lobe Updated: 02/17/21 0949     Case Report --     Non-gynecologic Cytology                          Case: [id number]                                  Authorizing Provider:  Darci Sam MD        Collected:           02/16/2021 11:29 AM          Ordering Location:     [facility]  Received:            02/16/2021 02:25 PM                                 ENDO SUITES                                                                  Pathologist:           Sydney Encinas MD                                                    Specimen:    Lung, Right Middle Lobe, BAL L                                                            Final Diagnosis --     1. Lung, Right Middle Lobe, Bronchoalveolar Lavage (BAL):   A. Negative for malignant cells.   B. Pulmonary macrophages, benign bronchial cells, squamous cells, acute inflammation, and mucus.   C. No pneumocystis or fungal organisms identified by GMS stain.       Clinical Information --      PNEUMOCYSTIS, FUNGUS ON CYTO       Gross Description --     1 vial received containing 15 ml of cloudy white fluid w/ mucus. 1 thin prep, cellblock, & GMS. No remaining fluid.       Microscopic Description --     Pulmonary macrophages, benign bronchial cells, squamous cells, acute inflammation, and mucus.    Screened by MICAELA Lee.         Special Stains --     Utilizing appropriate controls, a GMS stain is performed on the cell block and is judged negative.      Basic Metabolic Panel [[id number]]  (Abnormal) Collected: 02/17/21 0543    Specimen: Blood Updated: 02/17/21 0703     Glucose 117 mg/dL      BUN 49 mg/dL      Creatinine 0.71 mg/dL      Sodium 135 mmol/L      Potassium 4.4 mmol/L      Chloride 97 mmol/L      CO2 27.4 mmol/L      Calcium 9.8 mg/dL      eGFR Non African Amer 125 mL/min/1.73      BUN/Creatinine Ratio 69.0     Anion Gap 10.6 mmol/L     Narrative:      GFR Normal >60  Chronic Kidney Disease <60  Kidney Failure <15      CBC & Differential [[id number]]  (Abnormal) Collected: 02/17/21 0543    Specimen: Blood Updated: 02/17/21 0623    Narrative:      The following orders were created for panel order CBC & Differential.  Procedure                               Abnormality         Status                     ---------                               -----------         ------                     CBC Auto Differential[[id number]]        Abnormal            Final result                 Please view results for these tests on the individual orders.    CBC Auto Differential [[id number]]  (Abnormal) Collected: 02/17/21 0543    Specimen: Blood Updated: 02/17/21 0623     WBC 5.69 10*3/mm3      RBC 2.96 10*6/mm3      Hemoglobin 7.6 g/dL      Hematocrit 23.7 %      MCV 80.1 fL      MCH 25.7 pg      MCHC 32.1 g/dL      RDW 16.9 %      RDW-SD 49.0 fl      MPV 10.5 fL      Platelets 270 10*3/mm3      Neutrophil % 64.3 %      Lymphocyte % 21.1 %      Monocyte % 10.5 %      Eosinophil % 3.7 %      Basophil % 0.0 %       Immature Grans % 0.4 %      Neutrophils, Absolute 3.66 10*3/mm3      Lymphocytes, Absolute 1.20 10*3/mm3      Monocytes, Absolute 0.60 10*3/mm3      Eosinophils, Absolute 0.21 10*3/mm3      Basophils, Absolute 0.00 10*3/mm3      Immature Grans, Absolute 0.02 10*3/mm3      nRBC 0.0 /100 WBC     Fungus Smear - Lavage, Lung, Right Middle Lobe [[id number]] Collected: 02/16/21 1129    Specimen: Lavage from Lung, Right Middle Lobe Updated: 02/16/21 1403     Fungal Stain No yeast or hyphal elements seen    Respiratory Panel, PCR (WITHOUT COVID) - Lavage, Lung, Right Middle Lobe [[id number]]  (Normal) Collected: 02/16/21 1129    Specimen: Lavage from Lung, Right Middle Lobe Updated: 02/16/21 1257     ADENOVIRUS, PCR Not Detected     Coronavirus 229E Not Detected     Coronavirus HKU1 Not Detected     Coronavirus NL63 Not Detected     Coronavirus OC43 Not Detected     Human Metapneumovirus Not Detected     Human Rhinovirus/Enterovirus Not Detected     Influenza B PCR Not Detected     Parainfluenza Virus 1 Not Detected     Parainfluenza Virus 2 Not Detected     Parainfluenza Virus 3 Not Detected     Parainfluenza Virus 4 Not Detected     Bordetella pertussis pcr Not Detected     Chlamydophila pneumoniae PCR Not Detected     Mycoplasma pneumo by PCR Not Detected     Influenza A PCR Not Detected     RSV, PCR Not Detected     Bordetella parapertussis PCR Not Detected    Narrative:      The coronavirus on the RVP is NOT COVID-19 and is NOT indicative of infection with COVID-19.     Fungus Culture - Lavage, Lung, Right Middle Lobe [[id number]] Collected: 02/16/21 1129    Specimen: Lavage from Lung, Right Middle Lobe Updated: 02/16/21 1147    COVID PRE-OP / PRE-PROCEDURE SCREENING ORDER (NO ISOLATION) - Swab, Nasopharynx [[id number]]  (Normal) Collected: 02/16/21 0626    Specimen: Swab from Nasopharynx Updated: 02/16/21 0731    Narrative:      The following orders were created for panel order COVID PRE-OP / PRE-PROCEDURE SCREENING ORDER  (NO ISOLATION) - Swab, Nasopharynx.  Procedure                               Abnormality         Status                     ---------                               -----------         ------                     COVID-19,[facility] IN-HOUSE...[[id number]]  Normal              Final result                 Please view results for these tests on the individual orders.    COVID-19,[facility] IN-HOUSE CEPHEID, NP SWAB IN TRANSPORT MEDIA 8-12 HR TAT - Swab, Nasopharynx [[id number]]  (Normal) Collected: 02/16/21 0626    Specimen: Swab from Nasopharynx Updated: 02/16/21 0731     COVID19 Not Detected    Narrative:      Fact sheet for providers: https://www.fda.gov/media/235979/download     Fact sheet for patients: https://www.fda.gov/media/637719/download    Anaerobic Culture - Pleural Fluid, Pleural Cavity [[id number]] Collected: 02/11/21 0955    Specimen: Pleural Fluid from Pleural Cavity Updated: 02/16/21 0657     Anaerobic Culture No anaerobes isolated at 5 days    Basic Metabolic Panel [[id number]]  (Abnormal) Collected: 02/16/21 0500    Specimen: Blood Updated: 02/16/21 0629     Glucose 93 mg/dL      BUN 47 mg/dL      Creatinine 0.76 mg/dL      Sodium 131 mmol/L      Potassium 5.3 mmol/L      Chloride 94 mmol/L      CO2 26.8 mmol/L      Calcium 9.8 mg/dL      eGFR Non African Amer 115 mL/min/1.73      BUN/Creatinine Ratio 61.8     Anion Gap 10.2 mmol/L     Narrative:      GFR Normal >60  Chronic Kidney Disease <60  Kidney Failure <15      CBC & Differential [[id number]]  (Abnormal) Collected: 02/16/21 0500    Specimen: Blood Updated: 02/16/21 0609    Narrative:      The following orders were created for panel order CBC & Differential.  Procedure                               Abnormality         Status                     ---------                               -----------         ------                     CBC Auto Differential[[id number]]        Abnormal            Final result                 Please view results for these tests on  the individual orders.    CBC Auto Differential [[id number]]  (Abnormal) Collected: 02/16/21 0500    Specimen: Blood Updated: 02/16/21 0609     WBC 7.38 10*3/mm3      RBC 2.93 10*6/mm3      Hemoglobin 7.7 g/dL      Hematocrit 23.5 %      MCV 80.2 fL      MCH 26.3 pg      MCHC 32.8 g/dL      RDW 17.1 %      RDW-SD 50.3 fl      MPV 10.5 fL      Platelets 285 10*3/mm3      Neutrophil % 69.1 %      Lymphocyte % 18.3 %      Monocyte % 9.1 %      Eosinophil % 3.1 %      Basophil % 0.1 %      Immature Grans % 0.3 %      Neutrophils, Absolute 5.10 10*3/mm3      Lymphocytes, Absolute 1.35 10*3/mm3      Monocytes, Absolute 0.67 10*3/mm3      Eosinophils, Absolute 0.23 10*3/mm3      Basophils, Absolute 0.01 10*3/mm3      Immature Grans, Absolute 0.02 10*3/mm3      nRBC 0.0 /100 WBC     Body Fluid Culture - Body Fluid, Pleural Cavity [[id number]] Collected: 02/11/21 0955    Specimen: Body Fluid from Pleural Cavity Updated: 02/16/21 0454     Body Fluid Culture No growth at 5 days     Gram Stain No WBCs or organisms seen    Potassium [[id number]]  (Abnormal) Collected: 02/15/21 1914    Specimen: Blood Updated: 02/15/21 2014     Potassium 5.4 mmol/L         Data Review:  Results from last 7 days   Lab Units 02/17/21  0543 02/16/21  0500 02/15/21  1914 02/15/21  0500   SODIUM mmol/L 135* 131*  --  130*   POTASSIUM mmol/L 4.4 5.3* 5.4* 5.5*   CHLORIDE mmol/L 97* 94*  --  95*   CO2 mmol/L 27.4 26.8  --  26.4   BUN mg/dL 49* 47*  --  41*   CREATININE mg/dL 0.71* 0.76  --  0.63*   GLUCOSE mg/dL 117* 93  --  100*   CALCIUM mg/dL 9.8 9.8  --  10.1     Results from last 7 days   Lab Units 02/18/21  0511 02/17/21  0543 02/16/21  0500   WBC 10*3/mm3 5.44 5.69 7.38   HEMOGLOBIN g/dL 7.3* 7.6* 7.7*   HEMATOCRIT % 22.9* 23.7* 23.5*   PLATELETS 10*3/mm3 271 270 285             Lab Results   Lab Value Date/Time    TROPONINT <0.010 02/02/2021 0742    TROPONINT <0.010 02/02/2021 0608         Results from last 7 days   Lab Units 02/14/21  0703  02/12/21  0820   ALK PHOS U/L 83 83   BILIRUBIN mg/dL 1.3* 1.3*   ALT (SGPT) U/L 11 13   AST (SGOT) U/L 14 8             No results found for: POCGLU        Past Medical History:   Diagnosis Date   • Hypertension        Assessment:  Active Hospital Problems    Diagnosis  POA   • **HCAP (healthcare-associated pneumonia) [J18.9]  Unknown   • Tracheitis [J04.10]  Yes   • Acute on chronic respiratory failure with hypoxemia (CMS/HCC) [J96.21]  Yes   • Anemia [D64.9]  Yes   • Leukocytosis [D72.829]  Yes   • Quadriplegia (CMS/HCC) [G82.50]  Yes   • Essential hypertension [I10]  Yes   • Sepsis, unspecified organism (CMS/HCC) [A41.9]  Unknown   • Community acquired pneumonia [J18.9]  Unknown      Resolved Hospital Problems   No resolved problems to display.       Plan:  Continue RX. Looking for rehab. As per pulmonary. Follow lab. Finished antibiotics.    Fausto Montanez MD  2/18/2021  16:32 EST

## 2021-02-18 NOTE — PLAN OF CARE
Goal Outcome Evaluation:  Plan of Care Reviewed With: patient  Progress: improving  Outcome Summary: pt completed AAROM B UE to incr strength and endurance 10x3, he was dep w. washing his face and using the toothette to clean his mouth. He can use the suction that is taped to his hand w SBA. He cont to benefit from OT to incr ADL, strength, balance and FMC  OT wore mask, gloves, hand hygiene performed, gown worn, pt in contact precautions, pt refused mask. Pt also has a trach

## 2021-02-19 LAB
ANION GAP SERPL CALCULATED.3IONS-SCNC: 9.7 MMOL/L (ref 5–15)
BASOPHILS # BLD AUTO: 0.01 10*3/MM3 (ref 0–0.2)
BASOPHILS NFR BLD AUTO: 0.2 % (ref 0–1.5)
BUN SERPL-MCNC: 45 MG/DL (ref 6–20)
BUN/CREAT SERPL: 63.4 (ref 7–25)
CALCIUM SPEC-SCNC: 9.8 MG/DL (ref 8.6–10.5)
CHLORIDE SERPL-SCNC: 97 MMOL/L (ref 98–107)
CO2 SERPL-SCNC: 30.3 MMOL/L (ref 22–29)
CREAT SERPL-MCNC: 0.71 MG/DL (ref 0.76–1.27)
DEPRECATED RDW RBC AUTO: 49.9 FL (ref 37–54)
EOSINOPHIL # BLD AUTO: 0.39 10*3/MM3 (ref 0–0.4)
EOSINOPHIL NFR BLD AUTO: 5.9 % (ref 0.3–6.2)
ERYTHROCYTE [DISTWIDTH] IN BLOOD BY AUTOMATED COUNT: 16.9 % (ref 12.3–15.4)
GFR SERPL CREATININE-BSD FRML MDRD: 125 ML/MIN/1.73
GLUCOSE SERPL-MCNC: 112 MG/DL (ref 65–99)
HCT VFR BLD AUTO: 23.6 % (ref 37.5–51)
HGB BLD-MCNC: 7.5 G/DL (ref 13–17.7)
IMM GRANULOCYTES # BLD AUTO: 0.03 10*3/MM3 (ref 0–0.05)
IMM GRANULOCYTES NFR BLD AUTO: 0.5 % (ref 0–0.5)
LYMPHOCYTES # BLD AUTO: 1.38 10*3/MM3 (ref 0.7–3.1)
LYMPHOCYTES NFR BLD AUTO: 20.8 % (ref 19.6–45.3)
MCH RBC QN AUTO: 25.8 PG (ref 26.6–33)
MCHC RBC AUTO-ENTMCNC: 31.8 G/DL (ref 31.5–35.7)
MCV RBC AUTO: 81.1 FL (ref 79–97)
MONOCYTES # BLD AUTO: 0.48 10*3/MM3 (ref 0.1–0.9)
MONOCYTES NFR BLD AUTO: 7.2 % (ref 5–12)
NEUTROPHILS NFR BLD AUTO: 4.36 10*3/MM3 (ref 1.7–7)
NEUTROPHILS NFR BLD AUTO: 65.4 % (ref 42.7–76)
NRBC BLD AUTO-RTO: 0 /100 WBC (ref 0–0.2)
PLATELET # BLD AUTO: 278 10*3/MM3 (ref 140–450)
PMV BLD AUTO: 9.6 FL (ref 6–12)
POTASSIUM SERPL-SCNC: 4.6 MMOL/L (ref 3.5–5.2)
RBC # BLD AUTO: 2.91 10*6/MM3 (ref 4.14–5.8)
SODIUM SERPL-SCNC: 137 MMOL/L (ref 136–145)
WBC # BLD AUTO: 6.65 10*3/MM3 (ref 3.4–10.8)

## 2021-02-19 PROCEDURE — 80048 BASIC METABOLIC PNL TOTAL CA: CPT | Performed by: HOSPITALIST

## 2021-02-19 PROCEDURE — 94799 UNLISTED PULMONARY SVC/PX: CPT

## 2021-02-19 PROCEDURE — 97110 THERAPEUTIC EXERCISES: CPT

## 2021-02-19 PROCEDURE — 97530 THERAPEUTIC ACTIVITIES: CPT

## 2021-02-19 PROCEDURE — 85025 COMPLETE CBC W/AUTO DIFF WBC: CPT | Performed by: INTERNAL MEDICINE

## 2021-02-19 RX ADMIN — DOCUSATE SODIUM 100 MG: 50 LIQUID ORAL at 08:07

## 2021-02-19 RX ADMIN — PROPRANOLOL HYDROCHLORIDE 20 MG: 20 TABLET ORAL at 16:02

## 2021-02-19 RX ADMIN — GUAIFENESIN 400 MG: 100 SOLUTION ORAL at 21:14

## 2021-02-19 RX ADMIN — DOCUSATE SODIUM 50MG AND SENNOSIDES 8.6MG 2 TABLET: 8.6; 5 TABLET, FILM COATED ORAL at 21:13

## 2021-02-19 RX ADMIN — OXYCODONE HYDROCHLORIDE 10 MG: 5 SOLUTION ORAL at 19:04

## 2021-02-19 RX ADMIN — IPRATROPIUM BROMIDE AND ALBUTEROL SULFATE 3 ML: 2.5; .5 SOLUTION RESPIRATORY (INHALATION) at 15:34

## 2021-02-19 RX ADMIN — ALPRAZOLAM 0.5 MG: 0.5 TABLET ORAL at 21:16

## 2021-02-19 RX ADMIN — FLUCONAZOLE 100 MG: 100 TABLET ORAL at 16:02

## 2021-02-19 RX ADMIN — ACETYLCYSTEINE 3 ML: 200 SOLUTION ORAL; RESPIRATORY (INHALATION) at 07:40

## 2021-02-19 RX ADMIN — SERTRALINE 100 MG: 100 TABLET, FILM COATED ORAL at 08:05

## 2021-02-19 RX ADMIN — HYDROXYZINE HYDROCHLORIDE 25 MG: 25 TABLET ORAL at 08:05

## 2021-02-19 RX ADMIN — PROPRANOLOL HYDROCHLORIDE 20 MG: 20 TABLET ORAL at 08:05

## 2021-02-19 RX ADMIN — GUAIFENESIN 400 MG: 100 SOLUTION ORAL at 11:16

## 2021-02-19 RX ADMIN — IPRATROPIUM BROMIDE AND ALBUTEROL SULFATE 3 ML: 2.5; .5 SOLUTION RESPIRATORY (INHALATION) at 07:41

## 2021-02-19 RX ADMIN — CLOTRIMAZOLE 10 MG: 10 LOZENGE ORAL at 11:16

## 2021-02-19 RX ADMIN — ACETYLCYSTEINE 3 ML: 200 SOLUTION ORAL; RESPIRATORY (INHALATION) at 20:05

## 2021-02-19 RX ADMIN — OXYCODONE HYDROCHLORIDE AND ACETAMINOPHEN 1000 MG: 500 TABLET ORAL at 08:05

## 2021-02-19 RX ADMIN — PROPRANOLOL HYDROCHLORIDE 20 MG: 20 TABLET ORAL at 21:13

## 2021-02-19 RX ADMIN — CLOTRIMAZOLE 10 MG: 10 LOZENGE ORAL at 14:23

## 2021-02-19 RX ADMIN — CLOTRIMAZOLE 10 MG: 10 LOZENGE ORAL at 21:14

## 2021-02-19 RX ADMIN — OXYCODONE HYDROCHLORIDE 10 MG: 5 SOLUTION ORAL at 02:43

## 2021-02-19 RX ADMIN — CLOTRIMAZOLE 10 MG: 10 LOZENGE ORAL at 19:02

## 2021-02-19 RX ADMIN — GUAIFENESIN 400 MG: 100 SOLUTION ORAL at 16:01

## 2021-02-19 RX ADMIN — GABAPENTIN 800 MG: 250 SOLUTION ORAL at 21:15

## 2021-02-19 RX ADMIN — IPRATROPIUM BROMIDE AND ALBUTEROL SULFATE 3 ML: 2.5; .5 SOLUTION RESPIRATORY (INHALATION) at 20:05

## 2021-02-19 RX ADMIN — CYCLOBENZAPRINE 10 MG: 10 TABLET, FILM COATED ORAL at 08:05

## 2021-02-19 RX ADMIN — CLOTRIMAZOLE 10 MG: 10 LOZENGE ORAL at 08:06

## 2021-02-19 RX ADMIN — ALPRAZOLAM 0.5 MG: 0.5 TABLET ORAL at 08:05

## 2021-02-19 RX ADMIN — GABAPENTIN 800 MG: 250 SOLUTION ORAL at 07:03

## 2021-02-19 RX ADMIN — LANSOPRAZOLE 30 MG: KIT at 07:03

## 2021-02-19 RX ADMIN — GUAIFENESIN 400 MG: 100 SOLUTION ORAL at 04:35

## 2021-02-19 RX ADMIN — OXYCODONE HYDROCHLORIDE 10 MG: 5 SOLUTION ORAL at 14:23

## 2021-02-19 RX ADMIN — ASPIRIN 81 MG: 81 TABLET, CHEWABLE ORAL at 08:06

## 2021-02-19 RX ADMIN — POLYETHYLENE GLYCOL 3350 17 G: 17 POWDER, FOR SOLUTION ORAL at 08:06

## 2021-02-19 RX ADMIN — GUAIFENESIN 400 MG: 100 SOLUTION ORAL at 08:07

## 2021-02-19 RX ADMIN — GABAPENTIN 800 MG: 250 SOLUTION ORAL at 16:01

## 2021-02-19 NOTE — PLAN OF CARE
Problem: Skin Injury Risk Increased  Goal: Skin Health and Integrity  Intervention: Optimize Skin Protection     Problem: Adult Inpatient Plan of Care  Goal: Plan of Care Review  Goal: Absence of Hospital-Acquired Illness or Injury  Intervention: Identify and Manage Fall Risk  Intervention: Prevent Skin Injury  Goal: Optimal Comfort and Wellbeing  Intervention: Provide Person-Centered Care     Problem: Fall Injury Risk  Goal: Absence of Fall and Fall-Related Injury  Intervention: Identify and Manage Contributors to Fall Injury Risk  Intervention: Promote Injury-Free Environment   Goal Outcome Evaluation:        Outcome Summary: IV dilaudid dc. Agnes increased to 10. When RN ask pain scale pt always states that it is a 10. Patient still refusing COVID swab. Completed trach care and wound on coccyx. no other changes

## 2021-02-19 NOTE — THERAPY TREATMENT NOTE
Patient Name: Maxim Carvajal  : 1985    MRN: [id number]                              Today's Date: 2021       Admit Date: 2021    Visit Dx:     ICD-10-CM ICD-9-CM   1. Tracheitis  J04.10 464.10   2. Community acquired pneumonia, unspecified laterality  J18.9 486   3. Anemia, unspecified type  D64.9 285.9     Patient Active Problem List   Diagnosis   • Tracheitis   • Acute on chronic respiratory failure with hypoxemia (CMS/HCC)   • Anemia   • Leukocytosis   • Quadriplegia (CMS/HCC)   • Essential hypertension   • HCAP (healthcare-associated pneumonia)   • Sepsis, unspecified organism (CMS/[facility])   • Community acquired pneumonia     Past Medical History:   Diagnosis Date   • Hypertension      Past Surgical History:   Procedure Laterality Date   • BRONCHOSCOPY N/A 2021    Procedure: BRONCHOSCOPY;  Surgeon: Darci Sam MD;  Location: [facility];  Service: Pulmonary;  Laterality: N/A;  PRE- MUCUS PLUG  POST- SAME     General Information     Row Name 21 1515          Physical Therapy Time and Intention    Document Type  therapy note (daily note)  -SV     Mode of Treatment  individual therapy;physical therapy  -SV     Row Name 21 1515          General Information    Patient Profile Reviewed  yes  -SV       User Key  (r) = Recorded By, (t) = Taken By, (c) = Cosigned By    Initials Name Provider Type    SV Kaylah Garduno, PT Physical Therapist        Mobility     Row Name 21 1556          Bed Mobility    Comment (Bed Mobility)  pt declined sup to sit and long sitting today  -SV       User Key  (r) = Recorded By, (t) = Taken By, (c) = Cosigned By    Initials Name Provider Type    SV Kaylah Garduno PT Physical Therapist        Obj/Interventions     Row Name 21 1557          Shoulder (Therapeutic Exercise)    Shoulder (Therapeutic Exercise)  -- PROM BLE all planes 10-12 reps , HS stretch BLE 3 reps 25 h  -SV       User Key  (r) = Recorded By, (t) = Taken By, (c) = Cosigned  By    Initials Name Provider Type    Kaylah Orr PT Physical Therapist        Goals/Plan    No documentation.       Clinical Impression     Row Name 02/19/21 1557          Pain Scale: Numbers Pre/Post-Treatment    Pretreatment Pain Rating  0/10 - no pain  -SV     Posttreatment Pain Rating  0/10 - no pain  -SV     Row Name 02/19/21 1557          Vital Signs    Pretreatment Heart Rate (beats/min)  75  -SV     Posttreatment Heart Rate (beats/min)  75  -SV     Pre SpO2 (%)  95  -SV     O2 Delivery Pre Treatment  trach collar  -SV     Intra SpO2 (%)  95  -SV     O2 Delivery Intra Treatment  trach collar  -SV     Post SpO2 (%)  95  -SV     O2 Delivery Post Treatment  trach collar  -SV     Pre Patient Position  Supine  -SV     Intra Patient Position  Supine  -SV     Post Patient Position  Supine  -SV     Row Name 02/19/21 1557          Positioning and Restraints    Pre-Treatment Position  in bed  -SV     In Bed  fowlers;call light within reach;with family/caregiver  -SV       User Key  (r) = Recorded By, (t) = Taken By, (c) = Cosigned By    Initials Name Provider Type    Kaylah Orr PT Physical Therapist        Outcome Measures     Row Name 02/19/21 1558          How much help from another person do you currently need...    Turning from your back to your side while in flat bed without using bedrails?  2  -SV     Moving from lying on back to sitting on the side of a flat bed without bedrails?  1  -SV     Moving to and from a bed to a chair (including a wheelchair)?  1  -SV     Standing up from a chair using your arms (e.g., wheelchair, bedside chair)?  1  -SV     Climbing 3-5 steps with a railing?  1  -SV     To walk in hospital room?  1  -SV     AM-PAC 6 Clicks Score (PT)  7  -SV       User Key  (r) = Recorded By, (t) = Taken By, (c) = Cosigned By    Initials Name Provider Type    Kaylah Orr PT Physical Therapist        Physical Therapy Education                 Title: PT OT SLP Therapies (In  Progress)     Topic: Physical Therapy (In Progress)     Point: Mobility training (Done)     Learning Progress Summary           Patient Acceptance, E, VU,NR by  at 2/19/2021 0746    Acceptance, E, VU by  at 2/18/2021 1625    Acceptance, E, VU,NR by CB at 2/13/2021 1525    Acceptance, E,TB,D, VU,NR by CB at 2/11/2021 1549                   Point: Home exercise program (In Progress)     Learning Progress Summary           Patient Acceptance, E,TB,D, VU,NR by  at 2/19/2021 1559    Acceptance, E, VU,NR by  at 2/19/2021 0746    Acceptance, E, VU by JM at 2/18/2021 1625    Acceptance, E,D, NR by PC at 2/9/2021 1639   Family Acceptance, E,D, NR by PC at 2/9/2021 1639                   Point: Body mechanics (Done)     Learning Progress Summary           Patient Acceptance, E, VU,NR by  at 2/19/2021 0746    Acceptance, E, VU by  at 2/18/2021 1625    Acceptance, E, VU,NR by CB at 2/13/2021 1525    Acceptance, E,TB,D, VU,NR by CB at 2/11/2021 1549                   Point: Precautions (Done)     Learning Progress Summary           Patient Acceptance, E, VU,NR by  at 2/19/2021 0746    Acceptance, E, VU by  at 2/18/2021 1625    Acceptance, E, VU,NR by CB at 2/13/2021 1525    Acceptance, E,TB,D, VU,NR by CB at 2/11/2021 1549                               User Key     Initials Effective Dates Name Provider Type Discipline    PC 04/03/18 -  Randi Christensen, PT Physical Therapist PT     03/07/18 -  Brittaney Jaffe PTA Physical Therapy Assistant PT     04/03/18 -  Kaylah Garduno, PT Physical Therapist PT     09/17/19 -  Darío Moulton, RN Registered Nurse Nurse    CB 12/30/20 -  Edna Jean Physical Therapist PT              PT Recommendation and Plan           Time Calculation:   PT Charges     Row Name 02/19/21 1514             Time Calculation    Start Time  1514  -SV      Stop Time  1529  -SV      Time Calculation (min)  15 min  -SV      PT Received On  02/19/21  -SV      PT - Next Appointment   02/22/21  -        User Key  (r) = Recorded By, (t) = Taken By, (c) = Cosigned By    Initials Name Provider Type    SV Kaylah Garduno, PT Physical Therapist        Therapy Charges for Today     Code Description Service Date Service Provider Modifiers Qty    81222549354  PT THERAPEUTIC ACT EA 15 MIN 2/19/2021 Kaylah Garduno, PT GP 1          PT G-Codes  Outcome Measure Options: AM-PAC 6 Clicks Daily Activity (OT)  AM-PAC 6 Clicks Score (PT): 7  AM-PAC 6 Clicks Score (OT): 7    Kaylah Garduno PT  2/19/2021

## 2021-02-19 NOTE — PLAN OF CARE
Goal Outcome Evaluation:  Plan of Care Reviewed With: patient     Outcome Summary: Pt works with OT on UE ROM and function today.  Pt cont to require full assist with ADL tasks.      Patient was not placed in a face mask during this therapy encounter. Therapist used appropriate personal protective equipment including surgical mask, eye shield and gloves during the entire therapy session. Hand hygiene was completed before and after therapy session. Patient is not in enhanced droplet precautions.

## 2021-02-19 NOTE — NURSING NOTE
Paged pulmonology, Dr. Davey once. Went to answer phone immediately but before this RN could  phone, physician hung up. Called back at same number and phone rang with no answer. Paged second time and no response. Will attempt again at a later time. Will continue to monitor pt.    2-19-21 3487

## 2021-02-19 NOTE — PROGRESS NOTES
[facility]      Mar/chart reviewed  Follow up Acute respiratory failure, pneumonia, mucous plugging  Pleural effusion   some cough    Vital Sign Min/Max for last 24 hours  Temp  Min: 97.8 °F (36.6 °C)  Max: 98.3 °F (36.8 °C)   BP  Min: 105/62  Max: 119/66   Pulse  Min: 72  Max: 90   Resp  Min: 16  Max: 18   SpO2  Min: 92 %  Max: 98 %   Flow (L/min)  Min: 7  Max: 7   No data recorded     Appears ill, alert, appropriate  perrl, eomi, normal sclear, +trush  mmm, no jvd, trachea midline, neck supple,  chest fair ae coarse bilaterally, no crackles, no wheezes,   rrr,   soft, nt, nd +bs,  no c/c/ e  Skin warm, dry no rashes     ASSESSMENT  /  PLAN:  1. Tracheitis and pneumonia with mucous plugging status post bronchoscopy on 2/4/2021: Growing both Pseudomonas and MRSA  2. Exchange to a cuffed tracheostomy  as of 2/4/2021  3. Bilateral pleural effusion left more than right  4. Acute hypoxemic respiratory failure  5. Sepsis  6. Quadriplegia  7. Anemia  8. Essential hypertension  9. Gluteal muscle bleed  10. Fever     Wean oxygen as able.  Continue pulmonary toilet as able    Ok with d/c anytime from my standpoint --after he gets changed to cuffless trach.  I will request this get put at the bedside. He refused exchange earlier this week, hopefully he will be more agreeable

## 2021-02-19 NOTE — PROGRESS NOTES
Continued Stay Note  [facility]     Patient Name: Maxim Carvajal  MRN: [id number]  Today's Date: 2/19/2021    Admit Date: 2/2/2021    Discharge Plan     Row Name 02/19/21 1612       Plan    Plan  [facility] both accept patient pending that trach is uncuffed    Plan Comments  Spoke with Ivon with [facility] and verified that they need the trach to be uncuffed.  Nursing is verifying if his trach is cuffed and readiness for uncuffed trach.  ...........................Margarette Garcia RN    Row Name 02/19/21 1551       Plan    Plan  [facility] both accept patient    Plan Comments  Spoke with [facility] Coordinator.  Asked her to discuss DC today with Dr Montanez.....................Margarette Garcia RN        Discharge Codes    No documentation.             Margarette Garcia RN

## 2021-02-19 NOTE — PROGRESS NOTES
Continued Stay Note  [facility]     Patient Name: Maxim Carvajal  MRN: [id number]  Today's Date: 2/19/2021    Admit Date: 2/2/2021    Discharge Plan     Row Name 02/19/21 1551       Plan    Plan  [facility] accept patient    Plan Comments  Spoke with [facility] Coordinator.  Asked her to discuss DC today with Dr Montanez.....................Margarette Garcia RN        Discharge Codes    No documentation.             Margarette Garcia RN

## 2021-02-19 NOTE — PROGRESS NOTES
Continued Stay Note  [facility]     Patient Name: Maxim Carvajal  MRN: [id number]  Today's Date: 2/19/2021    Admit Date: 2/2/2021    Discharge Plan     Row Name 02/19/21 1202       Plan    Plan  [facility] both accepts and have a bed today, does not need another Covid test    Plan Comments  Spoke with Ivon with [facility].  She states that [facility] both have beds today and accept patient.  She does not need another Covid test at this time...............................Margarette Garcia RN        Discharge Codes    No documentation.             Margarette Garcia RN

## 2021-02-19 NOTE — PROGRESS NOTES
Continued Stay Note  [facility]     Patient Name: Maxim Carvajal  MRN: [id number]  Today's Date: 2/19/2021    Admit Date: 2/2/2021    Discharge Plan     Row Name 02/19/21 1643       Plan    Plan  [facility] both accept patient once trach is uncuffed    Plan Comments  Per Dr Sam he states that patient refused to have his trach replaced with uncuffed a few days ago.  He would like nursing to have one placed at bedside and he will change it out tomorrow when he rounds.  Ivon with [facility] notified and states that [facility] will have a bed available over the weekend for the patient...........................Margarette Garcia RN    Row Name 02/19/21 1612       Plan    Plan  [facility] both accept patient pending that trach is uncuffed    Plan Comments  Spoke with Ivon with [facility] and verified that they need the trach to be uncuffed.  Nursing is verifying if his trach is cuffed and readiness for uncuffed trach.  ...........................Margarette Garcia RN    Row Name 02/19/21 1551       Plan    Plan  [facility] both accept patient    Plan Comments  Spoke with [facility] Coordinator.  Asked her to discuss DC today with Dr Montanez.....................Margarette Garcia RN        Discharge Codes    No documentation.             Margarette Garcia RN

## 2021-02-19 NOTE — PLAN OF CARE
Goal Outcome Evaluation:  Plan of Care Reviewed With: patient     Outcome Summary: Pt unable to renata more than heel cord, DF stretches and PROM LEs, pt in 10/10 pain in neck and shoulders but states no one will believe him; he is upset that staff are not listening to his needs yet he is dependent on them for everything; pt has been at [facility] then [facility], but is inquiring about [facility] today; pt is limited on activity by pain this session    Patient was not wearing a face mask during this therapy encounter. Therapist used appropriate personal protective equipment including eye protection, mask, and gloves.  Mask used was standard procedure mask. Appropriate PPE was worn during the entire therapy session. Hand hygiene was completed before and after therapy session. Patient is not in enhanced droplet precautions.

## 2021-02-19 NOTE — NURSING NOTE
"Pt refused covid swab stating \"I want to talk to my family first before I get swabbed.\" Educated pt on reasoning behind swab for transfer to SNF and pt verbalized understanding. Pt also refused repositioning, educated pt on importance of turning and shifting weight to prevent pressure ulcers. Pt verbalized understanding but still refused. Will continue to monitor pt.     - 2-19-21 1126  "

## 2021-02-19 NOTE — THERAPY TREATMENT NOTE
Patient Name: Maxim Carvajal  : 1985    MRN: [id number]                              Today's Date: 2021       Admit Date: 2021    Visit Dx:     ICD-10-CM ICD-9-CM   1. Tracheitis  J04.10 464.10   2. Community acquired pneumonia, unspecified laterality  J18.9 486   3. Anemia, unspecified type  D64.9 285.9     Patient Active Problem List   Diagnosis   • Tracheitis   • Acute on chronic respiratory failure with hypoxemia (CMS/HCC)   • Anemia   • Leukocytosis   • Quadriplegia (CMS/[facility])   • Essential hypertension   • HCAP (healthcare-associated pneumonia)   • Sepsis, unspecified organism (CMS/[facility])   • Community acquired pneumonia     Past Medical History:   Diagnosis Date   • Hypertension      Past Surgical History:   Procedure Laterality Date   • BRONCHOSCOPY N/A 2021    Procedure: BRONCHOSCOPY;  Surgeon: Darci Sam MD;  Location: [facility];  Service: Pulmonary;  Laterality: N/A;  PRE- MUCUS PLUG  POST- SAME     General Information     Row Name 21 1428          OT Time and Intention    Document Type  therapy note (daily note)  -HUY     Mode of Treatment  individual therapy;occupational therapy  -HUY     Row Name 21 1428          General Information    Existing Precautions/Restrictions  fall trach to tpiece.  lauraer taped to R hand dorsum  -HUY     Row Name 21 1428          Cognition    Orientation Status (Cognition)  oriented to;person;situation;place pt able to speak around trach.  -HUY       User Key  (r) = Recorded By, (t) = Taken By, (c) = Cosigned By    Initials Name Provider Type    Mariah Shirley OTR Occupational Therapist          Mobility/ADL's     Row Name 21 1429          Bed Mobility    Scooting/Bridging [place] (Bed Mobility)  2 person assist;dependent (less than 25% patient effort)  -HUY     Assistive Device (Bed Mobility)  bed rails;head of bed elevated  -HUY     Comment (Bed Mobility)  RN and OT work to reposition pt in bed.  -HUY     Row Name  "02/19/21 1429          Activities of Daily Living    BADL Assessment/Intervention  feeding  -     Row Name 02/19/21 1429          Self-Feeding Assessment/Training    Comment (Feeding)  full assist to drink from Cup with nursing.  -LE       User Key  (r) = Recorded By, (t) = Taken By, (c) = Cosigned By    Initials Name Provider Type    Mariah Shirley, OTR Occupational Therapist        Obj/Interventions     Row Name 02/19/21 1430          Sensory Assessment (Somatosensory)    Sensory Assessment (Somatosensory)  -- reports can feel legs and up to chest \"I feel my chest the best\"  -     Row Name 02/19/21 1430          Therapeutic Exercise    Therapeutic Exercise  shoulder;wrist;hand B PROM to all joints to end range.  AAROM as able with all joints for 10 reps.  -LE       User Key  (r) = Recorded By, (t) = Taken By, (c) = Cosigned By    Initials Name Provider Type    Mariah Shirley, OTR Occupational Therapist        Goals/Plan    No documentation.       Clinical Impression     Row Name 02/19/21 1431          Pain Scale: Numbers Pre/Post-Treatment    Pretreatment Pain Rating  8/10 \"all over\" RN gives meds  -LE     Posttreatment Pain Rating  8/10  -LE     Row Name 02/19/21 1431          Plan of Care Review    Plan of Care Reviewed With  patient  -LE     Outcome Summary  Pt works with OT on UE ROM and function today.  Pt cont to require full assist with ADL tasks.  -     Row Name 02/19/21 1431          Vital Signs    O2 Delivery Pre Treatment  trach collar  -LE     O2 Delivery Intra Treatment  trach collar tpiece  -LE     O2 Delivery Post Treatment  trach collar tpiece  -LE     Pre Patient Position  Supine  -LE     Intra Patient Position  Supine  -LE     Post Patient Position  Supine  -LE     Row Name 02/19/21 1431          Positioning and Restraints    Pre-Treatment Position  in bed  -LE     Post Treatment Position  bed  -LE     In Bed  side lying left;call light within reach;exit alarm on;RUE elevated;LUE " elevated;with nsg;R multipodus;L multipodus  -LE       User Key  (r) = Recorded By, (t) = Taken By, (c) = Cosigned By    Initials Name Provider Type    Mariah Shirley OTR Occupational Therapist        Outcome Measures     Row Name 02/19/21 1434          How much help from another is currently needed...    Putting on and taking off regular lower body clothing?  1  -LE     Bathing (including washing, rinsing, and drying)  1  -LE     Toileting (which includes using toilet bed pan or urinal)  1  -LE     Putting on and taking off regular upper body clothing  1  -LE     Taking care of personal grooming (such as brushing teeth)  2  -LE     Eating meals  1  -LE     AM-PAC 6 Clicks Score (OT)  7  -LE       User Key  (r) = Recorded By, (t) = Taken By, (c) = Cosigned By    Initials Name Provider Type    Mariah Shirley OTR Occupational Therapist        Occupational Therapy Education                 Title: PT OT SLP Therapies (In Progress)     Topic: Occupational Therapy (Done)     Point: ADL training (Done)     Description:   Instruct learner(s) on proper safety adaptation and remediation techniques during self care or transfers.   Instruct in proper use of assistive devices.              Learning Progress Summary           Patient Acceptance, E, VU,NR by  at 2/19/2021 0746    Acceptance, E,TB, VU by  at 2/10/2021 1540                               User Key     Initials Effective Dates Name Provider Type Discipline     09/17/19 -  Darío Moulton, RN Registered Nurse Nurse    PAIGE 07/15/20 -  Eri Batista OT Occupational Therapist OT              OT Recommendation and Plan     Plan of Care Review  Plan of Care Reviewed With: patient  Outcome Summary: Pt works with OT on UE ROM and function today.  Pt cont to require full assist with ADL tasks.     Time Calculation:   Time Calculation- OT     Row Name 02/19/21 1434             Time Calculation- OT    OT Start Time  1406  -LE      OT Stop Time  1422  -LE      OT  Time Calculation (min)  16 min  -LE      Total Timed Code Minutes- OT  16 minute(s)  -LE      OT Received On  02/19/21  -LE      OT - Next Appointment  02/22/21  -LE        User Key  (r) = Recorded By, (t) = Taken By, (c) = Cosigned By    Initials Name Provider Type    Mariah Shirley OTR Occupational Therapist        Therapy Charges for Today     Code Description Service Date Service Provider Modifiers Qty    75349020270  OT THER PROC EA 15 MIN 2/19/2021 Mariah Herr OTR GO 1               LUCAS Robles  2/19/2021

## 2021-02-19 NOTE — PROGRESS NOTES
Continued Stay Note  [facility]     Patient Name: Maxim Carvajal  MRN: [id number]  Today's Date: 2/19/2021    Admit Date: 2/2/2021    Discharge Plan     Row Name 02/19/21 1719       Plan    Plan  [facility] accepts and has a bed over the weekend when his trach is uncuffed, needs ambulance transport    Plan Comments  Spoke with patient and explained CCP anticipates dc tomorrow. Explained that [facility] has excepted and has a bed available over the weekend.  His mother Shaista had stated she was coming to visit in the am.  He wanted her to view the website with him.  She since called and states she can not come to visit in the morning.  Explained to patient that [facility] can provide the care he needs and has a lung specialist, RT, wound care and all his therapies.  He states he still have not decided yet.  Incoming call from his mother, Shaista.  She states that she now can not come in the morning to see Maxim.  Asked her to provide other choices for referrals and that we could look at out of state if they would accept his insurance.  She again was explained that [facility] declines him for acute rehab per their evaluations he did not meet criteria.  [facility] declines him for long term acute care and [facility] stated the care he needs can be provided in subacute rehab.  [facility] declines due to cost of care.  Only facilities that have accepted are with [facility], [facility] but now does not have a long term care bed he will need.  [facility] could accept and has the care he needs however now may not have a bed either.  [facility] has accepted they will have a bed available over the weekend once he has an uncuffed trach.  Dr Sam plans to change his trach out tomorrow with an uncuffed trach.  Shaista states she now can not visit until Tuesday and she will not let him go anywhere until then.  Patient states he would like to talk to Dr Sam tomorrow about his  discharge.  Explained to Shaista that once DC orders are written that the choice would be to go to accepting facility or home and offered to set up things he needs at home.  She states she can not provide care for him at home.  Explained at some point after discharge is written they may receive a letter of non coverage.  She states she will not sign anything and is not responsible for his bills or his insurance.  Encouraged her to visit her son and told her visitation hours are 7a to 7p.  ....................................Margarette Garcia RN    Row Name 02/19/21 1643       Plan    Plan  [facility] accept patient once trach is uncuffed    Plan Comments  Per Dr Sam he states that patient refused to have his trach replaced with uncuffed a few days ago.  He would like nursing to have one placed at bedside and he will change it out tomorrow when he rounds.  Ivon with [facility] notified and states that [facility] will have a bed available over the weekend for the patient...........................Margarette Garcia RN    Row Name 02/19/21 1612       Plan    Plan  [facility] both accept patient pending that trach is uncuffed    Plan Comments  Spoke with Ivon with [facility] and verified that they need the trach to be uncuffed.  Nursing is verifying if his trach is cuffed and readiness for uncuffed trach.  ...........................Margarette Garcia RN    Row Name 02/19/21 1551       Plan    Plan  [facility] both accept patient    Plan Comments  Spoke with [facility] Coordinator.  Asked her to discuss DC today with Dr Montanez.....................Margarette Garcia RN        Discharge Codes    No documentation.             Margarette Garcia RN

## 2021-02-19 NOTE — PROGRESS NOTES
"DAILY PROGRESS NOTE  [facility]    Patient Identification:  Name: Maxim Carvajal  Age: 35 y.o.  Sex: male  :  1985  MRN: [id number]         Primary Care Physician: Sheron Perez MD    Subjective:  Interval History:He complains of pain.  Hurts all over.    Objective:    Scheduled Meds:acetylcysteine, 3 mL, Nebulization, BID - RT  ascorbic acid, 1,000 mg, Oral, Daily  aspirin, 81 mg, Nasogastric, Daily  clotrimazole, 10 mg, Oral, 5x Daily  cyclobenzaprine, 10 mg, Oral, Daily  docusate sodium, 100 mg, Per G Tube, BID  Eucerin original healing lotion, , Topical, Daily  fentaNYL, 1 patch, Transdermal, Q72H  fluconazole, 100 mg, Oral, Q24H  Gabapentin, 800 mg, Per G Tube, Q8H  guaifenesin, 400 mg, Per G Tube, Q4H  hydrOXYzine, 25 mg, Oral, Daily  ipratropium-albuterol, 3 mL, Nebulization, TID - RT  lansoprazole, 30 mg, Nasogastric, QAM  polyethylene glycol, 17 g, Per G Tube, Daily  propranolol, 20 mg, Per G Tube, TID  sennosides-docusate, 2 tablet, Oral, Nightly  sertraline, 100 mg, Oral, Daily      Continuous Infusions:hold, 1 each        Vital signs in last 24 hours:  Temp:  [97.8 °F (36.6 °C)-98.3 °F (36.8 °C)] 97.8 °F (36.6 °C)  Heart Rate:  [72-90] 90  Resp:  [16-18] 16  BP: (105-119)/(55-66) 105/62    Intake/Output:    Intake/Output Summary (Last 24 hours) at 2021 1215  Last data filed at 2021 0800  Gross per 24 hour   Intake 390 ml   Output 1900 ml   Net -1510 ml       Exam:  /62 (BP Location: Right arm, Patient Position: Lying)   Pulse 90   Temp 97.8 °F (36.6 °C) (Oral)   Resp 16   Ht 182.9 cm (72.01\")   Wt 71.4 kg (157 lb 6.5 oz)   SpO2 97%   BMI 21.34 kg/m²     General Appearance:    Alert, cooperative, no distress   Head:    Normocephalic, without obvious abnormality, atraumatic   Eyes:       Throat:   Lips, tongue, gums normal   Neck:   Supple, symmetrical, trachea midline, no JVD   Lungs:     rhonchi bilaterally, respirations unlabored   Chest Wall:    No " tenderness or deformity    Heart:    Regular rate and rhythm, S1 and S2 normal, no murmur,no  Rub or gallop   Abdomen:     Soft, nontender, bowel sounds active, no masses, no organomegaly    Extremities:   Extremities normal, atraumatic, no cyanosis or edema   Pulses:      Skin:   Skin is warm and dry,  no rashes or palpable lesions   Neurologic:   quadraplegia.      Lab Results (last 72 hours)     Procedure Component Value Units Date/Time    AFB Culture - Body Fluid, Pleural Cavity [[id number]] Collected: 02/11/21 0955    Specimen: Body Fluid from Pleural Cavity Updated: 02/18/21 1030     AFB Culture No AFB isolated at 1 week     AFB Stain No acid fast bacilli seen on direct smear    Fungus Culture - Body Fluid, Pleural Cavity [[id number]] Collected: 02/11/21 0955    Specimen: Body Fluid from Pleural Cavity Updated: 02/18/21 1030     Fungus Culture No fungus isolated at 1 week    BAL Culture, Quantitative - Lavage, Lung, Right Middle Lobe [[id number]]  (Abnormal)  (Susceptibility) Collected: 02/16/21 1129    Specimen: Lavage from Lung, Right Middle Lobe Updated: 02/18/21 0802     BAL Culture <10,000 CFU/mL Staphylococcus aureus, MRSA     Comment: Methicillin resistant Staphylococcus aureus, Patient may be an isolation risk.         No Normal Respiratory Lisa     Gram Stain Rare (1+) WBCs seen      No organisms seen    Susceptibility      Staphylococcus aureus, MRSA     YOGI     Clindamycin Resistant     Linezolid Susceptible     Oxacillin Resistant     Penicillin G Resistant     Tetracycline Susceptible     Trimethoprim + Sulfamethoxazole Susceptible     Vancomycin Susceptible                    CBC & Differential [[id number]]  (Abnormal) Collected: 02/18/21 0511    Specimen: Blood Updated: 02/18/21 0541    Narrative:      The following orders were created for panel order CBC & Differential.  Procedure                               Abnormality         Status                     ---------                                -----------         ------                     CBC Auto Differential[[id number]]        Abnormal            Final result                 Please view results for these tests on the individual orders.    CBC Auto Differential [[id number]]  (Abnormal) Collected: 02/18/21 0511    Specimen: Blood Updated: 02/18/21 0541     WBC 5.44 10*3/mm3      RBC 2.83 10*6/mm3      Hemoglobin 7.3 g/dL      Hematocrit 22.9 %      MCV 80.9 fL      MCH 25.8 pg      MCHC 31.9 g/dL      RDW 17.1 %      RDW-SD 50.8 fl      MPV 10.6 fL      Platelets 271 10*3/mm3      Neutrophil % 62.1 %      Lymphocyte % 21.5 %      Monocyte % 7.9 %      Eosinophil % 8.1 %      Basophil % 0.2 %      Immature Grans % 0.2 %      Neutrophils, Absolute 3.38 10*3/mm3      Lymphocytes, Absolute 1.17 10*3/mm3      Monocytes, Absolute 0.43 10*3/mm3      Eosinophils, Absolute 0.44 10*3/mm3      Basophils, Absolute 0.01 10*3/mm3      Immature Grans, Absolute 0.01 10*3/mm3      nRBC 0.2 /100 WBC     Non-gynecologic Cytology [[id number]] Collected: 02/16/21 1129    Specimen: Lavage from Lung, Right Middle Lobe Updated: 02/17/21 0949     Case Report --     Non-gynecologic Cytology                          Case: [id number]                                  Authorizing Provider:  Darci Sam MD        Collected:           02/16/2021 11:29 AM          Ordering Location:     [facility]  Received:            02/16/2021 02:25 PM                                 ENDO SUITES                                                                  Pathologist:           Sydney Encinas MD                                                    Specimen:    Lung, Right Middle Lobe, BAL RML                                                            Final Diagnosis --     1. Lung, Right Middle Lobe, Bronchoalveolar Lavage (BAL):   A. Negative for malignant cells.   B. Pulmonary macrophages, benign bronchial cells, squamous cells, acute inflammation, and mucus.   C. No  pneumocystis or fungal organisms identified by GMS stain.       Clinical Information --     PNEUMOCYSTIS, FUNGUS ON CYTO       Gross Description --     1 vial received containing 15 ml of cloudy white fluid w/ mucus. 1 thin prep, cellblock, & GMS. No remaining fluid.       Microscopic Description --     Pulmonary macrophages, benign bronchial cells, squamous cells, acute inflammation, and mucus.    Screened by MICAELA Lee.         Special Stains --     Utilizing appropriate controls, a GMS stain is performed on the cell block and is judged negative.      Basic Metabolic Panel [[id number]]  (Abnormal) Collected: 02/17/21 0543    Specimen: Blood Updated: 02/17/21 0703     Glucose 117 mg/dL      BUN 49 mg/dL      Creatinine 0.71 mg/dL      Sodium 135 mmol/L      Potassium 4.4 mmol/L      Chloride 97 mmol/L      CO2 27.4 mmol/L      Calcium 9.8 mg/dL      eGFR Non African Amer 125 mL/min/1.73      BUN/Creatinine Ratio 69.0     Anion Gap 10.6 mmol/L     Narrative:      GFR Normal >60  Chronic Kidney Disease <60  Kidney Failure <15      CBC & Differential [[id number]]  (Abnormal) Collected: 02/17/21 0543    Specimen: Blood Updated: 02/17/21 0623    Narrative:      The following orders were created for panel order CBC & Differential.  Procedure                               Abnormality         Status                     ---------                               -----------         ------                     CBC Auto Differential[[id number]]        Abnormal            Final result                 Please view results for these tests on the individual orders.    CBC Auto Differential [[id number]]  (Abnormal) Collected: 02/17/21 0543    Specimen: Blood Updated: 02/17/21 0623     WBC 5.69 10*3/mm3      RBC 2.96 10*6/mm3      Hemoglobin 7.6 g/dL      Hematocrit 23.7 %      MCV 80.1 fL      MCH 25.7 pg      MCHC 32.1 g/dL      RDW 16.9 %      RDW-SD 49.0 fl      MPV 10.5 fL      Platelets 270 10*3/mm3      Neutrophil % 64.3 %       Lymphocyte % 21.1 %      Monocyte % 10.5 %      Eosinophil % 3.7 %      Basophil % 0.0 %      Immature Grans % 0.4 %      Neutrophils, Absolute 3.66 10*3/mm3      Lymphocytes, Absolute 1.20 10*3/mm3      Monocytes, Absolute 0.60 10*3/mm3      Eosinophils, Absolute 0.21 10*3/mm3      Basophils, Absolute 0.00 10*3/mm3      Immature Grans, Absolute 0.02 10*3/mm3      nRBC 0.0 /100 WBC     Fungus Smear - Lavage, Lung, Right Middle Lobe [[id number]] Collected: 02/16/21 1129    Specimen: Lavage from Lung, Right Middle Lobe Updated: 02/16/21 1403     Fungal Stain No yeast or hyphal elements seen    Respiratory Panel, PCR (WITHOUT COVID) - Lavage, Lung, Right Middle Lobe [[id number]]  (Normal) Collected: 02/16/21 1129    Specimen: Lavage from Lung, Right Middle Lobe Updated: 02/16/21 1257     ADENOVIRUS, PCR Not Detected     Coronavirus 229E Not Detected     Coronavirus HKU1 Not Detected     Coronavirus NL63 Not Detected     Coronavirus OC43 Not Detected     Human Metapneumovirus Not Detected     Human Rhinovirus/Enterovirus Not Detected     Influenza B PCR Not Detected     Parainfluenza Virus 1 Not Detected     Parainfluenza Virus 2 Not Detected     Parainfluenza Virus 3 Not Detected     Parainfluenza Virus 4 Not Detected     Bordetella pertussis pcr Not Detected     Chlamydophila pneumoniae PCR Not Detected     Mycoplasma pneumo by PCR Not Detected     Influenza A PCR Not Detected     RSV, PCR Not Detected     Bordetella parapertussis PCR Not Detected    Narrative:      The coronavirus on the RVP is NOT COVID-19 and is NOT indicative of infection with COVID-19.     Fungus Culture - Lavage, Lung, Right Middle Lobe [[id number]] Collected: 02/16/21 1129    Specimen: Lavage from Lung, Right Middle Lobe Updated: 02/16/21 1147    COVID PRE-OP / PRE-PROCEDURE SCREENING ORDER (NO ISOLATION) - Swab, Nasopharynx [[id number]]  (Normal) Collected: 02/16/21 0626    Specimen: Swab from Nasopharynx Updated: 02/16/21 0731    Narrative:       The following orders were created for panel order COVID PRE-OP / PRE-PROCEDURE SCREENING ORDER (NO ISOLATION) - Swab, Nasopharynx.  Procedure                               Abnormality         Status                     ---------                               -----------         ------                     COVID-19,[facility] IN-HOUSE...[[id number]]  Normal              Final result                 Please view results for these tests on the individual orders.    COVID-19,[facility] IN-HOUSE CEPHEID, NP SWAB IN TRANSPORT MEDIA 8-12 HR TAT - Swab, Nasopharynx [[id number]]  (Normal) Collected: 02/16/21 0626    Specimen: Swab from Nasopharynx Updated: 02/16/21 0731     COVID19 Not Detected    Narrative:      Fact sheet for providers: https://www.fda.gov/media/568379/download     Fact sheet for patients: https://www.fda.gov/media/019286/download    Anaerobic Culture - Pleural Fluid, Pleural Cavity [[id number]] Collected: 02/11/21 0955    Specimen: Pleural Fluid from Pleural Cavity Updated: 02/16/21 0657     Anaerobic Culture No anaerobes isolated at 5 days    Basic Metabolic Panel [[id number]]  (Abnormal) Collected: 02/16/21 0500    Specimen: Blood Updated: 02/16/21 0629     Glucose 93 mg/dL      BUN 47 mg/dL      Creatinine 0.76 mg/dL      Sodium 131 mmol/L      Potassium 5.3 mmol/L      Chloride 94 mmol/L      CO2 26.8 mmol/L      Calcium 9.8 mg/dL      eGFR Non African Amer 115 mL/min/1.73      BUN/Creatinine Ratio 61.8     Anion Gap 10.2 mmol/L     Narrative:      GFR Normal >60  Chronic Kidney Disease <60  Kidney Failure <15      CBC & Differential [[id number]]  (Abnormal) Collected: 02/16/21 0500    Specimen: Blood Updated: 02/16/21 0609    Narrative:      The following orders were created for panel order CBC & Differential.  Procedure                               Abnormality         Status                     ---------                               -----------         ------                     CBC Auto Differential[[id number]]         Abnormal            Final result                 Please view results for these tests on the individual orders.    CBC Auto Differential [[id number]]  (Abnormal) Collected: 02/16/21 0500    Specimen: Blood Updated: 02/16/21 0609     WBC 7.38 10*3/mm3      RBC 2.93 10*6/mm3      Hemoglobin 7.7 g/dL      Hematocrit 23.5 %      MCV 80.2 fL      MCH 26.3 pg      MCHC 32.8 g/dL      RDW 17.1 %      RDW-SD 50.3 fl      MPV 10.5 fL      Platelets 285 10*3/mm3      Neutrophil % 69.1 %      Lymphocyte % 18.3 %      Monocyte % 9.1 %      Eosinophil % 3.1 %      Basophil % 0.1 %      Immature Grans % 0.3 %      Neutrophils, Absolute 5.10 10*3/mm3      Lymphocytes, Absolute 1.35 10*3/mm3      Monocytes, Absolute 0.67 10*3/mm3      Eosinophils, Absolute 0.23 10*3/mm3      Basophils, Absolute 0.01 10*3/mm3      Immature Grans, Absolute 0.02 10*3/mm3      nRBC 0.0 /100 WBC     Body Fluid Culture - Body Fluid, Pleural Cavity [[id number]] Collected: 02/11/21 0955    Specimen: Body Fluid from Pleural Cavity Updated: 02/16/21 0454     Body Fluid Culture No growth at 5 days     Gram Stain No WBCs or organisms seen    Potassium [[id number]]  (Abnormal) Collected: 02/15/21 1914    Specimen: Blood Updated: 02/15/21 2014     Potassium 5.4 mmol/L         Data Review:  Results from last 7 days   Lab Units 02/19/21  0538 02/17/21  0543 02/16/21  0500   SODIUM mmol/L 137 135* 131*   POTASSIUM mmol/L 4.6 4.4 5.3*   CHLORIDE mmol/L 97* 97* 94*   CO2 mmol/L 30.3* 27.4 26.8   BUN mg/dL 45* 49* 47*   CREATININE mg/dL 0.71* 0.71* 0.76   GLUCOSE mg/dL 112* 117* 93   CALCIUM mg/dL 9.8 9.8 9.8     Results from last 7 days   Lab Units 02/19/21  0538 02/18/21  0511 02/17/21  0543   WBC 10*3/mm3 6.65 5.44 5.69   HEMOGLOBIN g/dL 7.5* 7.3* 7.6*   HEMATOCRIT % 23.6* 22.9* 23.7*   PLATELETS 10*3/mm3 278 271 270             Lab Results   Lab Value Date/Time    TROPONINT <0.010 02/02/2021 0742    TROPONINT <0.010 02/02/2021 0608         Results from last 7  days   Lab Units 02/14/21  0703   ALK PHOS U/L 83   BILIRUBIN mg/dL 1.3*   ALT (SGPT) U/L 11   AST (SGOT) U/L 14             No results found for: POCGLU        Past Medical History:   Diagnosis Date   • Hypertension        Assessment:  Active Hospital Problems    Diagnosis  POA   • **HCAP (healthcare-associated pneumonia) [J18.9]  Unknown   • Tracheitis [J04.10]  Yes   • Acute on chronic respiratory failure with hypoxemia (CMS/HCC) [J96.21]  Yes   • Anemia [D64.9]  Yes   • Leukocytosis [D72.829]  Yes   • Quadriplegia (CMS/HCC) [G82.50]  Yes   • Essential hypertension [I10]  Yes   • Sepsis, unspecified organism (CMS/HCC) [A41.9]  Unknown   • Community acquired pneumonia [J18.9]  Unknown      Resolved Hospital Problems   No resolved problems to display.       Plan:  Continue RX. Looking for rehab. As per pulmonary. Follow lab. Finished antibiotics.  ? Possible bed at [facility]??    Fausto Montanez MD  2/19/2021  12:15 EST

## 2021-02-19 NOTE — PLAN OF CARE
"Goal Outcome Evaluation:   Pt agreed to BLE \" stretching \" today. He declined long sit and sup to sit eob. Relative present. PROM BLE as per notes performed.         "

## 2021-02-20 LAB
ANION GAP SERPL CALCULATED.3IONS-SCNC: 10.9 MMOL/L (ref 5–15)
BASOPHILS # BLD AUTO: 0 10*3/MM3 (ref 0–0.2)
BASOPHILS NFR BLD AUTO: 0 % (ref 0–1.5)
BUN SERPL-MCNC: 43 MG/DL (ref 6–20)
BUN/CREAT SERPL: 67.2 (ref 7–25)
CALCIUM SPEC-SCNC: 10.2 MG/DL (ref 8.6–10.5)
CHLORIDE SERPL-SCNC: 93 MMOL/L (ref 98–107)
CO2 SERPL-SCNC: 31.1 MMOL/L (ref 22–29)
CREAT SERPL-MCNC: 0.64 MG/DL (ref 0.76–1.27)
DEPRECATED RDW RBC AUTO: 49.6 FL (ref 37–54)
EOSINOPHIL # BLD AUTO: 0.4 10*3/MM3 (ref 0–0.4)
EOSINOPHIL NFR BLD AUTO: 5 % (ref 0.3–6.2)
ERYTHROCYTE [DISTWIDTH] IN BLOOD BY AUTOMATED COUNT: 17 % (ref 12.3–15.4)
GFR SERPL CREATININE-BSD FRML MDRD: 141 ML/MIN/1.73
GLUCOSE SERPL-MCNC: 102 MG/DL (ref 65–99)
HCT VFR BLD AUTO: 22 % (ref 37.5–51)
HGB BLD-MCNC: 7.2 G/DL (ref 13–17.7)
IMM GRANULOCYTES # BLD AUTO: 0.04 10*3/MM3 (ref 0–0.05)
IMM GRANULOCYTES NFR BLD AUTO: 0.5 % (ref 0–0.5)
LYMPHOCYTES # BLD AUTO: 1.55 10*3/MM3 (ref 0.7–3.1)
LYMPHOCYTES NFR BLD AUTO: 19.3 % (ref 19.6–45.3)
MCH RBC QN AUTO: 26.3 PG (ref 26.6–33)
MCHC RBC AUTO-ENTMCNC: 32.7 G/DL (ref 31.5–35.7)
MCV RBC AUTO: 80.3 FL (ref 79–97)
MONOCYTES # BLD AUTO: 0.45 10*3/MM3 (ref 0.1–0.9)
MONOCYTES NFR BLD AUTO: 5.6 % (ref 5–12)
NEUTROPHILS NFR BLD AUTO: 5.58 10*3/MM3 (ref 1.7–7)
NEUTROPHILS NFR BLD AUTO: 69.6 % (ref 42.7–76)
NRBC BLD AUTO-RTO: 0 /100 WBC (ref 0–0.2)
PLATELET # BLD AUTO: 276 10*3/MM3 (ref 140–450)
PMV BLD AUTO: 10.1 FL (ref 6–12)
POTASSIUM SERPL-SCNC: 4.7 MMOL/L (ref 3.5–5.2)
RBC # BLD AUTO: 2.74 10*6/MM3 (ref 4.14–5.8)
SODIUM SERPL-SCNC: 135 MMOL/L (ref 136–145)
WBC # BLD AUTO: 8.02 10*3/MM3 (ref 3.4–10.8)

## 2021-02-20 PROCEDURE — 85025 COMPLETE CBC W/AUTO DIFF WBC: CPT | Performed by: INTERNAL MEDICINE

## 2021-02-20 PROCEDURE — 94799 UNLISTED PULMONARY SVC/PX: CPT

## 2021-02-20 PROCEDURE — 80048 BASIC METABOLIC PNL TOTAL CA: CPT | Performed by: HOSPITALIST

## 2021-02-20 RX ADMIN — ACETYLCYSTEINE 3 ML: 200 SOLUTION ORAL; RESPIRATORY (INHALATION) at 19:32

## 2021-02-20 RX ADMIN — GUAIFENESIN 400 MG: 100 SOLUTION ORAL at 13:45

## 2021-02-20 RX ADMIN — POLYETHYLENE GLYCOL 3350 17 G: 17 POWDER, FOR SOLUTION ORAL at 08:14

## 2021-02-20 RX ADMIN — CYCLOBENZAPRINE 10 MG: 10 TABLET, FILM COATED ORAL at 08:13

## 2021-02-20 RX ADMIN — FLUCONAZOLE 100 MG: 100 TABLET ORAL at 16:36

## 2021-02-20 RX ADMIN — GUAIFENESIN 400 MG: 100 SOLUTION ORAL at 05:02

## 2021-02-20 RX ADMIN — SERTRALINE 100 MG: 100 TABLET, FILM COATED ORAL at 08:14

## 2021-02-20 RX ADMIN — IPRATROPIUM BROMIDE AND ALBUTEROL SULFATE 3 ML: 2.5; .5 SOLUTION RESPIRATORY (INHALATION) at 19:31

## 2021-02-20 RX ADMIN — GABAPENTIN 800 MG: 250 SOLUTION ORAL at 22:54

## 2021-02-20 RX ADMIN — FENTANYL 1 PATCH: 25 PATCH TRANSDERMAL at 12:39

## 2021-02-20 RX ADMIN — Medication: at 09:28

## 2021-02-20 RX ADMIN — GABAPENTIN 800 MG: 250 SOLUTION ORAL at 06:15

## 2021-02-20 RX ADMIN — OXYCODONE HYDROCHLORIDE 10 MG: 5 SOLUTION ORAL at 18:55

## 2021-02-20 RX ADMIN — PROPRANOLOL HYDROCHLORIDE 20 MG: 20 TABLET ORAL at 08:14

## 2021-02-20 RX ADMIN — ASPIRIN 81 MG: 81 TABLET, CHEWABLE ORAL at 08:14

## 2021-02-20 RX ADMIN — GUAIFENESIN 400 MG: 100 SOLUTION ORAL at 18:55

## 2021-02-20 RX ADMIN — DOCUSATE SODIUM 100 MG: 50 LIQUID ORAL at 08:14

## 2021-02-20 RX ADMIN — OXYCODONE HYDROCHLORIDE AND ACETAMINOPHEN 1000 MG: 500 TABLET ORAL at 08:13

## 2021-02-20 RX ADMIN — DOCUSATE SODIUM 50MG AND SENNOSIDES 8.6MG 2 TABLET: 8.6; 5 TABLET, FILM COATED ORAL at 20:50

## 2021-02-20 RX ADMIN — GUAIFENESIN 400 MG: 100 SOLUTION ORAL at 20:51

## 2021-02-20 RX ADMIN — OXYCODONE HYDROCHLORIDE 10 MG: 5 SOLUTION ORAL at 22:55

## 2021-02-20 RX ADMIN — HYDROXYZINE HYDROCHLORIDE 25 MG: 25 TABLET ORAL at 08:13

## 2021-02-20 RX ADMIN — IPRATROPIUM BROMIDE AND ALBUTEROL SULFATE 3 ML: 2.5; .5 SOLUTION RESPIRATORY (INHALATION) at 15:30

## 2021-02-20 RX ADMIN — OXYCODONE HYDROCHLORIDE 10 MG: 5 SOLUTION ORAL at 00:37

## 2021-02-20 RX ADMIN — OXYCODONE HYDROCHLORIDE 10 MG: 5 SOLUTION ORAL at 05:01

## 2021-02-20 RX ADMIN — OXYCODONE HYDROCHLORIDE 10 MG: 5 SOLUTION ORAL at 09:25

## 2021-02-20 RX ADMIN — GUAIFENESIN 400 MG: 100 SOLUTION ORAL at 08:14

## 2021-02-20 RX ADMIN — ALPRAZOLAM 0.5 MG: 0.5 TABLET ORAL at 08:14

## 2021-02-20 RX ADMIN — PROPRANOLOL HYDROCHLORIDE 20 MG: 20 TABLET ORAL at 18:55

## 2021-02-20 RX ADMIN — GABAPENTIN 800 MG: 250 SOLUTION ORAL at 16:35

## 2021-02-20 RX ADMIN — DOCUSATE SODIUM 100 MG: 50 LIQUID ORAL at 20:51

## 2021-02-20 RX ADMIN — GUAIFENESIN 400 MG: 100 SOLUTION ORAL at 00:37

## 2021-02-20 RX ADMIN — CLOTRIMAZOLE 10 MG: 10 LOZENGE ORAL at 12:39

## 2021-02-20 RX ADMIN — OXYCODONE HYDROCHLORIDE 10 MG: 5 SOLUTION ORAL at 13:45

## 2021-02-20 RX ADMIN — IPRATROPIUM BROMIDE AND ALBUTEROL SULFATE 3 ML: 2.5; .5 SOLUTION RESPIRATORY (INHALATION) at 07:47

## 2021-02-20 RX ADMIN — LANSOPRAZOLE 30 MG: KIT at 06:15

## 2021-02-20 RX ADMIN — PROPRANOLOL HYDROCHLORIDE 20 MG: 20 TABLET ORAL at 20:50

## 2021-02-20 RX ADMIN — ACETYLCYSTEINE 3 ML: 200 SOLUTION ORAL; RESPIRATORY (INHALATION) at 07:47

## 2021-02-20 RX ADMIN — ALPRAZOLAM 0.5 MG: 0.5 TABLET ORAL at 18:55

## 2021-02-20 NOTE — PROGRESS NOTES
"      [facility]         Dr Erickson Sayied   LOS: 18 days   Patient Care Team:  Sheron Perez MD as PCP - General (Family Medicine)    Chief Complaint: Tracheitis and pneumonia with mucous plugging status post trach bilateral pleural effusion    Interval History: Resting comfortably.  Still has purulent secretions up from the trach.    REVIEW OF SYSTEMS:   CARDIOVASCULAR: No chest pain, chest pressure or chest discomfort. No palpitations or edema.   RESPIRATORY: Coughing up some purulent sputum from the trach  GASTROINTESTINAL: No anorexia, nausea, vomiting or diarrhea. No abdominal pain or blood.   HEMATOLOGIC: No bleeding or bruising.     Ventilator/Non-Invasive Ventilation Settings (From admission, onward)     Start     Ordered    02/04/21 1607  Ventilator - AC/VC; (18); 5; 500  Continuous,   Status:  Canceled     Question Answer Comment   Vent Mode AC/VC    Breath rate  18   PEEP 5    Tidal Volume 500        02/04/21 1607                  Vital Signs  Temp:  [97.9 °F (36.6 °C)-98.5 °F (36.9 °C)] 98.2 °F (36.8 °C)  Heart Rate:  [62-86] 70  Resp:  [16-18] 18  BP: ()/(58-76) 116/76    Intake/Output Summary (Last 24 hours) at 2/20/2021 1407  Last data filed at 2/20/2021 1359  Gross per 24 hour   Intake 220 ml   Output 1900 ml   Net -1680 ml     Flowsheet Rows      First Filed Value   Admission Height  180.3 cm (71\") Documented at 02/02/2021 0547   Admission Weight  73.3 kg (161 lb 11.2 oz) Documented at 02/02/2021 0641          Physical Exam:  Patient is examined using the personal protective equipment as per guidelines from infection control for this particular patient as enacted.  Hand hygiene was performed before and after patient interaction.   General Appearance:    Alert, cooperative, in no acute distress.  Following simple commands  Neck midline trachea no thyromegaly.  Trach site appears to be clean   Lungs:    Diminished breath sounds rhonchi bilaterally on the basis    Heart:   "  Regular rhythm and normal rate, normal S1 and S2, no            murmur, no gallop, no rub, no click   Chest Wall:    No abnormalities observed   Abdomen:     Normal bowel sounds, no masses, no organomegaly, soft        non-tender, non-distended, no guarding, no rebound                tenderness.  PEG tube in place   Extremities:   Moves all extremities well, no edema, no cyanosis, no             redness  CNS quadriplegic       Results Review:        Results from last 7 days   Lab Units 02/20/21  0443 02/19/21  0538 02/17/21  0543   SODIUM mmol/L 135* 137 135*   POTASSIUM mmol/L 4.7 4.6 4.4   CHLORIDE mmol/L 93* 97* 97*   CO2 mmol/L 31.1* 30.3* 27.4   BUN mg/dL 43* 45* 49*   CREATININE mg/dL 0.64* 0.71* 0.71*   GLUCOSE mg/dL 102* 112* 117*   CALCIUM mg/dL 10.2 9.8 9.8         Results from last 7 days   Lab Units 02/20/21  0443 02/19/21  0538 02/18/21  0511   WBC 10*3/mm3 8.02 6.65 5.44   HEMOGLOBIN g/dL 7.2* 7.5* 7.3*   HEMATOCRIT % 22.0* 23.6* 22.9*   PLATELETS 10*3/mm3 276 278 271             Results from last 7 days   Lab Units 02/15/21  0500   MAGNESIUM mg/dL 2.3               I reviewed the patient's new clinical results.  I personally viewed and interpreted the patient's CXR        Medication Review:   acetylcysteine, 3 mL, Nebulization, BID - RT  ascorbic acid, 1,000 mg, Oral, Daily  aspirin, 81 mg, Nasogastric, Daily  clotrimazole, 10 mg, Oral, 5x Daily  cyclobenzaprine, 10 mg, Oral, Daily  docusate sodium, 100 mg, Per G Tube, BID  Eucerin original healing lotion, , Topical, Daily  fentaNYL, 1 patch, Transdermal, Q72H  fluconazole, 100 mg, Oral, Q24H  Gabapentin, 800 mg, Per G Tube, Q8H  guaifenesin, 400 mg, Per G Tube, Q4H  hydrOXYzine, 25 mg, Oral, Daily  ipratropium-albuterol, 3 mL, Nebulization, TID - RT  lansoprazole, 30 mg, Nasogastric, QAM  polyethylene glycol, 17 g, Per G Tube, Daily  propranolol, 20 mg, Per G Tube, TID  sennosides-docusate, 2 tablet, Oral, Nightly  sertraline, 100 mg, Oral,  Daily        hold, 1 each        ASSESSMENT:   1. Tracheitis and pneumonia with mucous plugging status post bronchoscopy on 2/4/2021: Growing both Pseudomonas and MRSA  2. Exchange to a cuffed tracheostomy  as of 2/4/2021  3. Bilateral pleural effusion left more than right  4. Acute hypoxemic respiratory failure  5. Sepsis  6. Quadriplegia  7. Anemia  8. Essential hypertension  9. Gluteal muscle bleed  10. Fever    PLAN:  Remains on trach collar cuff deflated.  Continue aggressive pulmonary toilet  Okay to discharge anytime from pulmonary point of view.  He is likely colonized with Pseudomonas and MRSA.      Dre Hdez MD  02/20/21  14:07 EST

## 2021-02-20 NOTE — PROGRESS NOTES
Access did follow up on pt.Pt sleeping and not waking to name being called. Nurse states he has been cooperative this morning but she will need to talk with him about agreeing to some medical procedure he has previously refused. Access will continue to follow.

## 2021-02-20 NOTE — PROGRESS NOTES
"DAILY PROGRESS NOTE  [facility]    Patient Identification:  Name: Maxim Carvajal  Age: 35 y.o.  Sex: male  :  1985  MRN: [id number]         Primary Care Physician: Sheron Perez MD    Subjective:  Interval History:He complains of pain.  Hurts all over.    Objective:    Scheduled Meds:acetylcysteine, 3 mL, Nebulization, BID - RT  ascorbic acid, 1,000 mg, Oral, Daily  aspirin, 81 mg, Nasogastric, Daily  clotrimazole, 10 mg, Oral, 5x Daily  cyclobenzaprine, 10 mg, Oral, Daily  docusate sodium, 100 mg, Per G Tube, BID  Eucerin original healing lotion, , Topical, Daily  fentaNYL, 1 patch, Transdermal, Q72H  fluconazole, 100 mg, Oral, Q24H  Gabapentin, 800 mg, Per G Tube, Q8H  guaifenesin, 400 mg, Per G Tube, Q4H  hydrOXYzine, 25 mg, Oral, Daily  ipratropium-albuterol, 3 mL, Nebulization, TID - RT  lansoprazole, 30 mg, Nasogastric, QAM  polyethylene glycol, 17 g, Per G Tube, Daily  propranolol, 20 mg, Per G Tube, TID  sennosides-docusate, 2 tablet, Oral, Nightly  sertraline, 100 mg, Oral, Daily      Continuous Infusions:hold, 1 each        Vital signs in last 24 hours:  Temp:  [97.9 °F (36.6 °C)-98.5 °F (36.9 °C)] 98.2 °F (36.8 °C)  Heart Rate:  [62-86] 70  Resp:  [16-18] 18  BP: ()/(58-76) 116/76    Intake/Output:    Intake/Output Summary (Last 24 hours) at 2021 1344  Last data filed at 2021 1251  Gross per 24 hour   Intake 170 ml   Output 1900 ml   Net -1730 ml       Exam:  /76 (BP Location: Right arm, Patient Position: Lying)   Pulse 70   Temp 98.2 °F (36.8 °C) (Oral)   Resp 18   Ht 182.9 cm (72.01\")   Wt 71.4 kg (157 lb 6.5 oz)   SpO2 100%   BMI 21.34 kg/m²     General Appearance:    Alert, cooperative, no distress   Head:    Normocephalic, without obvious abnormality, atraumatic   Eyes:       Throat:   Lips, tongue, gums normal   Neck:   Supple, symmetrical, trachea midline, no JVD   Lungs:     rhonchi bilaterally, respirations unlabored   Chest Wall:    No " tenderness or deformity    Heart:    Regular rate and rhythm, S1 and S2 normal, no murmur,no  Rub or gallop   Abdomen:     Soft, nontender, bowel sounds active, no masses, no organomegaly    Extremities:   Extremities normal, atraumatic, no cyanosis or edema   Pulses:      Skin:   Skin is warm and dry,  no rashes or palpable lesions   Neurologic:   quadraplegia.      Lab Results (last 72 hours)     Procedure Component Value Units Date/Time    AFB Culture - Body Fluid, Pleural Cavity [[id number]] Collected: 02/11/21 0955    Specimen: Body Fluid from Pleural Cavity Updated: 02/18/21 1030     AFB Culture No AFB isolated at 1 week     AFB Stain No acid fast bacilli seen on direct smear    Fungus Culture - Body Fluid, Pleural Cavity [[id number]] Collected: 02/11/21 0955    Specimen: Body Fluid from Pleural Cavity Updated: 02/18/21 1030     Fungus Culture No fungus isolated at 1 week    BAL Culture, Quantitative - Lavage, Lung, Right Middle Lobe [[id number]]  (Abnormal)  (Susceptibility) Collected: 02/16/21 1129    Specimen: Lavage from Lung, Right Middle Lobe Updated: 02/18/21 0802     BAL Culture <10,000 CFU/mL Staphylococcus aureus, MRSA     Comment: Methicillin resistant Staphylococcus aureus, Patient may be an isolation risk.         No Normal Respiratory Lisa     Gram Stain Rare (1+) WBCs seen      No organisms seen    Susceptibility      Staphylococcus aureus, MRSA     YOGI     Clindamycin Resistant     Linezolid Susceptible     Oxacillin Resistant     Penicillin G Resistant     Tetracycline Susceptible     Trimethoprim + Sulfamethoxazole Susceptible     Vancomycin Susceptible                    CBC & Differential [[id number]]  (Abnormal) Collected: 02/18/21 0511    Specimen: Blood Updated: 02/18/21 0541    Narrative:      The following orders were created for panel order CBC & Differential.  Procedure                               Abnormality         Status                     ---------                                -----------         ------                     CBC Auto Differential[[id number]]        Abnormal            Final result                 Please view results for these tests on the individual orders.    CBC Auto Differential [[id number]]  (Abnormal) Collected: 02/18/21 0511    Specimen: Blood Updated: 02/18/21 0541     WBC 5.44 10*3/mm3      RBC 2.83 10*6/mm3      Hemoglobin 7.3 g/dL      Hematocrit 22.9 %      MCV 80.9 fL      MCH 25.8 pg      MCHC 31.9 g/dL      RDW 17.1 %      RDW-SD 50.8 fl      MPV 10.6 fL      Platelets 271 10*3/mm3      Neutrophil % 62.1 %      Lymphocyte % 21.5 %      Monocyte % 7.9 %      Eosinophil % 8.1 %      Basophil % 0.2 %      Immature Grans % 0.2 %      Neutrophils, Absolute 3.38 10*3/mm3      Lymphocytes, Absolute 1.17 10*3/mm3      Monocytes, Absolute 0.43 10*3/mm3      Eosinophils, Absolute 0.44 10*3/mm3      Basophils, Absolute 0.01 10*3/mm3      Immature Grans, Absolute 0.01 10*3/mm3      nRBC 0.2 /100 WBC     Non-gynecologic Cytology [[id number]] Collected: 02/16/21 1129    Specimen: Lavage from Lung, Right Middle Lobe Updated: 02/17/21 0949     Case Report --     Non-gynecologic Cytology                          Case: [id number]                                  Authorizing Provider:  Darci Sam MD        Collected:           02/16/2021 11:29 AM          Ordering Location:     [facility]  Received:            02/16/2021 02:25 PM                                 ENDO SUITES                                                                  Pathologist:           Sydney Encinas MD                                                    Specimen:    Lung, Right Middle Lobe, BAL RML                                                            Final Diagnosis --     1. Lung, Right Middle Lobe, Bronchoalveolar Lavage (BAL):   A. Negative for malignant cells.   B. Pulmonary macrophages, benign bronchial cells, squamous cells, acute inflammation, and mucus.   C. No  pneumocystis or fungal organisms identified by GMS stain.       Clinical Information --     PNEUMOCYSTIS, FUNGUS ON CYTO       Gross Description --     1 vial received containing 15 ml of cloudy white fluid w/ mucus. 1 thin prep, cellblock, & GMS. No remaining fluid.       Microscopic Description --     Pulmonary macrophages, benign bronchial cells, squamous cells, acute inflammation, and mucus.    Screened by MICAELA Lee.         Special Stains --     Utilizing appropriate controls, a GMS stain is performed on the cell block and is judged negative.      Basic Metabolic Panel [[id number]]  (Abnormal) Collected: 02/17/21 0543    Specimen: Blood Updated: 02/17/21 0703     Glucose 117 mg/dL      BUN 49 mg/dL      Creatinine 0.71 mg/dL      Sodium 135 mmol/L      Potassium 4.4 mmol/L      Chloride 97 mmol/L      CO2 27.4 mmol/L      Calcium 9.8 mg/dL      eGFR Non African Amer 125 mL/min/1.73      BUN/Creatinine Ratio 69.0     Anion Gap 10.6 mmol/L     Narrative:      GFR Normal >60  Chronic Kidney Disease <60  Kidney Failure <15      CBC & Differential [[id number]]  (Abnormal) Collected: 02/17/21 0543    Specimen: Blood Updated: 02/17/21 0623    Narrative:      The following orders were created for panel order CBC & Differential.  Procedure                               Abnormality         Status                     ---------                               -----------         ------                     CBC Auto Differential[[id number]]        Abnormal            Final result                 Please view results for these tests on the individual orders.    CBC Auto Differential [[id number]]  (Abnormal) Collected: 02/17/21 0543    Specimen: Blood Updated: 02/17/21 0623     WBC 5.69 10*3/mm3      RBC 2.96 10*6/mm3      Hemoglobin 7.6 g/dL      Hematocrit 23.7 %      MCV 80.1 fL      MCH 25.7 pg      MCHC 32.1 g/dL      RDW 16.9 %      RDW-SD 49.0 fl      MPV 10.5 fL      Platelets 270 10*3/mm3      Neutrophil % 64.3 %       Lymphocyte % 21.1 %      Monocyte % 10.5 %      Eosinophil % 3.7 %      Basophil % 0.0 %      Immature Grans % 0.4 %      Neutrophils, Absolute 3.66 10*3/mm3      Lymphocytes, Absolute 1.20 10*3/mm3      Monocytes, Absolute 0.60 10*3/mm3      Eosinophils, Absolute 0.21 10*3/mm3      Basophils, Absolute 0.00 10*3/mm3      Immature Grans, Absolute 0.02 10*3/mm3      nRBC 0.0 /100 WBC     Fungus Smear - Lavage, Lung, Right Middle Lobe [[id number]] Collected: 02/16/21 1129    Specimen: Lavage from Lung, Right Middle Lobe Updated: 02/16/21 1403     Fungal Stain No yeast or hyphal elements seen    Respiratory Panel, PCR (WITHOUT COVID) - Lavage, Lung, Right Middle Lobe [[id number]]  (Normal) Collected: 02/16/21 1129    Specimen: Lavage from Lung, Right Middle Lobe Updated: 02/16/21 1257     ADENOVIRUS, PCR Not Detected     Coronavirus 229E Not Detected     Coronavirus HKU1 Not Detected     Coronavirus NL63 Not Detected     Coronavirus OC43 Not Detected     Human Metapneumovirus Not Detected     Human Rhinovirus/Enterovirus Not Detected     Influenza B PCR Not Detected     Parainfluenza Virus 1 Not Detected     Parainfluenza Virus 2 Not Detected     Parainfluenza Virus 3 Not Detected     Parainfluenza Virus 4 Not Detected     Bordetella pertussis pcr Not Detected     Chlamydophila pneumoniae PCR Not Detected     Mycoplasma pneumo by PCR Not Detected     Influenza A PCR Not Detected     RSV, PCR Not Detected     Bordetella parapertussis PCR Not Detected    Narrative:      The coronavirus on the RVP is NOT COVID-19 and is NOT indicative of infection with COVID-19.     Fungus Culture - Lavage, Lung, Right Middle Lobe [[id number]] Collected: 02/16/21 1129    Specimen: Lavage from Lung, Right Middle Lobe Updated: 02/16/21 1147    COVID PRE-OP / PRE-PROCEDURE SCREENING ORDER (NO ISOLATION) - Swab, Nasopharynx [[id number]]  (Normal) Collected: 02/16/21 0626    Specimen: Swab from Nasopharynx Updated: 02/16/21 0731    Narrative:       The following orders were created for panel order COVID PRE-OP / PRE-PROCEDURE SCREENING ORDER (NO ISOLATION) - Swab, Nasopharynx.  Procedure                               Abnormality         Status                     ---------                               -----------         ------                     COVID-19,[facility] IN-HOUSE...[[id number]]  Normal              Final result                 Please view results for these tests on the individual orders.    COVID-19,[facility] IN-HOUSE CEPHEID, NP SWAB IN TRANSPORT MEDIA 8-12 HR TAT - Swab, Nasopharynx [[id number]]  (Normal) Collected: 02/16/21 0626    Specimen: Swab from Nasopharynx Updated: 02/16/21 0731     COVID19 Not Detected    Narrative:      Fact sheet for providers: https://www.fda.gov/media/658772/download     Fact sheet for patients: https://www.fda.gov/media/602612/download    Anaerobic Culture - Pleural Fluid, Pleural Cavity [[id number]] Collected: 02/11/21 0955    Specimen: Pleural Fluid from Pleural Cavity Updated: 02/16/21 0657     Anaerobic Culture No anaerobes isolated at 5 days    Basic Metabolic Panel [[id number]]  (Abnormal) Collected: 02/16/21 0500    Specimen: Blood Updated: 02/16/21 0629     Glucose 93 mg/dL      BUN 47 mg/dL      Creatinine 0.76 mg/dL      Sodium 131 mmol/L      Potassium 5.3 mmol/L      Chloride 94 mmol/L      CO2 26.8 mmol/L      Calcium 9.8 mg/dL      eGFR Non African Amer 115 mL/min/1.73      BUN/Creatinine Ratio 61.8     Anion Gap 10.2 mmol/L     Narrative:      GFR Normal >60  Chronic Kidney Disease <60  Kidney Failure <15      CBC & Differential [[id number]]  (Abnormal) Collected: 02/16/21 0500    Specimen: Blood Updated: 02/16/21 0609    Narrative:      The following orders were created for panel order CBC & Differential.  Procedure                               Abnormality         Status                     ---------                               -----------         ------                     CBC Auto Differential[[id number]]         Abnormal            Final result                 Please view results for these tests on the individual orders.    CBC Auto Differential [[id number]]  (Abnormal) Collected: 02/16/21 0500    Specimen: Blood Updated: 02/16/21 0609     WBC 7.38 10*3/mm3      RBC 2.93 10*6/mm3      Hemoglobin 7.7 g/dL      Hematocrit 23.5 %      MCV 80.2 fL      MCH 26.3 pg      MCHC 32.8 g/dL      RDW 17.1 %      RDW-SD 50.3 fl      MPV 10.5 fL      Platelets 285 10*3/mm3      Neutrophil % 69.1 %      Lymphocyte % 18.3 %      Monocyte % 9.1 %      Eosinophil % 3.1 %      Basophil % 0.1 %      Immature Grans % 0.3 %      Neutrophils, Absolute 5.10 10*3/mm3      Lymphocytes, Absolute 1.35 10*3/mm3      Monocytes, Absolute 0.67 10*3/mm3      Eosinophils, Absolute 0.23 10*3/mm3      Basophils, Absolute 0.01 10*3/mm3      Immature Grans, Absolute 0.02 10*3/mm3      nRBC 0.0 /100 WBC     Body Fluid Culture - Body Fluid, Pleural Cavity [[id number]] Collected: 02/11/21 0955    Specimen: Body Fluid from Pleural Cavity Updated: 02/16/21 0454     Body Fluid Culture No growth at 5 days     Gram Stain No WBCs or organisms seen    Potassium [[id number]]  (Abnormal) Collected: 02/15/21 1914    Specimen: Blood Updated: 02/15/21 2014     Potassium 5.4 mmol/L         Data Review:  Results from last 7 days   Lab Units 02/20/21  0443 02/19/21  0538 02/17/21  0543   SODIUM mmol/L 135* 137 135*   POTASSIUM mmol/L 4.7 4.6 4.4   CHLORIDE mmol/L 93* 97* 97*   CO2 mmol/L 31.1* 30.3* 27.4   BUN mg/dL 43* 45* 49*   CREATININE mg/dL 0.64* 0.71* 0.71*   GLUCOSE mg/dL 102* 112* 117*   CALCIUM mg/dL 10.2 9.8 9.8     Results from last 7 days   Lab Units 02/20/21  0443 02/19/21  0538 02/18/21  0511   WBC 10*3/mm3 8.02 6.65 5.44   HEMOGLOBIN g/dL 7.2* 7.5* 7.3*   HEMATOCRIT % 22.0* 23.6* 22.9*   PLATELETS 10*3/mm3 276 278 271             Lab Results   Lab Value Date/Time    TROPONINT <0.010 02/02/2021 0742    TROPONINT <0.010 02/02/2021 0608         Results from last  7 days   Lab Units 02/14/21  0703   ALK PHOS U/L 83   BILIRUBIN mg/dL 1.3*   ALT (SGPT) U/L 11   AST (SGOT) U/L 14             No results found for: POCGLU        Past Medical History:   Diagnosis Date   • Hypertension        Assessment:  Active Hospital Problems    Diagnosis  POA   • **HCAP (healthcare-associated pneumonia) [J18.9]  Unknown   • Tracheitis [J04.10]  Yes   • Acute on chronic respiratory failure with hypoxemia (CMS/HCC) [J96.21]  Yes   • Anemia [D64.9]  Yes   • Leukocytosis [D72.829]  Yes   • Quadriplegia (CMS/HCC) [G82.50]  Yes   • Essential hypertension [I10]  Yes   • Sepsis, unspecified organism (CMS/HCC) [A41.9]  Unknown   • Community acquired pneumonia [J18.9]  Unknown      Resolved Hospital Problems   No resolved problems to display.       Plan:  Continue RX. Looking for rehab. As per pulmonary. Follow lab. Finished antibiotics.  ? Possible bed at [facility]??    Fausto Montanez MD  2/20/2021  13:44 EST

## 2021-02-21 LAB
ANION GAP SERPL CALCULATED.3IONS-SCNC: 10.5 MMOL/L (ref 5–15)
BASOPHILS # BLD AUTO: 0.01 10*3/MM3 (ref 0–0.2)
BASOPHILS NFR BLD AUTO: 0.1 % (ref 0–1.5)
BUN SERPL-MCNC: 40 MG/DL (ref 6–20)
BUN/CREAT SERPL: 61.5 (ref 7–25)
CALCIUM SPEC-SCNC: 10.1 MG/DL (ref 8.6–10.5)
CHLORIDE SERPL-SCNC: 94 MMOL/L (ref 98–107)
CO2 SERPL-SCNC: 30.5 MMOL/L (ref 22–29)
CREAT SERPL-MCNC: 0.65 MG/DL (ref 0.76–1.27)
DEPRECATED RDW RBC AUTO: 48.8 FL (ref 37–54)
EOSINOPHIL # BLD AUTO: 0.38 10*3/MM3 (ref 0–0.4)
EOSINOPHIL NFR BLD AUTO: 5.6 % (ref 0.3–6.2)
ERYTHROCYTE [DISTWIDTH] IN BLOOD BY AUTOMATED COUNT: 17.1 % (ref 12.3–15.4)
GFR SERPL CREATININE-BSD FRML MDRD: 138 ML/MIN/1.73
GLUCOSE SERPL-MCNC: 99 MG/DL (ref 65–99)
HCT VFR BLD AUTO: 23.6 % (ref 37.5–51)
HGB BLD-MCNC: 7.6 G/DL (ref 13–17.7)
IMM GRANULOCYTES # BLD AUTO: 0.03 10*3/MM3 (ref 0–0.05)
IMM GRANULOCYTES NFR BLD AUTO: 0.4 % (ref 0–0.5)
LYMPHOCYTES # BLD AUTO: 1.53 10*3/MM3 (ref 0.7–3.1)
LYMPHOCYTES NFR BLD AUTO: 22.5 % (ref 19.6–45.3)
MCH RBC QN AUTO: 25.5 PG (ref 26.6–33)
MCHC RBC AUTO-ENTMCNC: 32.2 G/DL (ref 31.5–35.7)
MCV RBC AUTO: 79.2 FL (ref 79–97)
MONOCYTES # BLD AUTO: 0.48 10*3/MM3 (ref 0.1–0.9)
MONOCYTES NFR BLD AUTO: 7.1 % (ref 5–12)
NEUTROPHILS NFR BLD AUTO: 4.36 10*3/MM3 (ref 1.7–7)
NEUTROPHILS NFR BLD AUTO: 64.3 % (ref 42.7–76)
NRBC BLD AUTO-RTO: 0 /100 WBC (ref 0–0.2)
PLATELET # BLD AUTO: 301 10*3/MM3 (ref 140–450)
PMV BLD AUTO: 9.8 FL (ref 6–12)
POTASSIUM SERPL-SCNC: 4.9 MMOL/L (ref 3.5–5.2)
RBC # BLD AUTO: 2.98 10*6/MM3 (ref 4.14–5.8)
SODIUM SERPL-SCNC: 135 MMOL/L (ref 136–145)
WBC # BLD AUTO: 6.79 10*3/MM3 (ref 3.4–10.8)

## 2021-02-21 PROCEDURE — 94799 UNLISTED PULMONARY SVC/PX: CPT

## 2021-02-21 PROCEDURE — 80048 BASIC METABOLIC PNL TOTAL CA: CPT | Performed by: HOSPITALIST

## 2021-02-21 PROCEDURE — 85025 COMPLETE CBC W/AUTO DIFF WBC: CPT | Performed by: INTERNAL MEDICINE

## 2021-02-21 RX ADMIN — ALPRAZOLAM 0.5 MG: 0.5 TABLET ORAL at 21:18

## 2021-02-21 RX ADMIN — OXYCODONE HYDROCHLORIDE AND ACETAMINOPHEN 1000 MG: 500 TABLET ORAL at 08:12

## 2021-02-21 RX ADMIN — OXYCODONE HYDROCHLORIDE 10 MG: 5 SOLUTION ORAL at 08:12

## 2021-02-21 RX ADMIN — OXYCODONE HYDROCHLORIDE 10 MG: 5 SOLUTION ORAL at 03:27

## 2021-02-21 RX ADMIN — IPRATROPIUM BROMIDE AND ALBUTEROL SULFATE 3 ML: 2.5; .5 SOLUTION RESPIRATORY (INHALATION) at 07:48

## 2021-02-21 RX ADMIN — OXYCODONE HYDROCHLORIDE 10 MG: 5 SOLUTION ORAL at 21:18

## 2021-02-21 RX ADMIN — GABAPENTIN 800 MG: 250 SOLUTION ORAL at 15:31

## 2021-02-21 RX ADMIN — DOCUSATE SODIUM 100 MG: 50 LIQUID ORAL at 08:12

## 2021-02-21 RX ADMIN — OXYCODONE HYDROCHLORIDE 10 MG: 5 SOLUTION ORAL at 16:54

## 2021-02-21 RX ADMIN — SERTRALINE 100 MG: 100 TABLET, FILM COATED ORAL at 08:12

## 2021-02-21 RX ADMIN — GUAIFENESIN 400 MG: 100 SOLUTION ORAL at 03:27

## 2021-02-21 RX ADMIN — PROPRANOLOL HYDROCHLORIDE 20 MG: 20 TABLET ORAL at 15:31

## 2021-02-21 RX ADMIN — DOCUSATE SODIUM 100 MG: 50 LIQUID ORAL at 21:18

## 2021-02-21 RX ADMIN — GUAIFENESIN 400 MG: 100 SOLUTION ORAL at 08:12

## 2021-02-21 RX ADMIN — GUAIFENESIN 400 MG: 100 SOLUTION ORAL at 21:18

## 2021-02-21 RX ADMIN — HYDROXYZINE HYDROCHLORIDE 25 MG: 25 TABLET ORAL at 08:12

## 2021-02-21 RX ADMIN — LANSOPRAZOLE 30 MG: KIT at 06:25

## 2021-02-21 RX ADMIN — OXYCODONE HYDROCHLORIDE 10 MG: 5 SOLUTION ORAL at 12:16

## 2021-02-21 RX ADMIN — ASPIRIN 81 MG: 81 TABLET, CHEWABLE ORAL at 08:12

## 2021-02-21 RX ADMIN — ACETYLCYSTEINE 3 ML: 200 SOLUTION ORAL; RESPIRATORY (INHALATION) at 07:48

## 2021-02-21 RX ADMIN — IPRATROPIUM BROMIDE AND ALBUTEROL SULFATE 3 ML: 2.5; .5 SOLUTION RESPIRATORY (INHALATION) at 19:08

## 2021-02-21 RX ADMIN — FLUCONAZOLE 100 MG: 100 TABLET ORAL at 15:31

## 2021-02-21 RX ADMIN — ACETYLCYSTEINE 3 ML: 200 SOLUTION ORAL; RESPIRATORY (INHALATION) at 19:09

## 2021-02-21 RX ADMIN — CYCLOBENZAPRINE 10 MG: 10 TABLET, FILM COATED ORAL at 08:12

## 2021-02-21 RX ADMIN — GABAPENTIN 800 MG: 250 SOLUTION ORAL at 06:25

## 2021-02-21 RX ADMIN — PROPRANOLOL HYDROCHLORIDE 20 MG: 20 TABLET ORAL at 21:18

## 2021-02-21 RX ADMIN — GABAPENTIN 800 MG: 250 SOLUTION ORAL at 23:11

## 2021-02-21 RX ADMIN — GUAIFENESIN 400 MG: 100 SOLUTION ORAL at 12:16

## 2021-02-21 RX ADMIN — Medication: at 08:14

## 2021-02-21 RX ADMIN — PROPRANOLOL HYDROCHLORIDE 20 MG: 20 TABLET ORAL at 08:12

## 2021-02-21 RX ADMIN — IPRATROPIUM BROMIDE AND ALBUTEROL SULFATE 3 ML: 2.5; .5 SOLUTION RESPIRATORY (INHALATION) at 15:12

## 2021-02-21 RX ADMIN — ALPRAZOLAM 0.5 MG: 0.5 TABLET ORAL at 04:32

## 2021-02-21 RX ADMIN — DOCUSATE SODIUM 50MG AND SENNOSIDES 8.6MG 2 TABLET: 8.6; 5 TABLET, FILM COATED ORAL at 21:18

## 2021-02-21 RX ADMIN — GUAIFENESIN 400 MG: 100 SOLUTION ORAL at 15:31

## 2021-02-21 NOTE — PLAN OF CARE
Goal Outcome Evaluation:        Outcome Summary: VSS stable, needing less freq, suctioning this shift. Continues to take pain meds Q4 - pain always about a 9/10 but appears comfortable. Trach care and dressing change completed. Good urine output. Cont. Q2 turns for sacral stage 2, also on low airloss mattress. Pt did not want to discuss placement, see previous note. Otherwise in good spirts today, belkys while father was visiting. wctm

## 2021-02-21 NOTE — NURSING NOTE
"Attempted to discuss discharge planning with patient again today. Pt says he doesn't want to make decisions until tomorrow, says he needs to discuss with his mom and dad but reports his mom looked at the [facility] and \"wasn't impressed.\" RN asked if I could call his mom but patient says \"she's at work\" but will be on the unit \"tomorrow.\" Offered to browse the facility web site with pt but he declined. Discussed need to change to uncuffed trach and asked if pt would be willing to allow pulm to attempt today but pt asked if he could do it tomorrow. Will continue to discuss with patient as he allows.  "

## 2021-02-21 NOTE — NURSING NOTE
"Access Center follow-up.  Patient RIB, is pleasant, smiles.  Continues to report anxiety related to not being able to return home.  Patient has been difficult with regards to placement; stated wants to go somewhere \"good\" that is capable of handling his care.  Denies SI.  Requests an oral swab, RN informed.      AC following.    "

## 2021-02-21 NOTE — PROGRESS NOTES
"DAILY PROGRESS NOTE  Psychiatric    Patient Identification:  Name: Maxim Carvajal  Age: 35 y.o.  Sex: male  :  1985  MRN: [id number]         Primary Care Physician: Sheron Perez MD    Subjective:  Interval History:He complains of pain.  Hurts all over.    Objective:    Scheduled Meds:acetylcysteine, 3 mL, Nebulization, BID - RT  ascorbic acid, 1,000 mg, Oral, Daily  aspirin, 81 mg, Nasogastric, Daily  clotrimazole, 10 mg, Oral, 5x Daily  cyclobenzaprine, 10 mg, Oral, Daily  docusate sodium, 100 mg, Per G Tube, BID  Eucerin original healing lotion, , Topical, Daily  fentaNYL, 1 patch, Transdermal, Q72H  fluconazole, 100 mg, Oral, Q24H  Gabapentin, 800 mg, Per G Tube, Q8H  guaifenesin, 400 mg, Per G Tube, Q4H  hydrOXYzine, 25 mg, Oral, Daily  ipratropium-albuterol, 3 mL, Nebulization, TID - RT  lansoprazole, 30 mg, Nasogastric, QAM  polyethylene glycol, 17 g, Per G Tube, Daily  propranolol, 20 mg, Per G Tube, TID  sennosides-docusate, 2 tablet, Oral, Nightly  sertraline, 100 mg, Oral, Daily      Continuous Infusions:hold, 1 each        Vital signs in last 24 hours:  Temp:  [97.9 °F (36.6 °C)-98.7 °F (37.1 °C)] 98.1 °F (36.7 °C)  Heart Rate:  [61-84] 70  Resp:  [18-20] 18  BP: ()/(60-63) 104/60    Intake/Output:    Intake/Output Summary (Last 24 hours) at 2021 1453  Last data filed at 2021 1339  Gross per 24 hour   Intake 3384 ml   Output 2275 ml   Net 1109 ml       Exam:  /60 (BP Location: Right arm, Patient Position: Lying)   Pulse 70   Temp 98.1 °F (36.7 °C) (Oral)   Resp 18   Ht 182.9 cm (72.01\")   Wt 71.4 kg (157 lb 6.5 oz)   SpO2 100%   BMI 21.34 kg/m²     General Appearance:    Alert, cooperative, no distress   Head:    Normocephalic, without obvious abnormality, atraumatic   Eyes:       Throat:   Lips, tongue, gums normal   Neck:   Supple, symmetrical, trachea midline, no JVD   Lungs:     rhonchi bilaterally, respirations unlabored   Chest Wall:    No " tenderness or deformity    Heart:    Regular rate and rhythm, S1 and S2 normal, no murmur,no  Rub or gallop   Abdomen:     Soft, nontender, bowel sounds active, no masses, no organomegaly    Extremities:   Extremities normal, atraumatic, no cyanosis or edema   Pulses:      Skin:   Skin is warm and dry,  no rashes or palpable lesions   Neurologic:   quadraplegia.      Lab Results (last 72 hours)     Procedure Component Value Units Date/Time    AFB Culture - Body Fluid, Pleural Cavity [[id number]] Collected: 02/11/21 0955    Specimen: Body Fluid from Pleural Cavity Updated: 02/18/21 1030     AFB Culture No AFB isolated at 1 week     AFB Stain No acid fast bacilli seen on direct smear    Fungus Culture - Body Fluid, Pleural Cavity [[id number]] Collected: 02/11/21 0955    Specimen: Body Fluid from Pleural Cavity Updated: 02/18/21 1030     Fungus Culture No fungus isolated at 1 week    BAL Culture, Quantitative - Lavage, Lung, Right Middle Lobe [[id number]]  (Abnormal)  (Susceptibility) Collected: 02/16/21 1129    Specimen: Lavage from Lung, Right Middle Lobe Updated: 02/18/21 0802     BAL Culture <10,000 CFU/mL Staphylococcus aureus, MRSA     Comment: Methicillin resistant Staphylococcus aureus, Patient may be an isolation risk.         No Normal Respiratory Lisa     Gram Stain Rare (1+) WBCs seen      No organisms seen    Susceptibility      Staphylococcus aureus, MRSA     YOGI     Clindamycin Resistant     Linezolid Susceptible     Oxacillin Resistant     Penicillin G Resistant     Tetracycline Susceptible     Trimethoprim + Sulfamethoxazole Susceptible     Vancomycin Susceptible                    CBC & Differential [[id number]]  (Abnormal) Collected: 02/18/21 0511    Specimen: Blood Updated: 02/18/21 0541    Narrative:      The following orders were created for panel order CBC & Differential.  Procedure                               Abnormality         Status                     ---------                                -----------         ------                     CBC Auto Differential[[id number]]        Abnormal            Final result                 Please view results for these tests on the individual orders.    CBC Auto Differential [[id number]]  (Abnormal) Collected: 02/18/21 0511    Specimen: Blood Updated: 02/18/21 0541     WBC 5.44 10*3/mm3      RBC 2.83 10*6/mm3      Hemoglobin 7.3 g/dL      Hematocrit 22.9 %      MCV 80.9 fL      MCH 25.8 pg      MCHC 31.9 g/dL      RDW 17.1 %      RDW-SD 50.8 fl      MPV 10.6 fL      Platelets 271 10*3/mm3      Neutrophil % 62.1 %      Lymphocyte % 21.5 %      Monocyte % 7.9 %      Eosinophil % 8.1 %      Basophil % 0.2 %      Immature Grans % 0.2 %      Neutrophils, Absolute 3.38 10*3/mm3      Lymphocytes, Absolute 1.17 10*3/mm3      Monocytes, Absolute 0.43 10*3/mm3      Eosinophils, Absolute 0.44 10*3/mm3      Basophils, Absolute 0.01 10*3/mm3      Immature Grans, Absolute 0.01 10*3/mm3      nRBC 0.2 /100 WBC     Non-gynecologic Cytology [[id number]] Collected: 02/16/21 1129    Specimen: Lavage from Lung, Right Middle Lobe Updated: 02/17/21 0949     Case Report --     Non-gynecologic Cytology                          Case: [id number]                                  Authorizing Provider:  Darci Sam MD        Collected:           02/16/2021 11:29 AM          Ordering Location:     [facility]  Received:            02/16/2021 02:25 PM                                 ENDO SUITES                                                                  Pathologist:           Sydney Encinas MD                                                    Specimen:    Lung, Right Middle Lobe, BAL RML                                                            Final Diagnosis --     1. Lung, Right Middle Lobe, Bronchoalveolar Lavage (BAL):   A. Negative for malignant cells.   B. Pulmonary macrophages, benign bronchial cells, squamous cells, acute inflammation, and mucus.   C. No  pneumocystis or fungal organisms identified by GMS stain.       Clinical Information --     PNEUMOCYSTIS, FUNGUS ON CYTO       Gross Description --     1 vial received containing 15 ml of cloudy white fluid w/ mucus. 1 thin prep, cellblock, & GMS. No remaining fluid.       Microscopic Description --     Pulmonary macrophages, benign bronchial cells, squamous cells, acute inflammation, and mucus.    Screened by MICAELA Lee.         Special Stains --     Utilizing appropriate controls, a GMS stain is performed on the cell block and is judged negative.      Basic Metabolic Panel [[id number]]  (Abnormal) Collected: 02/17/21 0543    Specimen: Blood Updated: 02/17/21 0703     Glucose 117 mg/dL      BUN 49 mg/dL      Creatinine 0.71 mg/dL      Sodium 135 mmol/L      Potassium 4.4 mmol/L      Chloride 97 mmol/L      CO2 27.4 mmol/L      Calcium 9.8 mg/dL      eGFR Non African Amer 125 mL/min/1.73      BUN/Creatinine Ratio 69.0     Anion Gap 10.6 mmol/L     Narrative:      GFR Normal >60  Chronic Kidney Disease <60  Kidney Failure <15      CBC & Differential [[id number]]  (Abnormal) Collected: 02/17/21 0543    Specimen: Blood Updated: 02/17/21 0623    Narrative:      The following orders were created for panel order CBC & Differential.  Procedure                               Abnormality         Status                     ---------                               -----------         ------                     CBC Auto Differential[[id number]]        Abnormal            Final result                 Please view results for these tests on the individual orders.    CBC Auto Differential [[id number]]  (Abnormal) Collected: 02/17/21 0543    Specimen: Blood Updated: 02/17/21 0623     WBC 5.69 10*3/mm3      RBC 2.96 10*6/mm3      Hemoglobin 7.6 g/dL      Hematocrit 23.7 %      MCV 80.1 fL      MCH 25.7 pg      MCHC 32.1 g/dL      RDW 16.9 %      RDW-SD 49.0 fl      MPV 10.5 fL      Platelets 270 10*3/mm3      Neutrophil % 64.3 %       Lymphocyte % 21.1 %      Monocyte % 10.5 %      Eosinophil % 3.7 %      Basophil % 0.0 %      Immature Grans % 0.4 %      Neutrophils, Absolute 3.66 10*3/mm3      Lymphocytes, Absolute 1.20 10*3/mm3      Monocytes, Absolute 0.60 10*3/mm3      Eosinophils, Absolute 0.21 10*3/mm3      Basophils, Absolute 0.00 10*3/mm3      Immature Grans, Absolute 0.02 10*3/mm3      nRBC 0.0 /100 WBC     Fungus Smear - Lavage, Lung, Right Middle Lobe [[id number]] Collected: 02/16/21 1129    Specimen: Lavage from Lung, Right Middle Lobe Updated: 02/16/21 1403     Fungal Stain No yeast or hyphal elements seen    Respiratory Panel, PCR (WITHOUT COVID) - Lavage, Lung, Right Middle Lobe [[id number]]  (Normal) Collected: 02/16/21 1129    Specimen: Lavage from Lung, Right Middle Lobe Updated: 02/16/21 1257     ADENOVIRUS, PCR Not Detected     Coronavirus 229E Not Detected     Coronavirus HKU1 Not Detected     Coronavirus NL63 Not Detected     Coronavirus OC43 Not Detected     Human Metapneumovirus Not Detected     Human Rhinovirus/Enterovirus Not Detected     Influenza B PCR Not Detected     Parainfluenza Virus 1 Not Detected     Parainfluenza Virus 2 Not Detected     Parainfluenza Virus 3 Not Detected     Parainfluenza Virus 4 Not Detected     Bordetella pertussis pcr Not Detected     Chlamydophila pneumoniae PCR Not Detected     Mycoplasma pneumo by PCR Not Detected     Influenza A PCR Not Detected     RSV, PCR Not Detected     Bordetella parapertussis PCR Not Detected    Narrative:      The coronavirus on the RVP is NOT COVID-19 and is NOT indicative of infection with COVID-19.     Fungus Culture - Lavage, Lung, Right Middle Lobe [[id number]] Collected: 02/16/21 1129    Specimen: Lavage from Lung, Right Middle Lobe Updated: 02/16/21 1147    COVID PRE-OP / PRE-PROCEDURE SCREENING ORDER (NO ISOLATION) - Swab, Nasopharynx [[id number]]  (Normal) Collected: 02/16/21 0626    Specimen: Swab from Nasopharynx Updated: 02/16/21 0731    Narrative:       The following orders were created for panel order COVID PRE-OP / PRE-PROCEDURE SCREENING ORDER (NO ISOLATION) - Swab, Nasopharynx.  Procedure                               Abnormality         Status                     ---------                               -----------         ------                     COVID-19,[facility] IN-HOUSE...[[id number]]  Normal              Final result                 Please view results for these tests on the individual orders.    COVID-19,[facility] IN-HOUSE CEPHEID, NP SWAB IN TRANSPORT MEDIA 8-12 HR TAT - Swab, Nasopharynx [[id number]]  (Normal) Collected: 02/16/21 0626    Specimen: Swab from Nasopharynx Updated: 02/16/21 0731     COVID19 Not Detected    Narrative:      Fact sheet for providers: https://www.fda.gov/media/766075/download     Fact sheet for patients: https://www.fda.gov/media/879293/download    Anaerobic Culture - Pleural Fluid, Pleural Cavity [[id number]] Collected: 02/11/21 0955    Specimen: Pleural Fluid from Pleural Cavity Updated: 02/16/21 0657     Anaerobic Culture No anaerobes isolated at 5 days    Basic Metabolic Panel [[id number]]  (Abnormal) Collected: 02/16/21 0500    Specimen: Blood Updated: 02/16/21 0629     Glucose 93 mg/dL      BUN 47 mg/dL      Creatinine 0.76 mg/dL      Sodium 131 mmol/L      Potassium 5.3 mmol/L      Chloride 94 mmol/L      CO2 26.8 mmol/L      Calcium 9.8 mg/dL      eGFR Non African Amer 115 mL/min/1.73      BUN/Creatinine Ratio 61.8     Anion Gap 10.2 mmol/L     Narrative:      GFR Normal >60  Chronic Kidney Disease <60  Kidney Failure <15      CBC & Differential [[id number]]  (Abnormal) Collected: 02/16/21 0500    Specimen: Blood Updated: 02/16/21 0609    Narrative:      The following orders were created for panel order CBC & Differential.  Procedure                               Abnormality         Status                     ---------                               -----------         ------                     CBC Auto Differential[[id number]]         Abnormal            Final result                 Please view results for these tests on the individual orders.    CBC Auto Differential [[id number]]  (Abnormal) Collected: 02/16/21 0500    Specimen: Blood Updated: 02/16/21 0609     WBC 7.38 10*3/mm3      RBC 2.93 10*6/mm3      Hemoglobin 7.7 g/dL      Hematocrit 23.5 %      MCV 80.2 fL      MCH 26.3 pg      MCHC 32.8 g/dL      RDW 17.1 %      RDW-SD 50.3 fl      MPV 10.5 fL      Platelets 285 10*3/mm3      Neutrophil % 69.1 %      Lymphocyte % 18.3 %      Monocyte % 9.1 %      Eosinophil % 3.1 %      Basophil % 0.1 %      Immature Grans % 0.3 %      Neutrophils, Absolute 5.10 10*3/mm3      Lymphocytes, Absolute 1.35 10*3/mm3      Monocytes, Absolute 0.67 10*3/mm3      Eosinophils, Absolute 0.23 10*3/mm3      Basophils, Absolute 0.01 10*3/mm3      Immature Grans, Absolute 0.02 10*3/mm3      nRBC 0.0 /100 WBC     Body Fluid Culture - Body Fluid, Pleural Cavity [[id number]] Collected: 02/11/21 0955    Specimen: Body Fluid from Pleural Cavity Updated: 02/16/21 0454     Body Fluid Culture No growth at 5 days     Gram Stain No WBCs or organisms seen    Potassium [[id number]]  (Abnormal) Collected: 02/15/21 1914    Specimen: Blood Updated: 02/15/21 2014     Potassium 5.4 mmol/L         Data Review:  Results from last 7 days   Lab Units 02/21/21 0522 02/20/21 0443 02/19/21  0538   SODIUM mmol/L 135* 135* 137   POTASSIUM mmol/L 4.9 4.7 4.6   CHLORIDE mmol/L 94* 93* 97*   CO2 mmol/L 30.5* 31.1* 30.3*   BUN mg/dL 40* 43* 45*   CREATININE mg/dL 0.65* 0.64* 0.71*   GLUCOSE mg/dL 99 102* 112*   CALCIUM mg/dL 10.1 10.2 9.8     Results from last 7 days   Lab Units 02/21/21  0522 02/20/21 0443 02/19/21  0538   WBC 10*3/mm3 6.79 8.02 6.65   HEMOGLOBIN g/dL 7.6* 7.2* 7.5*   HEMATOCRIT % 23.6* 22.0* 23.6*   PLATELETS 10*3/mm3 301 276 278             Lab Results   Lab Value Date/Time    TROPONINT <0.010 02/02/2021 0742    TROPONINT <0.010 02/02/2021 0608               Invalid  input(s): PROT, LABALBU          No results found for: POCGLU        Past Medical History:   Diagnosis Date   • Hypertension        Assessment:  Active Hospital Problems    Diagnosis  POA   • **HCAP (healthcare-associated pneumonia) [J18.9]  Unknown   • Tracheitis [J04.10]  Yes   • Acute on chronic respiratory failure with hypoxemia (CMS/HCC) [J96.21]  Yes   • Anemia [D64.9]  Yes   • Leukocytosis [D72.829]  Yes   • Quadriplegia (CMS/HCC) [G82.50]  Yes   • Essential hypertension [I10]  Yes   • Sepsis, unspecified organism (CMS/HCC) [A41.9]  Unknown   • Community acquired pneumonia [J18.9]  Unknown      Resolved Hospital Problems   No resolved problems to display.       Plan:  Continue RX. Looking for rehab. As per pulmonary. Follow lab. Finished antibiotics.  ? Possible bed at [facility]?? Monday?    Fausto Montanez MD  2/21/2021  14:53 EST

## 2021-02-21 NOTE — PLAN OF CARE
Goal Outcome Evaluation:        Outcome Summary: Continues to be stable on t-piece, frequent trach suctioning through shift w/ thick secretions. Meds through g-tube. Conts to req pain meds Q4. More cooperative with turns today (although not Q2) & coccyx dressing changed this shift. Will continue to reinforce plan of care. Large BM in evening. Attempted to discuss d/c planning with patient but pt not willing to go to accepted bed at [facility] at this time. jasmin

## 2021-02-21 NOTE — NURSING NOTE
"Attempted to speak to pt about discharge planning, pt's father also at bedside. Pt states he \"doesn't know anything about that place in [place],\" and that his mom is looking at it more as well as other places. Regarding accepted placement at [facility] pt says he's not sure if he wants to go there. Pt does not want to discuss further at this time. Reports his mother will be here on Monday.   "

## 2021-02-22 LAB
ANION GAP SERPL CALCULATED.3IONS-SCNC: 10.7 MMOL/L (ref 5–15)
BASOPHILS # BLD AUTO: 0.01 10*3/MM3 (ref 0–0.2)
BASOPHILS NFR BLD AUTO: 0.2 % (ref 0–1.5)
BUN SERPL-MCNC: 41 MG/DL (ref 6–20)
BUN/CREAT SERPL: 64.1 (ref 7–25)
CALCIUM SPEC-SCNC: 9.6 MG/DL (ref 8.6–10.5)
CHLORIDE SERPL-SCNC: 93 MMOL/L (ref 98–107)
CO2 SERPL-SCNC: 32.3 MMOL/L (ref 22–29)
CREAT SERPL-MCNC: 0.64 MG/DL (ref 0.76–1.27)
DEPRECATED RDW RBC AUTO: 49.3 FL (ref 37–54)
EOSINOPHIL # BLD AUTO: 0.34 10*3/MM3 (ref 0–0.4)
EOSINOPHIL NFR BLD AUTO: 6.2 % (ref 0.3–6.2)
ERYTHROCYTE [DISTWIDTH] IN BLOOD BY AUTOMATED COUNT: 17.4 % (ref 12.3–15.4)
GFR SERPL CREATININE-BSD FRML MDRD: 141 ML/MIN/1.73
GLUCOSE SERPL-MCNC: 116 MG/DL (ref 65–99)
HCT VFR BLD AUTO: 22.1 % (ref 37.5–51)
HGB BLD-MCNC: 7.2 G/DL (ref 13–17.7)
IMM GRANULOCYTES # BLD AUTO: 0.02 10*3/MM3 (ref 0–0.05)
IMM GRANULOCYTES NFR BLD AUTO: 0.4 % (ref 0–0.5)
LYMPHOCYTES # BLD AUTO: 1.36 10*3/MM3 (ref 0.7–3.1)
LYMPHOCYTES NFR BLD AUTO: 25 % (ref 19.6–45.3)
MCH RBC QN AUTO: 25.7 PG (ref 26.6–33)
MCHC RBC AUTO-ENTMCNC: 32.6 G/DL (ref 31.5–35.7)
MCV RBC AUTO: 78.9 FL (ref 79–97)
MONOCYTES # BLD AUTO: 0.36 10*3/MM3 (ref 0.1–0.9)
MONOCYTES NFR BLD AUTO: 6.6 % (ref 5–12)
NEUTROPHILS NFR BLD AUTO: 3.36 10*3/MM3 (ref 1.7–7)
NEUTROPHILS NFR BLD AUTO: 61.6 % (ref 42.7–76)
NRBC BLD AUTO-RTO: 0 /100 WBC (ref 0–0.2)
PLATELET # BLD AUTO: 305 10*3/MM3 (ref 140–450)
PMV BLD AUTO: 10.3 FL (ref 6–12)
POTASSIUM SERPL-SCNC: 4.6 MMOL/L (ref 3.5–5.2)
RBC # BLD AUTO: 2.8 10*6/MM3 (ref 4.14–5.8)
SODIUM SERPL-SCNC: 136 MMOL/L (ref 136–145)
WBC # BLD AUTO: 5.45 10*3/MM3 (ref 3.4–10.8)

## 2021-02-22 PROCEDURE — 97535 SELF CARE MNGMENT TRAINING: CPT

## 2021-02-22 PROCEDURE — 85025 COMPLETE CBC W/AUTO DIFF WBC: CPT | Performed by: INTERNAL MEDICINE

## 2021-02-22 PROCEDURE — 97110 THERAPEUTIC EXERCISES: CPT

## 2021-02-22 PROCEDURE — 94799 UNLISTED PULMONARY SVC/PX: CPT

## 2021-02-22 PROCEDURE — 80048 BASIC METABOLIC PNL TOTAL CA: CPT | Performed by: HOSPITALIST

## 2021-02-22 PROCEDURE — 97530 THERAPEUTIC ACTIVITIES: CPT

## 2021-02-22 RX ORDER — ACETAMINOPHEN 160 MG/5ML
650 SOLUTION ORAL EVERY 4 HOURS PRN
Status: DISCONTINUED | OUTPATIENT
Start: 2021-02-22 | End: 2021-02-23 | Stop reason: HOSPADM

## 2021-02-22 RX ORDER — GABAPENTIN 250 MG/5ML
800 SOLUTION ORAL EVERY 8 HOURS SCHEDULED
Qty: 470 ML | Refills: 0 | Status: SHIPPED | OUTPATIENT
Start: 2021-02-22

## 2021-02-22 RX ORDER — ACETYLCYSTEINE 200 MG/ML
3 SOLUTION ORAL; RESPIRATORY (INHALATION)
Start: 2021-02-22

## 2021-02-22 RX ORDER — ASCORBIC ACID 500 MG
1000 TABLET ORAL DAILY
Status: DISCONTINUED | OUTPATIENT
Start: 2021-02-22 | End: 2021-02-23 | Stop reason: HOSPADM

## 2021-02-22 RX ORDER — ACETAMINOPHEN 650 MG/1
650 SUPPOSITORY RECTAL EVERY 4 HOURS PRN
Status: DISCONTINUED | OUTPATIENT
Start: 2021-02-22 | End: 2021-02-23 | Stop reason: HOSPADM

## 2021-02-22 RX ORDER — DOCUSATE SODIUM 50 MG/5 ML
100 LIQUID (ML) ORAL 2 TIMES DAILY
Start: 2021-02-22

## 2021-02-22 RX ORDER — AMOXICILLIN 250 MG
2 CAPSULE ORAL NIGHTLY
Status: DISCONTINUED | OUTPATIENT
Start: 2021-02-22 | End: 2021-02-23 | Stop reason: HOSPADM

## 2021-02-22 RX ORDER — FENTANYL 25 UG/H
1 PATCH TRANSDERMAL
Qty: 2 EACH | Refills: 0 | Status: SHIPPED | OUTPATIENT
Start: 2021-02-22

## 2021-02-22 RX ORDER — GUAIFENESIN 200 MG/10ML
400 LIQUID ORAL EVERY 4 HOURS
Start: 2021-02-22 | End: 2021-03-16 | Stop reason: HOSPADM

## 2021-02-22 RX ORDER — ALPRAZOLAM 0.5 MG/1
0.5 TABLET ORAL EVERY 8 HOURS PRN
Qty: 10 TABLET | Refills: 0 | Status: SHIPPED | OUTPATIENT
Start: 2021-02-22 | End: 2021-03-16 | Stop reason: HOSPADM

## 2021-02-22 RX ORDER — LANSOPRAZOLE
30 KIT EVERY MORNING
Qty: 300 ML
Start: 2021-02-23

## 2021-02-22 RX ORDER — BISACODYL 10 MG
10 SUPPOSITORY, RECTAL RECTAL DAILY PRN
Start: 2021-02-22

## 2021-02-22 RX ORDER — POLYETHYLENE GLYCOL 3350 17 G/17G
17 POWDER, FOR SOLUTION ORAL DAILY
Start: 2021-02-23

## 2021-02-22 RX ORDER — FLUCONAZOLE 100 MG/1
100 TABLET ORAL EVERY 24 HOURS
Qty: 1 TABLET | Refills: 0 | Status: SHIPPED | OUTPATIENT
Start: 2021-02-22 | End: 2021-02-23

## 2021-02-22 RX ORDER — ACETAMINOPHEN 160 MG/5ML
650 SOLUTION ORAL EVERY 4 HOURS PRN
Start: 2021-02-22 | End: 2021-03-16 | Stop reason: HOSPADM

## 2021-02-22 RX ORDER — OXYCODONE HCL 5 MG/5 ML
10 SOLUTION, ORAL ORAL EVERY 4 HOURS PRN
Qty: 300 ML | Refills: 0 | Status: SHIPPED | OUTPATIENT
Start: 2021-02-22

## 2021-02-22 RX ORDER — SERTRALINE HYDROCHLORIDE 100 MG/1
100 TABLET, FILM COATED ORAL DAILY
Status: DISCONTINUED | OUTPATIENT
Start: 2021-02-22 | End: 2021-02-23 | Stop reason: HOSPADM

## 2021-02-22 RX ORDER — CYCLOBENZAPRINE HCL 10 MG
10 TABLET ORAL DAILY
Start: 2021-02-23

## 2021-02-22 RX ORDER — ONDANSETRON 4 MG/1
4 TABLET, FILM COATED ORAL EVERY 6 HOURS PRN
Status: DISCONTINUED | OUTPATIENT
Start: 2021-02-22 | End: 2021-02-23 | Stop reason: HOSPADM

## 2021-02-22 RX ORDER — DIPHENHYDRAMINE HCL/ZINC ACET 2 %-0.1 %
1 AEROSOL, SPRAY (GRAM) TOPICAL DAILY
Start: 2021-02-22 | End: 2021-03-16 | Stop reason: HOSPADM

## 2021-02-22 RX ORDER — OXYCODONE HCL 5 MG/5 ML
10 SOLUTION, ORAL ORAL EVERY 4 HOURS PRN
Status: DISCONTINUED | OUTPATIENT
Start: 2021-02-22 | End: 2021-02-23 | Stop reason: HOSPADM

## 2021-02-22 RX ORDER — CLOTRIMAZOLE 10 MG/1
10 LOZENGE ORAL; TOPICAL
Qty: 39 TABLET | Refills: 0 | Status: SHIPPED | OUTPATIENT
Start: 2021-02-22 | End: 2021-03-16 | Stop reason: HOSPADM

## 2021-02-22 RX ORDER — PROPRANOLOL HYDROCHLORIDE 20 MG/1
20 TABLET ORAL 3 TIMES DAILY
Start: 2021-02-22 | End: 2021-03-16 | Stop reason: HOSPADM

## 2021-02-22 RX ORDER — CYCLOBENZAPRINE HCL 10 MG
10 TABLET ORAL DAILY
Status: DISCONTINUED | OUTPATIENT
Start: 2021-02-22 | End: 2021-02-23 | Stop reason: HOSPADM

## 2021-02-22 RX ORDER — POTASSIUM CHLORIDE 1.5 G/1.77G
40 POWDER, FOR SOLUTION ORAL AS NEEDED
Status: DISCONTINUED | OUTPATIENT
Start: 2021-02-22 | End: 2021-02-23 | Stop reason: HOSPADM

## 2021-02-22 RX ORDER — HYDROXYZINE HYDROCHLORIDE 25 MG/1
25 TABLET, FILM COATED ORAL DAILY
Status: DISCONTINUED | OUTPATIENT
Start: 2021-02-22 | End: 2021-02-23 | Stop reason: HOSPADM

## 2021-02-22 RX ORDER — ACETAMINOPHEN 325 MG/1
650 TABLET ORAL EVERY 4 HOURS PRN
Status: DISCONTINUED | OUTPATIENT
Start: 2021-02-22 | End: 2021-02-23 | Stop reason: HOSPADM

## 2021-02-22 RX ORDER — ALPRAZOLAM 0.5 MG/1
0.5 TABLET ORAL EVERY 8 HOURS PRN
Status: DISCONTINUED | OUTPATIENT
Start: 2021-02-22 | End: 2021-02-23 | Stop reason: HOSPADM

## 2021-02-22 RX ORDER — FLUCONAZOLE 100 MG/1
100 TABLET ORAL EVERY 24 HOURS
Status: COMPLETED | OUTPATIENT
Start: 2021-02-22 | End: 2021-02-22

## 2021-02-22 RX ADMIN — GUAIFENESIN 400 MG: 100 SOLUTION ORAL at 00:18

## 2021-02-22 RX ADMIN — GUAIFENESIN 400 MG: 100 SOLUTION ORAL at 04:23

## 2021-02-22 RX ADMIN — PROPRANOLOL HYDROCHLORIDE 20 MG: 20 TABLET ORAL at 22:58

## 2021-02-22 RX ADMIN — DOCUSATE SODIUM 50MG AND SENNOSIDES 8.6MG 2 TABLET: 8.6; 5 TABLET, FILM COATED ORAL at 22:58

## 2021-02-22 RX ADMIN — IPRATROPIUM BROMIDE AND ALBUTEROL SULFATE 3 ML: 2.5; .5 SOLUTION RESPIRATORY (INHALATION) at 20:07

## 2021-02-22 RX ADMIN — Medication: at 09:52

## 2021-02-22 RX ADMIN — OXYCODONE HYDROCHLORIDE AND ACETAMINOPHEN 1000 MG: 500 TABLET ORAL at 09:02

## 2021-02-22 RX ADMIN — IPRATROPIUM BROMIDE AND ALBUTEROL SULFATE 3 ML: 2.5; .5 SOLUTION RESPIRATORY (INHALATION) at 06:55

## 2021-02-22 RX ADMIN — ACETYLCYSTEINE 3 ML: 200 SOLUTION ORAL; RESPIRATORY (INHALATION) at 20:07

## 2021-02-22 RX ADMIN — LANSOPRAZOLE 30 MG: KIT at 09:01

## 2021-02-22 RX ADMIN — OXYCODONE HYDROCHLORIDE 10 MG: 5 SOLUTION ORAL at 01:32

## 2021-02-22 RX ADMIN — OXYCODONE HYDROCHLORIDE 10 MG: 5 SOLUTION ORAL at 19:04

## 2021-02-22 RX ADMIN — OXYCODONE HYDROCHLORIDE 10 MG: 5 SOLUTION ORAL at 09:50

## 2021-02-22 RX ADMIN — IPRATROPIUM BROMIDE AND ALBUTEROL SULFATE 3 ML: 2.5; .5 SOLUTION RESPIRATORY (INHALATION) at 13:57

## 2021-02-22 RX ADMIN — DOCUSATE SODIUM 100 MG: 50 LIQUID ORAL at 22:57

## 2021-02-22 RX ADMIN — HYDROXYZINE HYDROCHLORIDE 25 MG: 25 TABLET ORAL at 09:02

## 2021-02-22 RX ADMIN — GABAPENTIN 800 MG: 250 SOLUTION ORAL at 22:56

## 2021-02-22 RX ADMIN — SERTRALINE 100 MG: 100 TABLET, FILM COATED ORAL at 09:02

## 2021-02-22 RX ADMIN — OXYCODONE HYDROCHLORIDE 10 MG: 5 SOLUTION ORAL at 14:53

## 2021-02-22 RX ADMIN — ALPRAZOLAM 0.5 MG: 0.5 TABLET ORAL at 14:53

## 2021-02-22 RX ADMIN — CYCLOBENZAPRINE 10 MG: 10 TABLET, FILM COATED ORAL at 09:02

## 2021-02-22 RX ADMIN — PROPRANOLOL HYDROCHLORIDE 20 MG: 20 TABLET ORAL at 09:02

## 2021-02-22 RX ADMIN — GUAIFENESIN 400 MG: 100 SOLUTION ORAL at 12:37

## 2021-02-22 RX ADMIN — ALPRAZOLAM 0.5 MG: 0.5 TABLET ORAL at 05:36

## 2021-02-22 RX ADMIN — ASPIRIN 81 MG: 81 TABLET, CHEWABLE ORAL at 09:02

## 2021-02-22 RX ADMIN — GUAIFENESIN 400 MG: 100 SOLUTION ORAL at 17:41

## 2021-02-22 RX ADMIN — CLOTRIMAZOLE 10 MG: 10 LOZENGE ORAL at 22:58

## 2021-02-22 RX ADMIN — OXYCODONE HYDROCHLORIDE 10 MG: 5 SOLUTION ORAL at 05:37

## 2021-02-22 RX ADMIN — GABAPENTIN 800 MG: 250 SOLUTION ORAL at 05:37

## 2021-02-22 RX ADMIN — CLOTRIMAZOLE 10 MG: 10 LOZENGE ORAL at 12:37

## 2021-02-22 RX ADMIN — DOCUSATE SODIUM 100 MG: 50 LIQUID ORAL at 09:02

## 2021-02-22 RX ADMIN — GUAIFENESIN 400 MG: 100 SOLUTION ORAL at 22:57

## 2021-02-22 RX ADMIN — PROPRANOLOL HYDROCHLORIDE 20 MG: 20 TABLET ORAL at 17:41

## 2021-02-22 RX ADMIN — POLYETHYLENE GLYCOL 3350 17 G: 17 POWDER, FOR SOLUTION ORAL at 09:02

## 2021-02-22 RX ADMIN — ACETYLCYSTEINE 3 ML: 200 SOLUTION ORAL; RESPIRATORY (INHALATION) at 06:57

## 2021-02-22 RX ADMIN — CLOTRIMAZOLE 10 MG: 10 LOZENGE ORAL at 14:53

## 2021-02-22 RX ADMIN — GABAPENTIN 800 MG: 250 SOLUTION ORAL at 16:31

## 2021-02-22 RX ADMIN — FLUCONAZOLE 100 MG: 100 TABLET ORAL at 17:42

## 2021-02-22 RX ADMIN — ALPRAZOLAM 0.5 MG: 0.5 TABLET ORAL at 22:57

## 2021-02-22 NOTE — PLAN OF CARE
Goal Outcome Evaluation:      Pt alert and agreeable to perform rom and long sitting. Prom BLE performed most planes prior to 2 attempts at long sitting for 10 seconds each. Pt used biceps to pull forward into long sitting and held for 10 seconds. Elevated HOB used. Pt declined sup to sit on eob today. Pt coughing and requested suctioning today. RT present for breathing treatment post PT

## 2021-02-22 NOTE — PROGRESS NOTES
"Physicians Statement of Medical Necessity for  Ambulance Transportation    GENERAL INFORMATION     Name: Maxim Carvajal  YOB: 1985  Medicare #: [id number]  Transport Date:     (Valid for round trips this date, or for scheduled repetitive trips for 60 days from the date signed below.)  Origin:   Destination:   Is the Patient's stay covered under Medicare Part A (PPS/DRG?)No   Closest appropriate facility? Yes  If this a hosp-hosp transfer? No  Is this a hospice patient? No    MEDICAL NECESSITY QUESTIONAIRE    Ambulance Transportation is medically necessary only if other means of transportation are contraindicated or would be potentially harmful to the patient.  To meet this requirement, the patient must be either \"bed confined\" or suffer from a condition such that transport by means other than an ambulance is contraindicated by the patient's condition.  The following questions must be answered by the healthcare professional signing below for this form to be valid:     1) Describe the MEDICAL CONDITION (physical and/or mental) of this patient AT THE TIME OF AMBULANCE TRANSPORT that requires the patient to be transported in an ambulance, and why transport by other means is contraindicated by the patient's condition: Pneumonia   Past Medical History:   Diagnosis Date   • Hypertension       Past Surgical History:   Procedure Laterality Date   • BRONCHOSCOPY N/A 2/16/2021    Procedure: BRONCHOSCOPY;  Surgeon: Darci Sam MD;  Location: [facility];  Service: Pulmonary;  Laterality: N/A;  PRE- MUCUS PLUG  POST- SAME      2) Is this patient \"bed confined\" as defined below?Yes   To be \"bed confined\" the patient must satisfy all three of the following criteria:  (1) unable to get up from bed without assistance; AND (2) unable to ambulate;  AND (3) unable to sit in a chair or wheelchair.  3) Can this patient safely be transported by car or wheelchair van (I.e., may safely sit during transport, without an " attendant or monitoring?)No   4. In addition to completing questions 1-3 above, please check any of the following conditions that apply*:          *Note: supporting documentation for any boxes checked must be maintained in the patient's medical records Medical attendant required and Requires oxygen - unable to self administer      SIGNATURE OF PHYSICIAN OR OTHER AUTHORIZED HEALTHCARE PROFESSIONAL    I certify that the above information is true and correct based on my evaluation of this patient, and represent that the patient requires transport by ambulance and that other forms of transport are contraindicated.  I understand that this information will be used by the Centers for Medicare and Medicaid Services (CMS) to support the determiniation of medical necessity for ambulance services, and I represent that I have personal knowledge of the patient's condition at the time of transport.       If this box is checked, I also certify that the patient is physically or mentally incapable of signing the ambulance service's claim form and that the institution with which I am affiliated has furnished care, services or assistance to the patient.  My signature below is made on behalf of the patient pursuant to 42 .36(b)(4). In accordance with 42 .37, the specific reason(s) that the patient is physically or mentally incapable of signing the claim for is as follow    Signature of Physician or Healthcare Professional   Margarette Garcia RN   Date/Time:   2/22/2021 15:04 EST       (For Scheduled repetitive transport, this form is not valid for transports performed more than 60 days after this date).                                                                                                                                            --------------------------------------------------------------------------------------------  Printed Name and Credentials of Physician or Authorized Healthcare Professional     *Form  must be signed by patient's attending physician for scheduled, repetitive transports,.  For non-repetitive ambulance transports, if unable to obtain the signature of the attending physician, any of the following may sign (please select below):     Physician  Clinical Nurse Specialist  Registered Nurse     Physician Assistant  Discharge Planner  Licensed Practical Nurse     Nurse Practitioner

## 2021-02-22 NOTE — NURSING NOTE
"Pt seen at bedside for Access follow up. He is calm, smiling and cooperative. He states his anxiety is \"ok\" and denies any needs. He declined resources for outpatient services. Reviewed pt with RN who states discharge placement is to be determined and that pt doesn't really want to leave. Access will sign off at this time but please call for questions or concerns.   "

## 2021-02-22 NOTE — THERAPY TREATMENT NOTE
Patient Name: Maxim Carvajal  : 1985    MRN: [id number]                              Today's Date: 2021       Admit Date: 2021    Visit Dx:     ICD-10-CM ICD-9-CM   1. Tracheitis  J04.10 464.10   2. Community acquired pneumonia, unspecified laterality  J18.9 486   3. Anemia, unspecified type  D64.9 285.9     Patient Active Problem List   Diagnosis   • Tracheitis   • Acute on chronic respiratory failure with hypoxemia (CMS/HCC)   • Anemia   • Leukocytosis   • Quadriplegia (CMS/HCC)   • Essential hypertension   • HCAP (healthcare-associated pneumonia)   • Sepsis, unspecified organism (CMS/HCC)   • Community acquired pneumonia     Past Medical History:   Diagnosis Date   • Hypertension      Past Surgical History:   Procedure Laterality Date   • BRONCHOSCOPY N/A 2021    Procedure: BRONCHOSCOPY;  Surgeon: Darci Sam MD;  Location: [facility];  Service: Pulmonary;  Laterality: N/A;  PRE- MUCUS PLUG  POST- SAME     General Information     Row Name 21 1314          OT Time and Intention    Document Type  therapy note (daily note)  -BT     Mode of Treatment  individual therapy;occupational therapy  -BT     Row Name 21 1314          General Information    Existing Precautions/Restrictions  fall  -BT     Barriers to Rehab  medically complex;previous functional deficit  -BT     Row Name 21 1314          Living Environment    Lives With  facility resident  -BT     Row Name 21 1314          Cognition    Orientation Status (Cognition)  oriented x 3  -BT     Row Name 21 1314          Safety Issues, Functional Mobility    Safety Issues Affecting Function (Mobility)  friction/shear risk  -BT     Impairments Affecting Function (Mobility)  balance;coordination;endurance/activity tolerance;grasp;motor control;muscle tone abnormal;pain;postural/trunk control;range of motion (ROM);strength;shortness of breath  -BT       User Key  (r) = Recorded By, (t) = Taken By, (c) = Cosigned  By    Initials Name Provider Type    BT Tiffany Fine OT Occupational Therapist          Mobility/ADL's     Row Name 02/22/21 1315          Bed Mobility    Comment (Bed Mobility)  pt declined sup to sit but agreed to participate with OT with HOB elevated  -BT     Row Name 02/22/21 1315          Activities of Daily Living    BADL Assessment/Intervention  grooming  -BT     Row Name 02/22/21 1329 02/22/21 1315       Grooming Assessment/Training    [place] Level (Grooming)  --  grooming skills;wash face, hands;standby assist;verbal cues  -BT    Position (Grooming)  sitting up in bed HOB elevated  -BT  --      User Key  (r) = Recorded By, (t) = Taken By, (c) = Cosigned By    Initials Name Provider Type    BT Tiffany Fine OT Occupational Therapist        Obj/Interventions     Row Name 02/22/21 1331          Shoulder (Therapeutic Exercise)    Shoulder (Therapeutic Exercise)  AROM (active range of motion)  -BT     Shoulder AROM (Therapeutic Exercise)  bilateral;flexion;extension;aBduction;aDduction;sitting;10 repetitions;2 sets  -BT     Row Name 02/22/21 1331          Elbow/Forearm (Therapeutic Exercise)    Elbow/Forearm (Therapeutic Exercise)  AROM (active range of motion)  -BT     Elbow/Forearm AROM (Therapeutic Exercise)  bilateral;flexion;extension;10 repetitions;2 sets;sitting  -BT     Row Name 02/22/21 1331          Therapeutic Exercise    Therapeutic Exercise  shoulder;elbow/forearm  -BT       User Key  (r) = Recorded By, (t) = Taken By, (c) = Cosigned By    Initials Name Provider Type    BT Tiffany Fine OT Occupational Therapist        Goals/Plan    No documentation.       Clinical Impression     Row Name 02/22/21 1332          Pain Assessment    Additional Documentation  Pain Scale: Numbers Pre/Post-Treatment (Group)  -BT     [facility] Name 02/22/21 1332          Pain Scale: Numbers Pre/Post-Treatment    Pre/Posttreatment Pain Comment  pt c/o of neck pain near trach  -BT     Pain Intervention(s)  Repositioned   -BT     Row Name 02/22/21 1337          Plan of Care Review    Plan of Care Reviewed With  patient  -BT     Progress  improving  -BT     Outcome Summary  Pt declined EOB activity, but agreeable to participate in OT session with HOB elevated. Pt completed grooming task with SBA and vc's. Pt completed 2 x 10 sets of BUE AROM exercises to increase strength and activity tolerance with adls and functional transfers.  -BT     Row Name 02/22/21 1332          Positioning and Restraints    Pre-Treatment Position  in bed  -BT     Post Treatment Position  bed  -BT     In Bed  supine;call light within reach;encouraged to call for assist;exit alarm on  -BT       User Key  (r) = Recorded By, (t) = Taken By, (c) = Cosigned By    Initials Name Provider Type    BT Tiffany Fine OT Occupational Therapist        Outcome Measures     Row Name 02/22/21 7147          How much help from another is currently needed...    Putting on and taking off regular lower body clothing?  1  -BT     Bathing (including washing, rinsing, and drying)  1  -BT     Toileting (which includes using toilet bed pan or urinal)  1  -BT     Putting on and taking off regular upper body clothing  1  -BT     Taking care of personal grooming (such as brushing teeth)  2  -BT     Eating meals  1  -BT     AM-PAC 6 Clicks Score (OT)  7  -BT     Row Name 02/22/21 2600          Functional Assessment    Outcome Measure Options  AM-PAC 6 Clicks Daily Activity (OT)  -BT       User Key  (r) = Recorded By, (t) = Taken By, (c) = Cosigned By    Initials Name Provider Type    BT Tiffany Fine OT Occupational Therapist        Occupational Therapy Education                 Title: PT OT SLP Therapies (In Progress)     Topic: Occupational Therapy (Done)     Point: ADL training (Done)     Description:   Instruct learner(s) on proper safety adaptation and remediation techniques during self care or transfers.   Instruct in proper use of assistive devices.              Learning Progress  Summary           Patient Acceptance, E, VU by BT at 2/22/2021 1338    Comment: Education regarding adl retraining and BUE ther ex.    Acceptance, E, VU,NR by  at 2/19/2021 0746    Acceptance, E,TB, VU by PAIGE at 2/10/2021 1540                   Point: Home exercise program (Done)     Description:   Instruct learner(s) on appropriate technique for monitoring, assisting and/or progressing therapeutic exercises/activities.              Learning Progress Summary           Patient Acceptance, E, VU by BT at 2/22/2021 1338    Comment: Education regarding adl retraining and BUE ther ex.                   Point: Precautions (Done)     Description:   Instruct learner(s) on prescribed precautions during self-care and functional transfers.              Learning Progress Summary           Patient Acceptance, E, VU by BT at 2/22/2021 1338    Comment: Education regarding adl retraining and BUE ther ex.                   Point: Body mechanics (Done)     Description:   Instruct learner(s) on proper positioning and spine alignment during self-care, functional mobility activities and/or exercises.              Learning Progress Summary           Patient Acceptance, E, VU by BT at 2/22/2021 1338    Comment: Education regarding adl retraining and BUE ther ex.                               User Key     Initials Effective Dates Name Provider Type Discipline     09/17/19 -  Darío Moulton, RN Registered Nurse Nurse    PAIGE 07/15/20 -  Eri Batista, OT Occupational Therapist OT    BT 11/16/20 -  Tiffany Fine OT Occupational Therapist OT              OT Recommendation and Plan     Plan of Care Review  Plan of Care Reviewed With: patient  Progress: improving  Outcome Summary: Pt declined EOB activity, but agreeable to participate in OT session with HOB elevated. Pt completed grooming task with SBA and vc's. Pt completed 2 x 10 sets of BUE AROM exercises to increase strength and activity tolerance with adls and functional  transfers.     Time Calculation:   Time Calculation- OT     Row Name 02/22/21 1340             Time Calculation- OT    OT Start Time  1030  -BT      OT Stop Time  1054  -BT      OT Time Calculation (min)  24 min  -BT      Total Timed Code Minutes- OT  24 minute(s)  -BT      OT Received On  02/22/21  -BT      OT - Next Appointment  02/24/21  -BT      OT Goal Re-Cert Due Date  03/03/21  -BT        User Key  (r) = Recorded By, (t) = Taken By, (c) = Cosigned By    Initials Name Provider Type    BT Tiffany Fine, OT Occupational Therapist        Therapy Charges for Today     Code Description Service Date Service Provider Modifiers Qty    17685785125 HC OT SELF CARE/MGMT/TRAIN EA 15 MIN 2/22/2021 Tiffany Fine OT GO 1    80577216510 HC OT THER PROC EA 15 MIN 2/22/2021 Tiffany Fine OT GO 1               Tiffany Fine OT  2/22/2021

## 2021-02-22 NOTE — THERAPY TREATMENT NOTE
Patient Name: Maxim Carvajal  : 1985    MRN: [id number]                              Today's Date: 2021       Admit Date: 2021    Visit Dx:     ICD-10-CM ICD-9-CM   1. Tracheitis  J04.10 464.10   2. Community acquired pneumonia, unspecified laterality  J18.9 486   3. Anemia, unspecified type  D64.9 285.9     Patient Active Problem List   Diagnosis   • Tracheitis   • Acute on chronic respiratory failure with hypoxemia (CMS/HCC)   • Anemia   • Leukocytosis   • Quadriplegia (CMS/HCC)   • Essential hypertension   • HCAP (healthcare-associated pneumonia)   • Sepsis, unspecified organism (CMS/[facility])   • Community acquired pneumonia     Past Medical History:   Diagnosis Date   • Hypertension      Past Surgical History:   Procedure Laterality Date   • BRONCHOSCOPY N/A 2021    Procedure: BRONCHOSCOPY;  Surgeon: Darci Sam MD;  Location: [facility];  Service: Pulmonary;  Laterality: N/A;  PRE- MUCUS PLUG  POST- SAME     General Information     Row Name 21 1345          Physical Therapy Time and Intention    Document Type  therapy note (daily note)  -SV     Mode of Treatment  individual therapy;physical therapy  -       User Key  (r) = Recorded By, (t) = Taken By, (c) = Cosigned By    Initials Name Provider Type    SV Kaylah Garduno, PT Physical Therapist        Mobility     Row Name 21 1537          Bed Mobility    Comment (Bed Mobility)  Fowlers to long sitting with modx2 (steadied himself in long sit using biceps: did not get into elbow ext due to only holding 10 seconds)  -       User Key  (r) = Recorded By, (t) = Taken By, (c) = Cosigned By    Initials Name Provider Type     Kaylah Garduno, PT Physical Therapist        Obj/Interventions     Row Name 21 1539          Motor Skills    Therapeutic Exercise  -- prom BLE x10 most planes, Hamstring stretch x2 30 hold  -SV     Row Name 21 1539          Balance    Comment, Balance  long sit 10 seconds min x2 ( 2 reps)   -SV       User Key  (r) = Recorded By, (t) = Taken By, (c) = Cosigned By    Initials Name Provider Type    SV Kaylah Garduno, PT Physical Therapist        Goals/Plan    No documentation.       Clinical Impression     Row Name 02/22/21 1540          Pain    Additional Documentation  Pain Scale: FACES Pre/Post-Treatment (Group)  -SV     Row Name 02/22/21 1540          Pain Scale: Numbers Pre/Post-Treatment    Pretreatment Pain Rating  0/10 - no pain  -SV     Posttreatment Pain Rating  0/10 - no pain  -SV     Row Name 02/22/21 1540          Vital Signs    Pre SpO2 (%)  95  -SV     O2 Delivery Pre Treatment  trach collar  -SV     Intra SpO2 (%)  93  -SV     Post SpO2 (%)  94  -SV     O2 Delivery Post Treatment  trach collar  -SV     Pre Patient Position  Supine  -SV     Intra Patient Position  -- long sitting  -SV     Post Patient Position  Supine  -SV     Row Name 02/22/21 1540          Positioning and Restraints    Pre-Treatment Position  in bed  -SV     Post Treatment Position  bed  -SV     In Bed  fowlers;encouraged to call for assist;call light within reach;with other staff RT with pt  -SV       User Key  (r) = Recorded By, (t) = Taken By, (c) = Cosigned By    Initials Name Provider Type    SV Kaylah Garduno, PT Physical Therapist        Outcome Measures     Row Name 02/22/21 1541          How much help from another person do you currently need...    Turning from your back to your side while in flat bed without using bedrails?  1  -SV     Moving from lying on back to sitting on the side of a flat bed without bedrails?  1  -SV     Moving to and from a bed to a chair (including a wheelchair)?  1  -SV     Standing up from a chair using your arms (e.g., wheelchair, bedside chair)?  1  -SV     Climbing 3-5 steps with a railing?  1  -SV     To walk in hospital room?  1  -SV     AM-PAC 6 Clicks Score (PT)  6  -SV       User Key  (r) = Recorded By, (t) = Taken By, (c) = Cosigned By    Initials Name Provider Type    SV  Kaylah Garduno, PT Physical Therapist        Physical Therapy Education                 Title: PT OT SLP Therapies (In Progress)     Topic: Physical Therapy (In Progress)     Point: Mobility training (Done)     Learning Progress Summary           Patient Acceptance, E,TB, VU,NR by  at 2/22/2021 1541    Acceptance, E, VU,NR by  at 2/19/2021 0746    Acceptance, E, VU by  at 2/18/2021 1625    Acceptance, E, VU,NR by CB at 2/13/2021 1525    Acceptance, E,TB,D, VU,NR by CB at 2/11/2021 1549                   Point: Home exercise program (In Progress)     Learning Progress Summary           Patient Acceptance, E,TB, VU,NR by  at 2/22/2021 1541    Acceptance, E,TB,D, VU,NR by  at 2/19/2021 1559    Acceptance, E, VU,NR by  at 2/19/2021 0746    Acceptance, E, VU by  at 2/18/2021 1625    Acceptance, E,D, NR by  at 2/9/2021 1639   Family Acceptance, E,D, NR by  at 2/9/2021 1639                   Point: Body mechanics (Done)     Learning Progress Summary           Patient Acceptance, E, VU,NR by  at 2/19/2021 0746    Acceptance, E, VU by  at 2/18/2021 1625    Acceptance, E, VU,NR by  at 2/13/2021 1525    Acceptance, E,TB,D, VU,NR by CB at 2/11/2021 1549                   Point: Precautions (Done)     Learning Progress Summary           Patient Acceptance, E, VU,NR by  at 2/19/2021 0746    Acceptance, E, VU by  at 2/18/2021 1625    Acceptance, E, VU,NR by CB at 2/13/2021 1525    Acceptance, E,TB,D, VU,NR by CB at 2/11/2021 1549                               User Key     Initials Effective Dates Name Provider Type Discipline     04/03/18 -  Randi Christensen, PT Physical Therapist PT     03/07/18 -  Brittaney Jaffe PTA Physical Therapy Assistant PT     04/03/18 -  Kaylah Garduno, PT Physical Therapist PT     09/17/19 -  Darío Moulton, RN Registered Nurse Nurse     12/30/20 -  Edna Jean Physical Therapist PT              PT Recommendation and Plan           Time Calculation:   PT  Charges     Row Name 02/22/21 1345             Time Calculation    Start Time  1345  -SV      Stop Time  1405  -SV      Time Calculation (min)  20 min  -SV      PT Received On  02/22/21  -SV      PT - Next Appointment  02/23/21  -SV        User Key  (r) = Recorded By, (t) = Taken By, (c) = Cosigned By    Initials Name Provider Type    SV Kaylah Garduno, PT Physical Therapist        Therapy Charges for Today     Code Description Service Date Service Provider Modifiers Qty    35786616575 HC PT THERAPEUTIC ACT EA 15 MIN 2/22/2021 Kaylah Garduno, PT GP 1    73071330238 HC PT THER SUPP EA 15 MIN 2/22/2021 Kaylah Garduno, PT GP 1          PT G-Codes  Outcome Measure Options: AM-PAC 6 Clicks Daily Activity (OT)  AM-PAC 6 Clicks Score (PT): 6  AM-PAC 6 Clicks Score (OT): 7    Kaylah Garduno PT  2/22/2021

## 2021-02-22 NOTE — DISCHARGE PLACEMENT REQUEST
"Maxim Hardin (35 y.o. Male)     Date of Birth Social Security Number Address Home Phone MRN    1985  [id number] [facility] [address] [phone] [id number]    Mormonism Marital Status          None Single       Admission Date Admission Type Admitting Provider Attending Provider Department, Room/Bed    2/2/21 Emergency Dedrick White MD Beard, Lyle E, MD [address], [address]1    Discharge Date Discharge Disposition Discharge Destination         Skilled Nursing Facility (DC - External)              Attending Provider: Fausto Montanez MD    Allergies: No Known Allergies    Isolation: Contact   Infection: MRSA (02/02/21), COVID Screen (preop/placement) (02/18/21)   Code Status: No CPR    Ht: 182.9 cm (72.01\")   Wt: 71.4 kg (157 lb 6.5 oz)    Admission Cmt: None   Principal Problem: HCAP (healthcare-associated pneumonia) [J18.9]                 Active Insurance as of 2/2/2021     Primary Coverage     Payor Plan Insurance Group Employer/Plan Group    [place] [id number]     Payor Plan Address Payor Plan Phone Number Payor Plan Fax Number Effective Dates    [employer] [address] [address] [phone]  4/1/2020 - None Entered    [facility] [id number]       Subscriber Name Subscriber Birth Date Member ID       MAXIM HARDIN 1985 [id number]                 Emergency Contacts      (Rel.) Home Phone Work Phone Mobile Phone    Kinjal Hardin (Father) -- -- [phone]    Shaista Hardin (Mother) [phone] -- [phone]          "

## 2021-02-22 NOTE — PLAN OF CARE
Goal Outcome Evaluation:  Plan of Care Reviewed With: patient     Outcome Summary: VSS. Tach care completed. Secretions aren't as thick as they previously were. Pt not requiring suctioning as often. Pt continues to take PRN pain medication Q4h, pt describes pain anywhere from an 8 to a 10 any time RN in the room. Pt has been smiling, resting comfortably, and watching tv this shift. Adequate urine output. Educating on Q2 turns. Pt sometimes compliant with turning but also refuses some turns as well. Pt states his mother should be visiting him today and they will discuss his next steps with her. No further changes.

## 2021-02-22 NOTE — PROGRESS NOTES
Continued Stay Note  [facility]     Patient Name: Maxim Carvajal  MRN: [id number]  Today's Date: 2/22/2021    Admit Date: 2/2/2021    Discharge Plan     Row Name 02/22/21 1642       Plan    Plan  DC orders today, has a bed at [facility] today, patient and mother decline discharge    Plan Comments  Met with patient and mother at bedside.  Discussed DC orders.  Patient verbalized understanding of safe dc plan arranged and accepting bed at [facility].  Mother and patient state that they refuse discharge.  Mother states she will only agree to [facility] rehab and [facility].  Mother states that no one from [facility] called her to tell her why they declined.  Discussed again how 2 referrals were placed and Brittaney with [facility] declines, stated he had acute rehab there.  He does not have a dc plan to home from acute rehab and that he does not meet acute rehab criteria at this time and declines.  Explained that [facility] also declined and reviewed the case again today.  She declines mass referrals.  She asked about [facility] and reminded that they declined.  She asked for a referral for [facility] and will place referral but discussed that they most likely will decline and not have a medicaid bed available.  Letter of non coverage was documented.  Mother would not sign.  However, patient verbally acknowledged.  She again asked that CCP do whatever it takes to get him to [facility].  Explained again that [facility] has declined.  Provided another [facility] book to look over to call case management with any other referrals she would prefer..........................Margarette Garcia RN    Row Name 02/22/21 5970       Plan    Plan  [facility] accepts and has a bed today for patient    Plan Comments  Spoke with Ivon and she verified with [facility] that they can accept the patient with the balloon deflated as an uncuffed trach.  Facility accepts and has a bed today.  [facility] with  [facility] reviewed again today and declined patient.  Mother at bedside and discussed that patient is ready for dc and orders are anticipated.  Encouraged her to discuss with Maxim.  She declines the bed at [facility].  Discussed that his options at dc were to go to [facility] for rehab or home.  ....................................Margarette Garcia RN        Discharge Codes    No documentation.       Expected Discharge Date and Time     Expected Discharge Date Expected Discharge Time    Feb 22, 2021             Margarette Garcia RN

## 2021-02-22 NOTE — PROGRESS NOTES
Continued Stay Note  [facility]     Patient Name: Maxim Carvajal  MRN: [id number]  Today's Date: 2/22/2021    Admit Date: 2/2/2021    Discharge Plan     Row Name 02/22/21 1453       Plan    Plan  [facility] accepts and has a bed today for patient    Plan Comments  Spoke with Ivon and she verified with [facility] RT that they can accept the patient with the balloon deflated as an uncuffed trach.  Facility accepts and has a bed today.  [facility] reviewed again today and declined patient.  Mother at bedside and discussed that patient is ready for dc and orders are anticipated.  Encouraged her to discuss with Maxim.  She declines the bed at [facility].  Discussed that his options at dc were to go to [facility] for rehab or home.  ....................................Margarette Garcia RN        Discharge Codes    No documentation.             Margarette Garcia RN

## 2021-02-22 NOTE — DISCHARGE SUMMARY
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 02/20/21 0443    Specimen: Blood Updated: 02/20/21 0507     WBC 8.02 10*3/mm3      RBC 2.74 10*6/mm3      Hemoglobin 7.2 g/dL      Hematocrit 22.0 %      MCV 80.3 fL      MCH 26.3 pg      MCHC 32.7 g/dL      RDW 17.0 %      RDW-SD 49.6 fl      MPV 10.1 fL      Platelets 276 10*3/mm3      Neutrophil % 69.6 %      Lymphocyte % 19.3 %      Monocyte % 5.6 %      Eosinophil % 5.0 %      Basophil % 0.0 %      Immature Grans % 0.5 %      Neutrophils, Absolute 5.58 10*3/mm3      Lymphocytes, Absolute 1.55 10*3/mm3      Monocytes, Absolute 0.45 10*3/mm3      Eosinophils, Absolute 0.40 10*3/mm3      Basophils, Absolute 0.00 10*3/mm3      Immature Grans, Absolute 0.04 10*3/mm3      nRBC 0.0 /100 WBC     Basic Metabolic Panel [[id number]]  (Abnormal) Collected: 02/19/21 0538    Specimen: Blood Updated: 02/19/21 0618     Glucose 112 mg/dL      BUN 45 mg/dL      Creatinine 0.71 mg/dL      Sodium 137 mmol/L      Potassium 4.6 mmol/L      Chloride 97 mmol/L      CO2 30.3 mmol/L      Calcium 9.8 mg/dL      eGFR Non African Amer 125 mL/min/1.73      BUN/Creatinine Ratio 63.4     Anion Gap 9.7 mmol/L     Narrative:      GFR Normal >60  Chronic Kidney Disease <60  Kidney Failure <15      CBC & Differential [[id number]]   (Abnormal) Collected: 02/19/21 0538    Specimen: Blood Updated: 02/19/21 0552    Narrative:      The following orders were created for panel order CBC & Differential.  Procedure                               Abnormality         Status                     ---------                               -----------         ------                     CBC Auto Differential[[id number]]        Abnormal            Final result                 Please view results for these tests on the individual orders.    CBC Auto Differential [[id number]]  (Abnormal) Collected: 02/19/21 0538    Specimen: Blood Updated: 02/19/21 0552     WBC 6.65 10*3/mm3      RBC 2.91 10*6/mm3      Hemoglobin 7.5 g/dL      Hematocrit 23.6 %      MCV 81.1 fL      MCH 25.8 pg      MCHC 31.8 g/dL      RDW 16.9 %      RDW-SD 49.9 fl      MPV 9.6 fL      Platelets 278 10*3/mm3      Neutrophil % 65.4 %      Lymphocyte % 20.8 %      Monocyte % 7.2 %      Eosinophil % 5.9 %      Basophil % 0.2 %      Immature Grans % 0.5 %      Neutrophils, Absolute 4.36 10*3/mm3      Lymphocytes, Absolute 1.38 10*3/mm3      Monocytes, Absolute 0.48 10*3/mm3      Eosinophils, Absolute 0.39 10*3/mm3      Basophils, Absolute 0.01 10*3/mm3      Immature Grans, Absolute 0.03 10*3/mm3      nRBC 0.0 /100 WBC     AFB Culture - Body Fluid, Pleural Cavity [[id number]] Collected: 02/11/21 0955    Specimen: Body Fluid from Pleural Cavity Updated: 02/18/21 1030     AFB Culture No AFB isolated at 1 week     AFB Stain No acid fast bacilli seen on direct smear    Fungus Culture - Body Fluid, Pleural Cavity [[id number]] Collected: 02/11/21 0955    Specimen: Body Fluid from Pleural Cavity Updated: 02/18/21 1030     Fungus Culture No fungus isolated at 1 week    BAL Culture, Quantitative - Lavage, Lung, Right Middle Lobe [[id number]]  (Abnormal)  (Susceptibility) Collected: 02/16/21 1129    Specimen: Lavage from Lung, Right Middle Lobe Updated: 02/18/21 0802     BAL Culture <10,000 CFU/mL Staphylococcus  aureus, MRSA     Comment: Methicillin resistant Staphylococcus aureus, Patient may be an isolation risk.         No Normal Respiratory Lisa     Gram Stain Rare (1+) WBCs seen      No organisms seen    Susceptibility      Staphylococcus aureus, MRSA     YOGI     Clindamycin Resistant     Linezolid Susceptible     Oxacillin Resistant     Penicillin G Resistant     Tetracycline Susceptible     Trimethoprim + Sulfamethoxazole Susceptible     Vancomycin Susceptible                    CBC & Differential [[id number]]  (Abnormal) Collected: 02/18/21 0511    Specimen: Blood Updated: 02/18/21 0541    Narrative:      The following orders were created for panel order CBC & Differential.  Procedure                               Abnormality         Status                     ---------                               -----------         ------                     CBC Auto Differential[[id number]]        Abnormal            Final result                 Please view results for these tests on the individual orders.    CBC Auto Differential [[id number]]  (Abnormal) Collected: 02/18/21 0511    Specimen: Blood Updated: 02/18/21 0541     WBC 5.44 10*3/mm3      RBC 2.83 10*6/mm3      Hemoglobin 7.3 g/dL      Hematocrit 22.9 %      MCV 80.9 fL      MCH 25.8 pg      MCHC 31.9 g/dL      RDW 17.1 %      RDW-SD 50.8 fl      MPV 10.6 fL      Platelets 271 10*3/mm3      Neutrophil % 62.1 %      Lymphocyte % 21.5 %      Monocyte % 7.9 %      Eosinophil % 8.1 %      Basophil % 0.2 %      Immature Grans % 0.2 %      Neutrophils, Absolute 3.38 10*3/mm3      Lymphocytes, Absolute 1.17 10*3/mm3      Monocytes, Absolute 0.43 10*3/mm3      Eosinophils, Absolute 0.44 10*3/mm3      Basophils, Absolute 0.01 10*3/mm3      Immature Grans, Absolute 0.01 10*3/mm3      nRBC 0.2 /100 WBC     Non-gynecologic Cytology [[id number]] Collected: 02/16/21 1129    Specimen: Lavage from Lung, Right Middle Lobe Updated: 02/17/21 0949     Case Report --     Non-gynecologic  Cytology                          Case: [id number]                                  Authorizing Provider:  Darci Sam MD        Collected:           02/16/2021 11:29 AM          Ordering Location:     [facility]  Received:            02/16/2021 02:25 PM                                 ENDO SUITES                                                                  Pathologist:           Sydney Encinas MD                                                    Specimen:    Lung, Right Middle Lobe, BAL RML                                                            Final Diagnosis --     1. Lung, Right Middle Lobe, Bronchoalveolar Lavage (BAL):   A. Negative for malignant cells.   B. Pulmonary macrophages, benign bronchial cells, squamous cells, acute inflammation, and mucus.   C. No pneumocystis or fungal organisms identified by GMS stain.       Clinical Information --     PNEUMOCYSTIS, FUNGUS ON CYTO       Gross Description --     1 vial received containing 15 ml of cloudy white fluid w/ mucus. 1 thin prep, cellblock, & GMS. No remaining fluid.       Microscopic Description --     Pulmonary macrophages, benign bronchial cells, squamous cells, acute inflammation, and mucus.    Screened by MICAELA Lee.         Special Stains --     Utilizing appropriate controls, a GMS stain is performed on the cell block and is judged negative.      Basic Metabolic Panel [[id number]]  (Abnormal) Collected: 02/17/21 0543    Specimen: Blood Updated: 02/17/21 0703     Glucose 117 mg/dL      BUN 49 mg/dL      Creatinine 0.71 mg/dL      Sodium 135 mmol/L      Potassium 4.4 mmol/L      Chloride 97 mmol/L      CO2 27.4 mmol/L      Calcium 9.8 mg/dL      eGFR Non African Amer 125 mL/min/1.73      BUN/Creatinine Ratio 69.0     Anion Gap 10.6 mmol/L     Narrative:      GFR Normal >60  Chronic Kidney Disease <60  Kidney Failure <15      CBC & Differential [[id number]]  (Abnormal) Collected: 02/17/21 0543    Specimen: Blood Updated:  02/17/21 0623    Narrative:      The following orders were created for panel order CBC & Differential.  Procedure                               Abnormality         Status                     ---------                               -----------         ------                     CBC Auto Differential[[id number]]        Abnormal            Final result                 Please view results for these tests on the individual orders.    CBC Auto Differential [[id number]]  (Abnormal) Collected: 02/17/21 0543    Specimen: Blood Updated: 02/17/21 0623     WBC 5.69 10*3/mm3      RBC 2.96 10*6/mm3      Hemoglobin 7.6 g/dL      Hematocrit 23.7 %      MCV 80.1 fL      MCH 25.7 pg      MCHC 32.1 g/dL      RDW 16.9 %      RDW-SD 49.0 fl      MPV 10.5 fL      Platelets 270 10*3/mm3      Neutrophil % 64.3 %      Lymphocyte % 21.1 %      Monocyte % 10.5 %      Eosinophil % 3.7 %      Basophil % 0.0 %      Immature Grans % 0.4 %      Neutrophils, Absolute 3.66 10*3/mm3      Lymphocytes, Absolute 1.20 10*3/mm3      Monocytes, Absolute 0.60 10*3/mm3      Eosinophils, Absolute 0.21 10*3/mm3      Basophils, Absolute 0.00 10*3/mm3      Immature Grans, Absolute 0.02 10*3/mm3      nRBC 0.0 /100 WBC     Fungus Smear - Lavage, Lung, Right Middle Lobe [[id number]] Collected: 02/16/21 1129    Specimen: Lavage from Lung, Right Middle Lobe Updated: 02/16/21 1403     Fungal Stain No yeast or hyphal elements seen    Respiratory Panel, PCR (WITHOUT COVID) - Lavage, Lung, Right Middle Lobe [[id number]]  (Normal) Collected: 02/16/21 1129    Specimen: Lavage from Lung, Right Middle Lobe Updated: 02/16/21 1257     ADENOVIRUS, PCR Not Detected     Coronavirus 229E Not Detected     Coronavirus HKU1 Not Detected     Coronavirus NL63 Not Detected     Coronavirus OC43 Not Detected     Human Metapneumovirus Not Detected     Human Rhinovirus/Enterovirus Not Detected     Influenza B PCR Not Detected     Parainfluenza Virus 1 Not Detected     Parainfluenza Virus 2 Not  Detected     Parainfluenza Virus 3 Not Detected     Parainfluenza Virus 4 Not Detected     Bordetella pertussis pcr Not Detected     Chlamydophila pneumoniae PCR Not Detected     Mycoplasma pneumo by PCR Not Detected     Influenza A PCR Not Detected     RSV, PCR Not Detected     Bordetella parapertussis PCR Not Detected    Narrative:      The coronavirus on the RVP is NOT COVID-19 and is NOT indicative of infection with COVID-19.     COVID PRE-OP / PRE-PROCEDURE SCREENING ORDER (NO ISOLATION) - Swab, Nasopharynx [[id number]]  (Normal) Collected: 02/16/21 0626    Specimen: Swab from Nasopharynx Updated: 02/16/21 0731    Narrative:      The following orders were created for panel order COVID PRE-OP / PRE-PROCEDURE SCREENING ORDER (NO ISOLATION) - Swab, Nasopharynx.  Procedure                               Abnormality         Status                     ---------                               -----------         ------                     COVID-19,[facility] IN-HOUSE...[[id number]]  Normal              Final result                 Please view results for these tests on the individual orders.    COVID-19,[facility] IN-HOUSE CEPHEID, NP SWAB IN TRANSPORT MEDIA 8-12 HR TAT - Swab, Nasopharynx [[id number]]  (Normal) Collected: 02/16/21 0626    Specimen: Swab from Nasopharynx Updated: 02/16/21 0731     COVID19 Not Detected    Narrative:      Fact sheet for providers: https://www.fda.gov/media/105924/download     Fact sheet for patients: https://www.fda.gov/media/974250/download    Anaerobic Culture - Pleural Fluid, Pleural Cavity [[id number]] Collected: 02/11/21 0955    Specimen: Pleural Fluid from Pleural Cavity Updated: 02/16/21 0657     Anaerobic Culture No anaerobes isolated at 5 days    Basic Metabolic Panel [[id number]]  (Abnormal) Collected: 02/16/21 0500    Specimen: Blood Updated: 02/16/21 0629     Glucose 93 mg/dL      BUN 47 mg/dL      Creatinine 0.76 mg/dL      Sodium 131 mmol/L      Potassium 5.3 mmol/L      Chloride 94  mmol/L      CO2 26.8 mmol/L      Calcium 9.8 mg/dL      eGFR Non African Amer 115 mL/min/1.73      BUN/Creatinine Ratio 61.8     Anion Gap 10.2 mmol/L     Narrative:      GFR Normal >60  Chronic Kidney Disease <60  Kidney Failure <15      CBC & Differential [[id number]]  (Abnormal) Collected: 02/16/21 0500    Specimen: Blood Updated: 02/16/21 0609    Narrative:      The following orders were created for panel order CBC & Differential.  Procedure                               Abnormality         Status                     ---------                               -----------         ------                     CBC Auto Differential[[id number]]        Abnormal            Final result                 Please view results for these tests on the individual orders.    CBC Auto Differential [[id number]]  (Abnormal) Collected: 02/16/21 0500    Specimen: Blood Updated: 02/16/21 0609     WBC 7.38 10*3/mm3      RBC 2.93 10*6/mm3      Hemoglobin 7.7 g/dL      Hematocrit 23.5 %      MCV 80.2 fL      MCH 26.3 pg      MCHC 32.8 g/dL      RDW 17.1 %      RDW-SD 50.3 fl      MPV 10.5 fL      Platelets 285 10*3/mm3      Neutrophil % 69.1 %      Lymphocyte % 18.3 %      Monocyte % 9.1 %      Eosinophil % 3.1 %      Basophil % 0.1 %      Immature Grans % 0.3 %      Neutrophils, Absolute 5.10 10*3/mm3      Lymphocytes, Absolute 1.35 10*3/mm3      Monocytes, Absolute 0.67 10*3/mm3      Eosinophils, Absolute 0.23 10*3/mm3      Basophils, Absolute 0.01 10*3/mm3      Immature Grans, Absolute 0.02 10*3/mm3      nRBC 0.0 /100 WBC     Body Fluid Culture - Body Fluid, Pleural Cavity [[id number]] Collected: 02/11/21 0955    Specimen: Body Fluid from Pleural Cavity Updated: 02/16/21 0454     Body Fluid Culture No growth at 5 days     Gram Stain No WBCs or organisms seen    Potassium [[id number]]  (Abnormal) Collected: 02/15/21 1914    Specimen: Blood Updated: 02/15/21 2014     Potassium 5.4 mmol/L         /57 (BP Location: Right arm, Patient  "Position: Lying)   Pulse 89   Temp 99 °F (37.2 °C) (Oral)   Resp 18   Ht 182.9 cm (72.01\")   Wt 71.4 kg (157 lb 6.5 oz)   SpO2 99%   BMI 21.34 kg/m²     Discharge Exam:  General Appearance:    Alert, cooperative, no distress                          Head:    Normocephalic, without obvious abnormality, atraumatic                          Eyes:                            Throat:   Lips, tongue, gums normal                          Neck: Has trach in place                        Lungs:     Clear to auscultation bilaterally, respirations unlabored                Chest Wall:    No tenderness or deformity                        Heart:    Regular rate and rhythm, S1 and S2 normal, no murmur,no  Rub or gallop                  Abdomen:     Soft, has G-tube, bowel sounds active, no masses, no organomegaly                  Extremities:   Extremities normal, atraumatic, no cyanosis or edema                             Skin:   Skin is warm and dry,  no rashes or palpable lesions                  Neurologic: Quadriplegia     Disposition:  Skilled nursing facility  No active diet order  The patient gets tube feedings and his medicines are given through the tube.  Patient Instructions:      Discharge Medications      New Medications      Instructions Start Date   acetylcysteine 20 % nebulizer solution  Commonly known as: MUCOMYST   3 mL, Nebulization, 2 Times Daily - RT      clotrimazole 10 MG miguel  Commonly known as: MYCELEX   10 mg, Oral, 5 Times Daily      docusate sodium 150 MG/15ML liquid  Commonly known as: COLACE   100 mg, Per G Tube, 2 Times Daily      fluconazole 100 MG tablet  Commonly known as: DIFLUCAN   100 mg, Per G Tube, Every 24 Hours      Gabapentin 300 MG/6ML solution solution  Commonly known as: NEURONTIN  Replaces: gabapentin 800 MG tablet   800 mg, Per G Tube, Every 8 Hours Scheduled      guaifenesin 100 MG/5ML liquid  Commonly known as: ROBITUSSIN   400 mg, Per G Tube, Every 4 Hours      lansoprazole " 3 MG/ML suspension oral suspension   30 mg, Nasogastric, Every Morning   Start Date: February 23, 2021     oxyCODONE 5 MG/5ML solution  Commonly known as: ROXICODONE  Replaces: oxyCODONE 5 MG capsule   10 mg, Per G Tube, Every 4 Hours PRN      polyethylene glycol 17 g packet  Commonly known as: MIRALAX   17 g, Oral, Daily   Start Date: February 23, 2021        Changes to Medications      Instructions Start Date   acetaminophen 650 MG suppository  Commonly known as: TYLENOL  What changed: Another medication with the same name was added. Make sure you understand how and when to take each.   650 mg, Rectal, Every 4 Hours PRN      acetaminophen 325 MG tablet  Commonly known as: TYLENOL  What changed: Another medication with the same name was added. Make sure you understand how and when to take each.   650 mg, Oral, Every 6 Hours PRN      acetaminophen 160 MG/5ML solution  Commonly known as: TYLENOL  What changed: You were already taking a medication with the same name, and this prescription was added. Make sure you understand how and when to take each.   650 mg, Per G Tube, Every 4 Hours PRN      ALPRAZolam 0.5 MG tablet  Commonly known as: XANAX  What changed:   · medication strength  · how much to take  · how to take this   0.5 mg, Per G Tube, Every 8 Hours PRN      bisacodyl 10 MG suppository  Commonly known as: DULCOLAX  What changed: Another medication with the same name was added. Make sure you understand how and when to take each.   10 mg, Rectal, Daily      bisacodyl 10 MG suppository  Commonly known as: DULCOLAX  What changed: You were already taking a medication with the same name, and this prescription was added. Make sure you understand how and when to take each.   10 mg, Rectal, Daily PRN      cyclobenzaprine 10 MG tablet  Commonly known as: FLEXERIL  What changed: how to take this   10 mg, Per G Tube, Daily   Start Date: February 23, 2021     propranolol 20 MG tablet  Commonly known as: INDERAL  What  changed: how to take this   20 mg, Per G Tube, 3 Times Daily         Continue These Medications      Instructions Start Date   ascorbic acid 500 MG tablet  Commonly known as: VITAMIN C   1,000 mg, Oral, Daily, Via g tube       aspirin 81 MG EC tablet   81 mg, Oral, Daily, Give via g tube       collagenase 250 UNIT/GM ointment   1 application, Topical, Daily      [employer] Probiotic (Lactobacillus) capsule   1 capsule, Oral, Daily      [employer] Zinc 50 MG tablet  Generic drug: zinc gluconate   50 mg, Oral, Daily      fentaNYL 25 MCG/HR patch  Commonly known as: DURAGESIC   1 patch, Transdermal, Every 72 Hours      ferrous sulfate 325 (65 FE) MG tablet   325 mg, Oral, Daily With Breakfast, Via g tube       hydrOXYzine 25 MG tablet  Commonly known as: ATARAX   25 mg, Oral, Daily, Via g tube       ipratropium-albuterol 0.5-2.5 mg/3 ml nebulizer  Commonly known as: DUO-NEB   3 mL, Nebulization, Every 4 Hours PRN      midodrine 2.5 MG tablet  Commonly known as: PROAMATINE   7.5 mg, Oral, 3 Times Daily      multivitamin with minerals tablet tablet   1 tablet, Oral, Daily      ondansetron 4 MG tablet  Commonly known as: ZOFRAN   4 mg, Oral, Every 6 Hours PRN      polyethyl glycol-propyl glycol 0.4-0.3 % solution ophthalmic solution  Commonly known as: SYSTANE   1 drop, Both Eyes, Every 1 Hour PRN      povidone-iodine 10 % external solution  Commonly known as: BETADINE   1 application, Topical, Daily, Apply to right heel topically once a day for impaired skin integrity cleanse right heel with soap and water; paint heel with betadine moistened gauze and cover with dry dressing       senna 8.6 MG tablet  Commonly known as: SENOKOT   1 tablet, Oral, 2 Times Daily      sertraline 100 MG tablet  Commonly known as: ZOLOFT   100 mg, Oral, Daily      sodium chloride 0.65 % nasal spray   1 spray, Nasal, Every 2 Hours PRN         Stop These Medications    ciprofloxacin 500 MG tablet  Commonly known as: CIPRO     clopidogrel 75 MG  tablet  Commonly known as: PLAVIX     doxycycline 100 MG tablet  Commonly known as: VIBRAMYICN     fludrocortisone 0.1 MG tablet     gabapentin 800 MG tablet  Commonly known as: NEURONTIN  Replaced by: Gabapentin 300 MG/6ML solution solution     melatonin 3 MG tablet     Mucinex Fast-Max DM Max 5-100 MG/5ML liquid  Generic drug: Dextromethorphan-guaiFENesin     omeprazole 2 mg/mL in sodium bicarbonate injection 8.4%     oxyCODONE 5 MG capsule  Commonly known as: OXY-IR  Replaced by: oxyCODONE 5 MG/5ML solution          No future appointments.  Follow-up Information     Sheron Perez MD Follow up.    Specialty: Family Medicine  Contact information:  [address] [id number] [phone]                 Discharge Order (From admission, onward)     Start     Ordered    02/22/21 1531  Discharge patient  Once     Expected Discharge Date: 02/22/21    Discharge Disposition: Skilled Nursing Facility (DC - External)    Physician of Record for Attribution - Please select from Treatment Team: FAUSTO MONTANEZ [[id number]]    Review needed by CMO to determine Physician of Record: No       Question Answer Comment   Physician of Record for Attribution - Please select from Treatment Team FAUSTO MONTANEZ    Review needed by CMO to determine Physician of Record No        02/22/21 1601                Total time spent discharging patient including evaluation,post hospitalization follow up,  medication and post hospitalization instructions and education total time exceeds 30 minutes.    Signed:  Fausto Monatnez MD  2/22/2021  16:03 EST

## 2021-02-22 NOTE — PLAN OF CARE
Goal Outcome Evaluation:  Plan of Care Reviewed With: patient  Progress: improving  Outcome Summary: Pt declined EOB activity, but agreeable to participate in OT session with HOB elevated. Pt completed grooming task with SBA and vc's. Pt completed 2 x 10 sets of BUE AROM exercises to increase strength and activity tolerance with adls and functional transfers.    Pt did not wear face mask during this therapy encounter. Therapist wore appropriate PPE for contact precautions including face mask, gloves, gown, and eye protection. Hand hygiene was completed before and after therapy session. Pt not in enhanced precautions.

## 2021-02-23 VITALS
DIASTOLIC BLOOD PRESSURE: 65 MMHG | BODY MASS INDEX: 21.32 KG/M2 | OXYGEN SATURATION: 99 % | HEIGHT: 72 IN | SYSTOLIC BLOOD PRESSURE: 111 MMHG | RESPIRATION RATE: 16 BRPM | WEIGHT: 157.41 LBS | HEART RATE: 66 BPM | TEMPERATURE: 98.8 F

## 2021-02-23 LAB
BASOPHILS # BLD AUTO: 0.01 10*3/MM3 (ref 0–0.2)
BASOPHILS NFR BLD AUTO: 0.2 % (ref 0–1.5)
DEPRECATED RDW RBC AUTO: 52.6 FL (ref 37–54)
EOSINOPHIL # BLD AUTO: 0.34 10*3/MM3 (ref 0–0.4)
EOSINOPHIL NFR BLD AUTO: 5.6 % (ref 0.3–6.2)
ERYTHROCYTE [DISTWIDTH] IN BLOOD BY AUTOMATED COUNT: 17.7 % (ref 12.3–15.4)
HCT VFR BLD AUTO: 22.6 % (ref 37.5–51)
HGB BLD-MCNC: 7.3 G/DL (ref 13–17.7)
IMM GRANULOCYTES # BLD AUTO: 0.02 10*3/MM3 (ref 0–0.05)
IMM GRANULOCYTES NFR BLD AUTO: 0.3 % (ref 0–0.5)
LYMPHOCYTES # BLD AUTO: 1.21 10*3/MM3 (ref 0.7–3.1)
LYMPHOCYTES NFR BLD AUTO: 20 % (ref 19.6–45.3)
MCH RBC QN AUTO: 26.2 PG (ref 26.6–33)
MCHC RBC AUTO-ENTMCNC: 32.3 G/DL (ref 31.5–35.7)
MCV RBC AUTO: 81 FL (ref 79–97)
MONOCYTES # BLD AUTO: 0.49 10*3/MM3 (ref 0.1–0.9)
MONOCYTES NFR BLD AUTO: 8.1 % (ref 5–12)
NEUTROPHILS NFR BLD AUTO: 3.99 10*3/MM3 (ref 1.7–7)
NEUTROPHILS NFR BLD AUTO: 65.8 % (ref 42.7–76)
NRBC BLD AUTO-RTO: 0 /100 WBC (ref 0–0.2)
PLATELET # BLD AUTO: 287 10*3/MM3 (ref 140–450)
PMV BLD AUTO: 10.4 FL (ref 6–12)
RBC # BLD AUTO: 2.79 10*6/MM3 (ref 4.14–5.8)
WBC # BLD AUTO: 6.06 10*3/MM3 (ref 3.4–10.8)

## 2021-02-23 PROCEDURE — 85025 COMPLETE CBC W/AUTO DIFF WBC: CPT | Performed by: INTERNAL MEDICINE

## 2021-02-23 PROCEDURE — 94799 UNLISTED PULMONARY SVC/PX: CPT

## 2021-02-23 RX ADMIN — ALPRAZOLAM 0.5 MG: 0.5 TABLET ORAL at 09:07

## 2021-02-23 RX ADMIN — CLOTRIMAZOLE 10 MG: 10 LOZENGE ORAL at 06:03

## 2021-02-23 RX ADMIN — ACETYLCYSTEINE 3 ML: 200 SOLUTION ORAL; RESPIRATORY (INHALATION) at 07:26

## 2021-02-23 RX ADMIN — IPRATROPIUM BROMIDE AND ALBUTEROL SULFATE 3 ML: 2.5; .5 SOLUTION RESPIRATORY (INHALATION) at 07:28

## 2021-02-23 RX ADMIN — GUAIFENESIN 400 MG: 100 SOLUTION ORAL at 11:53

## 2021-02-23 RX ADMIN — SERTRALINE 100 MG: 100 TABLET, FILM COATED ORAL at 09:09

## 2021-02-23 RX ADMIN — LANSOPRAZOLE 30 MG: KIT at 09:01

## 2021-02-23 RX ADMIN — CLOTRIMAZOLE 10 MG: 10 LOZENGE ORAL at 11:53

## 2021-02-23 RX ADMIN — HYDROXYZINE HYDROCHLORIDE 25 MG: 25 TABLET ORAL at 09:08

## 2021-02-23 RX ADMIN — OXYCODONE HYDROCHLORIDE AND ACETAMINOPHEN 1000 MG: 500 TABLET ORAL at 09:08

## 2021-02-23 RX ADMIN — ASPIRIN 81 MG: 81 TABLET, CHEWABLE ORAL at 09:08

## 2021-02-23 RX ADMIN — IPRATROPIUM BROMIDE AND ALBUTEROL SULFATE 3 ML: 2.5; .5 SOLUTION RESPIRATORY (INHALATION) at 15:03

## 2021-02-23 RX ADMIN — GABAPENTIN 800 MG: 250 SOLUTION ORAL at 13:21

## 2021-02-23 RX ADMIN — CLOTRIMAZOLE 10 MG: 10 LOZENGE ORAL at 17:12

## 2021-02-23 RX ADMIN — OXYCODONE HYDROCHLORIDE 10 MG: 5 SOLUTION ORAL at 13:21

## 2021-02-23 RX ADMIN — OXYCODONE HYDROCHLORIDE 10 MG: 5 SOLUTION ORAL at 17:08

## 2021-02-23 RX ADMIN — GUAIFENESIN 400 MG: 100 SOLUTION ORAL at 07:50

## 2021-02-23 RX ADMIN — FENTANYL 1 PATCH: 25 PATCH TRANSDERMAL at 11:54

## 2021-02-23 RX ADMIN — OXYCODONE HYDROCHLORIDE 10 MG: 5 SOLUTION ORAL at 04:21

## 2021-02-23 RX ADMIN — CLOTRIMAZOLE 10 MG: 10 LOZENGE ORAL at 13:21

## 2021-02-23 RX ADMIN — GUAIFENESIN 400 MG: 100 SOLUTION ORAL at 16:28

## 2021-02-23 RX ADMIN — GABAPENTIN 800 MG: 250 SOLUTION ORAL at 06:03

## 2021-02-23 RX ADMIN — ALPRAZOLAM 0.5 MG: 0.5 TABLET ORAL at 17:08

## 2021-02-23 RX ADMIN — PROPRANOLOL HYDROCHLORIDE 20 MG: 20 TABLET ORAL at 16:28

## 2021-02-23 RX ADMIN — DOCUSATE SODIUM 100 MG: 50 LIQUID ORAL at 09:08

## 2021-02-23 RX ADMIN — OXYCODONE HYDROCHLORIDE 10 MG: 5 SOLUTION ORAL at 09:07

## 2021-02-23 RX ADMIN — CYCLOBENZAPRINE 10 MG: 10 TABLET, FILM COATED ORAL at 09:09

## 2021-02-23 RX ADMIN — GUAIFENESIN 400 MG: 100 SOLUTION ORAL at 04:21

## 2021-02-23 RX ADMIN — POLYETHYLENE GLYCOL 3350 17 G: 17 POWDER, FOR SOLUTION ORAL at 09:09

## 2021-02-23 RX ADMIN — PROPRANOLOL HYDROCHLORIDE 20 MG: 20 TABLET ORAL at 09:08

## 2021-02-23 RX ADMIN — OXYCODONE HYDROCHLORIDE 10 MG: 5 SOLUTION ORAL at 00:18

## 2021-02-23 NOTE — PROGRESS NOTES
"DAILY PROGRESS NOTE  [facility]    Patient Identification:  Name: Maxim Carvajal  Age: 35 y.o.  Sex: male  :  1985  MRN: [id number]         Primary Care Physician: Sheron Perez MD    Subjective:  Interval History:He complains of pain.  Hurts all over.    Objective:    Scheduled Meds:acetylcysteine, 3 mL, Nebulization, BID - RT  ascorbic acid, 1,000 mg, Per G Tube, Daily  aspirin, 81 mg, Nasogastric, Daily  clotrimazole, 10 mg, Oral, 5x Daily  cyclobenzaprine, 10 mg, Per G Tube, Daily  docusate sodium, 100 mg, Per G Tube, BID  Eucerin original healing lotion, , Topical, Daily  fentaNYL, 1 patch, Transdermal, Q72H  Gabapentin, 800 mg, Per G Tube, Q8H  guaiFENesin, 400 mg, Per G Tube, Q4H  hydrOXYzine, 25 mg, Per G Tube, Daily  ipratropium-albuterol, 3 mL, Nebulization, TID - RT  lansoprazole, 30 mg, Nasogastric, QAM  polyethylene glycol, 17 g, Per G Tube, Daily  propranolol, 20 mg, Per G Tube, TID  sennosides-docusate, 2 tablet, Per G Tube, Nightly  sertraline, 100 mg, Per G Tube, Daily      Continuous Infusions:hold, 1 each        Vital signs in last 24 hours:  Temp:  [97.7 °F (36.5 °C)-99.8 °F (37.7 °C)] 98.8 °F (37.1 °C)  Heart Rate:  [76-89] 81  Resp:  [16-18] 18  BP: ()/(55-67) 111/65    Intake/Output:    Intake/Output Summary (Last 24 hours) at 2021 1249  Last data filed at 2021 0609  Gross per 24 hour   Intake 0 ml   Output 500 ml   Net -500 ml       Exam:  /65 (BP Location: Left arm, Patient Position: Lying)   Pulse 81   Temp 98.8 °F (37.1 °C) (Oral)   Resp 18   Ht 182.9 cm (72.01\")   Wt 71.4 kg (157 lb 6.5 oz)   SpO2 92%   BMI 21.34 kg/m²     General Appearance:    Alert, cooperative, no distress   Head:    Normocephalic, without obvious abnormality, atraumatic   Eyes:       Throat:   Lips, tongue, gums normal   Neck:   Supple, symmetrical, trachea midline, no JVD   Lungs:     rhonchi bilaterally, respirations unlabored   Chest Wall:    No " tenderness or deformity    Heart:    Regular rate and rhythm, S1 and S2 normal, no murmur,no  Rub or gallop   Abdomen:     Soft, nontender, bowel sounds active, no masses, no organomegaly    Extremities:   Extremities normal, atraumatic, no cyanosis or edema   Pulses:      Skin:   Skin is warm and dry,  no rashes or palpable lesions   Neurologic:   quadraplegia.      Lab Results (last 72 hours)     Procedure Component Value Units Date/Time    AFB Culture - Body Fluid, Pleural Cavity [[id number]] Collected: 02/11/21 0955    Specimen: Body Fluid from Pleural Cavity Updated: 02/18/21 1030     AFB Culture No AFB isolated at 1 week     AFB Stain No acid fast bacilli seen on direct smear    Fungus Culture - Body Fluid, Pleural Cavity [[id number]] Collected: 02/11/21 0955    Specimen: Body Fluid from Pleural Cavity Updated: 02/18/21 1030     Fungus Culture No fungus isolated at 1 week    BAL Culture, Quantitative - Lavage, Lung, Right Middle Lobe [[id number]]  (Abnormal)  (Susceptibility) Collected: 02/16/21 1129    Specimen: Lavage from Lung, Right Middle Lobe Updated: 02/18/21 0802     BAL Culture <10,000 CFU/mL Staphylococcus aureus, MRSA     Comment: Methicillin resistant Staphylococcus aureus, Patient may be an isolation risk.         No Normal Respiratory Lisa     Gram Stain Rare (1+) WBCs seen      No organisms seen    Susceptibility      Staphylococcus aureus, MRSA     YOGI     Clindamycin Resistant     Linezolid Susceptible     Oxacillin Resistant     Penicillin G Resistant     Tetracycline Susceptible     Trimethoprim + Sulfamethoxazole Susceptible     Vancomycin Susceptible                    CBC & Differential [[id number]]  (Abnormal) Collected: 02/18/21 0511    Specimen: Blood Updated: 02/18/21 0541    Narrative:      The following orders were created for panel order CBC & Differential.  Procedure                               Abnormality         Status                     ---------                                -----------         ------                     CBC Auto Differential[[id number]]        Abnormal            Final result                 Please view results for these tests on the individual orders.    CBC Auto Differential [[id number]]  (Abnormal) Collected: 02/18/21 0511    Specimen: Blood Updated: 02/18/21 0541     WBC 5.44 10*3/mm3      RBC 2.83 10*6/mm3      Hemoglobin 7.3 g/dL      Hematocrit 22.9 %      MCV 80.9 fL      MCH 25.8 pg      MCHC 31.9 g/dL      RDW 17.1 %      RDW-SD 50.8 fl      MPV 10.6 fL      Platelets 271 10*3/mm3      Neutrophil % 62.1 %      Lymphocyte % 21.5 %      Monocyte % 7.9 %      Eosinophil % 8.1 %      Basophil % 0.2 %      Immature Grans % 0.2 %      Neutrophils, Absolute 3.38 10*3/mm3      Lymphocytes, Absolute 1.17 10*3/mm3      Monocytes, Absolute 0.43 10*3/mm3      Eosinophils, Absolute 0.44 10*3/mm3      Basophils, Absolute 0.01 10*3/mm3      Immature Grans, Absolute 0.01 10*3/mm3      nRBC 0.2 /100 WBC     Non-gynecologic Cytology [[id number]] Collected: 02/16/21 1129    Specimen: Lavage from Lung, Right Middle Lobe Updated: 02/17/21 0949     Case Report --     Non-gynecologic Cytology                          Case: [id number]                                  Authorizing Provider:  Darci Sam MD        Collected:           02/16/2021 11:29 AM          Ordering Location:     [facility]  Received:            02/16/2021 02:25 PM                                 ENDO SUITES                                                                  Pathologist:           Sydney Encinas MD                                                    Specimen:    Lung, Right Middle Lobe, BAL RML                                                            Final Diagnosis --     1. Lung, Right Middle Lobe, Bronchoalveolar Lavage (BAL):   A. Negative for malignant cells.   B. Pulmonary macrophages, benign bronchial cells, squamous cells, acute inflammation, and mucus.   C. No  pneumocystis or fungal organisms identified by GMS stain.       Clinical Information --     PNEUMOCYSTIS, FUNGUS ON CYTO       Gross Description --     1 vial received containing 15 ml of cloudy white fluid w/ mucus. 1 thin prep, cellblock, & GMS. No remaining fluid.       Microscopic Description --     Pulmonary macrophages, benign bronchial cells, squamous cells, acute inflammation, and mucus.    Screened by MICAELA Lee.         Special Stains --     Utilizing appropriate controls, a GMS stain is performed on the cell block and is judged negative.      Basic Metabolic Panel [[id number]]  (Abnormal) Collected: 02/17/21 0543    Specimen: Blood Updated: 02/17/21 0703     Glucose 117 mg/dL      BUN 49 mg/dL      Creatinine 0.71 mg/dL      Sodium 135 mmol/L      Potassium 4.4 mmol/L      Chloride 97 mmol/L      CO2 27.4 mmol/L      Calcium 9.8 mg/dL      eGFR Non African Amer 125 mL/min/1.73      BUN/Creatinine Ratio 69.0     Anion Gap 10.6 mmol/L     Narrative:      GFR Normal >60  Chronic Kidney Disease <60  Kidney Failure <15      CBC & Differential [[id number]]  (Abnormal) Collected: 02/17/21 0543    Specimen: Blood Updated: 02/17/21 0623    Narrative:      The following orders were created for panel order CBC & Differential.  Procedure                               Abnormality         Status                     ---------                               -----------         ------                     CBC Auto Differential[[id number]]        Abnormal            Final result                 Please view results for these tests on the individual orders.    CBC Auto Differential [[id number]]  (Abnormal) Collected: 02/17/21 0543    Specimen: Blood Updated: 02/17/21 0623     WBC 5.69 10*3/mm3      RBC 2.96 10*6/mm3      Hemoglobin 7.6 g/dL      Hematocrit 23.7 %      MCV 80.1 fL      MCH 25.7 pg      MCHC 32.1 g/dL      RDW 16.9 %      RDW-SD 49.0 fl      MPV 10.5 fL      Platelets 270 10*3/mm3      Neutrophil % 64.3 %       Lymphocyte % 21.1 %      Monocyte % 10.5 %      Eosinophil % 3.7 %      Basophil % 0.0 %      Immature Grans % 0.4 %      Neutrophils, Absolute 3.66 10*3/mm3      Lymphocytes, Absolute 1.20 10*3/mm3      Monocytes, Absolute 0.60 10*3/mm3      Eosinophils, Absolute 0.21 10*3/mm3      Basophils, Absolute 0.00 10*3/mm3      Immature Grans, Absolute 0.02 10*3/mm3      nRBC 0.0 /100 WBC     Fungus Smear - Lavage, Lung, Right Middle Lobe [[id number]] Collected: 02/16/21 1129    Specimen: Lavage from Lung, Right Middle Lobe Updated: 02/16/21 1403     Fungal Stain No yeast or hyphal elements seen    Respiratory Panel, PCR (WITHOUT COVID) - Lavage, Lung, Right Middle Lobe [[id number]]  (Normal) Collected: 02/16/21 1129    Specimen: Lavage from Lung, Right Middle Lobe Updated: 02/16/21 1257     ADENOVIRUS, PCR Not Detected     Coronavirus 229E Not Detected     Coronavirus HKU1 Not Detected     Coronavirus NL63 Not Detected     Coronavirus OC43 Not Detected     Human Metapneumovirus Not Detected     Human Rhinovirus/Enterovirus Not Detected     Influenza B PCR Not Detected     Parainfluenza Virus 1 Not Detected     Parainfluenza Virus 2 Not Detected     Parainfluenza Virus 3 Not Detected     Parainfluenza Virus 4 Not Detected     Bordetella pertussis pcr Not Detected     Chlamydophila pneumoniae PCR Not Detected     Mycoplasma pneumo by PCR Not Detected     Influenza A PCR Not Detected     RSV, PCR Not Detected     Bordetella parapertussis PCR Not Detected    Narrative:      The coronavirus on the RVP is NOT COVID-19 and is NOT indicative of infection with COVID-19.     Fungus Culture - Lavage, Lung, Right Middle Lobe [[id number]] Collected: 02/16/21 1129    Specimen: Lavage from Lung, Right Middle Lobe Updated: 02/16/21 1147    COVID PRE-OP / PRE-PROCEDURE SCREENING ORDER (NO ISOLATION) - Swab, Nasopharynx [[id number]]  (Normal) Collected: 02/16/21 0626    Specimen: Swab from Nasopharynx Updated: 02/16/21 0731    Narrative:       The following orders were created for panel order COVID PRE-OP / PRE-PROCEDURE SCREENING ORDER (NO ISOLATION) - Swab, Nasopharynx.  Procedure                               Abnormality         Status                     ---------                               -----------         ------                     COVID-19,[facility] IN-HOUSE...[[id number]]  Normal              Final result                 Please view results for these tests on the individual orders.    COVID-19,[facility] IN-HOUSE CEPHEID, NP SWAB IN TRANSPORT MEDIA 8-12 HR TAT - Swab, Nasopharynx [[id number]]  (Normal) Collected: 02/16/21 0626    Specimen: Swab from Nasopharynx Updated: 02/16/21 0731     COVID19 Not Detected    Narrative:      Fact sheet for providers: https://www.fda.gov/media/640468/download     Fact sheet for patients: https://www.fda.gov/media/503772/download    Anaerobic Culture - Pleural Fluid, Pleural Cavity [[id number]] Collected: 02/11/21 0955    Specimen: Pleural Fluid from Pleural Cavity Updated: 02/16/21 0657     Anaerobic Culture No anaerobes isolated at 5 days    Basic Metabolic Panel [[id number]]  (Abnormal) Collected: 02/16/21 0500    Specimen: Blood Updated: 02/16/21 0629     Glucose 93 mg/dL      BUN 47 mg/dL      Creatinine 0.76 mg/dL      Sodium 131 mmol/L      Potassium 5.3 mmol/L      Chloride 94 mmol/L      CO2 26.8 mmol/L      Calcium 9.8 mg/dL      eGFR Non African Amer 115 mL/min/1.73      BUN/Creatinine Ratio 61.8     Anion Gap 10.2 mmol/L     Narrative:      GFR Normal >60  Chronic Kidney Disease <60  Kidney Failure <15      CBC & Differential [[id number]]  (Abnormal) Collected: 02/16/21 0500    Specimen: Blood Updated: 02/16/21 0609    Narrative:      The following orders were created for panel order CBC & Differential.  Procedure                               Abnormality         Status                     ---------                               -----------         ------                     CBC Auto Differential[[id number]]         Abnormal            Final result                 Please view results for these tests on the individual orders.    CBC Auto Differential [[id number]]  (Abnormal) Collected: 02/16/21 0500    Specimen: Blood Updated: 02/16/21 0609     WBC 7.38 10*3/mm3      RBC 2.93 10*6/mm3      Hemoglobin 7.7 g/dL      Hematocrit 23.5 %      MCV 80.2 fL      MCH 26.3 pg      MCHC 32.8 g/dL      RDW 17.1 %      RDW-SD 50.3 fl      MPV 10.5 fL      Platelets 285 10*3/mm3      Neutrophil % 69.1 %      Lymphocyte % 18.3 %      Monocyte % 9.1 %      Eosinophil % 3.1 %      Basophil % 0.1 %      Immature Grans % 0.3 %      Neutrophils, Absolute 5.10 10*3/mm3      Lymphocytes, Absolute 1.35 10*3/mm3      Monocytes, Absolute 0.67 10*3/mm3      Eosinophils, Absolute 0.23 10*3/mm3      Basophils, Absolute 0.01 10*3/mm3      Immature Grans, Absolute 0.02 10*3/mm3      nRBC 0.0 /100 WBC     Body Fluid Culture - Body Fluid, Pleural Cavity [[id number]] Collected: 02/11/21 0955    Specimen: Body Fluid from Pleural Cavity Updated: 02/16/21 0454     Body Fluid Culture No growth at 5 days     Gram Stain No WBCs or organisms seen    Potassium [[id number]]  (Abnormal) Collected: 02/15/21 1914    Specimen: Blood Updated: 02/15/21 2014     Potassium 5.4 mmol/L         Data Review:  Results from last 7 days   Lab Units 02/22/21  0520 02/21/21  0522 02/20/21  0443   SODIUM mmol/L 136 135* 135*   POTASSIUM mmol/L 4.6 4.9 4.7   CHLORIDE mmol/L 93* 94* 93*   CO2 mmol/L 32.3* 30.5* 31.1*   BUN mg/dL 41* 40* 43*   CREATININE mg/dL 0.64* 0.65* 0.64*   GLUCOSE mg/dL 116* 99 102*   CALCIUM mg/dL 9.6 10.1 10.2     Results from last 7 days   Lab Units 02/23/21  0658 02/22/21  0520 02/21/21  0522   WBC 10*3/mm3 6.06 5.45 6.79   HEMOGLOBIN g/dL 7.3* 7.2* 7.6*   HEMATOCRIT % 22.6* 22.1* 23.6*   PLATELETS 10*3/mm3 287 305 301             Lab Results   Lab Value Date/Time    TROPONINT <0.010 02/02/2021 0742    TROPONINT <0.010 02/02/2021 0608               Invalid  input(s): PROT, LABALBU          No results found for: POCGLU        Past Medical History:   Diagnosis Date   • Hypertension        Assessment:  Active Hospital Problems    Diagnosis  POA   • **HCAP (healthcare-associated pneumonia) [J18.9]  Unknown   • Tracheitis [J04.10]  Yes   • Acute on chronic respiratory failure with hypoxemia (CMS/HCC) [J96.21]  Yes   • Anemia [D64.9]  Yes   • Leukocytosis [D72.829]  Yes   • Quadriplegia (CMS/HCC) [G82.50]  Yes   • Essential hypertension [I10]  Yes   • Sepsis, unspecified organism (CMS/HCC) [A41.9]  Unknown   • Community acquired pneumonia [J18.9]  Unknown      Resolved Hospital Problems   No resolved problems to display.       Plan:  Continue RX. Looking for rehab. As per pulmonary. Follow lab. Finished antibiotics.  ? Possible bed at [facility]??He refuses and looking at other places.    Fausto Montanez MD  2/23/2021  12:49 EST

## 2021-02-23 NOTE — PROGRESS NOTES
Adult Nutrition  Assessment/PES    Patient Name:  Maxim Carvajal  YOB: 1985  MRN: [id number]  Admit Date:  2/2/2021    Assessment Date:  2/23/2021    Comments:  TF follow up    Remains on 55 cc/hr of Nutren 1.5, 30 cc Q 4 hour water flush. BUN up, may need to adjust/add more free water. Will monitor - supposed to dc to a rehab today.     Reason for Assessment     Row Name 02/23/21 0812          Reason for Assessment    Reason For Assessment  follow-up protocol;TF/PN             Labs/Tests/Procedures/Meds     Row Name 02/23/21 0813          Labs/Procedures/Meds    Lab Results Reviewed  reviewed     Lab Results Comments  2/22 - cl, glu, bun        Diagnostic Tests/Procedures    Diagnostic Test/Procedure Reviewed  reviewed        Medications    Pertinent Medications Reviewed  reviewed         Physical Findings     Row Name 02/23/21 0813          Physical Findings    Overall Physical Appearance  tetraplegia (quadriplegia)     Gastrointestinal  feeding tube     Tubes  PEG     Skin  pressure injury;other (see comments) LUIS E Paiz heel, B: 11           Nutrition Prescription Ordered     Row Name 02/23/21 0813          Nutrition Prescription PO    Current PO Diet  NPO        Nutrition Prescription EN    Enteral Route  PEG     Product  Nutren 1.5 (Osmolite 1.5)     TF Delivery Method  Continuous     Continuous TF Goal Rate (mL/hr)  55 mL/hr     Continuous TF Current Rate (mL/hr)  55 mL/hr     Water flush (mL)   30 mL     Water Flush Frequency  Every 4 hours         Evaluation of Received Nutrient/Fluid Intake     Row Name 02/23/21 0814          Calories Evaluation    Enteral Calories (kcal)  1980 Not completely recorded in intakes     % of Kcal Needs  100        Protein Evaluation    Enteral Protein (gm)  89     % of Protein Needs  100        Intake Assessment    Energy/Calorie Requirement Assessment  meeting needs     Protein Requirement Assessment  meeting needs        Fluid Intake Evaluation    Enteral (Free  Water) Fluid (mL)  1003     Free Water Flush Fluid (mL)  180     Total Free Water Intake (mL)  1183 mL        EN Evaluation    HOB  Greater than or equal to 30 degress               Problem/Interventions:          Intervention Goal     Row Name 02/23/21 0816          Intervention Goal    General  Maintain nutrition;Nutrition support treatment     TF/PN  Maintain TF/PN     Weight  Maintain weight           Nutrition Prescription     Row Name 02/23/21 0817          Nutrition Prescription EN    Enteral Prescription  Continue same protocol         Education/Evaluation     Row Name 02/23/21 0818          Education    Education  Will Instruct as appropriate        Monitor/Evaluation    Monitor  Per protocol           Electronically signed by:  Neli Morales RD  02/23/21 08:19 EST

## 2021-02-23 NOTE — PROGRESS NOTES
Discharge Planning Assessment  [facility]     Patient Name: Maxim Carvajal  MRN: [id number]  Today's Date: 2/23/2021    Admit Date: 2/2/2021    Discharge Needs Assessment    No documentation.       Discharge Plan     Row Name 02/23/21 1421       Plan    Plan Comments  Keira/Sharmila called and they do not accept his insurance. CANDICE Gutiérrez RN, CCP.    Row Name 02/23/21 1345       Plan    Plan Comments  Per Daniella/Mateusz Flores states they can not meet his needs at this time. CANDICE Gutiérrez RN, CCP    Row Name 02/23/21 1311       Plan    Plan Comments  Daniella/Mateusz Lizzieduyenstefany will evaluate and call CCP back if they can accept tomorrow. She has no beds today. CANDICE Gutiérrez RN, CCP    Row Name 02/23/21 1303       Plan    Plan Comments  [facility] [employer]/Aurelia going to call the patient and asked if he would like to start the medicaid waiver program that covers 45 hours of week assistance. Aurelia is willing to start the application if the patient is agreeable. This can take up to 30 days to get set-up per Aurelia. CANDICE Gutiérrez RN, CCP.    Row Name 02/23/21 1246       Plan    Plan Comments  Called all the facilities/Liaison's to please review for discharge yesterday. Waiting to hear back from them. CANDICE Gutiérrez RN, CCP.    Row Name 02/23/21 1225       Plan    Plan Comments  Liz/INGE and I spoke with the patient regarding his discharge and options and he would like for more referrals to be placed today so that he can review them and make a decision. I informed him that [facility] can accept today and they had already accepted. Will call the the facilities to see if they can accept to a medicaid bed. CANDICE Gutiérrez Rn, CCP        Continued Care and Services - Admitted Since 2/2/2021     Destination     Service Provider Request Status Selected Services Address Phone Fax Patient Preferred    [facility] REHAB  Accepted N/A [address] [facility] [address] [phone] --    [facility]  Accepted N/A [address]  [facility] [address] [phone] --    [facility] OF [place]  Pending - Request Sent N/A [address], [facility] [address] [phone] --    SIGNATURE AT [facility]  Pending - Request Sent N/A [address] R[address] RDHealthSouth Lakeview Rehabilitation Hospital [address] [phone] --    SIGNATURE [place]  Pending - Request Sent N/A [address] [place] [address] [phone] --    [facility]IGNATURE [facility]ALTHCARE - [facility] AND LORIE  Pending - Request Sent N/A [address] EVEN[address] ALFREDHealthSouth [facility] Rehabilitation Hospital [address] [phone] --    [facility] AT [facility]  Pending - Request Sent N/A 18[address] RUDOL[address] CORBINHealthSouth Lakeview Rehabilitation Hospital [address] [phone] --    [facility]ATURE [facility] OF [facility]  Pending - Request Sent N/A 1120 [facility] [address] [phone] --    [facility]  Pending - Request Sent N/A 2116 [facility] [address] [phone] --    [facility]  Pending - Request Sent N/A [address] [facility] [address] [phone] --    [facility]  Pending - Request Sent N/A 17[address] N[address] ALFRED[facility] [address] [phone] --    [facility]  Pending - Request Sent N/A [address] GE[address] ALFREDHealth[facility] [address] [phone] --    [facility]  Pending - Request Sent N/A [address] [facility] [address] [phone] --    [facility]  Pending - Request Sent N/A [address] [address] [phone] --    [facility]  Pending - Request Sent N/A [address] [facility] [place] [address] [phone] --    [facility]  Declined  pt does not wish to return N/A 3500 [facility] [address]  [phone] --    [facility]  Declined  cost of care N/A [address] [facility] [address] [phone] --    [facility]  Declined  No Bed Available N/A 1705 HALIM[address] [facility] [address] [phone] --    [facility]  Declined  Unable to Meet Patient Needs N/A [address] [facility] [address] [phone] --    [facility]  Declined  Unable to Meet Patient Needs N/A 35[address] DUTCHMAN'S [facility] [address] [phone] --    [facility]  Declined  no quad program N/A 4000 [facility] [address] [phone] -- --    [facility] [place]  Declined  discharged 01/29  not able to re admit N/A [address] RASH[address] [facility] [address] [phone] -- --    [facility]  Declined N/A 1313 [facility] [address] [phone] -- --    [facility]  Declined N/A 1313 [facility] [address] [phone] -- --       Internal Comment last updated by Margarette Garcia, RN 2/17/2021 1153    [facility] states patient is more appropriate for subacute and delcines               [facility] - [place]  Declined  No Medicaid Bed Available N/A 2120 [facility] [address] [phone] --    AG[facility] - [place]  Declined  No Medicaid Bed Available N/A 2000 [facility] [address] [phone] --    [facility]  Declined  Patient Insurance Not Accepted N/A 4247 [facility] [address] [phone] --    [facility]  Declined  No Medicaid Bed Available N/A 3701 HAL[address] [facility]ED[facility] [address] [phone] --    [facility]  Declined  Patient Insurance Not Accepted N/A 7[address] HAL[address] [facility] [address] [phone] --    THE [facility] AT  [place]  Declined  No Medicaid Bed Available N/A [address], [facility] [address] [phone] --    [facility]  Declined  No Medicaid Bed Available N/A 3001 [facility] [address] [phone] --    [facility]  Declined  Patient Insurance Not Accepted N/A 300 [facility] , [facility] [address] [phone] --    [facility]  Declined  Unable to Meet Patient Needs N/A 1025 [facility] [address] [phone] --    [facility]  Declined  Patient Insurance Not Accepted N/A 9600 PABLITO POWERST.[facility] [address] [phone] --              Expected Discharge Date and Time     Expected Discharge Date Expected Discharge Time    Feb 22, 2021         Demographic Summary    No documentation.       Functional Status    No documentation.       Psychosocial    No documentation.       Abuse/Neglect    No documentation.       Legal    No documentation.       Substance Abuse    No documentation.       Patient Forms    No documentation.           Lise Gutiérrez, RN

## 2021-02-23 NOTE — PROGRESS NOTES
Discharge Planning Assessment  [facility]     Patient Name: Maxim Carvajal  MRN: [id number]  Today's Date: 2/23/2021    Admit Date: 2/2/2021    Discharge Needs Assessment    No documentation.       Discharge Plan     Row Name 02/23/21 124       Plan    Plan Comments  Called all the facilities/Liaison's to please review for discharge yesterday. Waiting to hear back from them. CANDICE Gutiérrez RN, CCP.    Row Name 02/23/21 1221       Plan    Plan Comments  Liz/INGE and I spoke with the patient regarding his discharge and options and he would like for more referrals to be placed today so that he can review them and make a decision. I informed him that [facility] can accept today and they had already accepted. Will call the the facilities to see if they can accept to a medicaid bed. CANDICE Gutiérrez Rn, CCP    Row Name 02/23/21 3846       Plan    Plan Comments  The patient transferred to [facility] from [address] [place] [address] on 2/22/21. The patient has a bed at [facility] if the family is agreeable. Called Ingrid Thompson to verify next step in discharge. CANDICE Gutiérrez Rn, CCP.        Continued Care and Services - Admitted Since 2/2/2021     Destination     Service Provider Request Status Selected Services Address Phone Fax Patient Preferred    [facility]  Accepted N/A [address] [facility] [address] [phone] --    [facility]  Accepted N/A 227 [facility] [address] [phone] --    [facility]  Pending - Request Sent N/A 1155 [facility] [address] [phone] --    SIGNATURE AT [facility]  Pending - Request Sent N/A [address]ICH[address] INIG[facility]ZCaldwell Medical Center [address] [phone] --    SIGNATURE [facility]  Pending - Request Sent N/A [address] [place] [address] [phone] --    SIGNATURE [facility] VIRI MELO  Pending - Request Sent N/A 9016 [facility]  [address] [phone] --    SIGNATURE HEALTHCARE AT [facility]  Pending - Request Sent N/A 1801 RUDOLPH [facility] [address] [phone] --    [facility]ATURE [facility]ALTHC[facility] OF [facility]  Pending - Request Sent N/A 1120 [facility]AMESB[facility] [address] [phone] --    [facility]  Pending - Request Sent N/A 2116 [facility] [address] [phone] --    [facility]  Pending - Request Sent N/A 3701 HALLE [facility]ED[facility] [address] [phone] --    [facility]  Pending - Request Sent N/A 711 [facility] ALFREDO INIG[facility]ZOur [facility] [address] [phone] --    [facility] AT [place]  Pending - Request Sent N/A 200 [facility] [address] [phone] --    [facility]  Pending - Request Sent N/A 300 [facility] [address] [phone] --    [facility]AB[facility]ITATION  Pending - Request Sent N/A 2100 JAYDEN IN[facility] Rehabilitation [facility] [address] [phone] --    [facility]  Pending - Request Sent N/A 1705 NATHALIE [facility]ED[facility] [address] [phone] --    [facility]  Pending - Request Sent N/A [address] GE[address] [facility] [address] [phone] --    [facility] OF [facility]  Pending - Request Sent N/A [address]AZ[address] [facility] [address] [phone] --    [facility]  Pending - Request Sent N/A 9600 [facility] [address] [phone] --    [facility]  Pending - Request Sent N/A [address] [address] [phone] --    [facility]  Pending - Request Sent N/A 3104 [facility] [address] [phone] --    [facility]   Pending - No Request Sent N/A [address], [place] [address] [phone] --    [facility]  Declined  pt does not wish to return N/A 3500 [facility]BIN[facility] [address] [phone] --    [facility]  Declined  cost of care N/A 2529 SIX [facility] [address] [phone] --    [facility]  Declined  No Bed Available N/A 1705 [facility]IM[facility] [address] [phone] --    [facility]  Declined  Unable to Meet Patient Needs N/A [address]UL[address] [facility] [address] [phone] --    [facility]  Declined  Unable to Meet Patient Needs N/A 3526 MATILDA'S [facility] [address] [phone] --    [facility]  Declined  no quad program N/A [address] [facility] [address] [phone] -- --    [facility] [place]  Declined  discharged 01/29  not able to re admit N/A [address] R[address] [facility] [address] [phone] -- --    [facility]  Declined N/A 1313 [facility] [address] [phone] -- --    [facility]  Declined N/A 1313 [facility] [address] [phone] -- --       Internal Comment last updated by Margarette Garcia, RN 2/17/2021 1153    [facility] states patient is more appropriate for subacute and delcines               [facility]A [facility]  Declined  No Medicaid Bed Available N/A 2120 [facility] [address] [phone] --    [facility]  Declined  No Medicaid Bed Available N/A 2000 [facility] [address] [phone] --    [facility]  Declined  Patient Insurance Not Accepted N/A 4247 [facility] [address] [phone] --    [facility]  Declined  No Medicaid Bed Available N/A  [address] [facility], [facility] [address] [phone] --              Expected Discharge Date and Time     Expected Discharge Date Expected Discharge Time    Feb 22, 2021         Demographic Summary    No documentation.       Functional Status    No documentation.       Psychosocial    No documentation.       Abuse/Neglect    No documentation.       Legal    No documentation.       Substance Abuse    No documentation.       Patient Forms    No documentation.           Lise Gutiérrez RN

## 2021-02-23 NOTE — PROGRESS NOTES
Case Management Discharge Note      Final Note: Discharged to [facility], skilled bed, by [facility] on 2/23/21. CANDICE Gutiérrez RN, CCP.    Provided Post Acute Provider List?: N/A  Provided Post Acute Provider Quality & Resource List?: N/A    Selected Continued Care - Admitted Since 2/2/2021     Destination Coordination complete    Service Provider Selected Services Address Phone Fax Patient Preferred    [facility]  Skilled [address] [address] [phone] --          Durable Medical Equipment    No services have been selected for the patient.              Dialysis/Infusion    No services have been selected for the patient.              Home Medical Care    No services have been selected for the patient.              Therapy    No services have been selected for the patient.              Community Resources    No services have been selected for the patient.                       Final Discharge Disposition Code: 03 - skilled nursing facility (SNF)

## 2021-02-23 NOTE — PLAN OF CARE
Goal Outcome Evaluation:  Plan of Care Reviewed With: patient  Progress: no change  Outcome Summary: Pt A&Ox4, able to make needs known. Pt has a trach and peg tube, receiving tube feeds continously. Pt tolerating well. Pt has receive PRN oxycodone and xanax today in addition to scheduled medications. Pt has had loose stools today. Suctioning provided several times, Pt tolerated well. Pt is to d/c to rehab this evening at 1700.

## 2021-02-23 NOTE — PROGRESS NOTES
Discharge Planning Assessment  [facility]     Patient Name: Maxim Carvajal  MRN: [id number]  Today's Date: 2/23/2021    Admit Date: 2/2/2021    Discharge Needs Assessment    No documentation.       Discharge Plan     Row Name 02/23/21 1119       Plan    Plan Comments  The patient transferred to [facility] from [address] [place] [address] on 2/22/21. The patient has a bed at [place] if the family is agreeable. Called Ingrid Thompson to verify next step in discharge. CANDICE Gutiérrez Rn, CCP.        Continued Care and Services - Admitted Since 2/2/2021     Destination     Service Provider Request Status Selected Services Address Phone Fax Patient Preferred    [facility] REHAB  Accepted N/A [address] [facility] [address] [phone] --    [facility]  Accepted N/A 227 [facility] [address] [phone] --    [facility]  Pending - Request Sent N/A 1155 [facility] [address] [phone] --    [facility]  Declined  pt does not wish to return N/A 3500 [facility] [address] [phone] --    [facility]  Declined  cost of care N/A 25[address] [facility] [address] [phone] --    [facility]  Declined  No Bed Available N/A 1705 [facility] [address] [phone] --    [facility]  Declined  Unable to Meet Patient Needs N/A 4200 [facility] [address] [phone] --    [facility]  Declined  Unable to Meet Patient Needs N/A 3526 MATILDAS [facility] [address] [phone] --    [facility]  Declined  no quad program N/A [address] [facility] [address] [phone] -- --    [facility] [place]  Declined  discharged 01/29  not able to re admit N/A [address] COLEMAN [facility]  [address] [phone] -- --    [facility]  Declined N/A 1313 [facility] [address] [phone] -- --    [facility]  Declined N/A 1313 [facility] [address] [phone] -- --       Internal Comment last updated by Margarette Garcia RN 2/17/2021 1153    [facility] states patient is more appropriate for subacute and delcines               AGUEDA  [place]  Declined  No Medicaid Bed Available N/A 2120 [facility] [address] [phone] --    [facility]  Declined  No Medicaid Bed Available N/A 2000 [facility] [address] [phone] --    [facility]  Declined  Patient Insurance Not Accepted N/A 4247 [facility] [address] [phone] --              Expected Discharge Date and Time     Expected Discharge Date Expected Discharge Time    Feb 22, 2021         Demographic Summary    No documentation.       Functional Status    No documentation.       Psychosocial    No documentation.       Abuse/Neglect    No documentation.       Legal    No documentation.       Substance Abuse    No documentation.       Patient Forms    No documentation.           Lise Gutiérrez, RN

## 2021-02-23 NOTE — PROGRESS NOTES
"Physicians Statement of Medical Necessity for  Ambulance Transportation    GENERAL INFORMATION     Name: Maxim Carvajal  YOB: 1985  Medicare #: N/A [employer] medicaid [id number]  Transport Date: 2/23/2021 (Valid for round trips this date, or for scheduled repetitive trips for 60 days from the date signed below.)  Origin: [facility]  Destination: [facility]  Is the Patient's stay covered under Medicare Part A (PPS/DRG?)No   Closest appropriate facility? Yes  If this a hosp-hosp transfer? No  Is this a hospice patient? No    MEDICAL NECESSITY QUESTIONAIRE    Ambulance Transportation is medically necessary only if other means of transportation are contraindicated or would be potentially harmful to the patient.  To meet this requirement, the patient must be either \"bed confined\" or suffer from a condition such that transport by means other than an ambulance is contraindicated by the patient's condition.  The following questions must be answered by the healthcare professional signing below for this form to be valid:     1) Describe the MEDICAL CONDITION (physical and/or mental) of this patient AT THE TIME OF AMBULANCE TRANSPORT that requires the patient to be transported in an ambulance, and why transport by other means is contraindicated by the patient's condition: T-piece, oxygen  Past Medical History:   Diagnosis Date   • Hypertension       Past Surgical History:   Procedure Laterality Date   • BRONCHOSCOPY N/A 2/16/2021    Procedure: BRONCHOSCOPY;  Surgeon: Darci Sam MD;  Location: [facility];  Service: Pulmonary;  Laterality: N/A;  PRE- MUCUS PLUG  POST- SAME      2) Is this patient \"bed confined\" as defined below?Yes   To be \"bed confined\" the patient must satisfy all three of the following criteria:  (1) unable to get up from bed without assistance; AND (2) unable to ambulate;  AND (3) unable to sit in a chair or wheelchair.  3) Can this patient safely be transported by " car or wheelchair van (I.e., may safely sit during transport, without an attendant or monitoring?)No   4. In addition to completing questions 1-3 above, please check any of the following conditions that apply*:          *Note: supporting documentation for any boxes checked must be maintained in the patient's medical records Requires oxygen - unable to self administer      SIGNATURE OF PHYSICIAN OR OTHER AUTHORIZED HEALTHCARE PROFESSIONAL    I certify that the above information is true and correct based on my evaluation of this patient, and represent that the patient requires transport by ambulance and that other forms of transport are contraindicated.  I understand that this information will be used by the Centers for Medicare and Medicaid Services (CMS) to support the determiniation of medical necessity for ambulance services, and I represent that I have personal knowledge of the patient's condition at the time of transport.    x   If this box is checked, I also certify that the patient is physically or mentally incapable of signing the ambulance service's claim form and that the institution with which I am affiliated has furnished care, services or assistance to the patient.  My signature below is made on behalf of the patient pursuant to 42 .36(b)(4). In accordance with 42 .37, the specific reason(s) that the patient is physically or mentally incapable of signing the claim for is as follows:     Signature of Physician or Healthcare Professional  Date/Time:   2/23/2021     (For Scheduled repetitive transport, this form is not valid for transports performed more than 60 days after this date).                                                                                                                                            Zoe Watkins RN  Printed Name and Credentials of Physician or Authorized Healthcare Professional     *Form must be signed by patient's attending physician for scheduled,  repetitive transports,.  For non-repetitive ambulance transports, if unable to obtain the signature of the attending physician, any of the following may sign (please select below):     Physician  Clinical Nurse Specialist  Registered Nurse     Physician Assistant X Discharge Planner  Licensed Practical Nurse     Nurse Practitioner X

## 2021-02-23 NOTE — PLAN OF CARE
Goal Outcome Evaluation:  Plan of Care Reviewed With: patient  Progress: no change     Pt's AOx4, calm and cooperative, VSS. Pt's on tube feeding 55 ml/hr. O2 6 L T-piece. Pt's is a quadriplegic, but a little bit of mobility on his BUE, able to suction himself. Meds given through G-tube. Pt's complained of generalized pain, PRN Roxicodone given twice with good effect. Plan is for pt to go to rehab. Nurse will continue to monitor pt.

## 2021-02-23 NOTE — PAYOR COMM NOTE
"P: [phone]  F: [phone]    CONTINUED STAY REVIEW    REF #[id number]        Maxim Hardin (35 y.o. Male)     Date of Birth Social Security Number Address Home Phone MRN    1985  [id number] [facility] [phone] [id number]    Worship Marital Status          None Single       Admission Date Admission Type Admitting Provider Attending Provider Department, Room/Bed    2/2/21 Emergency Dedrick White MD Beard, Lyle E, MD [address], P476/1    Discharge Date Discharge Disposition Discharge Destination         Skilled Nursing Facility (DC - External)              Attending Provider: Fausto Montanez MD    Allergies: No Known Allergies    Isolation: Contact   Infection: MRSA (02/02/21), COVID Screen (preop/placement) (02/18/21)   Code Status: No CPR    Ht: 182.9 cm (72.01\")   Wt: 71.4 kg (157 lb 6.5 oz)    Admission Cmt: None   Principal Problem: HCAP (healthcare-associated pneumonia) [J18.9]                 Active Insurance as of 2/2/2021     Primary Coverage     Payor Plan Insurance Group Employer/Plan Group    [place] [id number]     Payor Plan Address Payor Plan Phone Number Payor Plan Fax Number Effective Dates    [employer] [address] [address] [phone]  4/1/2020 - None Entered    [facility] [id number]       Subscriber Name Subscriber Birth Date Member ID       MAXIM HARDIN 1985 [id number]                 Emergency Contacts      (Rel.) Home Phone Work Phone Mobile Phone    Kinjal Hardin (Father) -- -- [phone]    Shaista Hardin (Mother) [phone] -- [phone]            Vital Signs (last day)     Date/Time   Temp   Temp src   Pulse   Resp   BP   Patient Position   SpO2    02/23/21 0747   --   --   81   18   --   --   --    02/23/21 0726   --   --   80   18   --   --   92    02/23/21 0609   98.8 (37.1)   Oral   77   18   111/65   Lying   93    02/23/21 0519   --   --   76   18   --   --   100    02/23/21 " 0239   99.8 (37.7)   Oral   77   16   98/57   Lying   100    02/23/21 0005   --   --   --   --   --   --   97    02/22/21 2057   98.3 (36.8)   Oral   76   18   100/55   Lying   99    02/22/21 2007   --   --   77   18   --   --   95    02/22/21 1854   97.7 (36.5)   Oral   78   16   101/67   Lying   94    02/22/21 1741   --   --   78   --   104/57   --   --    02/22/21 1357   --   --   89   --   --   --   99    02/22/21 1247   99 (37.2)   Oral   76   18   104/57   Lying   97    02/22/21 0901   --   --   76   --   109/64   --   --    02/22/21 0655   --   --   76   18   --   --   95              Oxygen Therapy (last day)     Date/Time   SpO2   Device (Oxygen Therapy)   Flow (L/min)   Oxygen Concentration (%)   ETCO2 (mmHg)    02/23/21 1026   --   --   6   28   --    02/23/21 0726   92   humidified;T - piece   6   28   --    02/23/21 0609   93   humidified;T - piece   6   --   --    02/23/21 0519   100   humidified;T - piece   6   28   --    02/23/21 0239   100   humidified;T - piece   6   --   --    02/23/21 0005   97   humidified;T - piece   6   28   --    02/22/21 2250   --   humidified;T - piece   6   --   --    02/22/21 2057   99   humidified;T - piece   6   --   --    02/22/21 2007   95   humidified;T - piece   6   28   --    02/22/21 1854   94   --   --   --   --    02/22/21 1357   99   humidified;T - piece   6   28   --    02/22/21 1247   97   humidified;T - piece   6   --   --    02/22/21 0855   --   humidified;T - piece   6   --   --    02/22/21 0655   95   humidified;T - piece   6   28   --              Lines, Drains & Airways    Active LDAs     Name:   Placement date:   Placement time:   Site:   Days:    Peripheral IV 02/13/21 0348 Left Hand   02/13/21    0348    Hand   10    Peripheral IV 02/13/21 1024 Left Hand   02/13/21    1024    Hand   10    Gastrostomy/Enterostomy Gastrostomy LUQ   02/02/21 in place on admit    --    LUQ   21    External Urinary Catheter   02/07/21    5380    --   15    Surgical Airway  Cuffed 6   02/04/21    1515    6   18                Current Facility-Administered Medications   Medication Dose Route Frequency Provider Last Rate Last Admin   • acetaminophen (TYLENOL) 160 MG/5ML solution 650 mg  650 mg Per G Tube Q4H PRN Fausto Montanez MD        Or   • acetaminophen (TYLENOL) tablet 650 mg  650 mg Per G Tube Q4H PRN Fausto Montanez MD        Or   • acetaminophen (TYLENOL) suppository 650 mg  650 mg Rectal Q4H PRN Fausto Montanez MD       • acetylcysteine (MUCOMYST) 20 % nebulizer solution 3 mL  3 mL Nebulization BID - RT Darci Sam MD   3 mL at 02/23/21 0726   • ALPRAZolam (XANAX) tablet 0.5 mg  0.5 mg Per G Tube Q8H PRN Fausto Montanez MD   0.5 mg at 02/23/21 0907   • ascorbic acid (VITAMIN C) tablet 1,000 mg  1,000 mg Per G Tube Daily Fausto Montanez MD   1,000 mg at 02/23/21 0908   • aspirin chewable tablet 81 mg  81 mg Nasogastric Daily Claudio Sam MD   81 mg at 02/23/21 0908   • bisacodyl (DULCOLAX) suppository 10 mg  10 mg Rectal Daily PRN Claudio Sam MD       • clotrimazole (MYCELEX) miguel 10 mg  10 mg Oral 5x Daily Darci Sam MD   10 mg at 02/23/21 1153   • cyclobenzaprine (FLEXERIL) tablet 10 mg  10 mg Per G Tube Daily Fausto Montanez MD   10 mg at 02/23/21 0909   • docusate sodium (COLACE) liquid 100 mg  100 mg Per G Tube BID Fausto Montanez MD   100 mg at 02/23/21 0908   • Eucerin original healing lotion lotion   Topical Daily Claudio Sam MD   Given at 02/22/21 0952   • fentaNYL (DURAGESIC) 25 MCG/HR patch 1 patch  1 patch Transdermal Q72H lCaudio Sam MD   1 patch at 02/23/21 1154   • Gabapentin (NEURONTIN) 300 MG/6ML solution 800 mg  800 mg Per G Tube Q8H Meg Bynum MD   800 mg at 02/23/21 0603   • guaiFENesin (ROBITUSSIN) 100 MG/5ML oral solution 400 mg  400 mg Per G Tube Q4H Darci Sma MD   400 mg at 02/23/21 1153   • Hold medication  1 each Does not apply Continuous PRN Claudio Sam MD       • hydrALAZINE  (APRESOLINE) injection 10 mg  10 mg Intravenous Q4H PRN Claudio Sam MD       • hydrOXYzine (ATARAX) tablet 25 mg  25 mg Per G Tube Daily Fausto Montanez MD   25 mg at 02/23/21 0908   • ipratropium-albuterol (DUO-NEB) nebulizer solution 3 mL  3 mL Nebulization TID - RT Darci Sam MD   3 mL at 02/23/21 0728   • labetalol (NORMODYNE,TRANDATE) injection 10 mg  10 mg Intravenous Q4H PRN Claudio Sam MD       • lansoprazole (FIRST) oral suspension 30 mg  30 mg Nasogastric QAM Claudio Sam MD   30 mg at 02/23/21 0901   • ondansetron (ZOFRAN) injection 4 mg  4 mg Intravenous Q6H PRN Claudio Sam MD        Or   • ondansetron (ZOFRAN) tablet 4 mg  4 mg Per G Tube Q6H PRN Fausto Montanez MD       • oxyCODONE (ROXICODONE) 5 MG/5ML solution 10 mg  10 mg Per G Tube Q4H PRN Fausto Montanez MD   10 mg at 02/23/21 0907   • polyethylene glycol (MIRALAX) packet 17 g  17 g Per G Tube Daily Fausto Montanez MD   17 g at 02/23/21 0909   • potassium chloride (K-DUR,KLOR-CON) ER tablet 40 mEq  40 mEq Oral PRN Claudio Sam MD        Or   • potassium chloride 10 mEq in 100 mL IVPB  10 mEq Intravenous Q1H PRN Claudio Sam MD        Or   • potassium chloride (KLOR-CON) packet 40 mEq  40 mEq Per G Tube PRN Fausto Montanez MD       • propranolol (INDERAL) tablet 20 mg  20 mg Per G Tube TID Claudio Sam MD   20 mg at 02/23/21 0908   • sennosides-docusate (PERICOLACE) 8.6-50 MG per tablet 2 tablet  2 tablet Per G Tube Nightly Fausto Montanez MD   2 tablet at 02/22/21 2258   • sertraline (ZOLOFT) tablet 100 mg  100 mg Per G Tube Daily Fausto Montanez MD   100 mg at 02/23/21 0909         Lab Results (last 24 hours)     Procedure Component Value Units Date/Time    Fungus Culture - Lavage, Lung, Right Middle Lobe [[id number]] Collected: 02/16/21 1129    Specimen: Lavage from Lung, Right Middle Lobe Updated: 02/23/21 1200     Fungus Culture No fungus isolated at 1 week    CBC & Differential  [[id number]]  (Abnormal) Collected: 21    Specimen: Blood Updated: 21    Narrative:      The following orders were created for panel order CBC & Differential.  Procedure                               Abnormality         Status                     ---------                               -----------         ------                     CBC Auto Differential[[id number]]        Abnormal            Final result                 Please view results for these tests on the individual orders.    CBC Auto Differential [[id number]]  (Abnormal) Collected: 21    Specimen: Blood Updated: 21     WBC 6.06 10*3/mm3      RBC 2.79 10*6/mm3      Hemoglobin 7.3 g/dL      Hematocrit 22.6 %      MCV 81.0 fL      MCH 26.2 pg      MCHC 32.3 g/dL      RDW 17.7 %      RDW-SD 52.6 fl      MPV 10.4 fL      Platelets 287 10*3/mm3      Neutrophil % 65.8 %      Lymphocyte % 20.0 %      Monocyte % 8.1 %      Eosinophil % 5.6 %      Basophil % 0.2 %      Immature Grans % 0.3 %      Neutrophils, Absolute 3.99 10*3/mm3      Lymphocytes, Absolute 1.21 10*3/mm3      Monocytes, Absolute 0.49 10*3/mm3      Eosinophils, Absolute 0.34 10*3/mm3      Basophils, Absolute 0.01 10*3/mm3      Immature Grans, Absolute 0.02 10*3/mm3      nRBC 0.0 /100 WBC              Physician Progress Notes       Fausto Montanez MD at 21 1248          DAILY PROGRESS NOTE  [facility]    Patient Identification:  Name: Maxim Carvajal  Age: 35 y.o.  Sex: male  :  1985  MRN: [id number]         Primary Care Physician: Sheron Perez MD    Subjective:  Interval History:He complains of pain.  Hurts all over.    Objective:    Scheduled Meds:acetylcysteine, 3 mL, Nebulization, BID - RT  ascorbic acid, 1,000 mg, Per G Tube, Daily  aspirin, 81 mg, Nasogastric, Daily  clotrimazole, 10 mg, Oral, 5x Daily  cyclobenzaprine, 10 mg, Per G Tube, Daily  docusate sodium, 100 mg, Per G Tube, BID  Eucerin original healing lotion,  ", Topical, Daily  fentaNYL, 1 patch, Transdermal, Q72H  Gabapentin, 800 mg, Per G Tube, Q8H  guaiFENesin, 400 mg, Per G Tube, Q4H  hydrOXYzine, 25 mg, Per G Tube, Daily  ipratropium-albuterol, 3 mL, Nebulization, TID - RT  lansoprazole, 30 mg, Nasogastric, QAM  polyethylene glycol, 17 g, Per G Tube, Daily  propranolol, 20 mg, Per G Tube, TID  sennosides-docusate, 2 tablet, Per G Tube, Nightly  sertraline, 100 mg, Per G Tube, Daily      Continuous Infusions:hold, 1 each        Vital signs in last 24 hours:  Temp:  [97.7 °F (36.5 °C)-99.8 °F (37.7 °C)] 98.8 °F (37.1 °C)  Heart Rate:  [76-89] 81  Resp:  [16-18] 18  BP: ()/(55-67) 111/65    Intake/Output:    Intake/Output Summary (Last 24 hours) at 2/23/2021 1249  Last data filed at 2/23/2021 0609  Gross per 24 hour   Intake 0 ml   Output 500 ml   Net -500 ml       Exam:  /65 (BP Location: Left arm, Patient Position: Lying)   Pulse 81   Temp 98.8 °F (37.1 °C) (Oral)   Resp 18   Ht 182.9 cm (72.01\")   Wt 71.4 kg (157 lb 6.5 oz)   SpO2 92%   BMI 21.34 kg/m²     General Appearance:    Alert, cooperative, no distress   Head:    Normocephalic, without obvious abnormality, atraumatic   Eyes:       Throat:   Lips, tongue, gums normal   Neck:   Supple, symmetrical, trachea midline, no JVD   Lungs:     rhonchi bilaterally, respirations unlabored   Chest Wall:    No tenderness or deformity    Heart:    Regular rate and rhythm, S1 and S2 normal, no murmur,no  Rub or gallop   Abdomen:     Soft, nontender, bowel sounds active, no masses, no organomegaly    Extremities:   Extremities normal, atraumatic, no cyanosis or edema   Pulses:      Skin:   Skin is warm and dry,  no rashes or palpable lesions   Neurologic:   quadraplegia.      Data Review:  Results from last 7 days   Lab Units 02/22/21  0520 02/21/21  0522 02/20/21  0443   SODIUM mmol/L 136 135* 135*   POTASSIUM mmol/L 4.6 4.9 4.7   CHLORIDE mmol/L 93* 94* 93*   CO2 mmol/L 32.3* 30.5* 31.1*   BUN mg/dL 41* " 40* 43*   CREATININE mg/dL 0.64* 0.65* 0.64*   GLUCOSE mg/dL 116* 99 102*   CALCIUM mg/dL 9.6 10.1 10.2     Results from last 7 days   Lab Units 02/23/21  0658 02/22/21  0520 02/21/21  0522   WBC 10*3/mm3 6.06 5.45 6.79   HEMOGLOBIN g/dL 7.3* 7.2* 7.6*   HEMATOCRIT % 22.6* 22.1* 23.6*   PLATELETS 10*3/mm3 287 305 301             Lab Results   Lab Value Date/Time    TROPONINT <0.010 02/02/2021 0742    TROPONINT <0.010 02/02/2021 0608               Invalid input(s): PROT, LABALBU          No results found for: POCGLU        Past Medical History:   Diagnosis Date   • Hypertension        Assessment:  Active Hospital Problems    Diagnosis  POA   • **HCAP (healthcare-associated pneumonia) [J18.9]  Unknown   • Tracheitis [J04.10]  Yes   • Acute on chronic respiratory failure with hypoxemia (CMS/HCC) [J96.21]  Yes   • Anemia [D64.9]  Yes   • Leukocytosis [D72.829]  Yes   • Quadriplegia (CMS/HCC) [G82.50]  Yes   • Essential hypertension [I10]  Yes   • Sepsis, unspecified organism (CMS/HCC) [A41.9]  Unknown   • Community acquired pneumonia [J18.9]  Unknown      Resolved Hospital Problems   No resolved problems to display.       Plan:  Continue RX. Looking for rehab. As per pulmonary. Follow lab. Finished antibiotics.  ? Possible bed at [facility]??He refuses and looking at other places.    Fausto Montanez MD  2/23/2021  12:49 EST    Electronically signed by Fausto Montanez MD at 02/23/21 1249         Judi Benitez, RN   Registered Nurse   Nurse Navigator   Plan of Care   Signed   Date of Service:  02/23/21 0745   Creation Time:  02/23/21 0745            Signed             Goal Outcome Evaluation:  Plan of Care Reviewed With: patient  Progress: no change  Pt's AOx4, calm and cooperative, VSS. Pt's on tube feeding 55 ml/hr. O2 6 L T-piece. Pt's is a quadriplegic, but a little bit of mobility on his BUE, able to suction himself. Meds given through G-tube. Pt's complained of generalized pain, PRN Roxicodone given twice with  good effect. Plan is for pt to go to rehab. Nurse will continue to monitor pt.                     Lise Gutiérrez RN      Case Management   Progress Notes   Signed   Date of Service:  02/23/21 1305   Creation Time:  02/23/21 1305            Signed             Discharge Planning Assessment  [facility]     Patient Name: Maxim Carvajal             MRN: [id number]  Today's Date: 2/23/2021                     Admit Date: 2/2/2021     Discharge Needs Assessment    No documentation.              Discharge Plan      Row Name 02/23/21 1303           Plan     Plan Comments  [facility] going to call the patient and asked if he would like to start the medicaid waiver program that covers 45 hours of week assistance. [facility] is willing to start the application if the patient is agreeable. This can take up to 30 days to get set-up per [facility]. CANDICE Gutiérrez RN, CCP.     Row Name 02/23/21 1246           Plan     Plan Comments  Called all the facilities/Liaison's to please review for discharge yesterday. Waiting to hear back from them. CANDICE Gutiérrez RN, CCP.     Row Name 02/23/21 1225           Plan     Plan Comments  Liz/INGE and I spoke with the patient regarding his discharge and options and he would like for more referrals to be placed today so that he can review them and make a decision. I informed him that [facility] can accept today and they had already accepted. Will call the the facilities to see if they can accept to a medicaid bed. CANDICE Gutiérrez Rn, CCP     Row Name 02/23/21 111           Plan     Plan Comments  The patient transferred to [facility] from [address] [place] [address] on 2/22/21. The patient has a bed at [facility] if the family is agreeable. Called Ingrid Thompson to verify next step in discharge. CANDICE Gutiérrez Rn, CCP.

## 2021-02-23 NOTE — PROGRESS NOTES
Discharge Planning Assessment  [facility]     Patient Name: Maxim Carvajal  MRN: [id number]  Today's Date: 2/23/2021    Admit Date: 2/2/2021    Discharge Needs Assessment    No documentation.       Discharge Plan     Row Name 02/23/21 1311       Plan    Plan Comments  Daniella/Mateusz Hu will evaluate and call CCP back if they can accept tomorrow. She has no beds today. CANDICE Gutiérrez RN, CCP    Row Name 02/23/21 1303       Plan    Plan Comments  Home Instead/Aurelia going to call the patient and asked if he would like to start the medicaid waiver program that covers 45 hours of week assistance. Aurelia is willing to start the application if the patient is agreeable. This can take up to 30 days to get set-up per Aurelia. CANDICE Gutiérrez RN, CCP.    Row Name 02/23/21 1246       Plan    Plan Comments  Called all the facilities/Liaison's to please review for discharge yesterday. Waiting to hear back from them. CANDICE Gutiérrez RN, CCP.    Row Name 02/23/21 1221       Plan    Plan Comments  Liz/INGE and I spoke with the patient regarding his discharge and options and he would like for more referrals to be placed today so that he can review them and make a decision. I informed him that [facility] can accept today and they had already accepted. Will call the the facilities to see if they can accept to a medicaid bed. CANDICE Gutiérrez Rn, CCP    Row Name 02/23/21 1115       Plan    Plan Comments  The patient transferred to [facility] from [address] [place] [address] on 2/22/21. The patient has a bed at [facility] if the family is agreeable. Called Ingrid Thompson to verify next step in discharge. CANDICE Gutiérrez Rn, CCP.        Continued Care and Services - Admitted Since 2/2/2021     Destination     Service Provider Request Status Selected Services Address Phone Fax Patient Preferred    [facility] REHAB  Accepted N/A [address] [facility] [address] [phone] --    [facility]  Accepted N/A [address] [facility] [address]  [phone] --    LANDMARK OF [place]  Pending - Request Sent N/A [address], [facility] [address] [phone] --    SIGNATURE AT [facility]  Pending - Request Sent N/A [address] [facility] [address] [phone] --    SIGNATURE [facility]  Pending - Request Sent N/A [address] [place] [address] [phone] --    SIGNATURE HEALTHCARE - [facility] AND LORIE  Pending - Request Sent N/A [address] [facility] [address] [phone] --    SIGNATURE HEALTHCARE AT [facility]  Pending - Request Sent N/A 1801 RUDOL[address] CORBINJenn[facility] [address] [phone] --    SIGNATURE [facility]ALTHCARE OF [facility]  Pending - Request Sent N/A 1120 [facility] [address] [phone] --    [facility]  Pending - Request Sent N/A 2116 [facility] [address] [phone] --    [facility]  Pending - Request Sent N/A [address] ER[address] ALFR[address]O [facility] [address] [phone] --    [facility]  Pending - Request Sent N/A 711 HAL[address] [facility] [address] [phone] --    [facility] AT [place]  Pending - Request Sent N/A [address], [facility] [address] [phone] --    [facility]  Pending - Request Sent N/A [address] BET[address], [facility] [address] [phone] --    [facility] REHABILITATION  Pending - Request Sent N/A [address], [facility] [address] [phone] --    [facility] AND REHAB  Pending - Request Sent N/A 1705 NATHALIE ALFREDJ[facility] [address] [phone] --    [facility] OF [facility]  Pending - Request Sent N/A 900 GEOR[address] ALFRED[facility] [address] [phone] --    [facility]  Pending - Request Sent  N/A 1015 [facility] [address] [phone] --    [facility]  Pending - Request Sent N/A [address] [address] [phone] --    [facility]  Pending - Request Sent N/A 3104 [facility] [place] [address] [phone] --    [facility] REHAB AND NSG  Pending - No Request Sent N/A 1025 [facility] [address] [phone] --    [facility]  Declined  pt does not wish to return N/A 3500 NORMA [facility] [address] [phone] --    [facility]  Declined  cost of care N/A 2529 [facility] [address] [phone] --    [facility]  Declined  No Bed Available N/A 1705 HALIMA [facility] [address] [phone] --    [facility]  Declined  Unable to Meet Patient Needs N/A [address] [facility] [address] [phone] --    [facility]  Declined  Unable to Meet Patient Needs N/A 3526 DUTCHMAN'S [facility] [address] [phone] --    [facility]  Declined  no quad program N/A [address] [facility] [address] [phone] -- --    [facility]  Declined  discharged 01/29  not able to re admit N/A [address] [place] [facility] [address] [phone] -- --    [facility]  Declined N/A 1313  [facility] [address] [phone] -- --    [facility]  Declined N/A 1313 [facility] [address] [phone] -- --       Internal Comment last updated by Margarette Garcia, RN 2/17/2021 1153    Dionicio states patient is more appropriate for subacute and delcines               [facility]KS  Declined  No Medicaid Bed Available N/A 2120 [facility]  [address] [phone] --    [facility]  Declined  No Medicaid Bed Available N/A 2000 [facility] [address] [phone] --    [facility]  Declined  Patient Insurance Not Accepted N/A 4247 [facility] [address] [phone] --    [facility] HEALTH & REHAB  Declined  No Medicaid Bed Available N/A 3001 [facility] [address] [phone] --    [facility]  Declined  Patient Insurance Not Accepted N/A 9600 PABLITO [facility] [address] [phone] --              Expected Discharge Date and Time     Expected Discharge Date Expected Discharge Time    Feb 22, 2021         Demographic Summary    No documentation.       Functional Status    No documentation.       Psychosocial    No documentation.       Abuse/Neglect    No documentation.       Legal    No documentation.       Substance Abuse    No documentation.       Patient Forms    No documentation.           Lise Gutiérrez, RN

## 2021-02-23 NOTE — PROGRESS NOTES
Discharge Planning Assessment  [facility]     Patient Name: Maxim Carvajal  MRN: [id number]  Today's Date: 2/23/2021    Admit Date: 2/2/2021    Discharge Needs Assessment    No documentation.       Discharge Plan     Row Name 02/23/21 1345       Plan    Plan Comments  Per Daniella/Mateusz Flores states they can not meet his needs at this time. CANDICE Gutiérrez RN, CCP    Row Name 02/23/21 1311       Plan    Plan Comments  Daniella/Mateusz Fritz will evaluate and call CCP back if they can accept tomorrow. She has no beds today. CANDICE Gutiérrez RN, CCP    Row Name 02/23/21 1303       Plan    Plan Comments  Home Instead/Aurelia going to call the patient and asked if he would like to start the medicaid waiver program that covers 45 hours of week assistance. Aurelia is willing to start the application if the patient is agreeable. This can take up to 30 days to get set-up per Aurelia. CANDICE Gutiérrez RN, CCP.    Row Name 02/23/21 1246       Plan    Plan Comments  Called all the facilities/Liaison's to please review for discharge yesterday. Waiting to hear back from them. CANDICE Gutiérrez RN, CCP.    Row Name 02/23/21 1225       Plan    Plan Comments  Liz/INGE and I spoke with the patient regarding his discharge and options and he would like for more referrals to be placed today so that he can review them and make a decision. I informed him that [facility] can accept today and they had already accepted. Will call the the facilities to see if they can accept to a medicaid bed. CANDICE Gutiérrez Rn, CCP    Row Name 02/23/21 1119       Plan    Plan Comments  The patient transferred to [facility] from [address] [place] [address] on 2/22/21. The patient has a bed at [facility] if the family is agreeable. Called Ingrid Thompson to verify next step in discharge. CANDICE Gutiérrez Rn, CCP.        Continued Care and Services - Admitted Since 2/2/2021     Destination     Service Provider Request Status Selected Services Address Phone Fax Patient Preferred    [facility] REHAB  Accepted N/A [address], [facility] [address] [phone] --    [facility]  Accepted N/A [address], [facility] [address] [phone] --    [facility] OF [place]  Pending - Request Sent N/A [address] [facility] [address] [phone] --    [facility] AT [facility]  Pending - Request Sent N/A 1877 [facility] [facility] [address] [phone] --    SIGNATURE [facility]  Pending - Request Sent N/A [address] [place] [address] [phone] --    SIGNATURE HEALTHCARE - [facility] AND LORIE  Pending - Request Sent N/A [address] [facility] [id number] [phone] --    SIGNATURE HEALTHCARE AT [facility]  Pending - Request Sent N/A 18[address] CORBIN[facility] [address] [phone] --    SIGNATURE HEALTHCARE OF [facility]  Pending - Request Sent N/A 1120 [facility] [address] [phone] --    [facility] [place] [facility]  Pending - Request Sent N/A 2116 [facility] [address] [phone] --    [facility]  Pending - Request Sent N/A [address], [facility] [address] [phone] --    [facility] REHABILITATION  Pending - Request Sent N/A 2[address]AY[address] [facility] [address] [phone] --    [facility] AND REHAB  Pending - Request Sent N/A 1705 NATH[address] AL[facility]ED[facility] [address] [phone] --    LANDMARK OF [facility]  Pending - Request Sent N/A 900 [address] [facility] [address] [phone] --    [facility] OF [facility]  Pending - Request Sent N/A 1015 [facility] [address] [phone] --    [facility]  Pending - Request Sent N/A [address] [address] [phone] --    [facility]ITAL  Pending - Request Sent N/A [address] [facility] [place] [address] [phone] --    [facility] USHA OSCAR REHAB AND NSG  Pending - No Request Sent N/A [address] [place] [place] [address] [phone] --    [facility]  Declined  pt does not wish to return N/A 3500 [facility] [address] [phone] --    [facility]  Declined  cost of care N/A 2529 SIX [facility] [address] [phone] --    [facility]  Declined  No Bed Available N/A 1705 [facility] [address] [phone] --    [facility]  Declined  Unable to Meet Patient Needs N/A [address] [facility] [address] [phone] --    [facility]  Declined  Unable to Meet Patient Needs N/A 3526 MATILDA'S [facility] [address] [phone] --    [facility]  Declined  no quad program N/A [address] [facility] [address] [phone] -- --    [facility] [place]  Declined  discharged 01/29  not able to re admit N/A 220 RASHMI [facility] [address] [phone] -- --    [facility]  Declined N/A 1313 [facility] [address] [phone] -- --    [facility]  Declined N/A 1313 [facility] [address] [phone] -- --       Internal Comment last updated by Margarette Garcia RN 2/17/2021 1153    Dionicio states patient is more appropriate for subacute and delcines               [facility]  Declined  No Medicaid Bed Available N/A 2120 [facility] [address] [phone] --    AGUDEA GRIMES  Declined  No Medicaid Bed Available N/A 2000 [facility] [address] [phone] --    [facility]  Declined  Patient Insurance  Not Accepted N/A [address] [facility] [address] [phone] --    [facility]  Declined  No Medicaid Bed Available N/A 3701 HAL[address] [facility] [address] [phone] --    [facility]  Declined  Patient Insurance Not Accepted N/A 711 [facility] [address] [phone] --    [facility] AT [place]  Declined  No Medicaid Bed Available N/A [address] [place] [facility] [address] [phone] --    [facility] RE[facility]AB  Declined  No Medicaid Bed Available N/A 3001 [facility] [address] [phone] --    [facility]  Declined  Patient Insurance Not Accepted N/A 9600 PABL[address] [facility] [place] [address] [phone] --              Expected Discharge Date and Time     Expected Discharge Date Expected Discharge Time    Feb 22, 2021         Demographic Summary    No documentation.       Functional Status    No documentation.       Psychosocial    No documentation.       Abuse/Neglect    No documentation.       Legal    No documentation.       Substance Abuse    No documentation.       Patient Forms    No documentation.           Lise Gutiérrez, RN

## 2021-02-23 NOTE — PLAN OF CARE
Goal Outcome Evaluation:  Plan of Care Reviewed With: patient, mother  Progress: no change  Outcome Summary: Pt transferred to 4. Pt scheduled to d/c to rehab tomorrow. Pt is quadraplegic, able to move BUE some. Pt is A&Ox4, able to make needs known. Pt has TF running at 55mls/hr. Pt requesting pain medication upon arrival, administered via Gtube. Report given to oncoming shift.

## 2021-02-23 NOTE — PROGRESS NOTES
Discharge Planning Assessment  [facility]     Patient Name: Maxim Carvajal  MRN: [id number]  Today's Date: 2/23/2021    Admit Date: 2/2/2021    Discharge Needs Assessment    No documentation.       Discharge Plan     Row Name 02/23/21 1303       Plan    Plan Comments  Home Instead/Aurelia going to call the patient and asked if he would like to start the medicaid waiver program that covers 45 hours of week assistance. Aurelia is willing to start the application if the patient is agreeable. This can take up to 30 days to get set-up per Aurelia. CANDICE Gutiérrez RN, CCP.    Row Name 02/23/21 1246       Plan    Plan Comments  Called all the facilities/Liaison's to please review for discharge yesterday. Waiting to hear back from them. CANDICE Gutiérrez RN, CCP.    Row Name 02/23/21 1226       Plan    Plan Comments  Liz/INGE and I spoke with the patient regarding his discharge and options and he would like for more referrals to be placed today so that he can review them and make a decision. I informed him that [facility] can accept today and they had already accepted. Will call the the facilities to see if they can accept to a medicaid bed. CANDICE Gutiérrez Rn, CCP    Row Name 02/23/21 1110       Plan    Plan Comments  The patient transferred to [facility] from [address] [place] [address] on 2/22/21. The patient has a bed at [facility] if the family is agreeable. Called Ingrid Thompson to verify next step in discharge. CANDICE Gutiérrez Rn, CCP.        Continued Care and Services - Admitted Since 2/2/2021     Destination     Service Provider Request Status Selected Services Address Phone Fax Patient Preferred    [facility] REHAB  Accepted N/A [address] [facility] [address] [phone] --    [facility]  Accepted N/A [address] [place] [place] [address] [phone] --    [facility]  Pending - Request Sent N/A 1155 [facility] [address] [phone] --    SIGNATURE AT [place]  REHAB  Pending - Request Sent N/A 1877 R[address] [facility] [address] [phone] --    SIGNATURE [facility]  Pending - Request Sent N/A [address] [place] [address] [phone] --    SIGNATURE HEALTHCARE - [facility] AND LORIE  Pending - Request Sent N/A [address] [facility]ED[facility] [address] [phone] --    SIGNATURE HEALTHCARE AT [facility]  Pending - Request Sent N/A 1801 RUD[address] [facility]BIN[facility] [address] [phone] --    SIGNATURE HEALTHCARE OF [facility]  Pending - Request Sent N/A 1120 [facility] [address] [phone] --    [facility]  Pending - Request Sent N/A 2116 [facility] [address] [phone] --    [facility]  Pending - Request Sent N/A 3701 HAL[address] [facility] [address] [phone] --    [facility]  Pending - Request Sent N/A 711 [facility] ALFREDO [facility] [address] [phone] --    THE FORUM AT [place]  Pending - Request Sent N/A [address], [facility] [address] [phone] --    [facility]  Pending - Request Sent N/A 300 [facility] [address] [phone] --    [facility] [place] [facility]  Pending - Request Sent N/A 2100 JAY[address] [facility] [address] [phone] --    [facility] AND REHAB  Pending - Request Sent N/A 1705 NATH[address] [facility] [address] [phone] --    [facility]  Pending - Request Sent N/A 900 [facility] Hospital[facility] [address] [phone] --    [facility] OF [facility]  Pending - Request Sent N/A 1015 [facility] [address] [phone] --    [facility]  Pending - Request Sent N/A [address]  [address] [phone] --    [facility]  Pending - Request Sent N/A 3[address] S[address] RIVAS [facility] [place] [address] [phone] --    [facility] USHA OSCAR REHAB AND NSG  Pending - No Request Sent N/A [address] [place] [address] [phone] --    [facility]  Declined  pt does not wish to return N/A 3500 NORMA [facility] [address] [phone] --    [facility]  Declined  cost of care N/A 2529 [facility] [address] [phone] --    [facility]  Declined  No Bed Available N/A 1705 HALIM[address] [facility] [address] [phone] --    [facility]  Declined  Unable to Meet Patient Needs N/A 4[address] [facility] [address] [phone] --    [facility]  Declined  Unable to Meet Patient Needs N/A 3526 DUTCHJULIANNE'S [facility] [address] [phone] --    [facility]  Declined  no quad program N/A [address] [facility] [address] [phone] -- --    [facility] [place]  Declined  discharged 01/29  not able to re admit N/A [address] [place] [facility] [address] [phone] -- --    [facility]  Declined N/A 1313 [facility] [address] [phone] -- --    [facility]  Declined N/A 1313 [facility] [address] [phone] -- --       Internal Comment last updated by Margarette Garcia, RN 2/17/2021 1153    [facility] states patient is more appropriate for subacute and delcines               AGUEDA WEEKS  Declined  No Medicaid Bed Available N/A 2120 [facility] [address] [phone] --    AGUEDA GRIMES  Declined  No Medicaid Bed Available N/A 2000 [facility] [address] [phone] --     [facility]  Declined  Patient Insurance Not Accepted N/A 4247 [facility] [address] [phone] --    [facility] RE[facility]AB  Declined  No Medicaid Bed Available N/A 3001 [facility] [address] [phone] --    [facility]  Declined  Patient Insurance Not Accepted N/A 9600 CADENCEChildren's Mercy NorthlandDA[facility] [address] [phone] --              Expected Discharge Date and Time     Expected Discharge Date Expected Discharge Time    Feb 22, 2021         Demographic Summary    No documentation.       Functional Status    No documentation.       Psychosocial    No documentation.       Abuse/Neglect    No documentation.       Legal    No documentation.       Substance Abuse    No documentation.       Patient Forms    No documentation.           Lise Gutiérrez, RN

## 2021-02-23 NOTE — PROGRESS NOTES
Discharge Planning Assessment  [facility]     Patient Name: Maxim Carvajal  MRN: [id number]  Today's Date: 2/23/2021    Admit Date: 2/2/2021    Discharge Needs Assessment    No documentation.       Discharge Plan     Row Name 02/23/21 1616       Plan    Final Discharge Disposition Code  03 - skilled nursing facility (SNF)    Final Note  Discharged to [place], skilled bed, by [facility] on 2/23/21. CANDICE Gutiérrez RN, CCP.    Row Name 02/23/21 1611       Plan    Plan Comments  Spoke with the patient at bedside and he is agreeable to discharge and would like for CCP to call his mother and gave her the information from [facility] and she will call Aurelia Mcdonald. Also, left my CCP number if she has any further questions. [facility] can accept today. CANDICE Gutiérrez RN, CCP.    Row Name 02/23/21 1421       Plan    Plan Comments  [facility] called and they do not accept his insurance. CANDICE Gutiérrez RN, CCP.    Row Name 02/23/21 1349       Plan    Plan Comments  Per Daniella/Mateusz Flores states they can not meet his needs at this time. CANDICE Gutiérrez RN, CCP        Continued Care and Services - Admitted Since 2/2/2021     Destination Coordination complete    Service Provider Request Status Selected Services Address Phone Fax Patient Preferred    [facility]   Selected Skilled Nursing [address] [address] [phone] --    [facility] REHAB  Accepted N/A [address] [facility] [address] [phone] --    [facility]  Pending - Request Sent N/A [address] [facility] [address] [phone] --    [facility]  Pending - Request Sent N/A [address] [facility] [address] [phone] --    [facility] REHABILITATION  Pending - Request Sent N/A [address] [facility] [address] [phone] --    [facility] RE[facility]  Pending - Request Sent  N/A [address] [facility] [facility] [address] [phone] --    [facility]  Pending - Request Sent N/A [address] [facility] [facility] [address] [phone] --    [facility]  Pending - Request Sent N/A [address] [facility] [address] [phone] --    [facility]  Declined  pt does not wish to return N/A [address] [facility] [id number] [phone] --    [facility]  Declined  cost of care N/A [address] [facility] [address] [phone] --    [facility]  Declined  No Bed Available N/A [address] [facility] [id number] [phone] --    [facility]  Declined  Unable to Meet Patient Needs N/A [address] [facility] [id number] [phone] --    [facility]  Declined  Unable to Meet Patient Needs N/A [address] [facility] [id number] [phone] --    [facility]  Declined  no quad program N/A [address] [facility] [address] [phone] -- --    [facility] [place] [facility]  Declined  discharged 01/29  not able to re admit N/A [address] [facility] [id number] [phone] -- --    [facility]  Declined N/A [address] [facility] [address] [phone] -- --    [facility]  Declined N/A [address] [facility] [address] [phone] -- --       Internal Comment last updated by Margarette Garcia, RN 2/17/2021 1153    [facility] states patient is more appropriate for subacute and delcines               [facility]  Declined  No Medicaid Bed Available N/A [address] [facility] [address] [phone] --    [facility]  Declined  No Medicaid Bed Available N/A [address] [facility] [id number] [phone]  --    [facility]  Declined  Patient Insurance Not Accepted N/A [address] [place] [facility] [id number] [phone] --    [facility]  Declined  Unable to Meet Patient Needs N/A [address] [facility] [address] [phone] --    [facility]  Declined  Unable to Meet Patient Needs N/A [address] [place] [address] [phone] --    [facility]  Declined  Unable to Meet Patient Needs N/A [address] [facility] [address] [phone] --    [facility]  Declined  Unable to Meet Patient Needs N/A [address] [facility] [facility] [address] [phone] --    [facility]  Declined  Unable to Meet Patient Needs N/A [address] [facility] [address] [phone] --    [facility]  Declined  No Medicaid Bed Available N/A [address] [facility] [facility] [address] [phone] --    [facility]  Declined  Patient Insurance Not Accepted N/A [address] [facility] [address] [phone] --    [facility] [place]  Declined  No Medicaid Bed Available N/A [address] [place] [facility] [address] [phone] --    [facility]  Declined  No Medicaid Bed Available N/A [address] [facility] [address] [phone] --    [facility]  Declined  Patient Insurance Not Accepted N/A [address] [place] [address] [facility] [address] [phone] --    [facility]  Declined  Unable to Meet Patient Needs N/A [address] [place] [address] [phone] --    [facility]  Declined  Patient Insurance Not Accepted N/A [address] [facility] P[facility] [address] [phone] --    [facility]  Declined  Unable to Meet Patient Needs N/A [address] [address] [phone] --    [facility]  Declined  Patient Insurance Not Accepted N/A [address] [facility] [address] [phone] --              Expected Discharge Date and Time     Expected Discharge Date Expected Discharge Time    Feb 23, 2021         Demographic Summary    No documentation.       Functional Status    No documentation.       Psychosocial    No documentation.       Abuse/Neglect    No documentation.       Legal    No documentation.       Substance Abuse    No documentation.       Patient Forms    No documentation.           Lise Gutiérrez RN

## 2021-02-23 NOTE — DISCHARGE SUMMARY
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 tests on the individual orders.    CBC Auto Differential [[id number]]  (Abnormal) Collected: 02/20/21 0443    Specimen: Blood Updated: 02/20/21 0507     WBC 8.02 10*3/mm3      RBC 2.74 10*6/mm3      Hemoglobin 7.2 g/dL      Hematocrit 22.0 %      MCV 80.3 fL      MCH 26.3 pg      MCHC 32.7 g/dL      RDW 17.0 %      RDW-SD 49.6 fl      MPV 10.1 fL      Platelets 276 10*3/mm3      Neutrophil % 69.6 %      Lymphocyte % 19.3 %      Monocyte % 5.6 %      Eosinophil % 5.0 %      Basophil % 0.0 %      Immature Grans % 0.5 %      Neutrophils, Absolute 5.58 10*3/mm3      Lymphocytes, Absolute 1.55 10*3/mm3      Monocytes, Absolute 0.45 10*3/mm3      Eosinophils, Absolute 0.40 10*3/mm3      Basophils, Absolute 0.00 10*3/mm3      Immature Grans, Absolute 0.04 10*3/mm3      nRBC 0.0 /100 WBC     Basic Metabolic Panel [[id number]]  (Abnormal) Collected: 02/19/21 0538    Specimen: Blood Updated: 02/19/21 0618     Glucose 112 mg/dL      BUN 45 mg/dL      Creatinine 0.71 mg/dL      Sodium 137 mmol/L      Potassium 4.6 mmol/L      Chloride 97 mmol/L      CO2 30.3 mmol/L      Calcium 9.8 mg/dL      eGFR Non African Amer 125 mL/min/1.73      BUN/Creatinine Ratio 63.4     Anion Gap 9.7 mmol/L     Narrative:      GFR  Normal >60  Chronic Kidney Disease <60  Kidney Failure <15      CBC & Differential [[id number]]  (Abnormal) Collected: 02/19/21 0538    Specimen: Blood Updated: 02/19/21 0552    Narrative:      The following orders were created for panel order CBC & Differential.  Procedure                               Abnormality         Status                     ---------                               -----------         ------                     CBC Auto Differential[[id number]]        Abnormal            Final result                 Please view results for these tests on the individual orders.    CBC Auto Differential [[id number]]  (Abnormal) Collected: 02/19/21 0538    Specimen: Blood Updated: 02/19/21 0552     WBC 6.65 10*3/mm3      RBC 2.91 10*6/mm3      Hemoglobin 7.5 g/dL      Hematocrit 23.6 %      MCV 81.1 fL      MCH 25.8 pg      MCHC 31.8 g/dL      RDW 16.9 %      RDW-SD 49.9 fl      MPV 9.6 fL      Platelets 278 10*3/mm3      Neutrophil % 65.4 %      Lymphocyte % 20.8 %      Monocyte % 7.2 %      Eosinophil % 5.9 %      Basophil % 0.2 %      Immature Grans % 0.5 %      Neutrophils, Absolute 4.36 10*3/mm3      Lymphocytes, Absolute 1.38 10*3/mm3      Monocytes, Absolute 0.48 10*3/mm3      Eosinophils, Absolute 0.39 10*3/mm3      Basophils, Absolute 0.01 10*3/mm3      Immature Grans, Absolute 0.03 10*3/mm3      nRBC 0.0 /100 WBC     AFB Culture - Body Fluid, Pleural Cavity [[id number]] Collected: 02/11/21 0955    Specimen: Body Fluid from Pleural Cavity Updated: 02/18/21 1030     AFB Culture No AFB isolated at 1 week     AFB Stain No acid fast bacilli seen on direct smear    Fungus Culture - Body Fluid, Pleural Cavity [[id number]] Collected: 02/11/21 0955    Specimen: Body Fluid from Pleural Cavity Updated: 02/18/21 1030     Fungus Culture No fungus isolated at 1 week    BAL Culture, Quantitative - Lavage, Lung, Right Middle Lobe [[id number]]  (Abnormal)  (Susceptibility) Collected: 02/16/21 1129    Specimen: Lavage from  Lung, Right Middle Lobe Updated: 02/18/21 0802     BAL Culture <10,000 CFU/mL Staphylococcus aureus, MRSA     Comment: Methicillin resistant Staphylococcus aureus, Patient may be an isolation risk.         No Normal Respiratory Lisa     Gram Stain Rare (1+) WBCs seen      No organisms seen    Susceptibility      Staphylococcus aureus, MRSA     YOGI     Clindamycin Resistant     Linezolid Susceptible     Oxacillin Resistant     Penicillin G Resistant     Tetracycline Susceptible     Trimethoprim + Sulfamethoxazole Susceptible     Vancomycin Susceptible                    CBC & Differential [[id number]]  (Abnormal) Collected: 02/18/21 0511    Specimen: Blood Updated: 02/18/21 0541    Narrative:      The following orders were created for panel order CBC & Differential.  Procedure                               Abnormality         Status                     ---------                               -----------         ------                     CBC Auto Differential[[id number]]        Abnormal            Final result                 Please view results for these tests on the individual orders.    CBC Auto Differential [[id number]]  (Abnormal) Collected: 02/18/21 0511    Specimen: Blood Updated: 02/18/21 0541     WBC 5.44 10*3/mm3      RBC 2.83 10*6/mm3      Hemoglobin 7.3 g/dL      Hematocrit 22.9 %      MCV 80.9 fL      MCH 25.8 pg      MCHC 31.9 g/dL      RDW 17.1 %      RDW-SD 50.8 fl      MPV 10.6 fL      Platelets 271 10*3/mm3      Neutrophil % 62.1 %      Lymphocyte % 21.5 %      Monocyte % 7.9 %      Eosinophil % 8.1 %      Basophil % 0.2 %      Immature Grans % 0.2 %      Neutrophils, Absolute 3.38 10*3/mm3      Lymphocytes, Absolute 1.17 10*3/mm3      Monocytes, Absolute 0.43 10*3/mm3      Eosinophils, Absolute 0.44 10*3/mm3      Basophils, Absolute 0.01 10*3/mm3      Immature Grans, Absolute 0.01 10*3/mm3      nRBC 0.2 /100 WBC     Non-gynecologic Cytology [[id number]] Collected: 02/16/21 1129    Specimen: Lavage  from Lung, Right Middle Lobe Updated: 02/17/21 0949     Case Report --     Non-gynecologic Cytology                          Case: [id number]                                  Authorizing Provider:  Darci Sam MD        Collected:           02/16/2021 11:29 AM          Ordering Location:     [facility]  Received:            02/16/2021 02:25 PM                                 ENDO SUITES                                                                  Pathologist:           Sydney Encinas MD                                                    Specimen:    Lung, Right Middle Lobe, BAL RML                                                            Final Diagnosis --     1. Lung, Right Middle Lobe, Bronchoalveolar Lavage (BAL):   A. Negative for malignant cells.   B. Pulmonary macrophages, benign bronchial cells, squamous cells, acute inflammation, and mucus.   C. No pneumocystis or fungal organisms identified by GMS stain.       Clinical Information --     PNEUMOCYSTIS, FUNGUS ON CYTO       Gross Description --     1 vial received containing 15 ml of cloudy white fluid w/ mucus. 1 thin prep, cellblock, & GMS. No remaining fluid.       Microscopic Description --     Pulmonary macrophages, benign bronchial cells, squamous cells, acute inflammation, and mucus.    Screened by MICAELA Lee.         Special Stains --     Utilizing appropriate controls, a GMS stain is performed on the cell block and is judged negative.      Basic Metabolic Panel [[id number]]  (Abnormal) Collected: 02/17/21 0543    Specimen: Blood Updated: 02/17/21 0703     Glucose 117 mg/dL      BUN 49 mg/dL      Creatinine 0.71 mg/dL      Sodium 135 mmol/L      Potassium 4.4 mmol/L      Chloride 97 mmol/L      CO2 27.4 mmol/L      Calcium 9.8 mg/dL      eGFR Non African Amer 125 mL/min/1.73      BUN/Creatinine Ratio 69.0     Anion Gap 10.6 mmol/L     Narrative:      GFR Normal >60  Chronic Kidney Disease <60  Kidney Failure <15      CBC  & Differential [[id number]]  (Abnormal) Collected: 02/17/21 0543    Specimen: Blood Updated: 02/17/21 0623    Narrative:      The following orders were created for panel order CBC & Differential.  Procedure                               Abnormality         Status                     ---------                               -----------         ------                     CBC Auto Differential[[id number]]        Abnormal            Final result                 Please view results for these tests on the individual orders.    CBC Auto Differential [[id number]]  (Abnormal) Collected: 02/17/21 0543    Specimen: Blood Updated: 02/17/21 0623     WBC 5.69 10*3/mm3      RBC 2.96 10*6/mm3      Hemoglobin 7.6 g/dL      Hematocrit 23.7 %      MCV 80.1 fL      MCH 25.7 pg      MCHC 32.1 g/dL      RDW 16.9 %      RDW-SD 49.0 fl      MPV 10.5 fL      Platelets 270 10*3/mm3      Neutrophil % 64.3 %      Lymphocyte % 21.1 %      Monocyte % 10.5 %      Eosinophil % 3.7 %      Basophil % 0.0 %      Immature Grans % 0.4 %      Neutrophils, Absolute 3.66 10*3/mm3      Lymphocytes, Absolute 1.20 10*3/mm3      Monocytes, Absolute 0.60 10*3/mm3      Eosinophils, Absolute 0.21 10*3/mm3      Basophils, Absolute 0.00 10*3/mm3      Immature Grans, Absolute 0.02 10*3/mm3      nRBC 0.0 /100 WBC     Fungus Smear - Lavage, Lung, Right Middle Lobe [[id number]] Collected: 02/16/21 1129    Specimen: Lavage from Lung, Right Middle Lobe Updated: 02/16/21 1403     Fungal Stain No yeast or hyphal elements seen    Respiratory Panel, PCR (WITHOUT COVID) - Lavage, Lung, Right Middle Lobe [[id number]]  (Normal) Collected: 02/16/21 1129    Specimen: Lavage from Lung, Right Middle Lobe Updated: 02/16/21 1257     ADENOVIRUS, PCR Not Detected     Coronavirus 229E Not Detected     Coronavirus HKU1 Not Detected     Coronavirus NL63 Not Detected     Coronavirus OC43 Not Detected     Human Metapneumovirus Not Detected     Human Rhinovirus/Enterovirus Not Detected      Influenza B PCR Not Detected     Parainfluenza Virus 1 Not Detected     Parainfluenza Virus 2 Not Detected     Parainfluenza Virus 3 Not Detected     Parainfluenza Virus 4 Not Detected     Bordetella pertussis pcr Not Detected     Chlamydophila pneumoniae PCR Not Detected     Mycoplasma pneumo by PCR Not Detected     Influenza A PCR Not Detected     RSV, PCR Not Detected     Bordetella parapertussis PCR Not Detected    Narrative:      The coronavirus on the RVP is NOT COVID-19 and is NOT indicative of infection with COVID-19.     COVID PRE-OP / PRE-PROCEDURE SCREENING ORDER (NO ISOLATION) - Swab, Nasopharynx [[id number]]  (Normal) Collected: 02/16/21 0626    Specimen: Swab from Nasopharynx Updated: 02/16/21 0731    Narrative:      The following orders were created for panel order COVID PRE-OP / PRE-PROCEDURE SCREENING ORDER (NO ISOLATION) - Swab, Nasopharynx.  Procedure                               Abnormality         Status                     ---------                               -----------         ------                     COVID-19,[facility] IN-HOUSE...[[id number]]  Normal              Final result                 Please view results for these tests on the individual orders.    COVID-19,[facility] IN-HOUSE CEPHEID, NP SWAB IN TRANSPORT MEDIA 8-12 HR TAT - Swab, Nasopharynx [[id number]]  (Normal) Collected: 02/16/21 0626    Specimen: Swab from Nasopharynx Updated: 02/16/21 0731     COVID19 Not Detected    Narrative:      Fact sheet for providers: https://www.fda.gov/media/561629/download     Fact sheet for patients: https://www.fda.gov/media/301043/download    Anaerobic Culture - Pleural Fluid, Pleural Cavity [[id number]] Collected: 02/11/21 0955    Specimen: Pleural Fluid from Pleural Cavity Updated: 02/16/21 0657     Anaerobic Culture No anaerobes isolated at 5 days    Basic Metabolic Panel [[id number]]  (Abnormal) Collected: 02/16/21 0500    Specimen: Blood Updated: 02/16/21 0629     Glucose 93 mg/dL      BUN 47 mg/dL       Creatinine 0.76 mg/dL      Sodium 131 mmol/L      Potassium 5.3 mmol/L      Chloride 94 mmol/L      CO2 26.8 mmol/L      Calcium 9.8 mg/dL      eGFR Non African Amer 115 mL/min/1.73      BUN/Creatinine Ratio 61.8     Anion Gap 10.2 mmol/L     Narrative:      GFR Normal >60  Chronic Kidney Disease <60  Kidney Failure <15      CBC & Differential [[id number]]  (Abnormal) Collected: 02/16/21 0500    Specimen: Blood Updated: 02/16/21 0609    Narrative:      The following orders were created for panel order CBC & Differential.  Procedure                               Abnormality         Status                     ---------                               -----------         ------                     CBC Auto Differential[[id number]]        Abnormal            Final result                 Please view results for these tests on the individual orders.    CBC Auto Differential [[id number]]  (Abnormal) Collected: 02/16/21 0500    Specimen: Blood Updated: 02/16/21 0609     WBC 7.38 10*3/mm3      RBC 2.93 10*6/mm3      Hemoglobin 7.7 g/dL      Hematocrit 23.5 %      MCV 80.2 fL      MCH 26.3 pg      MCHC 32.8 g/dL      RDW 17.1 %      RDW-SD 50.3 fl      MPV 10.5 fL      Platelets 285 10*3/mm3      Neutrophil % 69.1 %      Lymphocyte % 18.3 %      Monocyte % 9.1 %      Eosinophil % 3.1 %      Basophil % 0.1 %      Immature Grans % 0.3 %      Neutrophils, Absolute 5.10 10*3/mm3      Lymphocytes, Absolute 1.35 10*3/mm3      Monocytes, Absolute 0.67 10*3/mm3      Eosinophils, Absolute 0.23 10*3/mm3      Basophils, Absolute 0.01 10*3/mm3      Immature Grans, Absolute 0.02 10*3/mm3      nRBC 0.0 /100 WBC     Body Fluid Culture - Body Fluid, Pleural Cavity [[id number]] Collected: 02/11/21 0955    Specimen: Body Fluid from Pleural Cavity Updated: 02/16/21 0454     Body Fluid Culture No growth at 5 days     Gram Stain No WBCs or organisms seen    Potassium [[id number]]  (Abnormal) Collected: 02/15/21 1914    Specimen: Blood Updated:  "02/15/21 2014     Potassium 5.4 mmol/L         /65 (BP Location: Left arm, Patient Position: Lying)   Pulse 66   Temp 98.8 °F (37.1 °C) (Oral)   Resp 16   Ht 182.9 cm (72.01\")   Wt 71.4 kg (157 lb 6.5 oz)   SpO2 99%   BMI 21.34 kg/m²     Discharge Exam:  General Appearance:    Alert, cooperative, no distress                          Head:    Normocephalic, without obvious abnormality, atraumatic                          Eyes:                            Throat:   Lips, tongue, gums normal                          Neck: Has trach in place                        Lungs:     Clear to auscultation bilaterally, respirations unlabored                Chest Wall:    No tenderness or deformity                        Heart:    Regular rate and rhythm, S1 and S2 normal, no murmur,no  Rub or gallop                  Abdomen:     Soft, has G-tube, bowel sounds active, no masses, no organomegaly                  Extremities:   Extremities normal, atraumatic, no cyanosis or edema                             Skin:   Skin is warm and dry,  no rashes or palpable lesions                  Neurologic: Quadriplegia     Disposition:  Skilled nursing facility  No active diet order  The patient gets tube feedings and his medicines are given through the tube.  Patient Instructions:      Discharge Medications      New Medications      Instructions Start Date   acetylcysteine 20 % nebulizer solution  Commonly known as: MUCOMYST   3 mL, Nebulization, 2 Times Daily - RT      clotrimazole 10 MG miguel  Commonly known as: MYCELEX   10 mg, Oral, 5 Times Daily      docusate sodium 150 MG/15ML liquid  Commonly known as: COLACE   100 mg, Per G Tube, 2 Times Daily      fluconazole 100 MG tablet  Commonly known as: DIFLUCAN   100 mg, Per G Tube, Every 24 Hours      Gabapentin 300 MG/6ML solution solution  Commonly known as: NEURONTIN  Replaces: gabapentin 800 MG tablet   800 mg, Per G Tube, Every 8 Hours Scheduled      guaifenesin 100 MG/5ML " liquid  Commonly known as: ROBITUSSIN   400 mg, Per G Tube, Every 4 Hours      lansoprazole 3 MG/ML suspension oral suspension   30 mg, Nasogastric, Every Morning      oxyCODONE 5 MG/5ML solution  Commonly known as: ROXICODONE  Replaces: oxyCODONE 5 MG capsule   10 mg, Per G Tube, Every 4 Hours PRN      polyethylene glycol 17 g packet  Commonly known as: MIRALAX   17 g, Oral, Daily         Changes to Medications      Instructions Start Date   acetaminophen 650 MG suppository  Commonly known as: TYLENOL  What changed: Another medication with the same name was added. Make sure you understand how and when to take each.   650 mg, Rectal, Every 4 Hours PRN      acetaminophen 325 MG tablet  Commonly known as: TYLENOL  What changed: Another medication with the same name was added. Make sure you understand how and when to take each.   650 mg, Oral, Every 6 Hours PRN      acetaminophen 160 MG/5ML solution  Commonly known as: TYLENOL  What changed: You were already taking a medication with the same name, and this prescription was added. Make sure you understand how and when to take each.   650 mg, Per G Tube, Every 4 Hours PRN      ALPRAZolam 0.5 MG tablet  Commonly known as: XANAX  What changed:   · medication strength  · how much to take  · how to take this   0.5 mg, Per G Tube, Every 8 Hours PRN      bisacodyl 10 MG suppository  Commonly known as: DULCOLAX  What changed: Another medication with the same name was added. Make sure you understand how and when to take each.   10 mg, Rectal, Daily      bisacodyl 10 MG suppository  Commonly known as: DULCOLAX  What changed: You were already taking a medication with the same name, and this prescription was added. Make sure you understand how and when to take each.   10 mg, Rectal, Daily PRN      cyclobenzaprine 10 MG tablet  Commonly known as: FLEXERIL  What changed: how to take this   10 mg, Per G Tube, Daily      propranolol 20 MG tablet  Commonly known as: INDERAL  What  changed: how to take this   20 mg, Per G Tube, 3 Times Daily         Continue These Medications      Instructions Start Date   ascorbic acid 500 MG tablet  Commonly known as: VITAMIN C   1,000 mg, Oral, Daily, Via g tube       aspirin 81 MG EC tablet   81 mg, Oral, Daily, Give via g tube       collagenase 250 UNIT/GM ointment   1 application, Topical, Daily      [employer] Probiotic (Lactobacillus) capsule   1 capsule, Oral, Daily      [employer] Zinc 50 MG tablet  Generic drug: zinc gluconate   50 mg, Oral, Daily      fentaNYL 25 MCG/HR patch  Commonly known as: DURAGESIC   1 patch, Transdermal, Every 72 Hours      ferrous sulfate 325 (65 FE) MG tablet   325 mg, Oral, Daily With Breakfast, Via g tube       hydrOXYzine 25 MG tablet  Commonly known as: ATARAX   25 mg, Oral, Daily, Via g tube       ipratropium-albuterol 0.5-2.5 mg/3 ml nebulizer  Commonly known as: DUO-NEB   3 mL, Nebulization, Every 4 Hours PRN      midodrine 2.5 MG tablet  Commonly known as: PROAMATINE   7.5 mg, Oral, 3 Times Daily      multivitamin with minerals tablet tablet   1 tablet, Oral, Daily      ondansetron 4 MG tablet  Commonly known as: ZOFRAN   4 mg, Oral, Every 6 Hours PRN      polyethyl glycol-propyl glycol 0.4-0.3 % solution ophthalmic solution  Commonly known as: SYSTANE   1 drop, Both Eyes, Every 1 Hour PRN      povidone-iodine 10 % external solution  Commonly known as: BETADINE   1 application, Topical, Daily, Apply to right heel topically once a day for impaired skin integrity cleanse right heel with soap and water; paint heel with betadine moistened gauze and cover with dry dressing       senna 8.6 MG tablet  Commonly known as: SENOKOT   1 tablet, Oral, 2 Times Daily      sertraline 100 MG tablet  Commonly known as: ZOLOFT   100 mg, Oral, Daily      sodium chloride 0.65 % nasal spray   1 spray, Nasal, Every 2 Hours PRN         Stop These Medications    ciprofloxacin 500 MG tablet  Commonly known as: CIPRO     clopidogrel 75 MG  tablet  Commonly known as: PLAVIX     doxycycline 100 MG tablet  Commonly known as: VIBRAMYICN     fludrocortisone 0.1 MG tablet     gabapentin 800 MG tablet  Commonly known as: NEURONTIN  Replaced by: Gabapentin 300 MG/6ML solution solution     melatonin 3 MG tablet     Mucinex Fast-Max DM Max 5-100 MG/5ML liquid  Generic drug: Dextromethorphan-guaiFENesin     omeprazole 2 mg/mL in sodium bicarbonate injection 8.4%     oxyCODONE 5 MG capsule  Commonly known as: OXY-IR  Replaced by: oxyCODONE 5 MG/5ML solution          Future Appointments   Date Time Provider Department Center   2/23/2021  5:00 PM EMS MED 1  [facility]     Follow-up Information     Sheron Perez MD Follow up.    Specialty: Family Medicine  Contact information:  [address] [place], [place] [address] [phone]                 Discharge Order (From admission, onward)     Start     Ordered    02/22/21 1531  Discharge patient  Once     Expected Discharge Date: 02/22/21    Discharge Disposition: Skilled Nursing Facility (DC - External)    Physician of Record for Attribution - Please select from Treatment Team: FAUSTO MONTANEZ [[id number]]    Review needed by CMO to determine Physician of Record: No       Question Answer Comment   Physician of Record for Attribution - Please select from Treatment Team FAUSTO MONTANEZ    Review needed by CMO to determine Physician of Record No        02/22/21 1601                Total time spent discharging patient including evaluation,post hospitalization follow up,  medication and post hospitalization instructions and education total time exceeds 30 minutes.    Signed:  Fausto Montanez MD  2/23/2021  16:00 EST

## 2021-02-24 NOTE — PAYOR COMM NOTE
"DISCHARGED    REF #[id number]        Maxim Hardin (35 y.o. Male)     Date of Birth Social Security Number Address Home Phone MRN    [id number]  [id number] [address] [phone] [id number]    Taoism Marital Status          None Single       Admission Date Admission Type Admitting Provider Attending Provider Department, Room/Bed    2/2/21 Emergency Dedrick White MD  [facility] 4 PARK, P476/1    Discharge Date Discharge Disposition Discharge Destination        2/23/2021 Skilled Nursing Facility (DC - External)              Attending Provider: (none)   Allergies: No Known Allergies    Isolation: Contact   Infection: MRSA (02/02/21), COVID Screen (preop/placement) (02/18/21)   Code Status: Prior    Ht: 182.9 cm (72.01\")   Wt: 71.4 kg (157 lb 6.5 oz)    Admission Cmt: None   Principal Problem: HCAP (healthcare-associated pneumonia) [J18.9]                 Active Insurance as of 2/2/2021     Primary Coverage     Payor Plan Insurance Group Employer/Plan Group    [place] [id number]     Payor Plan Address Payor Plan Phone Number Payor Plan Fax Number Effective Dates    [employer] [address] [address] [phone]  4/1/2020 - None Entered    [facility] [id number]       Subscriber Name Subscriber Birth Date Member ID       MAXIM HARDIN 1985 [id number]                 Emergency Contacts      (Rel.) Home Phone Work Phone Mobile Phone    Kinjal Hardin (Father) -- -- [phone]    Shaista Hardin (Mother) [phone] -- [phone]            Discharge Order (From admission, onward)     Start     Ordered    02/23/21 1600  Discharge patient  Once     Expected Discharge Date: 02/23/21    Discharge Disposition: Skilled Nursing Facility (DC - External)    Physician of Record for Attribution - Please select from Treatment Team: ROMA YORK [[id number]]    Review needed by CMO to determine Physician of Record: No       Question Answer Comment "   Physician of Record for Attribution - Please select from Treatment Team ROMA YORK    Review needed by CMO to determine Physician of Record No        02/23/21 6614

## 2021-02-27 ENCOUNTER — APPOINTMENT (OUTPATIENT)
Dept: GENERAL RADIOLOGY | Facility: HOSPITAL | Age: 36
End: 2021-02-27

## 2021-02-27 ENCOUNTER — HOSPITAL ENCOUNTER (INPATIENT)
Facility: HOSPITAL | Age: 36
LOS: 17 days | Discharge: NURSING, REHAB OR LONG-TERM CARE | End: 2021-03-16
Attending: EMERGENCY MEDICINE | Admitting: INTERNAL MEDICINE

## 2021-02-27 DIAGNOSIS — R06.00 DYSPNEA, UNSPECIFIED TYPE: Primary | ICD-10-CM

## 2021-02-27 DIAGNOSIS — J04.10 TRACHEITIS: ICD-10-CM

## 2021-02-27 DIAGNOSIS — J90 PLEURAL EFFUSION: ICD-10-CM

## 2021-02-27 DIAGNOSIS — F51.02 ADJUSTMENT INSOMNIA: ICD-10-CM

## 2021-02-27 DIAGNOSIS — R07.9 CHEST PAIN, UNSPECIFIED TYPE: ICD-10-CM

## 2021-02-27 DIAGNOSIS — Z87.01 HISTORY OF PNEUMONIA: ICD-10-CM

## 2021-02-27 DIAGNOSIS — F41.1 GENERALIZED ANXIETY DISORDER: ICD-10-CM

## 2021-02-27 LAB
ALBUMIN SERPL-MCNC: 3.8 G/DL (ref 3.5–5.2)
ALBUMIN/GLOB SERPL: 1 G/DL
ALP SERPL-CCNC: 136 U/L (ref 39–117)
ALT SERPL W P-5'-P-CCNC: 25 U/L (ref 1–41)
ANION GAP SERPL CALCULATED.3IONS-SCNC: 10 MMOL/L (ref 5–15)
ANION GAP SERPL CALCULATED.3IONS-SCNC: 12.7 MMOL/L (ref 5–15)
ARTERIAL PATENCY WRIST A: POSITIVE
AST SERPL-CCNC: 13 U/L (ref 1–40)
ATMOSPHERIC PRESS: 752.2 MMHG
B PARAPERT DNA SPEC QL NAA+PROBE: NOT DETECTED
B PERT DNA SPEC QL NAA+PROBE: NOT DETECTED
BASE EXCESS BLDA CALC-SCNC: 5.6 MMOL/L (ref 0–2)
BASOPHILS # BLD AUTO: 0.01 10*3/MM3 (ref 0–0.2)
BASOPHILS # BLD AUTO: 0.02 10*3/MM3 (ref 0–0.2)
BASOPHILS NFR BLD AUTO: 0.1 % (ref 0–1.5)
BASOPHILS NFR BLD AUTO: 0.1 % (ref 0–1.5)
BDY SITE: ABNORMAL
BILIRUB SERPL-MCNC: 2.4 MG/DL (ref 0–1.2)
BUN SERPL-MCNC: 27 MG/DL (ref 6–20)
BUN SERPL-MCNC: 28 MG/DL (ref 6–20)
BUN/CREAT SERPL: 41.2 (ref 7–25)
BUN/CREAT SERPL: 45 (ref 7–25)
C PNEUM DNA NPH QL NAA+NON-PROBE: NOT DETECTED
CALCIUM SPEC-SCNC: 9.2 MG/DL (ref 8.6–10.5)
CALCIUM SPEC-SCNC: 9.6 MG/DL (ref 8.6–10.5)
CHLORIDE SERPL-SCNC: 92 MMOL/L (ref 98–107)
CHLORIDE SERPL-SCNC: 92 MMOL/L (ref 98–107)
CO2 SERPL-SCNC: 29.3 MMOL/L (ref 22–29)
CO2 SERPL-SCNC: 31 MMOL/L (ref 22–29)
CREAT SERPL-MCNC: 0.6 MG/DL (ref 0.76–1.27)
CREAT SERPL-MCNC: 0.68 MG/DL (ref 0.76–1.27)
D-LACTATE SERPL-SCNC: 1.1 MMOL/L (ref 0.5–2)
DEPRECATED RDW RBC AUTO: 50.7 FL (ref 37–54)
DEPRECATED RDW RBC AUTO: 51.1 FL (ref 37–54)
EOSINOPHIL # BLD AUTO: 0.11 10*3/MM3 (ref 0–0.4)
EOSINOPHIL # BLD AUTO: 0.33 10*3/MM3 (ref 0–0.4)
EOSINOPHIL NFR BLD AUTO: 0.7 % (ref 0.3–6.2)
EOSINOPHIL NFR BLD AUTO: 3 % (ref 0.3–6.2)
ERYTHROCYTE [DISTWIDTH] IN BLOOD BY AUTOMATED COUNT: 17.1 % (ref 12.3–15.4)
ERYTHROCYTE [DISTWIDTH] IN BLOOD BY AUTOMATED COUNT: 17.3 % (ref 12.3–15.4)
FLUAV SUBTYP SPEC NAA+PROBE: NOT DETECTED
FLUBV RNA ISLT QL NAA+PROBE: NOT DETECTED
GFR SERPL CREATININE-BSD FRML MDRD: 131 ML/MIN/1.73
GFR SERPL CREATININE-BSD FRML MDRD: >150 ML/MIN/1.73
GLOBULIN UR ELPH-MCNC: 3.8 GM/DL
GLUCOSE BLDC GLUCOMTR-MCNC: 110 MG/DL (ref 70–130)
GLUCOSE SERPL-MCNC: 100 MG/DL (ref 65–99)
GLUCOSE SERPL-MCNC: 107 MG/DL (ref 65–99)
HADV DNA SPEC NAA+PROBE: NOT DETECTED
HCO3 BLDA-SCNC: 30.6 MMOL/L (ref 22–28)
HCOV 229E RNA SPEC QL NAA+PROBE: NOT DETECTED
HCOV HKU1 RNA SPEC QL NAA+PROBE: NOT DETECTED
HCOV NL63 RNA SPEC QL NAA+PROBE: NOT DETECTED
HCOV OC43 RNA SPEC QL NAA+PROBE: NOT DETECTED
HCT VFR BLD AUTO: 24.7 % (ref 37.5–51)
HCT VFR BLD AUTO: 25.8 % (ref 37.5–51)
HGB BLD-MCNC: 7.9 G/DL (ref 13–17.7)
HGB BLD-MCNC: 8.3 G/DL (ref 13–17.7)
HMPV RNA NPH QL NAA+NON-PROBE: NOT DETECTED
HPIV1 RNA SPEC QL NAA+PROBE: NOT DETECTED
HPIV2 RNA SPEC QL NAA+PROBE: NOT DETECTED
HPIV3 RNA NPH QL NAA+PROBE: NOT DETECTED
HPIV4 P GENE NPH QL NAA+PROBE: NOT DETECTED
IMM GRANULOCYTES # BLD AUTO: 0.04 10*3/MM3 (ref 0–0.05)
IMM GRANULOCYTES # BLD AUTO: 0.1 10*3/MM3 (ref 0–0.05)
IMM GRANULOCYTES NFR BLD AUTO: 0.4 % (ref 0–0.5)
IMM GRANULOCYTES NFR BLD AUTO: 0.7 % (ref 0–0.5)
INHALED O2 CONCENTRATION: 70 %
INR PPP: 1.15 (ref 0.9–1.1)
LYMPHOCYTES # BLD AUTO: 1.2 10*3/MM3 (ref 0.7–3.1)
LYMPHOCYTES # BLD AUTO: 1.21 10*3/MM3 (ref 0.7–3.1)
LYMPHOCYTES NFR BLD AUTO: 11 % (ref 19.6–45.3)
LYMPHOCYTES NFR BLD AUTO: 8.1 % (ref 19.6–45.3)
M PNEUMO IGG SER IA-ACNC: NOT DETECTED
MAGNESIUM SERPL-MCNC: 2.1 MG/DL (ref 1.6–2.6)
MCH RBC QN AUTO: 26 PG (ref 26.6–33)
MCH RBC QN AUTO: 26.3 PG (ref 26.6–33)
MCHC RBC AUTO-ENTMCNC: 32 G/DL (ref 31.5–35.7)
MCHC RBC AUTO-ENTMCNC: 32.2 G/DL (ref 31.5–35.7)
MCV RBC AUTO: 81.3 FL (ref 79–97)
MCV RBC AUTO: 81.6 FL (ref 79–97)
MODALITY: ABNORMAL
MONOCYTES # BLD AUTO: 0.77 10*3/MM3 (ref 0.1–0.9)
MONOCYTES # BLD AUTO: 0.92 10*3/MM3 (ref 0.1–0.9)
MONOCYTES NFR BLD AUTO: 5.2 % (ref 5–12)
MONOCYTES NFR BLD AUTO: 8.4 % (ref 5–12)
NEUTROPHILS NFR BLD AUTO: 12.7 10*3/MM3 (ref 1.7–7)
NEUTROPHILS NFR BLD AUTO: 77.1 % (ref 42.7–76)
NEUTROPHILS NFR BLD AUTO: 8.47 10*3/MM3 (ref 1.7–7)
NEUTROPHILS NFR BLD AUTO: 85.2 % (ref 42.7–76)
NRBC BLD AUTO-RTO: 0 /100 WBC (ref 0–0.2)
NRBC BLD AUTO-RTO: 0 /100 WBC (ref 0–0.2)
O2 A-A PPRESDIFF RESPIRATORY: 0.1 MMHG
PCO2 BLDA: 45.9 MM HG (ref 35–45)
PH BLDA: 7.43 PH UNITS (ref 7.35–7.45)
PHOSPHATE SERPL-MCNC: 4.9 MG/DL (ref 2.5–4.5)
PLATELET # BLD AUTO: 331 10*3/MM3 (ref 140–450)
PLATELET # BLD AUTO: 375 10*3/MM3 (ref 140–450)
PMV BLD AUTO: 10 FL (ref 6–12)
PMV BLD AUTO: 9.7 FL (ref 6–12)
PO2 BLDA: 69.7 MM HG (ref 80–100)
POTASSIUM SERPL-SCNC: 3.8 MMOL/L (ref 3.5–5.2)
POTASSIUM SERPL-SCNC: 4.1 MMOL/L (ref 3.5–5.2)
PROCALCITONIN SERPL-MCNC: 0.11 NG/ML (ref 0–0.25)
PROT SERPL-MCNC: 7.6 G/DL (ref 6–8.5)
PROTHROMBIN TIME: 14.5 SECONDS (ref 11.7–14.2)
RBC # BLD AUTO: 3.04 10*6/MM3 (ref 4.14–5.8)
RBC # BLD AUTO: 3.16 10*6/MM3 (ref 4.14–5.8)
RHINOVIRUS RNA SPEC NAA+PROBE: NOT DETECTED
RSV RNA NPH QL NAA+NON-PROBE: NOT DETECTED
SAO2 % BLDCOA: 94 % (ref 92–99)
SARS-COV-2 RNA NPH QL NAA+NON-PROBE: NOT DETECTED
SODIUM SERPL-SCNC: 133 MMOL/L (ref 136–145)
SODIUM SERPL-SCNC: 134 MMOL/L (ref 136–145)
TOTAL RATE: 21 BREATHS/MINUTE
TROPONIN T SERPL-MCNC: <0.01 NG/ML (ref 0–0.03)
WBC # BLD AUTO: 10.98 10*3/MM3 (ref 3.4–10.8)
WBC # BLD AUTO: 14.9 10*3/MM3 (ref 3.4–10.8)

## 2021-02-27 PROCEDURE — 87186 SC STD MICRODIL/AGAR DIL: CPT | Performed by: INTERNAL MEDICINE

## 2021-02-27 PROCEDURE — 84100 ASSAY OF PHOSPHORUS: CPT | Performed by: NURSE PRACTITIONER

## 2021-02-27 PROCEDURE — 87077 CULTURE AEROBIC IDENTIFY: CPT | Performed by: INTERNAL MEDICINE

## 2021-02-27 PROCEDURE — 87077 CULTURE AEROBIC IDENTIFY: CPT | Performed by: PHYSICIAN ASSISTANT

## 2021-02-27 PROCEDURE — 25010000002 PIPERACILLIN SOD-TAZOBACTAM PER 1 G: Performed by: PHYSICIAN ASSISTANT

## 2021-02-27 PROCEDURE — 71045 X-RAY EXAM CHEST 1 VIEW: CPT

## 2021-02-27 PROCEDURE — 82803 BLOOD GASES ANY COMBINATION: CPT

## 2021-02-27 PROCEDURE — 87205 SMEAR GRAM STAIN: CPT | Performed by: PHYSICIAN ASSISTANT

## 2021-02-27 PROCEDURE — 87205 SMEAR GRAM STAIN: CPT | Performed by: INTERNAL MEDICINE

## 2021-02-27 PROCEDURE — 87070 CULTURE OTHR SPECIMN AEROBIC: CPT | Performed by: INTERNAL MEDICINE

## 2021-02-27 PROCEDURE — 85025 COMPLETE CBC W/AUTO DIFF WBC: CPT | Performed by: NURSE PRACTITIONER

## 2021-02-27 PROCEDURE — 25010000002 VANCOMYCIN 10 G RECONSTITUTED SOLUTION: Performed by: NURSE PRACTITIONER

## 2021-02-27 PROCEDURE — 0202U NFCT DS 22 TRGT SARS-COV-2: CPT | Performed by: PHYSICIAN ASSISTANT

## 2021-02-27 PROCEDURE — 25010000002 PIPERACILLIN SOD-TAZOBACTAM PER 1 G: Performed by: NURSE PRACTITIONER

## 2021-02-27 PROCEDURE — 93005 ELECTROCARDIOGRAM TRACING: CPT | Performed by: PHYSICIAN ASSISTANT

## 2021-02-27 PROCEDURE — 87070 CULTURE OTHR SPECIMN AEROBIC: CPT | Performed by: PHYSICIAN ASSISTANT

## 2021-02-27 PROCEDURE — 85025 COMPLETE CBC W/AUTO DIFF WBC: CPT | Performed by: PHYSICIAN ASSISTANT

## 2021-02-27 PROCEDURE — 25010000002 MORPHINE PER 10 MG: Performed by: EMERGENCY MEDICINE

## 2021-02-27 PROCEDURE — 25010000002 MORPHINE PER 10 MG: Performed by: INTERNAL MEDICINE

## 2021-02-27 PROCEDURE — 94799 UNLISTED PULMONARY SVC/PX: CPT

## 2021-02-27 PROCEDURE — 25010000002 ONDANSETRON PER 1 MG: Performed by: EMERGENCY MEDICINE

## 2021-02-27 PROCEDURE — 36600 WITHDRAWAL OF ARTERIAL BLOOD: CPT

## 2021-02-27 PROCEDURE — 84145 PROCALCITONIN (PCT): CPT | Performed by: PHYSICIAN ASSISTANT

## 2021-02-27 PROCEDURE — 94640 AIRWAY INHALATION TREATMENT: CPT

## 2021-02-27 PROCEDURE — 80053 COMPREHEN METABOLIC PANEL: CPT | Performed by: PHYSICIAN ASSISTANT

## 2021-02-27 PROCEDURE — 25010000002 HYDROMORPHONE 1 MG/ML SOLUTION: Performed by: INTERNAL MEDICINE

## 2021-02-27 PROCEDURE — 83605 ASSAY OF LACTIC ACID: CPT | Performed by: PHYSICIAN ASSISTANT

## 2021-02-27 PROCEDURE — 82962 GLUCOSE BLOOD TEST: CPT

## 2021-02-27 PROCEDURE — 99284 EMERGENCY DEPT VISIT MOD MDM: CPT

## 2021-02-27 PROCEDURE — 83735 ASSAY OF MAGNESIUM: CPT | Performed by: NURSE PRACTITIONER

## 2021-02-27 PROCEDURE — 25010000002 VANCOMYCIN 10 G RECONSTITUTED SOLUTION: Performed by: PHYSICIAN ASSISTANT

## 2021-02-27 PROCEDURE — 87040 BLOOD CULTURE FOR BACTERIA: CPT | Performed by: PHYSICIAN ASSISTANT

## 2021-02-27 PROCEDURE — 84484 ASSAY OF TROPONIN QUANT: CPT | Performed by: PHYSICIAN ASSISTANT

## 2021-02-27 PROCEDURE — 85610 PROTHROMBIN TIME: CPT | Performed by: NURSE PRACTITIONER

## 2021-02-27 RX ORDER — FENTANYL 25 UG/H
1 PATCH TRANSDERMAL
Status: DISCONTINUED | OUTPATIENT
Start: 2021-02-27 | End: 2021-03-16 | Stop reason: HOSPADM

## 2021-02-27 RX ORDER — BISACODYL 10 MG
10 SUPPOSITORY, RECTAL RECTAL DAILY PRN
Status: DISCONTINUED | OUTPATIENT
Start: 2021-02-27 | End: 2021-03-16 | Stop reason: HOSPADM

## 2021-02-27 RX ORDER — LANSOPRAZOLE
30 KIT EVERY MORNING
Status: DISCONTINUED | OUTPATIENT
Start: 2021-02-27 | End: 2021-03-16 | Stop reason: HOSPADM

## 2021-02-27 RX ORDER — MORPHINE SULFATE 2 MG/ML
4 INJECTION, SOLUTION INTRAMUSCULAR; INTRAVENOUS ONCE
Status: COMPLETED | OUTPATIENT
Start: 2021-02-27 | End: 2021-02-27

## 2021-02-27 RX ORDER — CYCLOBENZAPRINE HCL 10 MG
10 TABLET ORAL DAILY
Status: DISCONTINUED | OUTPATIENT
Start: 2021-02-27 | End: 2021-03-16 | Stop reason: HOSPADM

## 2021-02-27 RX ORDER — IPRATROPIUM BROMIDE AND ALBUTEROL SULFATE 2.5; .5 MG/3ML; MG/3ML
3 SOLUTION RESPIRATORY (INHALATION) EVERY 4 HOURS PRN
Status: DISCONTINUED | OUTPATIENT
Start: 2021-02-27 | End: 2021-03-10

## 2021-02-27 RX ORDER — MULTIPLE VITAMINS W/ MINERALS TAB 9MG-400MCG
1 TAB ORAL DAILY
Status: DISCONTINUED | OUTPATIENT
Start: 2021-02-27 | End: 2021-03-16 | Stop reason: HOSPADM

## 2021-02-27 RX ORDER — OXYCODONE HCL 5 MG/5 ML
10 SOLUTION, ORAL ORAL EVERY 4 HOURS PRN
Status: DISCONTINUED | OUTPATIENT
Start: 2021-02-27 | End: 2021-03-16 | Stop reason: HOSPADM

## 2021-02-27 RX ORDER — MORPHINE SULFATE 2 MG/ML
2 INJECTION, SOLUTION INTRAMUSCULAR; INTRAVENOUS
Status: DISCONTINUED | OUTPATIENT
Start: 2021-02-27 | End: 2021-03-04

## 2021-02-27 RX ORDER — FERROUS SULFATE 325(65) MG
325 TABLET ORAL
Status: DISCONTINUED | OUTPATIENT
Start: 2021-02-27 | End: 2021-02-27

## 2021-02-27 RX ORDER — NITROGLYCERIN 0.4 MG/1
0.4 TABLET SUBLINGUAL
Status: DISCONTINUED | OUTPATIENT
Start: 2021-02-27 | End: 2021-03-16 | Stop reason: HOSPADM

## 2021-02-27 RX ORDER — ACETAMINOPHEN 650 MG/1
650 SUPPOSITORY RECTAL EVERY 4 HOURS PRN
Status: DISCONTINUED | OUTPATIENT
Start: 2021-02-27 | End: 2021-03-16 | Stop reason: HOSPADM

## 2021-02-27 RX ORDER — SODIUM CHLORIDE 0.9 % (FLUSH) 0.9 %
10 SYRINGE (ML) INJECTION AS NEEDED
Status: DISCONTINUED | OUTPATIENT
Start: 2021-02-27 | End: 2021-03-16 | Stop reason: HOSPADM

## 2021-02-27 RX ORDER — POLYETHYLENE GLYCOL 3350 17 G/17G
17 POWDER, FOR SOLUTION ORAL DAILY
Status: DISCONTINUED | OUTPATIENT
Start: 2021-02-27 | End: 2021-03-13

## 2021-02-27 RX ORDER — L.ACID,PARA/B.BIFIDUM/S.THERM 8B CELL
1 CAPSULE ORAL DAILY
Status: COMPLETED | OUTPATIENT
Start: 2021-02-27 | End: 2021-03-01

## 2021-02-27 RX ORDER — ASCORBIC ACID 500 MG
1000 TABLET ORAL DAILY
Status: DISCONTINUED | OUTPATIENT
Start: 2021-02-27 | End: 2021-03-16 | Stop reason: HOSPADM

## 2021-02-27 RX ORDER — SERTRALINE HYDROCHLORIDE 100 MG/1
100 TABLET, FILM COATED ORAL DAILY
Status: DISCONTINUED | OUTPATIENT
Start: 2021-02-27 | End: 2021-03-05

## 2021-02-27 RX ORDER — ONDANSETRON 2 MG/ML
4 INJECTION INTRAMUSCULAR; INTRAVENOUS ONCE
Status: COMPLETED | OUTPATIENT
Start: 2021-02-27 | End: 2021-02-27

## 2021-02-27 RX ORDER — GUAIFENESIN 200 MG/10ML
400 LIQUID ORAL EVERY 4 HOURS
Status: DISCONTINUED | OUTPATIENT
Start: 2021-02-27 | End: 2021-02-27

## 2021-02-27 RX ORDER — CLOTRIMAZOLE 10 MG/1
10 LOZENGE ORAL; TOPICAL
Status: DISPENSED | OUTPATIENT
Start: 2021-02-27 | End: 2021-03-02

## 2021-02-27 RX ORDER — ACETAMINOPHEN 160 MG/5ML
650 SOLUTION ORAL EVERY 4 HOURS PRN
Status: DISCONTINUED | OUTPATIENT
Start: 2021-02-27 | End: 2021-03-16 | Stop reason: HOSPADM

## 2021-02-27 RX ORDER — ASPIRIN 81 MG/1
81 TABLET ORAL DAILY
Status: DISCONTINUED | OUTPATIENT
Start: 2021-02-27 | End: 2021-02-27

## 2021-02-27 RX ORDER — DOCUSATE SODIUM 50 MG/5 ML
100 LIQUID (ML) ORAL 2 TIMES DAILY
Status: DISCONTINUED | OUTPATIENT
Start: 2021-02-27 | End: 2021-03-16 | Stop reason: HOSPADM

## 2021-02-27 RX ORDER — HYDROXYZINE HYDROCHLORIDE 25 MG/1
25 TABLET, FILM COATED ORAL DAILY
Status: DISCONTINUED | OUTPATIENT
Start: 2021-02-27 | End: 2021-03-16 | Stop reason: HOSPADM

## 2021-02-27 RX ORDER — GABAPENTIN 250 MG/5ML
800 SOLUTION ORAL EVERY 8 HOURS SCHEDULED
Status: DISCONTINUED | OUTPATIENT
Start: 2021-02-27 | End: 2021-03-16 | Stop reason: HOSPADM

## 2021-02-27 RX ORDER — PROPRANOLOL HYDROCHLORIDE 20 MG/1
20 TABLET ORAL 3 TIMES DAILY
Status: DISCONTINUED | OUTPATIENT
Start: 2021-02-27 | End: 2021-02-27

## 2021-02-27 RX ORDER — SODIUM CHLORIDE 0.9 % (FLUSH) 0.9 %
10 SYRINGE (ML) INJECTION EVERY 12 HOURS SCHEDULED
Status: DISCONTINUED | OUTPATIENT
Start: 2021-02-27 | End: 2021-03-16 | Stop reason: HOSPADM

## 2021-02-27 RX ORDER — ALPRAZOLAM 0.5 MG/1
0.5 TABLET ORAL EVERY 8 HOURS PRN
Status: DISCONTINUED | OUTPATIENT
Start: 2021-02-27 | End: 2021-03-09

## 2021-02-27 RX ORDER — GUAIFENESIN 200 MG/10ML
400 LIQUID ORAL EVERY 6 HOURS
Status: DISCONTINUED | OUTPATIENT
Start: 2021-02-27 | End: 2021-03-16 | Stop reason: HOSPADM

## 2021-02-27 RX ORDER — SENNA PLUS 8.6 MG/1
1 TABLET ORAL 2 TIMES DAILY
Status: DISCONTINUED | OUTPATIENT
Start: 2021-02-27 | End: 2021-03-13

## 2021-02-27 RX ORDER — SODIUM CHLORIDE 9 MG/ML
100 INJECTION, SOLUTION INTRAVENOUS CONTINUOUS
Status: DISCONTINUED | OUTPATIENT
Start: 2021-02-27 | End: 2021-03-04

## 2021-02-27 RX ORDER — ACETAMINOPHEN 325 MG/1
650 TABLET ORAL EVERY 4 HOURS PRN
Status: DISCONTINUED | OUTPATIENT
Start: 2021-02-27 | End: 2021-03-16 | Stop reason: HOSPADM

## 2021-02-27 RX ORDER — FLUCONAZOLE 100 MG/1
100 TABLET ORAL DAILY
COMMUNITY
End: 2021-03-16 | Stop reason: HOSPADM

## 2021-02-27 RX ORDER — MORPHINE SULFATE 2 MG/ML
2 INJECTION, SOLUTION INTRAMUSCULAR; INTRAVENOUS AS NEEDED
Status: COMPLETED | OUTPATIENT
Start: 2021-02-27 | End: 2021-02-27

## 2021-02-27 RX ORDER — ONDANSETRON 2 MG/ML
4 INJECTION INTRAMUSCULAR; INTRAVENOUS EVERY 6 HOURS PRN
Status: DISCONTINUED | OUTPATIENT
Start: 2021-02-27 | End: 2021-03-16 | Stop reason: HOSPADM

## 2021-02-27 RX ADMIN — CLOTRIMAZOLE 10 MG: 10 LOZENGE ORAL at 13:32

## 2021-02-27 RX ADMIN — VANCOMYCIN HYDROCHLORIDE 1250 MG: 10 INJECTION, POWDER, LYOPHILIZED, FOR SOLUTION INTRAVENOUS at 20:11

## 2021-02-27 RX ADMIN — HYDROMORPHONE HYDROCHLORIDE 1 MG: 10 INJECTION INTRAMUSCULAR; INTRAVENOUS; SUBCUTANEOUS at 15:54

## 2021-02-27 RX ADMIN — MORPHINE SULFATE 2 MG: 2 INJECTION, SOLUTION INTRAMUSCULAR; INTRAVENOUS at 04:10

## 2021-02-27 RX ADMIN — ONDANSETRON 2 MG: 2 INJECTION INTRAMUSCULAR; INTRAVENOUS at 03:26

## 2021-02-27 RX ADMIN — HYDROXYZINE HYDROCHLORIDE 25 MG: 25 TABLET ORAL at 13:27

## 2021-02-27 RX ADMIN — LANSOPRAZOLE 30 MG: KIT at 13:27

## 2021-02-27 RX ADMIN — TAZOBACTAM SODIUM AND PIPERACILLIN SODIUM 3.38 G: 375; 3 INJECTION, SOLUTION INTRAVENOUS at 04:58

## 2021-02-27 RX ADMIN — MORPHINE SULFATE 4 MG: 2 INJECTION, SOLUTION INTRAMUSCULAR; INTRAVENOUS at 05:57

## 2021-02-27 RX ADMIN — DOCUSATE SODIUM 100 MG: 50 LIQUID ORAL at 20:11

## 2021-02-27 RX ADMIN — GUAIFENESIN 400 MG: 100 SOLUTION ORAL at 13:28

## 2021-02-27 RX ADMIN — GABAPENTIN 800 MG: 250 SOLUTION ORAL at 22:34

## 2021-02-27 RX ADMIN — MORPHINE SULFATE 2 MG: 2 INJECTION, SOLUTION INTRAMUSCULAR; INTRAVENOUS at 15:48

## 2021-02-27 RX ADMIN — MORPHINE SULFATE 2 MG: 2 INJECTION, SOLUTION INTRAMUSCULAR; INTRAVENOUS at 20:26

## 2021-02-27 RX ADMIN — OXYCODONE HYDROCHLORIDE AND ACETAMINOPHEN 1000 MG: 500 TABLET ORAL at 13:26

## 2021-02-27 RX ADMIN — TAZOBACTAM SODIUM AND PIPERACILLIN SODIUM 3.38 G: 375; 3 INJECTION, SOLUTION INTRAVENOUS at 13:33

## 2021-02-27 RX ADMIN — GUAIFENESIN 400 MG: 100 SOLUTION ORAL at 19:00

## 2021-02-27 RX ADMIN — HYDROMORPHONE HYDROCHLORIDE 1 MG: 10 INJECTION INTRAMUSCULAR; INTRAVENOUS; SUBCUTANEOUS at 17:34

## 2021-02-27 RX ADMIN — MORPHINE SULFATE 2 MG: 2 INJECTION, SOLUTION INTRAMUSCULAR; INTRAVENOUS at 22:34

## 2021-02-27 RX ADMIN — HYDROMORPHONE HYDROCHLORIDE 1 MG: 10 INJECTION INTRAMUSCULAR; INTRAVENOUS; SUBCUTANEOUS at 22:34

## 2021-02-27 RX ADMIN — MIDODRINE HYDROCHLORIDE 7.5 MG: 5 TABLET ORAL at 19:03

## 2021-02-27 RX ADMIN — VANCOMYCIN HYDROCHLORIDE 1500 MG: 10 INJECTION, POWDER, LYOPHILIZED, FOR SOLUTION INTRAVENOUS at 05:41

## 2021-02-27 RX ADMIN — IPRATROPIUM BROMIDE AND ALBUTEROL SULFATE 3 ML: 2.5; .5 SOLUTION RESPIRATORY (INHALATION) at 19:27

## 2021-02-27 RX ADMIN — FENTANYL 1 PATCH: 25 PATCH TRANSDERMAL at 13:26

## 2021-02-27 RX ADMIN — GABAPENTIN 800 MG: 250 SOLUTION ORAL at 15:09

## 2021-02-27 RX ADMIN — SERTRALINE 100 MG: 100 TABLET, FILM COATED ORAL at 13:26

## 2021-02-27 RX ADMIN — CLOTRIMAZOLE 10 MG: 10 LOZENGE ORAL at 15:09

## 2021-02-27 RX ADMIN — CLOTRIMAZOLE 10 MG: 10 LOZENGE ORAL at 22:34

## 2021-02-27 RX ADMIN — MORPHINE SULFATE 2 MG: 2 INJECTION, SOLUTION INTRAMUSCULAR; INTRAVENOUS at 03:24

## 2021-02-27 RX ADMIN — SENNOSIDES 1 TABLET: 8.6 TABLET, FILM COATED ORAL at 20:11

## 2021-02-27 RX ADMIN — SODIUM CHLORIDE, PRESERVATIVE FREE 10 ML: 5 INJECTION INTRAVENOUS at 08:40

## 2021-02-27 RX ADMIN — Medication 1 CAPSULE: at 13:26

## 2021-02-27 RX ADMIN — CYCLOBENZAPRINE 10 MG: 10 TABLET, FILM COATED ORAL at 13:26

## 2021-02-27 RX ADMIN — MORPHINE SULFATE 2 MG: 2 INJECTION, SOLUTION INTRAMUSCULAR; INTRAVENOUS at 13:13

## 2021-02-27 RX ADMIN — Medication 1 TABLET: at 13:26

## 2021-02-27 RX ADMIN — HYDROMORPHONE HYDROCHLORIDE 1 MG: 10 INJECTION INTRAMUSCULAR; INTRAVENOUS; SUBCUTANEOUS at 18:29

## 2021-02-27 RX ADMIN — MORPHINE SULFATE 2 MG: 2 INJECTION, SOLUTION INTRAMUSCULAR; INTRAVENOUS at 18:23

## 2021-02-27 RX ADMIN — TAZOBACTAM SODIUM AND PIPERACILLIN SODIUM 3.38 G: 375; 3 INJECTION, SOLUTION INTRAVENOUS at 19:02

## 2021-02-27 RX ADMIN — CLOTRIMAZOLE 10 MG: 10 LOZENGE ORAL at 19:00

## 2021-02-27 RX ADMIN — MIDODRINE HYDROCHLORIDE 7.5 MG: 5 TABLET ORAL at 13:32

## 2021-02-27 RX ADMIN — MORPHINE SULFATE 2 MG: 2 INJECTION, SOLUTION INTRAMUSCULAR; INTRAVENOUS at 10:31

## 2021-02-28 ENCOUNTER — APPOINTMENT (OUTPATIENT)
Dept: GENERAL RADIOLOGY | Facility: HOSPITAL | Age: 36
End: 2021-02-28

## 2021-02-28 PROBLEM — I95.9 HYPOTENSION: Status: ACTIVE | Noted: 2021-02-28

## 2021-02-28 LAB
ALBUMIN SERPL-MCNC: 3.6 G/DL (ref 3.5–5.2)
ALBUMIN/GLOB SERPL: 1 G/DL
ALP SERPL-CCNC: 132 U/L (ref 39–117)
ALT SERPL W P-5'-P-CCNC: 19 U/L (ref 1–41)
ANION GAP SERPL CALCULATED.3IONS-SCNC: 12.6 MMOL/L (ref 5–15)
AST SERPL-CCNC: 14 U/L (ref 1–40)
BASOPHILS # BLD AUTO: 0.01 10*3/MM3 (ref 0–0.2)
BASOPHILS NFR BLD AUTO: 0.1 % (ref 0–1.5)
BILIRUB SERPL-MCNC: 2.4 MG/DL (ref 0–1.2)
BUN SERPL-MCNC: 23 MG/DL (ref 6–20)
BUN/CREAT SERPL: 35.9 (ref 7–25)
CALCIUM SPEC-SCNC: 8.9 MG/DL (ref 8.6–10.5)
CHLORIDE SERPL-SCNC: 95 MMOL/L (ref 98–107)
CO2 SERPL-SCNC: 28.4 MMOL/L (ref 22–29)
CREAT SERPL-MCNC: 0.64 MG/DL (ref 0.76–1.27)
DEPRECATED RDW RBC AUTO: 50.6 FL (ref 37–54)
EOSINOPHIL # BLD AUTO: 0.3 10*3/MM3 (ref 0–0.4)
EOSINOPHIL NFR BLD AUTO: 3 % (ref 0.3–6.2)
ERYTHROCYTE [DISTWIDTH] IN BLOOD BY AUTOMATED COUNT: 17.2 % (ref 12.3–15.4)
GFR SERPL CREATININE-BSD FRML MDRD: 141 ML/MIN/1.73
GLOBULIN UR ELPH-MCNC: 3.5 GM/DL
GLUCOSE BLDC GLUCOMTR-MCNC: 113 MG/DL (ref 70–130)
GLUCOSE BLDC GLUCOMTR-MCNC: 126 MG/DL (ref 70–130)
GLUCOSE SERPL-MCNC: 114 MG/DL (ref 65–99)
HCT VFR BLD AUTO: 22.4 % (ref 37.5–51)
HGB BLD-MCNC: 7.4 G/DL (ref 13–17.7)
IMM GRANULOCYTES # BLD AUTO: 0.04 10*3/MM3 (ref 0–0.05)
IMM GRANULOCYTES NFR BLD AUTO: 0.4 % (ref 0–0.5)
LYMPHOCYTES # BLD AUTO: 1.18 10*3/MM3 (ref 0.7–3.1)
LYMPHOCYTES NFR BLD AUTO: 12 % (ref 19.6–45.3)
MCH RBC QN AUTO: 26.9 PG (ref 26.6–33)
MCHC RBC AUTO-ENTMCNC: 33 G/DL (ref 31.5–35.7)
MCV RBC AUTO: 81.5 FL (ref 79–97)
MONOCYTES # BLD AUTO: 0.73 10*3/MM3 (ref 0.1–0.9)
MONOCYTES NFR BLD AUTO: 7.4 % (ref 5–12)
NEUTROPHILS NFR BLD AUTO: 7.6 10*3/MM3 (ref 1.7–7)
NEUTROPHILS NFR BLD AUTO: 77.1 % (ref 42.7–76)
NRBC BLD AUTO-RTO: 0 /100 WBC (ref 0–0.2)
NT-PROBNP SERPL-MCNC: 517.8 PG/ML (ref 0–450)
PLATELET # BLD AUTO: 305 10*3/MM3 (ref 140–450)
PMV BLD AUTO: 10.4 FL (ref 6–12)
POTASSIUM SERPL-SCNC: 3.4 MMOL/L (ref 3.5–5.2)
PROCALCITONIN SERPL-MCNC: 0.1 NG/ML (ref 0–0.25)
PROT SERPL-MCNC: 7.1 G/DL (ref 6–8.5)
QT INTERVAL: 357 MS
RBC # BLD AUTO: 2.75 10*6/MM3 (ref 4.14–5.8)
SODIUM SERPL-SCNC: 136 MMOL/L (ref 136–145)
WBC # BLD AUTO: 9.86 10*3/MM3 (ref 3.4–10.8)

## 2021-02-28 PROCEDURE — 25010000002 PIPERACILLIN SOD-TAZOBACTAM PER 1 G: Performed by: INTERNAL MEDICINE

## 2021-02-28 PROCEDURE — 84145 PROCALCITONIN (PCT): CPT | Performed by: INTERNAL MEDICINE

## 2021-02-28 PROCEDURE — 25010000002 PIPERACILLIN SOD-TAZOBACTAM PER 1 G: Performed by: NURSE PRACTITIONER

## 2021-02-28 PROCEDURE — 25010000002 VANCOMYCIN 10 G RECONSTITUTED SOLUTION: Performed by: NURSE PRACTITIONER

## 2021-02-28 PROCEDURE — 25010000002 MORPHINE PER 10 MG: Performed by: INTERNAL MEDICINE

## 2021-02-28 PROCEDURE — 94799 UNLISTED PULMONARY SVC/PX: CPT

## 2021-02-28 PROCEDURE — 82962 GLUCOSE BLOOD TEST: CPT

## 2021-02-28 PROCEDURE — 83880 ASSAY OF NATRIURETIC PEPTIDE: CPT | Performed by: INTERNAL MEDICINE

## 2021-02-28 PROCEDURE — 71045 X-RAY EXAM CHEST 1 VIEW: CPT

## 2021-02-28 PROCEDURE — 80053 COMPREHEN METABOLIC PANEL: CPT | Performed by: NURSE PRACTITIONER

## 2021-02-28 PROCEDURE — 85025 COMPLETE CBC W/AUTO DIFF WBC: CPT | Performed by: INTERNAL MEDICINE

## 2021-02-28 PROCEDURE — 25010000002 VANCOMYCIN 10 G RECONSTITUTED SOLUTION: Performed by: INTERNAL MEDICINE

## 2021-02-28 PROCEDURE — 25010000002 HYDROMORPHONE 1 MG/ML SOLUTION: Performed by: INTERNAL MEDICINE

## 2021-02-28 RX ORDER — MAGNESIUM SULFATE HEPTAHYDRATE 40 MG/ML
2 INJECTION, SOLUTION INTRAVENOUS AS NEEDED
Status: DISCONTINUED | OUTPATIENT
Start: 2021-02-28 | End: 2021-03-16 | Stop reason: HOSPADM

## 2021-02-28 RX ORDER — POTASSIUM CHLORIDE 750 MG/1
40 TABLET, FILM COATED, EXTENDED RELEASE ORAL AS NEEDED
Status: DISCONTINUED | OUTPATIENT
Start: 2021-02-28 | End: 2021-03-16 | Stop reason: HOSPADM

## 2021-02-28 RX ORDER — MAGNESIUM SULFATE HEPTAHYDRATE 40 MG/ML
4 INJECTION, SOLUTION INTRAVENOUS AS NEEDED
Status: DISCONTINUED | OUTPATIENT
Start: 2021-02-28 | End: 2021-03-16 | Stop reason: HOSPADM

## 2021-02-28 RX ORDER — POTASSIUM CHLORIDE 1.5 G/1.77G
40 POWDER, FOR SOLUTION ORAL AS NEEDED
Status: DISCONTINUED | OUTPATIENT
Start: 2021-02-28 | End: 2021-03-16 | Stop reason: HOSPADM

## 2021-02-28 RX ORDER — POTASSIUM CHLORIDE 7.45 MG/ML
10 INJECTION INTRAVENOUS
Status: DISCONTINUED | OUTPATIENT
Start: 2021-02-28 | End: 2021-03-16 | Stop reason: HOSPADM

## 2021-02-28 RX ADMIN — SERTRALINE 100 MG: 100 TABLET, FILM COATED ORAL at 09:16

## 2021-02-28 RX ADMIN — VANCOMYCIN HYDROCHLORIDE 1250 MG: 10 INJECTION, POWDER, LYOPHILIZED, FOR SOLUTION INTRAVENOUS at 06:59

## 2021-02-28 RX ADMIN — GUAIFENESIN 400 MG: 100 SOLUTION ORAL at 23:14

## 2021-02-28 RX ADMIN — HYDROMORPHONE HYDROCHLORIDE 1 MG: 10 INJECTION INTRAMUSCULAR; INTRAVENOUS; SUBCUTANEOUS at 14:01

## 2021-02-28 RX ADMIN — OXYCODONE HYDROCHLORIDE AND ACETAMINOPHEN 1000 MG: 500 TABLET ORAL at 09:16

## 2021-02-28 RX ADMIN — MORPHINE SULFATE 2 MG: 2 INJECTION, SOLUTION INTRAMUSCULAR; INTRAVENOUS at 13:42

## 2021-02-28 RX ADMIN — MIDODRINE HYDROCHLORIDE 7.5 MG: 5 TABLET ORAL at 03:15

## 2021-02-28 RX ADMIN — LANSOPRAZOLE 30 MG: KIT at 06:14

## 2021-02-28 RX ADMIN — MORPHINE SULFATE 2 MG: 2 INJECTION, SOLUTION INTRAMUSCULAR; INTRAVENOUS at 18:53

## 2021-02-28 RX ADMIN — HYDROXYZINE HYDROCHLORIDE 25 MG: 25 TABLET ORAL at 09:16

## 2021-02-28 RX ADMIN — HYDROMORPHONE HYDROCHLORIDE 1 MG: 10 INJECTION INTRAMUSCULAR; INTRAVENOUS; SUBCUTANEOUS at 15:38

## 2021-02-28 RX ADMIN — CYCLOBENZAPRINE 10 MG: 10 TABLET, FILM COATED ORAL at 09:16

## 2021-02-28 RX ADMIN — TAZOBACTAM SODIUM AND PIPERACILLIN SODIUM 3.38 G: 375; 3 INJECTION, SOLUTION INTRAVENOUS at 03:10

## 2021-02-28 RX ADMIN — OXYCODONE HYDROCHLORIDE 10 MG: 5 SOLUTION ORAL at 15:37

## 2021-02-28 RX ADMIN — VANCOMYCIN HYDROCHLORIDE 1250 MG: 10 INJECTION, POWDER, LYOPHILIZED, FOR SOLUTION INTRAVENOUS at 18:38

## 2021-02-28 RX ADMIN — ALPRAZOLAM 0.5 MG: 0.5 TABLET ORAL at 20:14

## 2021-02-28 RX ADMIN — HYDROMORPHONE HYDROCHLORIDE 1 MG: 10 INJECTION INTRAMUSCULAR; INTRAVENOUS; SUBCUTANEOUS at 23:14

## 2021-02-28 RX ADMIN — CLOTRIMAZOLE 10 MG: 10 LOZENGE ORAL at 23:14

## 2021-02-28 RX ADMIN — MORPHINE SULFATE 2 MG: 2 INJECTION, SOLUTION INTRAMUSCULAR; INTRAVENOUS at 16:08

## 2021-02-28 RX ADMIN — GABAPENTIN 800 MG: 250 SOLUTION ORAL at 23:13

## 2021-02-28 RX ADMIN — HYDROMORPHONE HYDROCHLORIDE 1 MG: 10 INJECTION INTRAMUSCULAR; INTRAVENOUS; SUBCUTANEOUS at 11:58

## 2021-02-28 RX ADMIN — GUAIFENESIN 400 MG: 100 SOLUTION ORAL at 18:37

## 2021-02-28 RX ADMIN — MORPHINE SULFATE 2 MG: 2 INJECTION, SOLUTION INTRAMUSCULAR; INTRAVENOUS at 11:40

## 2021-02-28 RX ADMIN — HYDROMORPHONE HYDROCHLORIDE 1 MG: 10 INJECTION INTRAMUSCULAR; INTRAVENOUS; SUBCUTANEOUS at 17:00

## 2021-02-28 RX ADMIN — MORPHINE SULFATE 2 MG: 2 INJECTION, SOLUTION INTRAMUSCULAR; INTRAVENOUS at 09:34

## 2021-02-28 RX ADMIN — CLOTRIMAZOLE 10 MG: 10 LOZENGE ORAL at 06:14

## 2021-02-28 RX ADMIN — GABAPENTIN 800 MG: 250 SOLUTION ORAL at 15:37

## 2021-02-28 RX ADMIN — SODIUM CHLORIDE, PRESERVATIVE FREE 10 ML: 5 INJECTION INTRAVENOUS at 20:15

## 2021-02-28 RX ADMIN — MORPHINE SULFATE 2 MG: 2 INJECTION, SOLUTION INTRAMUSCULAR; INTRAVENOUS at 07:29

## 2021-02-28 RX ADMIN — TAZOBACTAM SODIUM AND PIPERACILLIN SODIUM 3.38 G: 375; 3 INJECTION, SOLUTION INTRAVENOUS at 11:15

## 2021-02-28 RX ADMIN — MORPHINE SULFATE 2 MG: 2 INJECTION, SOLUTION INTRAMUSCULAR; INTRAVENOUS at 02:41

## 2021-02-28 RX ADMIN — Medication 1 TABLET: at 09:16

## 2021-02-28 RX ADMIN — POLYETHYLENE GLYCOL 3350 17 G: 17 POWDER, FOR SOLUTION ORAL at 09:16

## 2021-02-28 RX ADMIN — Medication 1 CAPSULE: at 09:16

## 2021-02-28 RX ADMIN — GUAIFENESIN 400 MG: 100 SOLUTION ORAL at 06:14

## 2021-02-28 RX ADMIN — MIDODRINE HYDROCHLORIDE 7.5 MG: 5 TABLET ORAL at 11:16

## 2021-02-28 RX ADMIN — TAZOBACTAM SODIUM AND PIPERACILLIN SODIUM 3.38 G: 375; 3 INJECTION, SOLUTION INTRAVENOUS at 20:12

## 2021-02-28 RX ADMIN — MORPHINE SULFATE 2 MG: 2 INJECTION, SOLUTION INTRAMUSCULAR; INTRAVENOUS at 05:30

## 2021-02-28 RX ADMIN — GUAIFENESIN 400 MG: 100 SOLUTION ORAL at 00:19

## 2021-02-28 RX ADMIN — CLOTRIMAZOLE 10 MG: 10 LOZENGE ORAL at 11:16

## 2021-02-28 RX ADMIN — HYDROMORPHONE HYDROCHLORIDE 1 MG: 10 INJECTION INTRAMUSCULAR; INTRAVENOUS; SUBCUTANEOUS at 05:30

## 2021-02-28 RX ADMIN — HYDROMORPHONE HYDROCHLORIDE 1 MG: 10 INJECTION INTRAMUSCULAR; INTRAVENOUS; SUBCUTANEOUS at 02:41

## 2021-02-28 RX ADMIN — GUAIFENESIN 400 MG: 100 SOLUTION ORAL at 13:14

## 2021-02-28 RX ADMIN — GABAPENTIN 800 MG: 250 SOLUTION ORAL at 06:14

## 2021-02-28 RX ADMIN — HYDROMORPHONE HYDROCHLORIDE 1 MG: 10 INJECTION INTRAMUSCULAR; INTRAVENOUS; SUBCUTANEOUS at 00:32

## 2021-02-28 RX ADMIN — DOCUSATE SODIUM 100 MG: 50 LIQUID ORAL at 09:16

## 2021-02-28 RX ADMIN — MIDODRINE HYDROCHLORIDE 7.5 MG: 5 TABLET ORAL at 20:22

## 2021-03-01 ENCOUNTER — APPOINTMENT (OUTPATIENT)
Dept: GENERAL RADIOLOGY | Facility: HOSPITAL | Age: 36
End: 2021-03-01

## 2021-03-01 LAB
ABO GROUP BLD: NORMAL
ALBUMIN SERPL-MCNC: 3.4 G/DL (ref 3.5–5.2)
ALBUMIN/GLOB SERPL: 1 G/DL
ALP SERPL-CCNC: 118 U/L (ref 39–117)
ALT SERPL W P-5'-P-CCNC: 16 U/L (ref 1–41)
ANION GAP SERPL CALCULATED.3IONS-SCNC: 8.7 MMOL/L (ref 5–15)
AST SERPL-CCNC: 8 U/L (ref 1–40)
BASOPHILS # BLD AUTO: 0.01 10*3/MM3 (ref 0–0.2)
BASOPHILS NFR BLD AUTO: 0.1 % (ref 0–1.5)
BILIRUB SERPL-MCNC: 1.7 MG/DL (ref 0–1.2)
BLD GP AB SCN SERPL QL: NEGATIVE
BUN SERPL-MCNC: 19 MG/DL (ref 6–20)
BUN/CREAT SERPL: 38.8 (ref 7–25)
CALCIUM SPEC-SCNC: 9.1 MG/DL (ref 8.6–10.5)
CHLORIDE SERPL-SCNC: 97 MMOL/L (ref 98–107)
CO2 SERPL-SCNC: 31.3 MMOL/L (ref 22–29)
CREAT SERPL-MCNC: 0.49 MG/DL (ref 0.76–1.27)
DEPRECATED RDW RBC AUTO: 52.2 FL (ref 37–54)
EOSINOPHIL # BLD AUTO: 0.13 10*3/MM3 (ref 0–0.4)
EOSINOPHIL NFR BLD AUTO: 1.3 % (ref 0.3–6.2)
ERYTHROCYTE [DISTWIDTH] IN BLOOD BY AUTOMATED COUNT: 17.5 % (ref 12.3–15.4)
GFR SERPL CREATININE-BSD FRML MDRD: >150 ML/MIN/1.73
GLOBULIN UR ELPH-MCNC: 3.4 GM/DL
GLUCOSE SERPL-MCNC: 128 MG/DL (ref 65–99)
HCT VFR BLD AUTO: 19.8 % (ref 37.5–51)
HCT VFR BLD AUTO: 23.3 % (ref 37.5–51)
HGB BLD-MCNC: 6.5 G/DL (ref 13–17.7)
HGB BLD-MCNC: 7.7 G/DL (ref 13–17.7)
IMM GRANULOCYTES # BLD AUTO: 0.03 10*3/MM3 (ref 0–0.05)
IMM GRANULOCYTES NFR BLD AUTO: 0.3 % (ref 0–0.5)
LYMPHOCYTES # BLD AUTO: 1 10*3/MM3 (ref 0.7–3.1)
LYMPHOCYTES NFR BLD AUTO: 9.9 % (ref 19.6–45.3)
MCH RBC QN AUTO: 26.7 PG (ref 26.6–33)
MCHC RBC AUTO-ENTMCNC: 32.8 G/DL (ref 31.5–35.7)
MCV RBC AUTO: 81.5 FL (ref 79–97)
MONOCYTES # BLD AUTO: 0.64 10*3/MM3 (ref 0.1–0.9)
MONOCYTES NFR BLD AUTO: 6.3 % (ref 5–12)
NEUTROPHILS NFR BLD AUTO: 8.3 10*3/MM3 (ref 1.7–7)
NEUTROPHILS NFR BLD AUTO: 82.1 % (ref 42.7–76)
NRBC BLD AUTO-RTO: 0 /100 WBC (ref 0–0.2)
NT-PROBNP SERPL-MCNC: 1083 PG/ML (ref 0–450)
PLATELET # BLD AUTO: 266 10*3/MM3 (ref 140–450)
PMV BLD AUTO: 10.5 FL (ref 6–12)
POTASSIUM SERPL-SCNC: 3.8 MMOL/L (ref 3.5–5.2)
PROCALCITONIN SERPL-MCNC: 0.13 NG/ML (ref 0–0.25)
PROT SERPL-MCNC: 6.8 G/DL (ref 6–8.5)
RBC # BLD AUTO: 2.43 10*6/MM3 (ref 4.14–5.8)
RH BLD: POSITIVE
SODIUM SERPL-SCNC: 137 MMOL/L (ref 136–145)
T&S EXPIRATION DATE: NORMAL
WBC # BLD AUTO: 10.11 10*3/MM3 (ref 3.4–10.8)

## 2021-03-01 PROCEDURE — 36430 TRANSFUSION BLD/BLD COMPNT: CPT

## 2021-03-01 PROCEDURE — P9016 RBC LEUKOCYTES REDUCED: HCPCS

## 2021-03-01 PROCEDURE — 83880 ASSAY OF NATRIURETIC PEPTIDE: CPT | Performed by: INTERNAL MEDICINE

## 2021-03-01 PROCEDURE — 25010000002 HYDROMORPHONE 1 MG/ML SOLUTION: Performed by: INTERNAL MEDICINE

## 2021-03-01 PROCEDURE — 86901 BLOOD TYPING SEROLOGIC RH(D): CPT | Performed by: INTERNAL MEDICINE

## 2021-03-01 PROCEDURE — 86923 COMPATIBILITY TEST ELECTRIC: CPT

## 2021-03-01 PROCEDURE — 84145 PROCALCITONIN (PCT): CPT | Performed by: INTERNAL MEDICINE

## 2021-03-01 PROCEDURE — 94799 UNLISTED PULMONARY SVC/PX: CPT

## 2021-03-01 PROCEDURE — 80053 COMPREHEN METABOLIC PANEL: CPT | Performed by: INTERNAL MEDICINE

## 2021-03-01 PROCEDURE — 86850 RBC ANTIBODY SCREEN: CPT | Performed by: INTERNAL MEDICINE

## 2021-03-01 PROCEDURE — 71045 X-RAY EXAM CHEST 1 VIEW: CPT

## 2021-03-01 PROCEDURE — 85018 HEMOGLOBIN: CPT | Performed by: INTERNAL MEDICINE

## 2021-03-01 PROCEDURE — 85025 COMPLETE CBC W/AUTO DIFF WBC: CPT | Performed by: INTERNAL MEDICINE

## 2021-03-01 PROCEDURE — 86900 BLOOD TYPING SEROLOGIC ABO: CPT | Performed by: INTERNAL MEDICINE

## 2021-03-01 PROCEDURE — 85014 HEMATOCRIT: CPT | Performed by: INTERNAL MEDICINE

## 2021-03-01 PROCEDURE — 86900 BLOOD TYPING SEROLOGIC ABO: CPT

## 2021-03-01 RX ADMIN — SODIUM CHLORIDE, PRESERVATIVE FREE 10 ML: 5 INJECTION INTRAVENOUS at 21:51

## 2021-03-01 RX ADMIN — SERTRALINE 100 MG: 100 TABLET, FILM COATED ORAL at 09:42

## 2021-03-01 RX ADMIN — Medication 1 CAPSULE: at 09:42

## 2021-03-01 RX ADMIN — GUAIFENESIN 400 MG: 100 SOLUTION ORAL at 23:57

## 2021-03-01 RX ADMIN — HYDROMORPHONE HYDROCHLORIDE 1 MG: 10 INJECTION INTRAMUSCULAR; INTRAVENOUS; SUBCUTANEOUS at 03:06

## 2021-03-01 RX ADMIN — CLOTRIMAZOLE 10 MG: 10 LOZENGE ORAL at 14:21

## 2021-03-01 RX ADMIN — OXYCODONE HYDROCHLORIDE 10 MG: 5 SOLUTION ORAL at 11:20

## 2021-03-01 RX ADMIN — IPRATROPIUM BROMIDE AND ALBUTEROL SULFATE 3 ML: 2.5; .5 SOLUTION RESPIRATORY (INHALATION) at 01:46

## 2021-03-01 RX ADMIN — HYDROMORPHONE HYDROCHLORIDE 1 MG: 10 INJECTION INTRAMUSCULAR; INTRAVENOUS; SUBCUTANEOUS at 09:38

## 2021-03-01 RX ADMIN — SENNOSIDES 1 TABLET: 8.6 TABLET, FILM COATED ORAL at 09:42

## 2021-03-01 RX ADMIN — POLYETHYLENE GLYCOL 3350 17 G: 17 POWDER, FOR SOLUTION ORAL at 09:41

## 2021-03-01 RX ADMIN — GABAPENTIN 800 MG: 250 SOLUTION ORAL at 21:50

## 2021-03-01 RX ADMIN — GABAPENTIN 800 MG: 250 SOLUTION ORAL at 07:17

## 2021-03-01 RX ADMIN — IPRATROPIUM BROMIDE AND ALBUTEROL SULFATE 3 ML: 2.5; .5 SOLUTION RESPIRATORY (INHALATION) at 09:01

## 2021-03-01 RX ADMIN — HYDROMORPHONE HYDROCHLORIDE 1 MG: 10 INJECTION INTRAMUSCULAR; INTRAVENOUS; SUBCUTANEOUS at 07:19

## 2021-03-01 RX ADMIN — GUAIFENESIN 400 MG: 100 SOLUTION ORAL at 07:18

## 2021-03-01 RX ADMIN — CYCLOBENZAPRINE 10 MG: 10 TABLET, FILM COATED ORAL at 09:32

## 2021-03-01 RX ADMIN — SENNOSIDES 1 TABLET: 8.6 TABLET, FILM COATED ORAL at 21:51

## 2021-03-01 RX ADMIN — HYDROMORPHONE HYDROCHLORIDE 1 MG: 10 INJECTION INTRAMUSCULAR; INTRAVENOUS; SUBCUTANEOUS at 18:19

## 2021-03-01 RX ADMIN — DOCUSATE SODIUM 100 MG: 50 LIQUID ORAL at 09:32

## 2021-03-01 RX ADMIN — MIDODRINE HYDROCHLORIDE 7.5 MG: 5 TABLET ORAL at 19:06

## 2021-03-01 RX ADMIN — MIDODRINE HYDROCHLORIDE 7.5 MG: 5 TABLET ORAL at 07:19

## 2021-03-01 RX ADMIN — MIDODRINE HYDROCHLORIDE 7.5 MG: 5 TABLET ORAL at 11:21

## 2021-03-01 RX ADMIN — OXYCODONE HYDROCHLORIDE AND ACETAMINOPHEN 1000 MG: 500 TABLET ORAL at 09:32

## 2021-03-01 RX ADMIN — HYDROMORPHONE HYDROCHLORIDE 1 MG: 10 INJECTION INTRAMUSCULAR; INTRAVENOUS; SUBCUTANEOUS at 14:21

## 2021-03-01 RX ADMIN — GUAIFENESIN 400 MG: 100 SOLUTION ORAL at 11:20

## 2021-03-01 RX ADMIN — ALPRAZOLAM 0.5 MG: 0.5 TABLET ORAL at 11:20

## 2021-03-01 RX ADMIN — CLOTRIMAZOLE 10 MG: 10 LOZENGE ORAL at 11:20

## 2021-03-01 RX ADMIN — GUAIFENESIN 400 MG: 100 SOLUTION ORAL at 18:19

## 2021-03-01 RX ADMIN — HYDROXYZINE HYDROCHLORIDE 25 MG: 25 TABLET ORAL at 09:42

## 2021-03-01 RX ADMIN — Medication 1 TABLET: at 09:44

## 2021-03-01 RX ADMIN — GABAPENTIN 800 MG: 250 SOLUTION ORAL at 14:21

## 2021-03-01 RX ADMIN — LANSOPRAZOLE 30 MG: KIT at 09:49

## 2021-03-01 RX ADMIN — HYDROMORPHONE HYDROCHLORIDE 1 MG: 10 INJECTION INTRAMUSCULAR; INTRAVENOUS; SUBCUTANEOUS at 21:50

## 2021-03-01 RX ADMIN — OXYCODONE HYDROCHLORIDE 10 MG: 5 SOLUTION ORAL at 19:07

## 2021-03-01 RX ADMIN — CLOTRIMAZOLE 10 MG: 10 LOZENGE ORAL at 21:51

## 2021-03-01 RX ADMIN — SODIUM CHLORIDE, PRESERVATIVE FREE 10 ML: 5 INJECTION INTRAVENOUS at 09:45

## 2021-03-01 RX ADMIN — CLOTRIMAZOLE 10 MG: 10 LOZENGE ORAL at 18:19

## 2021-03-01 NOTE — PLAN OF CARE
Goal Outcome Evaluation:  Plan of Care Reviewed With: patient     Outcome Summary: pt vss and afebrile during shift.  pt refused turn by assistance due to concern w/being on Right side.  Talked with patient about importance of turning due to his stage 2 sound and that goal of wound prevention, healing, and further deteriation of his wound that could lead to a possible infection and death.  Pt and I discussed an alternative.  As a result, we agreed he would turn to the left side and when on his back he will have a waffle cushion under his bottom.  Pt had a specialty bed ordered.  PT suctioned x3 on shift.  pt had a chest xray and tolerated movement.  pt safety maintained and continued to monitor.  PT received 1 unit of blood w/o incident.  Follow up Hgb...

## 2021-03-01 NOTE — PLAN OF CARE
Goal Outcome Evaluation:  Plan of Care Reviewed With: patient     Outcome Summary: VSS; transfer from ICU; pt somewhat anxious; respiratory therapy adjusted oxygen for trach; pt calmed down afterwards and oxygen went up to the high 90s  Problem: Adult Inpatient Plan of Care  Goal: Plan of Care Review  Outcome: Ongoing, Progressing  Flowsheets  Taken 2/28/2021 2011 by Holly Zhu, RN  Plan of Care Reviewed With: patient  Outcome Summary:   VSS   transfer from ICU   pt somewhat anxious   respiratory therapy adjusted oxygen for trach   pt calmed down afterwards and oxygen went up to the high 90s  Taken 2/28/2021 0637 by Carlos Joe, RN  Progress: no change

## 2021-03-01 NOTE — DISCHARGE PLACEMENT REQUEST
"Maxim Hardin (35 y.o. Male)     Date of Birth Social Security Number Address Home Phone MRN    1985  [facility]  [address] [facility], [place] [address] [phone] [id number]    [employer] Marital Status          None Single       Admission Date Admission Type Admitting Provider Attending Provider Department, Room/Bed    2/27/21 Emergency Alfredo Coelho MD Reddy, Rahul Kandada, MD [address], [address]1    Discharge Date Discharge Disposition Discharge Destination                       Attending Provider: Alfredo Coelho MD    Allergies: Lisinopril    Isolation: Contact   Infection: MRSA (02/02/21)   Code Status: No CPR    Ht: 177.8 cm (70\")   Wt: 71.4 kg (157 lb 6.5 oz)    Admission Cmt: None   Principal Problem: Acute on chronic respiratory failure with hypoxemia (CMS/HCC) [J96.21]                 Active Insurance as of 2/27/2021     Primary Coverage     Payor Plan Insurance Group Employer/Plan Group    [place] [id number]     Payor Plan Address Payor Plan Phone Number Payor Plan Fax Number Effective Dates    [employer] [address] [address] [phone]  4/1/2020 - None Entered    [facility] [id number]       Subscriber Name Subscriber Birth Date Member ID       MAXIM HARDIN 1985 [id number]           Secondary Coverage     Payor Plan Insurance Group Employer/Plan Group    [place]      Payor Plan Address Payor Plan Phone Number Payor Plan Fax Number Effective Dates    [address] [phone]  2/27/2021 - None Entered    [facility] [id number]       Subscriber Name Subscriber Birth Date Member ID       MAXIM HARDIN 1985 [id number]                 Emergency Contacts      (Rel.) Home Phone Work Phone Mobile Phone    Kinjal Hardin (Father) -- -- [phone]    Shaista Hardin (Mother) [phone] -- [phone]              "

## 2021-03-01 NOTE — PLAN OF CARE
Goal Outcome Evaluation:  Plan of Care Reviewed With: patient     Outcome Summary: patient  frequently called for pain medication. Refused turning, Patient wanted to be suctioned repeatedly maintain safety and continue to monitor.

## 2021-03-01 NOTE — PROGRESS NOTES
Continued Stay Note  [facility]     Patient Name: Maxim Carvajal  MRN: [id number]  Today's Date: 3/1/2021    Admit Date: 2/27/2021    Discharge Plan     Row Name 03/01/21 1251       Plan    Plan  [facility] and [facility] referrals pending    Patient/Family in Agreement with Plan  yes    Plan Comments  CCP spoke with patient’s mother who is adamant that she wants patient to go to [facility] or [facility] as she does not think patient is getting the care that he needed at [facility]. CCP reviewed Margarette Garcia, CCP’s note from 2/17 that [facility], [facility] and [facility] have declined. Placed referral in [facility] basket again and made calls to [facility] and [facility]. Per [facility], as patient’s family resides in a second floor apartment, they are unable to accept as patient cannot return home. Spoke with [facility] who will review and call CCP back. Spoke with Ivon/Louis who noted how much oxygen patient is currently on and said that he could return to [facility] if down to 6 L o2 or possibly [facility] as they can take 10 liters. Spoke with patient’s mother, Shaista ([phone]) who would prefer [facility] (CCP notified Shaista they are unable to accept) and [facility] to review. Shaista agreeable to [facility] referral as well. [facility] to finish working patient admission criteria and states if they are able to accept, they should have a bed by tomorrow. Plan is either [facility] (pending acceptance and bed availability) or [facility] (pending acceptance). EMS to transport. Katie Young RN/CCP        Discharge Codes    No documentation.             Felisa Young

## 2021-03-01 NOTE — PROGRESS NOTES
Name: Maxim Carvajal ADMIT: 2021   : 1985  PCP: Sheron Perez MD    MRN: [id number] LOS: 2 days   AGE/SEX: 35 y.o. male  ROOM: [facility]     Subjective   Subjective   Patient is lying in the bed and appears weak and lethargic. He not in any major respiratory distress. Answering questions fairly appropriately. Denies nausea, vomiting, abdominal pain, chest pain.      Objective   Objective   Vital Signs  Temp:  [97.9 °F (36.6 °C)-99.6 °F (37.6 °C)] 98.4 °F (36.9 °C)  Heart Rate:  [81-96] 90  Resp:  [18-24] 18  BP: ()/() 187/105  SpO2:  [91 %-100 %] 100 %  on  Flow (L/min):  [6-12] 11;   Device (Oxygen Therapy): humidified;T - piece  Body mass index is 22.59 kg/m².  Physical Exam  HEENT: PERRLA, extraocular movements intact, sclerae no icterus  Neck: Supple, no JVD, does have a tracheostomy in place  Respiratory: Diminished breath sounds with mild rhonchi  Cardiovascular: Regular rate and rhythm with normal S1 and S2  GI: Soft, nontender, bowel sounds present, PEG tube in place  Extremities: No edema, atrophic muscles noted, palpable pedal pulses    Results Review     I reviewed the patient's new clinical results.  Results from last 7 days   Lab Units 21  0258   WBC 10*3/mm3 10.11 9.86 14.90* 10.98*   HEMOGLOBIN g/dL 6.5* 7.4* 7.9* 8.3*   PLATELETS 10*3/mm3 266 305 375 331     Results from last 7 days   Lab Units 21  0736 21  0621  0258   SODIUM mmol/L 137 136 134* 133*   POTASSIUM mmol/L 3.8 3.4* 4.1 3.8   CHLORIDE mmol/L 97* 95* 92* 92*   CO2 mmol/L 31.3* 28.4 29.3* 31.0*   BUN mg/dL 19 23* 27* 28*   CREATININE mg/dL 0.49* 0.64* 0.60* 0.68*   GLUCOSE mg/dL 128* 114* 100* 107*   Estimated Creatinine Clearance: 208.5 mL/min (A) (by C-G formula based on SCr of 0.49 mg/dL (L)).  Results from last 7 days   Lab Units 21  0626 21  0736 21  0258   ALBUMIN g/dL 3.40* 3.60 3.80   BILIRUBIN  mg/dL 1.7* 2.4* 2.4*   ALK PHOS U/L 118* 132* 136*   AST (SGOT) U/L 8 14 13   ALT (SGPT) U/L 16 19 25     Results from last 7 days   Lab Units 03/01/21  0626 02/28/21  0736 02/27/21  0602 02/27/21  0258   CALCIUM mg/dL 9.1 8.9 9.2 9.6   ALBUMIN g/dL 3.40* 3.60  --  3.80   MAGNESIUM mg/dL  --   --  2.1  --    PHOSPHORUS mg/dL  --   --  4.9*  --      Results from last 7 days   Lab Units 03/01/21  0626 02/28/21  0736 02/27/21  0258   PROCALCITONIN ng/mL 0.13 0.10 0.11   LACTATE mmol/L  --   --  1.1     COVID19   Date Value Ref Range Status   02/27/2021 Not Detected Not Detected - Ref. Range Final   02/16/2021 Not Detected Not Detected - Ref. Range Final     Glucose   Date/Time Value Ref Range Status   02/28/2021 1109 126 70 - 130 mg/dL Final   02/28/2021 0557 113 70 - 130 mg/dL Final   02/27/2021 0922 110 70 - 130 mg/dL Final       XR Chest 1 View  Narrative: CHEST SINGLE VIEW     HISTORY: Mucous plugging. History of hypertension.     COMPARISON: AP chest 2/27/2021, 2/12/2021, CT chest 2/10/2021     FINDINGS: Tracheostomy tube is present. Moderate right and small left  pleural effusions are present. Right pleural fluid extends partially  into the major fissure. There is right lower lobe consolidation or  infiltrate. Left lung is comparatively clear there is no perihilar edema  or pneumothorax.     Impression: Moderate right and small left pleural effusions.  Right  pleural effusion extends partially into the major fissure. There is  dense right basilar opacity/consolidation and potential infiltrate. No  evidence for perihilar edema or pneumothorax. Tracheostomy is present.     This report was finalized on 2/28/2021 2:20 PM by Dr. Taran Killian M.D.       Scheduled Medications  ascorbic acid, 1,000 mg, Per G Tube, Daily  clotrimazole, 10 mg, Oral, 5x Daily  cyclobenzaprine, 10 mg, Per G Tube, Daily  docusate sodium, 100 mg, Per G Tube, BID  fentaNYL, 1 patch, Transdermal, Q72H  Gabapentin, 800 mg, Per G Tube,  Q8H  guaifenesin, 400 mg, Per G Tube, Q6H  hydrOXYzine, 25 mg, Oral, Daily  lansoprazole, 30 mg, Per G Tube, QAM  midodrine, 7.5 mg, Per G Tube, Q8H  multivitamin with minerals, 1 tablet, Oral, Daily  polyethylene glycol, 17 g, Oral, Daily  senna, 1 tablet, Per G Tube, BID  sertraline, 100 mg, Per G Tube, Daily  sodium chloride, 10 mL, Intravenous, Q12H    Infusions  sodium chloride, 100 mL/hr    Diet  JEVITY 1.5 JUSTO PO LIQD  NPO Diet       Assessment/Plan     Active Hospital Problems    Diagnosis  POA   • **Acute on chronic respiratory failure with hypoxemia (CMS/HCC) [J96.21]  Yes   • Hypotension [I95.9]  Yes   • Dyspnea [R06.00]  Yes   • Anemia [D64.9]  Yes   • Leukocytosis [D72.829]  Yes   • Quadriplegia (CMS/HCC) [G82.50]  Yes   • HCAP (healthcare-associated pneumonia) [J18.9]  Yes      Resolved Hospital Problems   No resolved problems to display.       1.Acute on chronic respiratory failure with hypoxemia secondary to mucous plugging and difficulty with secretion clearance. 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 Continue with aggressive pulmonary toilet.  2. Anemia, he is being transfused with 1 unit of PRBC and there is no evidence of any active bleeding and iron panel will be checked.  3. Quadriplegia, continue to monitor for pressure ulcers.  4. Hypertension, continue to monitor closely is currently asymptomatic.    Alfredo Coelho MD  [facility]  03/01/21  13:44 EST

## 2021-03-01 NOTE — PROGRESS NOTES
PROGRESS NOTE  Patient Name: Maxim Carvajal  Age/Sex: 35 y.o. male  : 1985  MRN: [id number]    Date of Admission: 2021  Date of Encounter Visit: 21   LOS: 2 days   Patient Care Team:  Sheron Perez MD as PCP - General (Family Medicine)    Chief Complaint: Acute respiratory failure, status post recent injury with quadriplegia, MRSA/Pseudomonas colonization, and anemia      Hospital course: Patient is having more shortness of breath today, he is on 70% FiO2 through his trach T-piece, he also had transfusion this morning because his hemoglobin was down to 6.5.  He had a bowel movement yesterday that was reported normal he denies any abdominal pain or nausea or vomiting or history of GI bleed.  He denies any productive cough any thick secretion he does not have any sounds of retained secretion on exam.  He is still awake and alert and responsive and able to answer questions.  Able to talk with the T-piece even without the Passy-Patricia valve but he can only talk in short sentences         REVIEW OF SYSTEMS:   CONSTITUTIONAL: no fever or chills  CARDIOVASCULAR: No chest pain, chest pressure or chest discomfort. No palpitations or edema.   RESPIRATORY: No shortness of breath.   GASTROINTESTINAL: No anorexia, nausea, vomiting or diarrhea. No abdominal pain or blood.   HEMATOLOGIC: No bleeding or bruising.  However patient has anemia requiring transfusion    Ventilator/Non-Invasive Ventilation Settings (From admission, onward)    T-piece on 70% FiO2            Vital Signs  Temp:  [97.9 °F (36.6 °C)-99.6 °F (37.6 °C)] 99.1 °F (37.3 °C)  Heart Rate:  [81-96] 86  Resp:  [18-24] 20  BP: ()/() 145/77  SpO2:  [91 %-100 %] 94 %  on  Flow (L/min):  [6-12] 11 Device (Oxygen Therapy): humidified;T - piece    Intake/Output Summary (Last 24 hours) at 3/1/2021 1521  Last data filed at 3/1/2021 1350  Gross per 24 hour   Intake 420 ml   Output 0 ml   Net 420 ml     Flowsheet Rows      First Filed Value  "  Admission Height  177.8 cm (70\") Documented at 02/27/2021 0346   Admission Weight  71.4 kg (157 lb 6.5 oz) Documented at 02/27/2021 0346        Body mass index is 22.59 kg/m².      02/27/21  0346   Weight: 71.4 kg (157 lb 6.5 oz)       Physical Exam:  GEN:  No acute distress, alert, cooperative, well developed   EYES:   Sclerae clear. No icterus. PERRL. Normal EOM  ENT:   External ears/nose normal, no oral lesions, no thrush, mucous membranes moist  NECK:  Supple, midline trachea, no JVD, trach site is clean patient is on T-piece  LUNGS: Normal chest on inspection, CTAB but diminished, no wheezes. No rhonchi. No crackles. Respirations regular, even and unlabored.  Able to talk with a T-piece without a Passy-Flemington valve  CV:  Regular rhythm and rate. Normal S1/S2. No murmurs, gallops, or rubs noted.  ABD:  Soft, nontender and nondistended. Normal bowel sounds. No guarding  EXT:  Moves all extremities well. No cyanosis. No redness. No edema.   Skin: Dry, intact, no bleeding    Results Review:    Results From Last 14 Days   Lab Units 02/27/21  0258   LACTATE mmol/L 1.1     Results from last 7 days   Lab Units 03/01/21  0626 02/28/21  0736 02/27/21  0602 02/27/21  0258   SODIUM mmol/L 137 136 134* 133*   POTASSIUM mmol/L 3.8 3.4* 4.1 3.8   CHLORIDE mmol/L 97* 95* 92* 92*   CO2 mmol/L 31.3* 28.4 29.3* 31.0*   BUN mg/dL 19 23* 27* 28*   CREATININE mg/dL 0.49* 0.64* 0.60* 0.68*   CALCIUM mg/dL 9.1 8.9 9.2 9.6   AST (SGOT) U/L 8 14  --  13   ALT (SGPT) U/L 16 19  --  25   ANION GAP mmol/L 8.7 12.6 12.7 10.0   ALBUMIN g/dL 3.40* 3.60  --  3.80     Results from last 7 days   Lab Units 02/27/21  0258   TROPONIN T ng/mL <0.010         Results from last 7 days   Lab Units 03/01/21  0626 02/28/21  0736   PROBNP pg/mL 1,083.0* 517.8*     Results from last 7 days   Lab Units 03/01/21  0626 02/28/21  0736 02/27/21  0602 02/27/21  0258 02/23/21  0658   WBC 10*3/mm3 10.11 9.86 14.90* 10.98* 6.06   HEMOGLOBIN g/dL 6.5* 7.4* 7.9* 8.3* " 7.3*   HEMATOCRIT % 19.8* 22.4* 24.7* 25.8* 22.6*   PLATELETS 10*3/mm3 266 305 375 331 287   MCV fL 81.5 81.5 81.3 81.6 81.0   NEUTROPHIL % % 82.1* 77.1* 85.2* 77.1* 65.8   LYMPHOCYTE % % 9.9* 12.0* 8.1* 11.0* 20.0   MONOCYTES % % 6.3 7.4 5.2 8.4 8.1   EOSINOPHIL % % 1.3 3.0 0.7 3.0 5.6   BASOPHIL % % 0.1 0.1 0.1 0.1 0.2   IMM GRAN % % 0.3 0.4 0.7* 0.4 0.3     Results from last 7 days   Lab Units 02/27/21  0602   INR  1.15*     Results from last 7 days   Lab Units 02/27/21  0602   MAGNESIUM mg/dL 2.1           Invalid input(s): LDLCALC  Results from last 7 days   Lab Units 02/27/21  0312   PH, ARTERIAL pH units 7.432   PCO2, ARTERIAL mm Hg 45.9*   PO2 ART mm Hg 69.7*   HCO3 ART mmol/L 30.6*         Glucose   Date/Time Value Ref Range Status   02/28/2021 1109 126 70 - 130 mg/dL Final   02/28/2021 0557 113 70 - 130 mg/dL Final   02/27/2021 0922 110 70 - 130 mg/dL Final     Results from last 7 days   Lab Units 03/01/21  0626 02/28/21  0736 02/27/21  0258   PROCALCITONIN ng/mL 0.13 0.10 0.11   LACTATE mmol/L  --   --  1.1     Results from last 7 days   Lab Units 02/27/21  1638 02/27/21  0340 02/27/21  0330 02/27/21  0259   BLOODCX   --  No growth at 2 days  --  No growth at 2 days   RESPCX  Light growth (2+) Pseudomonas aeruginosa*  No Normal Respiratory Lisa*  --  Scant growth (1+) Pseudomonas aeruginosa*  --          Results from last 7 days   Lab Units 02/27/21  0236   COVID19  Not Detected   ADENOVIRUS DETECTION BY PCR  Not Detected   CORONAVIRUS 229E  Not Detected   CORONAVIRUS HKU1  Not Detected   CORONAVIRUS NL63  Not Detected   CORONAVIRUS OC43  Not Detected   HUMAN METAPNEUMOVIRUS  Not Detected   HUMAN RHINOVIRUS/ENTEROVIRUS  Not Detected   INFLUENZA B PCR  Not Detected   PARAINFLUENZA 1  Not Detected   PARAINFLUENZA VIRUS 2  Not Detected   PARAINFLUENZA VIRUS 3  Not Detected   PARAINFLUENZA VIRUS 4  Not Detected   BORDETELLA PERTUSSIS PCR  Not Detected   BORDETELLA PARAPERTUSSIS PCR  Not Detected    CHLAMYDOPHILA PNEUMONIAE PCR  Not Detected   MYCOPLAMA PNEUMO PCR  Not Detected   RSV, PCR  Not Detected               Imaging:   Imaging Results (All)     Procedure Component Value Units Date/Time       I reviewed the patient's new clinical results.  I personally viewed and interpreted the patient's imaging results:  Recent KUB shows some haziness over the right lower lobe that might suggest some atelectasis versus mucous plugging and we need to follow-up with another chest x-ray tomorrow      Medication Review:   ascorbic acid, 1,000 mg, Per G Tube, Daily  clotrimazole, 10 mg, Oral, 5x Daily  cyclobenzaprine, 10 mg, Per G Tube, Daily  docusate sodium, 100 mg, Per G Tube, BID  fentaNYL, 1 patch, Transdermal, Q72H  Gabapentin, 800 mg, Per G Tube, Q8H  guaifenesin, 400 mg, Per G Tube, Q6H  hydrOXYzine, 25 mg, Oral, Daily  lansoprazole, 30 mg, Per G Tube, QAM  midodrine, 7.5 mg, Per G Tube, Q8H  multivitamin with minerals, 1 tablet, Oral, Daily  polyethylene glycol, 17 g, Oral, Daily  senna, 1 tablet, Per G Tube, BID  sertraline, 100 mg, Per G Tube, Daily  sodium chloride, 10 mL, Intravenous, Q12H        sodium chloride, 100 mL/hr        ASSESSMENT:   1. Acute hypoxic respiratory failure  2. Mucous plugging of the airways  3. History of Pseudomonas colonization of the airways  4. History of MRSA pneumonia  5. Quadriplegia  6. Chronic tracheostomy  7. Chronic respiratory failure due to quadriplegia  8. Anemia status post transfusion    PLAN:  Patient is not on any antibiotics at this point, his MRSA and Pseudomonas are felt to be colonization, was transferred out of the ICU yesterday  Given the finding on the KUB, even though this is not the best way to assess for lung issue, but mucous plugging on the right lower lobe is suspected and needs to be followed with a dedicated portable chest x-ray, especially that his oxygen requirement has significantly improved.  Meanwhile we will continue to monitor off antibiotic,  continue with the pulmonary hygiene measures, his hemoglobin has dropped gradually and is down to 6.5, transfusion was provided, will keep following and consider further recommendation depending on the source of the bleeding.  He denies any history of GI bleed, he did have a bowel movement yesterday  that was reported normal  Discussed with patient    Disposition: Depending on hospital course    Meg Bynum MD  03/01/21  15:21 EST           Dictated utilizing Dragon dictation

## 2021-03-02 ENCOUNTER — APPOINTMENT (OUTPATIENT)
Dept: CT IMAGING | Facility: HOSPITAL | Age: 36
End: 2021-03-02

## 2021-03-02 LAB
BACTERIA SPEC RESP CULT: ABNORMAL
BH BB BLOOD EXPIRATION DATE: NORMAL
BH BB BLOOD TYPE BARCODE: 5100
BH BB DISPENSE STATUS: NORMAL
BH BB PRODUCT CODE: NORMAL
BH BB UNIT NUMBER: NORMAL
CROSSMATCH INTERPRETATION: NORMAL
DEPRECATED RDW RBC AUTO: 53.4 FL (ref 37–54)
ERYTHROCYTE [DISTWIDTH] IN BLOOD BY AUTOMATED COUNT: 17.3 % (ref 12.3–15.4)
GRAM STN SPEC: ABNORMAL
HCT VFR BLD AUTO: 24.3 % (ref 37.5–51)
HGB BLD-MCNC: 8 G/DL (ref 13–17.7)
IRON 24H UR-MRATE: 25 MCG/DL (ref 59–158)
IRON SATN MFR SERPL: 12 % (ref 20–50)
MCH RBC QN AUTO: 27.8 PG (ref 26.6–33)
MCHC RBC AUTO-ENTMCNC: 32.9 G/DL (ref 31.5–35.7)
MCV RBC AUTO: 84.4 FL (ref 79–97)
PLATELET # BLD AUTO: 257 10*3/MM3 (ref 140–450)
PMV BLD AUTO: 10.5 FL (ref 6–12)
RBC # BLD AUTO: 2.88 10*6/MM3 (ref 4.14–5.8)
TIBC SERPL-MCNC: 201 MCG/DL (ref 298–536)
TRANSFERRIN SERPL-MCNC: 135 MG/DL (ref 200–360)
UNIT  ABO: NORMAL
UNIT  RH: NORMAL
WBC # BLD AUTO: 12.79 10*3/MM3 (ref 3.4–10.8)

## 2021-03-02 PROCEDURE — 85027 COMPLETE CBC AUTOMATED: CPT | Performed by: INTERNAL MEDICINE

## 2021-03-02 PROCEDURE — 94799 UNLISTED PULMONARY SVC/PX: CPT

## 2021-03-02 PROCEDURE — 83540 ASSAY OF IRON: CPT | Performed by: INTERNAL MEDICINE

## 2021-03-02 PROCEDURE — 84466 ASSAY OF TRANSFERRIN: CPT | Performed by: INTERNAL MEDICINE

## 2021-03-02 PROCEDURE — 25010000002 HYDROMORPHONE 1 MG/ML SOLUTION: Performed by: INTERNAL MEDICINE

## 2021-03-02 PROCEDURE — 71250 CT THORAX DX C-: CPT

## 2021-03-02 RX ADMIN — GUAIFENESIN 400 MG: 100 SOLUTION ORAL at 06:01

## 2021-03-02 RX ADMIN — IPRATROPIUM BROMIDE AND ALBUTEROL SULFATE 3 ML: 2.5; .5 SOLUTION RESPIRATORY (INHALATION) at 07:11

## 2021-03-02 RX ADMIN — MIDODRINE HYDROCHLORIDE 7.5 MG: 5 TABLET ORAL at 13:10

## 2021-03-02 RX ADMIN — POLYETHYLENE GLYCOL 3350 17 G: 17 POWDER, FOR SOLUTION ORAL at 10:06

## 2021-03-02 RX ADMIN — GABAPENTIN 800 MG: 250 SOLUTION ORAL at 23:08

## 2021-03-02 RX ADMIN — HYDROMORPHONE HYDROCHLORIDE 1 MG: 10 INJECTION INTRAMUSCULAR; INTRAVENOUS; SUBCUTANEOUS at 23:10

## 2021-03-02 RX ADMIN — OXYCODONE HYDROCHLORIDE 10 MG: 5 SOLUTION ORAL at 00:02

## 2021-03-02 RX ADMIN — GUAIFENESIN 400 MG: 100 SOLUTION ORAL at 13:11

## 2021-03-02 RX ADMIN — HYDROMORPHONE HYDROCHLORIDE 1 MG: 10 INJECTION INTRAMUSCULAR; INTRAVENOUS; SUBCUTANEOUS at 17:52

## 2021-03-02 RX ADMIN — Medication 1 TABLET: at 10:06

## 2021-03-02 RX ADMIN — CLOTRIMAZOLE 10 MG: 10 LOZENGE ORAL at 05:59

## 2021-03-02 RX ADMIN — HYDROMORPHONE HYDROCHLORIDE 1 MG: 10 INJECTION INTRAMUSCULAR; INTRAVENOUS; SUBCUTANEOUS at 02:40

## 2021-03-02 RX ADMIN — FENTANYL 1 PATCH: 25 PATCH TRANSDERMAL at 13:13

## 2021-03-02 RX ADMIN — ALPRAZOLAM 0.5 MG: 0.5 TABLET ORAL at 16:47

## 2021-03-02 RX ADMIN — IPRATROPIUM BROMIDE AND ALBUTEROL SULFATE 3 ML: 2.5; .5 SOLUTION RESPIRATORY (INHALATION) at 15:12

## 2021-03-02 RX ADMIN — HYDROMORPHONE HYDROCHLORIDE 1 MG: 10 INJECTION INTRAMUSCULAR; INTRAVENOUS; SUBCUTANEOUS at 10:07

## 2021-03-02 RX ADMIN — CYCLOBENZAPRINE 10 MG: 10 TABLET, FILM COATED ORAL at 10:06

## 2021-03-02 RX ADMIN — HYDROMORPHONE HYDROCHLORIDE 1 MG: 10 INJECTION INTRAMUSCULAR; INTRAVENOUS; SUBCUTANEOUS at 04:59

## 2021-03-02 RX ADMIN — GABAPENTIN 800 MG: 250 SOLUTION ORAL at 05:59

## 2021-03-02 RX ADMIN — GABAPENTIN 800 MG: 250 SOLUTION ORAL at 13:11

## 2021-03-02 RX ADMIN — SENNOSIDES 1 TABLET: 8.6 TABLET, FILM COATED ORAL at 10:06

## 2021-03-02 RX ADMIN — GUAIFENESIN 400 MG: 100 SOLUTION ORAL at 17:52

## 2021-03-02 RX ADMIN — LANSOPRAZOLE 30 MG: KIT at 06:00

## 2021-03-02 RX ADMIN — SERTRALINE 100 MG: 100 TABLET, FILM COATED ORAL at 10:06

## 2021-03-02 RX ADMIN — DOCUSATE SODIUM 100 MG: 50 LIQUID ORAL at 10:06

## 2021-03-02 RX ADMIN — SODIUM CHLORIDE, PRESERVATIVE FREE 10 ML: 5 INJECTION INTRAVENOUS at 21:05

## 2021-03-02 RX ADMIN — HYDROMORPHONE HYDROCHLORIDE 1 MG: 10 INJECTION INTRAMUSCULAR; INTRAVENOUS; SUBCUTANEOUS at 20:56

## 2021-03-02 RX ADMIN — HYDROMORPHONE HYDROCHLORIDE 1 MG: 10 INJECTION INTRAMUSCULAR; INTRAVENOUS; SUBCUTANEOUS at 15:45

## 2021-03-02 RX ADMIN — MIDODRINE HYDROCHLORIDE 7.5 MG: 5 TABLET ORAL at 18:07

## 2021-03-02 RX ADMIN — OXYCODONE HYDROCHLORIDE AND ACETAMINOPHEN 1000 MG: 500 TABLET ORAL at 10:06

## 2021-03-02 RX ADMIN — SODIUM CHLORIDE, PRESERVATIVE FREE 10 ML: 5 INJECTION INTRAVENOUS at 10:07

## 2021-03-02 RX ADMIN — ALPRAZOLAM 0.5 MG: 0.5 TABLET ORAL at 00:02

## 2021-03-02 RX ADMIN — OXYCODONE HYDROCHLORIDE 10 MG: 5 SOLUTION ORAL at 05:59

## 2021-03-02 RX ADMIN — HYDROXYZINE HYDROCHLORIDE 25 MG: 25 TABLET ORAL at 10:06

## 2021-03-02 RX ADMIN — GUAIFENESIN 400 MG: 100 SOLUTION ORAL at 23:08

## 2021-03-02 NOTE — PROGRESS NOTES
Name: Maxim Carvajal ADMIT: 2021   : 1985  PCP: Sheron Perez MD    MRN: [id number] LOS: 3 days   AGE/SEX: 35 y.o. male  ROOM: [facility]     Subjective   Subjective   Patient is lying in the bed and appears weak and lethargic. He is not in any major respiratory distress. Answering questions fairly appropriately. Denies nausea, vomiting, abdominal pain, chest pain.      Objective   Objective   Vital Signs  Temp:  [99 °F (37.2 °C)-99.8 °F (37.7 °C)] 99 °F (37.2 °C)  Heart Rate:  [59-96] 59  Resp:  [18-20] 18  BP: (129-144)/(70-89) 142/89  SpO2:  [94 %-99 %] 96 %  on  Flow (L/min):  [10-11] 10;   Device (Oxygen Therapy): humidified;T - piece  Body mass index is 22.59 kg/m².  Physical Exam  HEENT: PERRLA, extraocular movements intact, sclerae no icterus  Neck: Supple, no JVD, does have a tracheostomy in place  Respiratory: Diminished breath sounds with mild rhonchi  Cardiovascular: Regular rate and rhythm with normal S1 and S2  GI: Soft, nontender, bowel sounds present, PEG tube in place  Extremities: No edema, atrophic muscles noted, palpable pedal pulses    Results Review     I reviewed the patient's new clinical results.  Results from last 7 days   Lab Units 21  0324 21  1848 21  0621  0736 21  0602   WBC 10*3/mm3 12.79*  --  10.11 9.86 14.90*   HEMOGLOBIN g/dL 8.0* 7.7* 6.5* 7.4* 7.9*   PLATELETS 10*3/mm3 257  --  266 305 375     Results from last 7 days   Lab Units 21  0626 21  0736 21  0602 21  0258   SODIUM mmol/L 137 136 134* 133*   POTASSIUM mmol/L 3.8 3.4* 4.1 3.8   CHLORIDE mmol/L 97* 95* 92* 92*   CO2 mmol/L 31.3* 28.4 29.3* 31.0*   BUN mg/dL 19 23* 27* 28*   CREATININE mg/dL 0.49* 0.64* 0.60* 0.68*   GLUCOSE mg/dL 128* 114* 100* 107*   Estimated Creatinine Clearance: 208.5 mL/min (A) (by C-G formula based on SCr of 0.49 mg/dL (L)).  Results from last 7 days   Lab Units 21  0626 21  0736 21  0258   ALBUMIN g/dL  3.40* 3.60 3.80   BILIRUBIN mg/dL 1.7* 2.4* 2.4*   ALK PHOS U/L 118* 132* 136*   AST (SGOT) U/L 8 14 13   ALT (SGPT) U/L 16 19 25     Results from last 7 days   Lab Units 03/01/21  0626 02/28/21  0736 02/27/21  0602 02/27/21  0258   CALCIUM mg/dL 9.1 8.9 9.2 9.6   ALBUMIN g/dL 3.40* 3.60  --  3.80   MAGNESIUM mg/dL  --   --  2.1  --    PHOSPHORUS mg/dL  --   --  4.9*  --      Results from last 7 days   Lab Units 03/01/21  0626 02/28/21  0736 02/27/21  0258   PROCALCITONIN ng/mL 0.13 0.10 0.11   LACTATE mmol/L  --   --  1.1     COVID19   Date Value Ref Range Status   02/27/2021 Not Detected Not Detected - Ref. Range Final   02/16/2021 Not Detected Not Detected - Ref. Range Final     Glucose   Date/Time Value Ref Range Status   02/28/2021 1109 126 70 - 130 mg/dL Final   02/28/2021 0557 113 70 - 130 mg/dL Final       XR Chest 1 View  ONE VIEW PORTABLE CHEST AT 3:48 PM     HISTORY: Shortness of breath. Right-sided atelectasis and pleural  effusion. Possible mucous plugging.     FINDINGS: There is some atelectasis and pleural effusion at both bases,  right greater than left and the appearance at the right base shows  improvement from 02/28/2021. There is slight worsening of increased  density at the left base. The heart size remains normal. A tracheostomy  tube is in place.     This report was finalized on 3/1/2021 4:42 PM by Dr. Benjamin De Leon M.D.       Scheduled Medications  ascorbic acid, 1,000 mg, Per G Tube, Daily  cyclobenzaprine, 10 mg, Per G Tube, Daily  docusate sodium, 100 mg, Per G Tube, BID  fentaNYL, 1 patch, Transdermal, Q72H  Gabapentin, 800 mg, Per G Tube, Q8H  guaifenesin, 400 mg, Per G Tube, Q6H  hydrOXYzine, 25 mg, Oral, Daily  lansoprazole, 30 mg, Per G Tube, QAM  midodrine, 7.5 mg, Per G Tube, Q8H  multivitamin with minerals, 1 tablet, Oral, Daily  polyethylene glycol, 17 g, Oral, Daily  senna, 1 tablet, Per G Tube, BID  sertraline, 100 mg, Per G Tube, Daily  sodium chloride, 10 mL, Intravenous,  Q12H    Infusions  sodium chloride, 100 mL/hr    Diet  JEVITY 1.5 JUSTO PO LIQD  NPO Diet       Assessment/Plan     Active Hospital Problems    Diagnosis  POA   • **Acute on chronic respiratory failure with hypoxemia (CMS/HCC) [J96.21]  Yes   • Hypotension [I95.9]  Yes   • Dyspnea [R06.00]  Yes   • Anemia [D64.9]  Yes   • Leukocytosis [D72.829]  Yes   • Quadriplegia (CMS/HCC) [G82.50]  Yes   • HCAP (healthcare-associated pneumonia) [J18.9]  Yes      Resolved Hospital Problems   No resolved problems to display.       1.Acute on chronic respiratory failure with hypoxemia secondary to mucous plugging and difficulty with secretion clearance. 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 Continue with aggressive pulmonary toilet.  2. Anemia,   No evidence of any active bleeding and was transfused with 1 unit of PRBC.  3. Quadriplegia, continue to monitor for pressure ulcers.  4. Hypertension, continue to monitor closely is currently asymptomatic.    Alfredo Coelho MD  [facility]  03/02/21  16:08 EST

## 2021-03-02 NOTE — NURSING NOTE
03/02/21 1100   Wound 02/02/21 2100 coccyx   Placement Date/Time: 02/02/21 2100   Present on Hospital Admission: Yes  Location: coccyx  Stage, Pressure Injury : Stage 2   Dressing Appearance dry;intact   Periwound intact;blanchable   Edges irregular   Wound Length (cm) 2.5 cm   Wound Width (cm) 2.5 cm   Drainage Amount none   Care, Wound   (hydrofiber/ silicone border dressing)   WOCN: Pressure injury stage 2 coccyx. Currently on low air loss mattress. Heel boots in place.

## 2021-03-02 NOTE — PROGRESS NOTES
Continued Stay Note  [facility]     Patient Name: Maxim Carvajal  MRN: [id number]  Today's Date: 3/2/2021    Admit Date: 2/27/2021    Discharge Plan     Row Name 03/02/21 1321       Plan    Plan  [facility] pulmonary referral pending    Patient/Family in Agreement with Plan  yes    Plan Comments  CCP spoke with [facility] who said patient does not qualify for admission after facility team review. Spoke with Ivon/Nneka who says patient could be a candidate for pulmonary rehab once down to 8 liters of oxygen. CCP made call to RN to ask if it is possible to wean patient's oxygen to qualify for [facility]. CCP spoke with patient over phone who says he is okay with [facility]. CCP to continue to follow. EMS to transport. Katie Young, RN/CCP        Discharge Codes    No documentation.             Felisa Young

## 2021-03-02 NOTE — PROGRESS NOTES
"  PROGRESS NOTE  Patient Name: Maxim Carvajal  Age/Sex: 35 y.o. male  : 1985  MRN: [id number]    Date of Admission: 2021  Date of Encounter Visit: 21   LOS: 3 days   Patient Care Team:  Sheron Perez MD as PCP - General (Family Medicine)    Chief Complaint: Acute respiratory failure, status post recent injury with quadriplegia, MRSA/Pseudomonas colonization, and anemia      Hospital course: Patient had some increasing oxygen requirement yesterday, chest x-ray was done and it showed some evidence of pleural effusion.  He is afebrile but borderline, his white count is slightly up but still mildly elevated.  He is currently on no antibiotic, he is on mucolytic therapy and pulmonary hygiene measures but still having weak cough.         REVIEW OF SYSTEMS:   CONSTITUTIONAL: no fever or chills  CARDIOVASCULAR: No chest pain, chest pressure or chest discomfort. No palpitations or edema.   RESPIRATORY: Minimal shortness of breath, positive cough especially with the breathing treatment, currently on 11 L T-piece  GASTROINTESTINAL: No anorexia, nausea, vomiting or diarrhea. No abdominal pain or blood.   HEMATOLOGIC: No bleeding or bruising.  However patient has anemia requiring transfusion    Ventilator/Non-Invasive Ventilation Settings (From admission, onward)    T-piece on 70% FiO2            Vital Signs  Temp:  [99 °F (37.2 °C)-99.8 °F (37.7 °C)] 99 °F (37.2 °C)  Heart Rate:  [76-96] 76  Resp:  [18-20] 18  BP: (129-144)/(70-89) 142/89  SpO2:  [94 %-98 %] 96 %  on  Flow (L/min):  [11] 11 Device (Oxygen Therapy): humidified;T - piece    Intake/Output Summary (Last 24 hours) at 3/2/2021 1512  Last data filed at 3/2/2021 1306  Gross per 24 hour   Intake 260 ml   Output --   Net 260 ml     Flowsheet Rows      First Filed Value   Admission Height  177.8 cm (70\") Documented at 2021 0346   Admission Weight  71.4 kg (157 lb 6.5 oz) Documented at 2021 0346        Body mass index is 22.59 kg/m².   "    02/27/21  0346   Weight: 71.4 kg (157 lb 6.5 oz)       Physical Exam:  GEN:  No acute distress, alert, cooperative, well developed   EYES:   Sclerae clear. No icterus. PERRL. Normal EOM  ENT:   External ears/nose normal, no oral lesions, no thrush, mucous membranes moist  NECK:  Supple, midline trachea, no JVD, trach site is clean patient is on T-piece  LUNGS: Normal chest on inspection,  diminished, no wheezes.  Positive coarse rhonchi, that was done while he was getting his mucolytic nebulizer therapy no crackles. Respirations regular, even and unlabored.  Able to talk with a T-piece without a [place] valve  CV:  Regular rhythm and rate. Normal S1/S2. No murmurs, gallops, or rubs noted.  ABD:  Soft, nontender and nondistended. Normal bowel sounds. No guarding  EXT:  Moves all extremities well. No cyanosis. No redness. No edema.   Skin: Dry, intact, no bleeding    Results Review:    Results From Last 14 Days   Lab Units 02/27/21  0258   LACTATE mmol/L 1.1     Results from last 7 days   Lab Units 03/01/21  0626 02/28/21  0736 02/27/21  0602 02/27/21  0258   SODIUM mmol/L 137 136 134* 133*   POTASSIUM mmol/L 3.8 3.4* 4.1 3.8   CHLORIDE mmol/L 97* 95* 92* 92*   CO2 mmol/L 31.3* 28.4 29.3* 31.0*   BUN mg/dL 19 23* 27* 28*   CREATININE mg/dL 0.49* 0.64* 0.60* 0.68*   CALCIUM mg/dL 9.1 8.9 9.2 9.6   AST (SGOT) U/L 8 14  --  13   ALT (SGPT) U/L 16 19  --  25   ANION GAP mmol/L 8.7 12.6 12.7 10.0   ALBUMIN g/dL 3.40* 3.60  --  3.80     Results from last 7 days   Lab Units 02/27/21  0258   TROPONIN T ng/mL <0.010         Results from last 7 days   Lab Units 03/01/21  0626 02/28/21  0736   PROBNP pg/mL 1,083.0* 517.8*     Results from last 7 days   Lab Units 03/02/21  0324 03/01/21  1848 03/01/21  0626 02/28/21  0736 02/27/21  0602 02/27/21  0258   WBC 10*3/mm3 12.79*  --  10.11 9.86 14.90* 10.98*   HEMOGLOBIN g/dL 8.0* 7.7* 6.5* 7.4* 7.9* 8.3*   HEMATOCRIT % 24.3* 23.3* 19.8* 22.4* 24.7* 25.8*   PLATELETS 10*3/mm3 257   --  266 305 375 331   MCV fL 84.4  --  81.5 81.5 81.3 81.6   NEUTROPHIL % %  --   --  82.1* 77.1* 85.2* 77.1*   LYMPHOCYTE % %  --   --  9.9* 12.0* 8.1* 11.0*   MONOCYTES % %  --   --  6.3 7.4 5.2 8.4   EOSINOPHIL % %  --   --  1.3 3.0 0.7 3.0   BASOPHIL % %  --   --  0.1 0.1 0.1 0.1   IMM GRAN % %  --   --  0.3 0.4 0.7* 0.4     Results from last 7 days   Lab Units 02/27/21  0602   INR  1.15*     Results from last 7 days   Lab Units 02/27/21  0602   MAGNESIUM mg/dL 2.1           Invalid input(s): LDLCALC  Results from last 7 days   Lab Units 02/27/21  0312   PH, ARTERIAL pH units 7.432   PCO2, ARTERIAL mm Hg 45.9*   PO2 ART mm Hg 69.7*   HCO3 ART mmol/L 30.6*         Glucose   Date/Time Value Ref Range Status   02/28/2021 1109 126 70 - 130 mg/dL Final   02/28/2021 0557 113 70 - 130 mg/dL Final     Results from last 7 days   Lab Units 03/01/21  0626 02/28/21  0736 02/27/21  0258   PROCALCITONIN ng/mL 0.13 0.10 0.11   LACTATE mmol/L  --   --  1.1     Results from last 7 days   Lab Units 02/27/21  1638 02/27/21  0340 02/27/21  0330 02/27/21  0259   BLOODCX   --  No growth at 3 days  --  No growth at 3 days   RESPCX  Light growth (2+) Pseudomonas aeruginosa*  Scant growth (1+) Normal Respiratory Lisa  --  Scant growth (1+) Pseudomonas aeruginosa*  Rare Normal Respiratory Lisa  --          Results from last 7 days   Lab Units 02/27/21  0236   COVID19  Not Detected   ADENOVIRUS DETECTION BY PCR  Not Detected   CORONAVIRUS 229E  Not Detected   CORONAVIRUS HKU1  Not Detected   CORONAVIRUS NL63  Not Detected   CORONAVIRUS OC43  Not Detected   HUMAN METAPNEUMOVIRUS  Not Detected   HUMAN RHINOVIRUS/ENTEROVIRUS  Not Detected   INFLUENZA B PCR  Not Detected   PARAINFLUENZA 1  Not Detected   PARAINFLUENZA VIRUS 2  Not Detected   PARAINFLUENZA VIRUS 3  Not Detected   PARAINFLUENZA VIRUS 4  Not Detected   BORDETELLA PERTUSSIS PCR  Not Detected   BORDETELLA PARAPERTUSSIS PCR  Not Detected   CHLAMYDOPHILA PNEUMONIAE PCR  Not  Detected   MYCOPLAMA PNEUMO PCR  Not Detected   RSV, PCR  Not Detected               Imaging:   Imaging Results (All)     Procedure Component Value Units Date/Time       I reviewed the patient's new clinical results.  I personally viewed and interpreted the patient's imaging results: Chest x-ray showed evidence of mild to moderate right-sided pleural effusion with secondary atelectasis, cannot rule out pneumonia  Medication Review:   ascorbic acid, 1,000 mg, Per G Tube, Daily  cyclobenzaprine, 10 mg, Per G Tube, Daily  docusate sodium, 100 mg, Per G Tube, BID  fentaNYL, 1 patch, Transdermal, Q72H  Gabapentin, 800 mg, Per G Tube, Q8H  guaifenesin, 400 mg, Per G Tube, Q6H  hydrOXYzine, 25 mg, Oral, Daily  lansoprazole, 30 mg, Per G Tube, QAM  midodrine, 7.5 mg, Per G Tube, Q8H  multivitamin with minerals, 1 tablet, Oral, Daily  polyethylene glycol, 17 g, Oral, Daily  senna, 1 tablet, Per G Tube, BID  sertraline, 100 mg, Per G Tube, Daily  sodium chloride, 10 mL, Intravenous, Q12H        sodium chloride, 100 mL/hr        ASSESSMENT:   1. Acute hypoxic respiratory failure  2. Mucous plugging of the airways  3. History of Pseudomonas colonization of the airways  4. History of MRSA pneumonia  5. Quadriplegia  6. Chronic tracheostomy  7. Chronic respiratory failure due to quadriplegia  8. Anemia status post transfusion  9. Pleural effusion, new problem    PLAN:  Patient is not on any antibiotics at this point, his MRSA and Pseudomonas are felt to be colonization, was transferred out of the ICU  Oxygen requirement are still relatively elevated  His chest x-ray showing progression of the right-sided pleural effusion with possible atelectasis but cannot rule out pneumonia  We will further evaluate with a CT of the chest to look for any complex pleural effusion  Continue to hold on the antibiotic unless patient has fever or other signs of sepsis meanwhile we will check procalcitonin and noncontrast chest CT for further  recommendations        Disposition: Depending on hospital course    Meg Bynum MD  03/02/21  15:12 EST           Dictated utilizing Dragon dictation

## 2021-03-02 NOTE — PROGRESS NOTES
Continued Stay Note  [facility]     Patient Name: Maxim Carvajal  MRN: [id number]  Today's Date: 3/2/2021    Admit Date: 2/27/2021    Discharge Plan     Row Name 03/02/21 1612       Plan    Plan  [facility] pulmonary unit referral pending    Patient/Family in Agreement with Plan  yes    Plan Comments  Patient's mother asked to speak with CCP about [facility] rehab. CCP explained again that [facility] is unable to accept due to patient not having a discharge plan to go back home and their apartment is on a second floor. Patient's mother said they could move, but CCP discussed that the family would already have to reside in a situation patient could return to prior to going to [facility] and have a different address listed. Brittaney/Mehdi confirms. Patient's mother also asked why patient could not return back to [place]. CCP explained [facility] has reviewed patient's clinical information and that patient did not qualify for [facility] as his medical needs could be met with lower level of care facilities. Patient's mother asked if this was related to his financial situation and insurance, and CCP explained that they had absolutely no bearing on why [facility] and [facility] cannot accept and everything to do with patient's medical needs and what facilities could accommodate accordingly. CCP explained that patient is agreeable to [facility] and they are currently the only facility that can accommodate patient's needs. Katie Young RN/CCP        Discharge Codes    No documentation.             Felisa Young

## 2021-03-02 NOTE — PLAN OF CARE
Goal Outcome Evaluation:  Plan of Care Reviewed With: patient  Progress: no change  Outcome Summary: Pt secretions continue to be thick, creamy. Suctioned several times throughout shift. Pt refusing turns at times, provided education to help prevent new or further break down of skin. Pt states it's more difficult for him to breathe when turned on R side. Wound care consulted for coccyx wound. Pt remains in low air loss mattress. Will continue to monitor.

## 2021-03-03 LAB
ANION GAP SERPL CALCULATED.3IONS-SCNC: 10.5 MMOL/L (ref 5–15)
BUN SERPL-MCNC: 18 MG/DL (ref 6–20)
BUN/CREAT SERPL: 40 (ref 7–25)
CALCIUM SPEC-SCNC: 9.5 MG/DL (ref 8.6–10.5)
CHLORIDE SERPL-SCNC: 97 MMOL/L (ref 98–107)
CO2 SERPL-SCNC: 28.5 MMOL/L (ref 22–29)
CREAT SERPL-MCNC: 0.45 MG/DL (ref 0.76–1.27)
DEPRECATED RDW RBC AUTO: 51.8 FL (ref 37–54)
ERYTHROCYTE [DISTWIDTH] IN BLOOD BY AUTOMATED COUNT: 17.2 % (ref 12.3–15.4)
GFR SERPL CREATININE-BSD FRML MDRD: >150 ML/MIN/1.73
GLUCOSE SERPL-MCNC: 96 MG/DL (ref 65–99)
HCT VFR BLD AUTO: 24.6 % (ref 37.5–51)
HGB BLD-MCNC: 8.1 G/DL (ref 13–17.7)
MAGNESIUM SERPL-MCNC: 1.9 MG/DL (ref 1.6–2.6)
MCH RBC QN AUTO: 27.2 PG (ref 26.6–33)
MCHC RBC AUTO-ENTMCNC: 32.9 G/DL (ref 31.5–35.7)
MCV RBC AUTO: 82.6 FL (ref 79–97)
PLATELET # BLD AUTO: 289 10*3/MM3 (ref 140–450)
PMV BLD AUTO: 9.6 FL (ref 6–12)
POTASSIUM SERPL-SCNC: 4.6 MMOL/L (ref 3.5–5.2)
PROCALCITONIN SERPL-MCNC: 0.09 NG/ML (ref 0–0.25)
RBC # BLD AUTO: 2.98 10*6/MM3 (ref 4.14–5.8)
SODIUM SERPL-SCNC: 136 MMOL/L (ref 136–145)
WBC # BLD AUTO: 8.79 10*3/MM3 (ref 3.4–10.8)

## 2021-03-03 PROCEDURE — 83735 ASSAY OF MAGNESIUM: CPT | Performed by: INTERNAL MEDICINE

## 2021-03-03 PROCEDURE — 84145 PROCALCITONIN (PCT): CPT | Performed by: INTERNAL MEDICINE

## 2021-03-03 PROCEDURE — 25010000002 HYDROMORPHONE 1 MG/ML SOLUTION: Performed by: INTERNAL MEDICINE

## 2021-03-03 PROCEDURE — 94799 UNLISTED PULMONARY SVC/PX: CPT

## 2021-03-03 PROCEDURE — 94660 CPAP INITIATION&MGMT: CPT

## 2021-03-03 PROCEDURE — 80048 BASIC METABOLIC PNL TOTAL CA: CPT | Performed by: INTERNAL MEDICINE

## 2021-03-03 PROCEDURE — 85027 COMPLETE CBC AUTOMATED: CPT | Performed by: INTERNAL MEDICINE

## 2021-03-03 PROCEDURE — 99252 IP/OBS CONSLTJ NEW/EST SF 35: CPT | Performed by: INTERNAL MEDICINE

## 2021-03-03 RX ORDER — FUROSEMIDE 20 MG/1
20 TABLET ORAL DAILY
Status: DISCONTINUED | OUTPATIENT
Start: 2021-03-03 | End: 2021-03-07

## 2021-03-03 RX ORDER — SCOLOPAMINE TRANSDERMAL SYSTEM 1 MG/1
1 PATCH, EXTENDED RELEASE TRANSDERMAL
Status: DISCONTINUED | OUTPATIENT
Start: 2021-03-03 | End: 2021-03-16 | Stop reason: HOSPADM

## 2021-03-03 RX ADMIN — HYDROMORPHONE HYDROCHLORIDE 1 MG: 10 INJECTION INTRAMUSCULAR; INTRAVENOUS; SUBCUTANEOUS at 23:15

## 2021-03-03 RX ADMIN — OXYCODONE HYDROCHLORIDE 10 MG: 5 SOLUTION ORAL at 18:13

## 2021-03-03 RX ADMIN — ALPRAZOLAM 0.5 MG: 0.5 TABLET ORAL at 18:13

## 2021-03-03 RX ADMIN — ALPRAZOLAM 0.5 MG: 0.5 TABLET ORAL at 09:06

## 2021-03-03 RX ADMIN — SCOPALAMINE 1 PATCH: 1 PATCH, EXTENDED RELEASE TRANSDERMAL at 20:30

## 2021-03-03 RX ADMIN — GABAPENTIN 800 MG: 250 SOLUTION ORAL at 22:43

## 2021-03-03 RX ADMIN — OXYCODONE HYDROCHLORIDE 10 MG: 5 SOLUTION ORAL at 22:43

## 2021-03-03 RX ADMIN — FUROSEMIDE 20 MG: 20 TABLET ORAL at 20:29

## 2021-03-03 RX ADMIN — SERTRALINE 100 MG: 100 TABLET, FILM COATED ORAL at 09:06

## 2021-03-03 RX ADMIN — GUAIFENESIN 400 MG: 100 SOLUTION ORAL at 13:17

## 2021-03-03 RX ADMIN — HYDROMORPHONE HYDROCHLORIDE 1 MG: 10 INJECTION INTRAMUSCULAR; INTRAVENOUS; SUBCUTANEOUS at 07:13

## 2021-03-03 RX ADMIN — SODIUM CHLORIDE, PRESERVATIVE FREE 10 ML: 5 INJECTION INTRAVENOUS at 20:37

## 2021-03-03 RX ADMIN — GABAPENTIN 800 MG: 250 SOLUTION ORAL at 13:17

## 2021-03-03 RX ADMIN — GUAIFENESIN 400 MG: 100 SOLUTION ORAL at 18:13

## 2021-03-03 RX ADMIN — LANSOPRAZOLE 30 MG: KIT at 06:31

## 2021-03-03 RX ADMIN — OXYCODONE HYDROCHLORIDE 10 MG: 5 SOLUTION ORAL at 09:06

## 2021-03-03 RX ADMIN — HYDROXYZINE HYDROCHLORIDE 25 MG: 25 TABLET ORAL at 09:06

## 2021-03-03 RX ADMIN — HYDROMORPHONE HYDROCHLORIDE 1 MG: 10 INJECTION INTRAMUSCULAR; INTRAVENOUS; SUBCUTANEOUS at 01:47

## 2021-03-03 RX ADMIN — HYDROMORPHONE HYDROCHLORIDE 1 MG: 10 INJECTION INTRAMUSCULAR; INTRAVENOUS; SUBCUTANEOUS at 16:01

## 2021-03-03 RX ADMIN — GUAIFENESIN 400 MG: 100 SOLUTION ORAL at 23:15

## 2021-03-03 RX ADMIN — OXYCODONE HYDROCHLORIDE AND ACETAMINOPHEN 1000 MG: 500 TABLET ORAL at 09:06

## 2021-03-03 RX ADMIN — HYDROMORPHONE HYDROCHLORIDE 1 MG: 10 INJECTION INTRAMUSCULAR; INTRAVENOUS; SUBCUTANEOUS at 03:54

## 2021-03-03 RX ADMIN — Medication 1 TABLET: at 09:06

## 2021-03-03 RX ADMIN — GUAIFENESIN 400 MG: 100 SOLUTION ORAL at 06:31

## 2021-03-03 RX ADMIN — MIDODRINE HYDROCHLORIDE 7.5 MG: 5 TABLET ORAL at 18:13

## 2021-03-03 RX ADMIN — OXYCODONE HYDROCHLORIDE 10 MG: 5 SOLUTION ORAL at 13:17

## 2021-03-03 RX ADMIN — HYDROMORPHONE HYDROCHLORIDE 1 MG: 10 INJECTION INTRAMUSCULAR; INTRAVENOUS; SUBCUTANEOUS at 05:07

## 2021-03-03 RX ADMIN — HYDROMORPHONE HYDROCHLORIDE 1 MG: 10 INJECTION INTRAMUSCULAR; INTRAVENOUS; SUBCUTANEOUS at 09:29

## 2021-03-03 RX ADMIN — HYDROMORPHONE HYDROCHLORIDE 1 MG: 10 INJECTION INTRAMUSCULAR; INTRAVENOUS; SUBCUTANEOUS at 19:53

## 2021-03-03 RX ADMIN — CYCLOBENZAPRINE 10 MG: 10 TABLET, FILM COATED ORAL at 09:06

## 2021-03-03 RX ADMIN — HYDROMORPHONE HYDROCHLORIDE 1 MG: 10 INJECTION INTRAMUSCULAR; INTRAVENOUS; SUBCUTANEOUS at 21:38

## 2021-03-03 RX ADMIN — HYDROMORPHONE HYDROCHLORIDE 1 MG: 10 INJECTION INTRAMUSCULAR; INTRAVENOUS; SUBCUTANEOUS at 13:45

## 2021-03-03 RX ADMIN — GABAPENTIN 800 MG: 250 SOLUTION ORAL at 06:31

## 2021-03-03 NOTE — PROGRESS NOTES
Name: Maxim Carvajal ADMIT: 2021   : 1985  PCP: Sheron Perez MD    MRN: [id number] LOS: 4 days   AGE/SEX: 35 y.o. male  ROOM: [facility]     Subjective   Subjective     Patient is lying on the bed and reportedly had an episode of anxiety attack earlier today.  Per nursing staff also had 3 second pauses.  Patient at the time of my evaluation appears quite comfortable.  Denies nausea, vomiting, chest pain, shortness of breath, palpitations.      Objective   Objective   Vital Signs  Temp:  [97.4 °F (36.3 °C)-97.8 °F (36.6 °C)] 97.4 °F (36.3 °C)  Heart Rate:  [61-90] 87  Resp:  [18-22] 20  BP: (129-150)/() 129/77  SpO2:  [92 %-100 %] 100 %  on  Flow (L/min):  [4-12] 4;   Device (Oxygen Therapy): T - piece  Body mass index is 22.59 kg/m².  Physical Exam  HEENT: PERRLA, extraocular movements intact, sclerae no icterus  Neck: Supple, no JVD, does have a tracheostomy in place  Respiratory: Diminished breath sounds with mild rhonchi  Cardiovascular: Regular rate and rhythm with normal S1 and S2  GI: Soft, nontender, bowel sounds present, PEG tube in place  Extremities: No edema, atrophic muscles noted, palpable pedal pulses    Results Review     I reviewed the patient's new clinical results.  Results from last 7 days   Lab Units 21  0504 21  0324 21  1848 21  0626 21  0736   WBC 10*3/mm3 8.79 12.79*  --  10.11 9.86   HEMOGLOBIN g/dL 8.1* 8.0* 7.7* 6.5* 7.4*   PLATELETS 10*3/mm3 289 257  --  266 305     Results from last 7 days   Lab Units 21  0504 21  0626 21  0736 21  0602   SODIUM mmol/L 136 137 136 134*   POTASSIUM mmol/L 4.6 3.8 3.4* 4.1   CHLORIDE mmol/L 97* 97* 95* 92*   CO2 mmol/L 28.5 31.3* 28.4 29.3*   BUN mg/dL 18 19 23* 27*   CREATININE mg/dL 0.45* 0.49* 0.64* 0.60*   GLUCOSE mg/dL 96 128* 114* 100*   Estimated Creatinine Clearance: 227 mL/min (A) (by C-G formula based on SCr of 0.45 mg/dL (L)).  Results from last 7 days   Lab Units  03/01/21  0626 02/28/21  0736 02/27/21  0258   ALBUMIN g/dL 3.40* 3.60 3.80   BILIRUBIN mg/dL 1.7* 2.4* 2.4*   ALK PHOS U/L 118* 132* 136*   AST (SGOT) U/L 8 14 13   ALT (SGPT) U/L 16 19 25     Results from last 7 days   Lab Units 03/03/21  0504 03/01/21  0626 02/28/21  0736 02/27/21  0602 02/27/21  0258   CALCIUM mg/dL 9.5 9.1 8.9 9.2 9.6   ALBUMIN g/dL  --  3.40* 3.60  --  3.80   MAGNESIUM mg/dL 1.9  --   --  2.1  --    PHOSPHORUS mg/dL  --   --   --  4.9*  --      Results from last 7 days   Lab Units 03/03/21  0504 03/01/21  0626 02/28/21  0736 02/27/21  0258   PROCALCITONIN ng/mL 0.09 0.13 0.10 0.11   LACTATE mmol/L  --   --   --  1.1     COVID19   Date Value Ref Range Status   02/27/2021 Not Detected Not Detected - Ref. Range Final   02/16/2021 Not Detected Not Detected - Ref. Range Final     No results found for: HGBA1C, POCGLU    CT Chest Without Contrast Diagnostic  Narrative: CT CHEST WO CONTRAST DIAGNOSTIC-     CLINICAL HISTORY: Dyspnea. Pleural effusion. Quadriplegia. History of  mucous plugging and atelectasis.     TECHNIQUE: Spiral CT images were obtained through the chest without IV  contrast and were reconstructed in 3 mm thick slices.     Radiation dose reduction techniques were utilized, including automated  exposure control and exposure modulation based on body size.     COMPARISON: CT chest dated 02/10/2021     FINDINGS: A tracheostomy tube remains in satisfactory position.  Comparison with the previous study is difficult due to extensive  breathing motion artifact that was present on the previous study. There  are moderate-sized bilateral posteriorly layering pleural effusions,  greater in size on the right than the left. The effusion on the right  has increased in size somewhat in the interval. The effusion on the left  appears essentially unchanged. There is dense consolidation in the left  lower lobe containing a few air bronchograms that is most likely  primarily due to atelectasis. This  appears slightly larger. Slightly  less extensive patchy consolidation in the right lower lobe also appears  slightly more prominent. There are patchy reticulonodular opacities  throughout the right upper lobe that appear essentially new since the  preceding CT scan. These have a tree-in-bud appearance and are likely  due to mucus plugging within bronchioles. In addition, a 7 mm diameter  discrete noncalcified nodule in the right upper lobe appears new. There  also several additional smaller nodular opacities in the inferior aspect  of the right upper lobe that are new. These are all favored to be due to  peripheral mucous plugging, as well. Images through the upper abdomen  demonstrate no obvious abnormality.     Impression: Suboptimal study due to artifact from patient breathing  motion. There are moderate-sized bilateral posteriorly layering pleural  effusions. The effusion on the left is unchanged since a CT dated  02/10/2021. The effusion on the right appears slightly larger. Areas of  consolidation within both lower lobes also appear more prominent and are  most likely due to atelectasis, although superimposed pneumonia cannot  be excluded. Patchy reticulonodular opacities in the right upper lobe  are new and are likely secondary to mucous plugging within bronchioles.  There are also multiple macroscopic nodules in the inferior aspect of  the right upper lobe that are new and are quite likely secondary to  peripheral mucous plugging.     This report was finalized on 3/2/2021 8:11 PM by Dr. Rober Monson M.D.       Scheduled Medications  ascorbic acid, 1,000 mg, Per G Tube, Daily  cyclobenzaprine, 10 mg, Per G Tube, Daily  docusate sodium, 100 mg, Per G Tube, BID  fentaNYL, 1 patch, Transdermal, Q72H  Gabapentin, 800 mg, Per G Tube, Q8H  guaifenesin, 400 mg, Per G Tube, Q6H  hydrOXYzine, 25 mg, Oral, Daily  lansoprazole, 30 mg, Per G Tube, QAM  midodrine, 7.5 mg, Per G Tube, BID AC  multivitamin with minerals,  1 tablet, Oral, Daily  polyethylene glycol, 17 g, Oral, Daily  senna, 1 tablet, Per G Tube, BID  sertraline, 100 mg, Per G Tube, Daily  sodium chloride, 10 mL, Intravenous, Q12H    Infusions  sodium chloride, 100 mL/hr    Diet  JEVITY 1.5 JUSTO PO LIQD  NPO Diet       Assessment/Plan     Active Hospital Problems    Diagnosis  POA   • **Acute on chronic respiratory failure with hypoxemia (CMS/HCC) [J96.21]  Yes   • Hypotension [I95.9]  Yes   • Dyspnea [R06.00]  Yes   • Anemia [D64.9]  Yes   • Leukocytosis [D72.829]  Yes   • Quadriplegia (CMS/HCC) [G82.50]  Yes   • HCAP (healthcare-associated pneumonia) [J18.9]  Yes      Resolved Hospital Problems   No resolved problems to display.       1.Acute on chronic respiratory failure with hypoxemia secondary to mucous plugging and difficulty with secretion clearance. 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 Continue with aggressive pulmonary toilet.  2. Anemia,   No evidence of any active bleeding and was transfused with 1 unit of PRBC.  3. Quadriplegia, continue to monitor for pressure ulcers.  4. Hypertension, continue to monitor closely is currently asymptomatic.  5. Episode of anxiety, continue with anxiolytics.    6. Cardiac pauses 3 second long, cardiology evaluation will be obtained.    Alfredo Coelho MD  [facility]  03/03/21  15:25 EST

## 2021-03-03 NOTE — PROGRESS NOTES
Continued Stay Note  [facility]     Patient Name: Maxim Carvajal  MRN: [id number]  Today's Date: 3/3/2021    Admit Date: 2/27/2021    Discharge Plan     Row Name 03/03/21 1216       Plan    Plan  Has bed at [place]    Plan Comments  [facility] spoke with Inga who says the [facility] unit cannot accept but he can return back to [facility]        Discharge Codes    No documentation.       Expected Discharge Date and Time     Expected Discharge Date Expected Discharge Time    Mar 5, 2021             Felisa Young

## 2021-03-03 NOTE — PLAN OF CARE
Problem: Adult Inpatient Plan of Care  Goal: Optimal Comfort and Wellbeing  Intervention: Provide Person-Centered Care  Recent Flowsheet Documentation  Taken 3/3/2021 0951 by Marisela Gomez, RN  Trust Relationship/Rapport:   care explained   choices provided   emotional support provided   reassurance provided   thoughts/feelings acknowledged     Problem: Adult Inpatient Plan of Care  Goal: Readiness for Transition of Care  Intervention: Mutually Develop Transition Plan  Recent Flowsheet Documentation  Taken 3/3/2021 1000 by Marisela Gomez, RN  Equipment Currently Used at Home:   hospital bed   oxygen  Transportation Anticipated: health plan transportation  Transportation Concerns: (EMS transport) other (see comments)  Patient/Family Anticipated Services at Transition:      outpatient care   skilled nursing  Patient/Family Anticipates Transition to: long-term care facility   Goal Outcome Evaluation:        Problem: Adult Inpatient Plan of Care  Goal: Plan of Care Review  Outcome: Ongoing, Progressing   All plan of care goals reviewed with pt.

## 2021-03-03 NOTE — PROGRESS NOTES
Adult Nutrition  Assessment/PES    Patient Name:  Maxim Carvajal  YOB: 1985  MRN: [id number]  Admit Date:  2/27/2021    Assessment Date:  3/3/2021  Nutrition follow up.   Reason for Assessment     Row Name 03/03/21 1022          Reason for Assessment    Reason For Assessment  follow-up protocol;TF/PN         Nutrition/Diet History     Row Name 03/03/21 1022          Nutrition/Diet History    Typical Food/Fluid Intake  NPO. tolerating TF-Nutren 1.5 @ 55 cc/hr           Labs/Tests/Procedures/Meds     Row Name 03/03/21 1022          Labs/Procedures/Meds    Lab Results Reviewed  reviewed, pertinent        Diagnostic Tests/Procedures    Diagnostic Test/Procedure Reviewed  reviewed, pertinent        Medications    Pertinent Medications Reviewed  reviewed, pertinent         Physical Findings     Row Name 03/03/21 1022          Physical Findings    Overall Physical Appearance  on oxygen therapy     Gastrointestinal  feeding tube     Tubes  PEG           Nutrition Prescription Ordered     Row Name 03/03/21 1022          Nutrition Prescription PO    Current PO Diet  NPO        Nutrition Prescription EN    Enteral Route  PEG     Product  Nutren 1.5 (Osmolite 1.5)     TF Delivery Method  Continuous     Continuous TF Goal Rate (mL/hr)  55 mL/hr     Water flush (mL)   30 mL     Water Flush Frequency  Every 4 hours         Evaluation of Received Nutrient/Fluid Intake     Row Name 03/03/21 1023          Calories Evaluation    Enteral Calories (kcal)  1980     % of Kcal Needs  100        Protein Evaluation    Enteral Protein (gm)  89.8     % of Protein Needs  100        Fluid Intake Evaluation    Enteral (Free Water) Fluid (mL)  1003     Free Water Flush Fluid (mL)  180     Total Free Water Intake (mL)  1183 mL         Problem/Interventions:    Intervention Goal     Row Name 03/03/21 1023          Intervention Goal    General  Maintain nutrition;Nutrition support treatment     TF/PN  Maintain TF/PN;Tolerate TF at goal      Weight  Maintain weight         Nutrition Intervention     Row Name 03/03/21 1023          Nutrition Intervention    RD/Tech Action  Follow Tx progress;Care plan reviewd         Nutrition Prescription     Row Name 03/03/21 1023          Nutrition Prescription EN    Enteral Prescription  Continue same protocol         Education/Evaluation     Row Name 03/03/21 1023          Education    Education  Will Instruct as appropriate        Monitor/Evaluation    Monitor  Per protocol;I&O;Pertinent labs;TF delivery/tolerance;Weight;Skin status;Symptoms           Electronically signed by:  Jennifer Martinez RD  03/03/21 10:24 EST
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
## 2021-03-03 NOTE — PROGRESS NOTES
"  PROGRESS NOTE  Patient Name: Maxim Carvajal  Age/Sex: 35 y.o. male  : 1985  MRN: [id number]    Date of Admission: 2021  Date of Encounter Visit: 21   LOS: 4 days   Patient Care Team:  Sheron Perez MD as PCP - General (Family Medicine)    Chief Complaint: Acute respiratory failure, status post recent injury with quadriplegia, MRSA/Pseudomonas colonization, and anemia      Hospital course: Patient is having  high oxygen requirement still with significant desaturation and dyspnea whenever we lay him supine.  He had an episode today while being cleaned where he had near syncope with hypoxemia, the nurse thinks he vasovagaled.  Otherwise he is afebrile  He did have a CT of the chest that I ordered to further evaluate for the changes on his x-ray which was summarized below  No fever or chills  His white count is trending down again.  No active bleeding         REVIEW OF SYSTEMS:   CONSTITUTIONAL: no fever or chills  CARDIOVASCULAR: No chest pain, chest pressure or chest discomfort. No palpitations or edema.   RESPIRATORY: No significant change in oxygen requirement on the T-piece at 70% FiO2.  GASTROINTESTINAL: No anorexia, nausea, vomiting or diarrhea. No abdominal pain or blood.   HEMATOLOGIC: No bleeding or bruising.  However patient has anemia requiring transfusion    Ventilator/Non-Invasive Ventilation Settings (From admission, onward)    T-piece on 70% FiO2            Vital Signs  Temp:  [97.4 °F (36.3 °C)-97.8 °F (36.6 °C)] 97.4 °F (36.3 °C)  Heart Rate:  [61-90] 87  Resp:  [18-22] 20  BP: (129-150)/() 129/77  SpO2:  [92 %-100 %] 100 %  on  Flow (L/min):  [4-12] 4 Device (Oxygen Therapy): T - piece    Intake/Output Summary (Last 24 hours) at 3/3/2021 1601  Last data filed at 3/3/2021 1328  Gross per 24 hour   Intake 60 ml   Output --   Net 60 ml     Flowsheet Rows      First Filed Value   Admission Height  177.8 cm (70\") Documented at 2021 0346   Admission Weight  71.4 kg " (157 lb 6.5 oz) Documented at 02/27/2021 0346        Body mass index is 22.59 kg/m².      02/27/21  0346   Weight: 71.4 kg (157 lb 6.5 oz)       Physical Exam:  GEN:  No acute distress, alert, cooperative, well developed   EYES:   Sclerae clear. No icterus. PERRL. Normal EOM  ENT:   External ears/nose normal, no oral lesions, no thrush, mucous membranes moist  NECK:  Supple, midline trachea, no JVD, trach site is clean patient is on T-piece  LUNGS: Normal chest on inspection,  diminished, no wheezes.  Positive coarse rhonchi, with improvement post suctioning with yellow clear thin secretions. Respirations regular, even and unlabored.  Able to talk with a T-piece without a Passy-Patricia valve  CV:  Regular rhythm and rate. Normal S1/S2. No murmurs, gallops, or rubs noted.  ABD:  Soft, nontender and nondistended. Normal bowel sounds. No guarding  EXT:  Moves all extremities well. No cyanosis. No redness. No edema.   Skin: Dry, intact, no bleeding    Results Review:    Results From Last 14 Days   Lab Units 02/27/21  0258   LACTATE mmol/L 1.1     Results from last 7 days   Lab Units 03/03/21  0504 03/01/21  0626 02/28/21  0736 02/27/21  0602 02/27/21  0258   SODIUM mmol/L 136 137 136 134* 133*   POTASSIUM mmol/L 4.6 3.8 3.4* 4.1 3.8   CHLORIDE mmol/L 97* 97* 95* 92* 92*   CO2 mmol/L 28.5 31.3* 28.4 29.3* 31.0*   BUN mg/dL 18 19 23* 27* 28*   CREATININE mg/dL 0.45* 0.49* 0.64* 0.60* 0.68*   CALCIUM mg/dL 9.5 9.1 8.9 9.2 9.6   AST (SGOT) U/L  --  8 14  --  13   ALT (SGPT) U/L  --  16 19  --  25   ANION GAP mmol/L 10.5 8.7 12.6 12.7 10.0   ALBUMIN g/dL  --  3.40* 3.60  --  3.80     Results from last 7 days   Lab Units 02/27/21  0258   TROPONIN T ng/mL <0.010         Results from last 7 days   Lab Units 03/01/21  0626 02/28/21  0736   PROBNP pg/mL 1,083.0* 517.8*     Results from last 7 days   Lab Units 03/03/21  0504 03/02/21  0324 03/01/21  1848 03/01/21  0626 02/28/21  0736 02/27/21  0602 02/27/21  0258   WBC 10*3/mm3 8.79  12.79*  --  10.11 9.86 14.90* 10.98*   HEMOGLOBIN g/dL 8.1* 8.0* 7.7* 6.5* 7.4* 7.9* 8.3*   HEMATOCRIT % 24.6* 24.3* 23.3* 19.8* 22.4* 24.7* 25.8*   PLATELETS 10*3/mm3 289 257  --  266 305 375 331   MCV fL 82.6 84.4  --  81.5 81.5 81.3 81.6   NEUTROPHIL % %  --   --   --  82.1* 77.1* 85.2* 77.1*   LYMPHOCYTE % %  --   --   --  9.9* 12.0* 8.1* 11.0*   MONOCYTES % %  --   --   --  6.3 7.4 5.2 8.4   EOSINOPHIL % %  --   --   --  1.3 3.0 0.7 3.0   BASOPHIL % %  --   --   --  0.1 0.1 0.1 0.1   IMM GRAN % %  --   --   --  0.3 0.4 0.7* 0.4     Results from last 7 days   Lab Units 02/27/21  0602   INR  1.15*     Results from last 7 days   Lab Units 03/03/21  0504 02/27/21  0602   MAGNESIUM mg/dL 1.9 2.1           Invalid input(s): LDLCALC  Results from last 7 days   Lab Units 02/27/21  0312   PH, ARTERIAL pH units 7.432   PCO2, ARTERIAL mm Hg 45.9*   PO2 ART mm Hg 69.7*   HCO3 ART mmol/L 30.6*         No results found for: POCGLU  Results from last 7 days   Lab Units 03/03/21  0504 03/01/21  0626 02/28/21  0736 02/27/21  0258   PROCALCITONIN ng/mL 0.09 0.13 0.10 0.11   LACTATE mmol/L  --   --   --  1.1     Results from last 7 days   Lab Units 02/27/21  1638 02/27/21  0340 02/27/21  0330 02/27/21  0259   BLOODCX   --  No growth at 4 days  --  No growth at 4 days   RESPCX  Light growth (2+) Pseudomonas aeruginosa*  Scant growth (1+) Normal Respiratory Lisa  --  Scant growth (1+) Pseudomonas aeruginosa*  Rare Normal Respiratory Lisa  --          Results from last 7 days   Lab Units 02/27/21  0236   COVID19  Not Detected   ADENOVIRUS DETECTION BY PCR  Not Detected   CORONAVIRUS 229E  Not Detected   CORONAVIRUS HKU1  Not Detected   CORONAVIRUS NL63  Not Detected   CORONAVIRUS OC43  Not Detected   HUMAN METAPNEUMOVIRUS  Not Detected   HUMAN RHINOVIRUS/ENTEROVIRUS  Not Detected   INFLUENZA B PCR  Not Detected   PARAINFLUENZA 1  Not Detected   PARAINFLUENZA VIRUS 2  Not Detected   PARAINFLUENZA VIRUS 3  Not Detected    PARAINFLUENZA VIRUS 4  Not Detected   BORDETELLA PERTUSSIS PCR  Not Detected   BORDETELLA PARAPERTUSSIS PCR  Not Detected   CHLAMYDOPHILA PNEUMONIAE PCR  Not Detected   MYCOPLAMA PNEUMO PCR  Not Detected   RSV, PCR  Not Detected               Imaging:   Imaging Results (All)     Procedure Component Value Units Date/Time         I reviewed the patient's new clinical results.  I personally viewed and interpreted the patient's imaging results: Chest CT was reviewed and patient has bilateral pleural effusion moderate to large on the right mild on the left with pneumonia cannot be ruled out but this is more consistent with passive atelectasis .  Medication Review:   ascorbic acid, 1,000 mg, Per G Tube, Daily  cyclobenzaprine, 10 mg, Per G Tube, Daily  docusate sodium, 100 mg, Per G Tube, BID  fentaNYL, 1 patch, Transdermal, Q72H  Gabapentin, 800 mg, Per G Tube, Q8H  guaifenesin, 400 mg, Per G Tube, Q6H  hydrOXYzine, 25 mg, Oral, Daily  lansoprazole, 30 mg, Per G Tube, QAM  midodrine, 7.5 mg, Per G Tube, BID AC  multivitamin with minerals, 1 tablet, Oral, Daily  polyethylene glycol, 17 g, Oral, Daily  senna, 1 tablet, Per G Tube, BID  sertraline, 100 mg, Per G Tube, Daily  sodium chloride, 10 mL, Intravenous, Q12H        sodium chloride, 100 mL/hr        ASSESSMENT:   1. Acute hypoxic respiratory failure  2. Mucous plugging of the airways  3. History of Pseudomonas colonization of the airways  4. History of MRSA pneumonia  5. Quadriplegia  6. Chronic tracheostomy  7. Chronic respiratory failure due to quadriplegia  8. Anemia status post transfusion  9. Pleural effusion, new problem    PLAN:  Patient is not on any antibiotics at this point, his MRSA and Pseudomonas are felt to be colonization, was transferred out of the ICU  His white count is normal and after reviewing the CAT scan I do not think pneumonia or active infection is the issue  He still having problem with significant dyspnea especially when supine, he has  some atelectasis he has some pleural effusion that need to be addressed.  Continue with aggressive pulmonary hygiene, and we will start the patient on nocturnal ventilator at least initially, may not need that on the long-term at the time of discharge  We will also try to keep the patient on the negative balance trying to help resolve that pleural effusion.  With a normal procalcitonin, the absence of any fever, and the normal white blood cell count, we will continue to hold on the antibiotic.  Discussed with the patient in detail        Disposition: Depending on hospital course    Meg Bynum MD  03/03/21  16:03 EST           Dictated utilizing Dragon dictation

## 2021-03-03 NOTE — PLAN OF CARE
Goal Outcome Evaluation:  Plan of Care Reviewed With: patient      Pt requires frequent suctioning, IV meds required often, stools are liquid, tube feedings continue and tolerated well, safety maintained, continue plan of care

## 2021-03-04 LAB
BACTERIA SPEC AEROBE CULT: NORMAL
BACTERIA SPEC AEROBE CULT: NORMAL

## 2021-03-04 PROCEDURE — 94799 UNLISTED PULMONARY SVC/PX: CPT

## 2021-03-04 PROCEDURE — 25010000002 HYDROMORPHONE 1 MG/ML SOLUTION: Performed by: INTERNAL MEDICINE

## 2021-03-04 PROCEDURE — 94660 CPAP INITIATION&MGMT: CPT

## 2021-03-04 PROCEDURE — 25010000002 MORPHINE PER 10 MG: Performed by: INTERNAL MEDICINE

## 2021-03-04 RX ORDER — HYDROCODONE BITARTRATE AND ACETAMINOPHEN 5; 325 MG/1; MG/1
1 TABLET ORAL EVERY 6 HOURS
Status: DISCONTINUED | OUTPATIENT
Start: 2021-03-04 | End: 2021-03-04

## 2021-03-04 RX ADMIN — SODIUM CHLORIDE, PRESERVATIVE FREE 10 ML: 5 INJECTION INTRAVENOUS at 20:39

## 2021-03-04 RX ADMIN — HYDROMORPHONE HYDROCHLORIDE 1 MG: 1 INJECTION, SOLUTION INTRAMUSCULAR; INTRAVENOUS; SUBCUTANEOUS at 15:18

## 2021-03-04 RX ADMIN — HYDROMORPHONE HYDROCHLORIDE 1 MG: 1 INJECTION, SOLUTION INTRAMUSCULAR; INTRAVENOUS; SUBCUTANEOUS at 21:15

## 2021-03-04 RX ADMIN — GUAIFENESIN 400 MG: 100 SOLUTION ORAL at 06:19

## 2021-03-04 RX ADMIN — ACETAMINOPHEN ORAL SOLUTION 650 MG: 325 SOLUTION ORAL at 08:23

## 2021-03-04 RX ADMIN — GABAPENTIN 800 MG: 250 SOLUTION ORAL at 22:55

## 2021-03-04 RX ADMIN — MIDODRINE HYDROCHLORIDE 7.5 MG: 5 TABLET ORAL at 08:23

## 2021-03-04 RX ADMIN — HYDROCODONE BITARTRATE AND ACETAMINOPHEN 10 ML: 7.5; 325 SOLUTION ORAL at 15:18

## 2021-03-04 RX ADMIN — FUROSEMIDE 20 MG: 20 TABLET ORAL at 08:23

## 2021-03-04 RX ADMIN — SERTRALINE 100 MG: 100 TABLET, FILM COATED ORAL at 08:23

## 2021-03-04 RX ADMIN — Medication 1 TABLET: at 08:23

## 2021-03-04 RX ADMIN — MORPHINE SULFATE 2 MG: 2 INJECTION, SOLUTION INTRAMUSCULAR; INTRAVENOUS at 12:12

## 2021-03-04 RX ADMIN — OXYCODONE HYDROCHLORIDE 10 MG: 5 SOLUTION ORAL at 04:27

## 2021-03-04 RX ADMIN — GUAIFENESIN 400 MG: 100 SOLUTION ORAL at 18:35

## 2021-03-04 RX ADMIN — ALPRAZOLAM 0.5 MG: 0.5 TABLET ORAL at 18:35

## 2021-03-04 RX ADMIN — HYDROCODONE BITARTRATE AND ACETAMINOPHEN 10 ML: 7.5; 325 SOLUTION ORAL at 20:37

## 2021-03-04 RX ADMIN — SODIUM CHLORIDE 100 ML/HR: 9 INJECTION, SOLUTION INTRAVENOUS at 04:32

## 2021-03-04 RX ADMIN — LANSOPRAZOLE 30 MG: KIT at 06:19

## 2021-03-04 RX ADMIN — GABAPENTIN 800 MG: 250 SOLUTION ORAL at 15:18

## 2021-03-04 RX ADMIN — OXYCODONE HYDROCHLORIDE 10 MG: 5 SOLUTION ORAL at 18:35

## 2021-03-04 RX ADMIN — HYDROMORPHONE HYDROCHLORIDE 1 MG: 10 INJECTION INTRAMUSCULAR; INTRAVENOUS; SUBCUTANEOUS at 07:18

## 2021-03-04 RX ADMIN — OXYCODONE HYDROCHLORIDE 10 MG: 5 SOLUTION ORAL at 08:23

## 2021-03-04 RX ADMIN — CYCLOBENZAPRINE 10 MG: 10 TABLET, FILM COATED ORAL at 08:23

## 2021-03-04 RX ADMIN — MIDODRINE HYDROCHLORIDE 7.5 MG: 5 TABLET ORAL at 18:34

## 2021-03-04 RX ADMIN — HYDROMORPHONE HYDROCHLORIDE 1 MG: 10 INJECTION INTRAMUSCULAR; INTRAVENOUS; SUBCUTANEOUS at 05:20

## 2021-03-04 RX ADMIN — HYDROXYZINE HYDROCHLORIDE 25 MG: 25 TABLET ORAL at 08:23

## 2021-03-04 RX ADMIN — HYDROMORPHONE HYDROCHLORIDE 1 MG: 10 INJECTION INTRAMUSCULAR; INTRAVENOUS; SUBCUTANEOUS at 00:57

## 2021-03-04 RX ADMIN — HYDROMORPHONE HYDROCHLORIDE 1 MG: 10 INJECTION INTRAMUSCULAR; INTRAVENOUS; SUBCUTANEOUS at 02:03

## 2021-03-04 RX ADMIN — GUAIFENESIN 400 MG: 100 SOLUTION ORAL at 12:23

## 2021-03-04 RX ADMIN — GABAPENTIN 800 MG: 250 SOLUTION ORAL at 06:19

## 2021-03-04 RX ADMIN — OXYCODONE HYDROCHLORIDE 10 MG: 5 SOLUTION ORAL at 22:55

## 2021-03-04 RX ADMIN — GUAIFENESIN 400 MG: 100 SOLUTION ORAL at 23:04

## 2021-03-04 RX ADMIN — ALPRAZOLAM 0.5 MG: 0.5 TABLET ORAL at 02:03

## 2021-03-04 RX ADMIN — OXYCODONE HYDROCHLORIDE AND ACETAMINOPHEN 1000 MG: 500 TABLET ORAL at 08:23

## 2021-03-04 NOTE — PROGRESS NOTES
"  PROGRESS NOTE  Patient Name: Maxim Carvajal  Age/Sex: 35 y.o. male  : 1985  MRN: [id number]    Date of Admission: 2021  Date of Encounter Visit: 21   LOS: 5 days   Patient Care Team:  Sheron Perez MD as PCP - General (Family Medicine)    Chief Complaint: Acute respiratory failure, status post recent injury with quadriplegia, MRSA/Pseudomonas colonization, and anemia      Hospital course: Patient is feeling much better on the ventilator which is expected, his respiratory secretion are better on the scopolamine.  He is on Lasix and he has no good accurate intake and output measurements.  So far no fever or chills, white count has improved down to normal with no antibiotic, his hemoglobin is stable.       REVIEW OF SYSTEMS:   CONSTITUTIONAL: no fever or chills  CARDIOVASCULAR: No chest pain, chest pressure or chest discomfort. No palpitations or edema.   RESPIRATORY: Currently on the ventilator with 40% FiO2  GASTROINTESTINAL: No anorexia, nausea, vomiting or diarrhea. No abdominal pain or blood.   HEMATOLOGIC: No bleeding or bruising.  However patient has anemia requiring transfusion    Ventilator/Non-Invasive Ventilation Settings (From admission, onward)    T-piece on 70% FiO2            Vital Signs  Temp:  [97.3 °F (36.3 °C)-101.4 °F (38.6 °C)] 99.3 °F (37.4 °C)  Heart Rate:  [67-92] 92  Resp:  [18-20] 18  BP: (125-159)/() 142/84  SpO2:  [96 %-98 %] 98 %  on  Flow (L/min):  [8] 8 Device (Oxygen Therapy): T - piece    Intake/Output Summary (Last 24 hours) at 3/4/2021 1501  Last data filed at 3/4/2021 1304  Gross per 24 hour   Intake 60 ml   Output --   Net 60 ml     Flowsheet Rows      First Filed Value   Admission Height  177.8 cm (70\") Documented at 2021 0346   Admission Weight  71.4 kg (157 lb 6.5 oz) Documented at 2021 0346        Body mass index is 22.59 kg/m².      21  0346   Weight: 71.4 kg (157 lb 6.5 oz)       Physical Exam:  GEN:  No acute distress, alert, " cooperative, well developed   EYES:   Sclerae clear. No icterus. PERRL. Normal EOM  ENT:   External ears/nose normal, no oral lesions, no thrush, mucous membranes moist  NECK:  Supple, midline trachea, no JVD, trach site is clean patient is on T-piece  LUNGS: Normal chest on inspection, improved breath sounds while on the ventilator, no more rhonchi, no wheezes with decreased breath sounds posteriorly  Respirations regular, even and unlabored breathing in sync with the ventilator.  Still able to mouth and talk despite the above  CV:  Regular rhythm and rate. Normal S1/S2. No murmurs, gallops, or rubs noted.  ABD:  Soft, nontender and nondistended. Normal bowel sounds. No guarding  EXT:  Moves all extremities well. No cyanosis. No redness. No edema.   Skin: Dry, intact, no bleeding    Results Review:    Results From Last 14 Days   Lab Units 02/27/21  0258   LACTATE mmol/L 1.1     Results from last 7 days   Lab Units 03/03/21  0504 03/01/21  0626 02/28/21  0736 02/27/21  0602 02/27/21  0258   SODIUM mmol/L 136 137 136 134* 133*   POTASSIUM mmol/L 4.6 3.8 3.4* 4.1 3.8   CHLORIDE mmol/L 97* 97* 95* 92* 92*   CO2 mmol/L 28.5 31.3* 28.4 29.3* 31.0*   BUN mg/dL 18 19 23* 27* 28*   CREATININE mg/dL 0.45* 0.49* 0.64* 0.60* 0.68*   CALCIUM mg/dL 9.5 9.1 8.9 9.2 9.6   AST (SGOT) U/L  --  8 14  --  13   ALT (SGPT) U/L  --  16 19  --  25   ANION GAP mmol/L 10.5 8.7 12.6 12.7 10.0   ALBUMIN g/dL  --  3.40* 3.60  --  3.80     Results from last 7 days   Lab Units 02/27/21  0258   TROPONIN T ng/mL <0.010         Results from last 7 days   Lab Units 03/01/21  0626 02/28/21  0736   PROBNP pg/mL 1,083.0* 517.8*     Results from last 7 days   Lab Units 03/03/21  0504 03/02/21  0324 03/01/21  1848 03/01/21  0626 02/28/21  0736 02/27/21  0602 02/27/21  0258   WBC 10*3/mm3 8.79 12.79*  --  10.11 9.86 14.90* 10.98*   HEMOGLOBIN g/dL 8.1* 8.0* 7.7* 6.5* 7.4* 7.9* 8.3*   HEMATOCRIT % 24.6* 24.3* 23.3* 19.8* 22.4* 24.7* 25.8*   PLATELETS  10*3/mm3 289 257  --  266 305 375 331   MCV fL 82.6 84.4  --  81.5 81.5 81.3 81.6   NEUTROPHIL % %  --   --   --  82.1* 77.1* 85.2* 77.1*   LYMPHOCYTE % %  --   --   --  9.9* 12.0* 8.1* 11.0*   MONOCYTES % %  --   --   --  6.3 7.4 5.2 8.4   EOSINOPHIL % %  --   --   --  1.3 3.0 0.7 3.0   BASOPHIL % %  --   --   --  0.1 0.1 0.1 0.1   IMM GRAN % %  --   --   --  0.3 0.4 0.7* 0.4     Results from last 7 days   Lab Units 02/27/21  0602   INR  1.15*     Results from last 7 days   Lab Units 03/03/21  0504 02/27/21  0602   MAGNESIUM mg/dL 1.9 2.1           Invalid input(s): LDLCALC  Results from last 7 days   Lab Units 02/27/21  0312   PH, ARTERIAL pH units 7.432   PCO2, ARTERIAL mm Hg 45.9*   PO2 ART mm Hg 69.7*   HCO3 ART mmol/L 30.6*         No results found for: POCGLU  Results from last 7 days   Lab Units 03/03/21  0504 03/01/21  0626 02/28/21  0736 02/27/21  0258   PROCALCITONIN ng/mL 0.09 0.13 0.10 0.11   LACTATE mmol/L  --   --   --  1.1     Results from last 7 days   Lab Units 02/27/21  1638 02/27/21  0340 02/27/21  0330 02/27/21  0259   BLOODCX   --  No growth at 5 days  --  No growth at 5 days   RESPCX  Light growth (2+) Pseudomonas aeruginosa*  Scant growth (1+) Normal Respiratory Lisa  --  Scant growth (1+) Pseudomonas aeruginosa*  Rare Normal Respiratory Lisa  --          Results from last 7 days   Lab Units 02/27/21  0236   COVID19  Not Detected   ADENOVIRUS DETECTION BY PCR  Not Detected   CORONAVIRUS 229E  Not Detected   CORONAVIRUS HKU1  Not Detected   CORONAVIRUS NL63  Not Detected   CORONAVIRUS OC43  Not Detected   HUMAN METAPNEUMOVIRUS  Not Detected   HUMAN RHINOVIRUS/ENTEROVIRUS  Not Detected   INFLUENZA B PCR  Not Detected   PARAINFLUENZA 1  Not Detected   PARAINFLUENZA VIRUS 2  Not Detected   PARAINFLUENZA VIRUS 3  Not Detected   PARAINFLUENZA VIRUS 4  Not Detected   BORDETELLA PERTUSSIS PCR  Not Detected   BORDETELLA PARAPERTUSSIS PCR  Not Detected   CHLAMYDOPHILA PNEUMONIAE PCR  Not Detected    MYCOPLAMA PNEUMO PCR  Not Detected   RSV, PCR  Not Detected               Imaging:   Imaging Results (All)     Procedure Component Value Units Date/Time         I reviewed the patient's new clinical results.  I personally viewed and interpreted the patient's imaging results: Chest CT was reviewed and patient has bilateral pleural effusion moderate to large on the right mild on the left with pneumonia cannot be ruled out but this is more consistent with passive atelectasis .  Medication Review:   ascorbic acid, 1,000 mg, Per G Tube, Daily  cyclobenzaprine, 10 mg, Per G Tube, Daily  docusate sodium, 100 mg, Per G Tube, BID  fentaNYL, 1 patch, Transdermal, Q72H  furosemide, 20 mg, Oral, Daily  Gabapentin, 800 mg, Per G Tube, Q8H  guaifenesin, 400 mg, Per G Tube, Q6H  HYDROcodone-acetaminophen, 10 mL, Per G Tube, Q6H  hydrOXYzine, 25 mg, Oral, Daily  lansoprazole, 30 mg, Per G Tube, QAM  midodrine, 7.5 mg, Per G Tube, BID AC  multivitamin with minerals, 1 tablet, Oral, Daily  polyethylene glycol, 17 g, Oral, Daily  Scopolamine, 1 patch, Transdermal, Q72H  senna, 1 tablet, Per G Tube, BID  sertraline, 100 mg, Per G Tube, Daily  sodium chloride, 10 mL, Intravenous, Q12H        sodium chloride, 100 mL/hr, Last Rate: 100 mL/hr (03/04/21 0432)        ASSESSMENT:   1. Acute hypoxic respiratory failure  2. Mucous plugging of the airways  3. History of Pseudomonas colonization of the airways  4. History of MRSA pneumonia  5. Quadriplegia  6. Chronic tracheostomy  7. Chronic respiratory failure due to quadriplegia  8. Anemia status post transfusion  9. Pleural effusion, new problem    PLAN:  So far patient continues to improve, we will continue with the Lasix, we will discontinue his IV fluid  Continue with the scopolamine  Change the ventilator to only as needed any nightly  Will need a follow-up x-ray in a couple of days, hold on the thoracentesis  His ventilator is to be used only while inpatient, there are no plans for  discharge on the ventilator unless his condition changes over the hospital course        Disposition: Depending on hospital course    Meg Bynum MD  03/04/21  15:01 EST           Dictated utilizing Dragon dictation

## 2021-03-04 NOTE — PROGRESS NOTES
Name: Maxim Carvajal ADMIT: 2021   : 1985  PCP: Sheron Perez MD    MRN: [id number] LOS: 5 days   AGE/SEX: 35 y.o. male  ROOM: [facility]     Subjective   Subjective   Patient is lying on the bed and appears better clinically.  He is on a trilogy machine at the time of my evaluation.  Also requiring frequent IV Dilaudid administration for chronic pain.      Objective   Objective   Vital Signs  Temp:  [97.3 °F (36.3 °C)-101.4 °F (38.6 °C)] 99.3 °F (37.4 °C)  Heart Rate:  [67-92] 92  Resp:  [18-20] 18  BP: (125-159)/() 142/84  SpO2:  [96 %-98 %] 98 %  on  Flow (L/min):  [8] 8;   Device (Oxygen Therapy): T - piece  Body mass index is 22.59 kg/m².  Physical Exam  HEENT: PERRLA, extraocular movements intact, sclerae no icterus  Neck: Supple, no JVD, does have a tracheostomy in place  Respiratory: Diminished breath sounds with mild rhonchi  Cardiovascular: Regular rate and rhythm with normal S1 and S2  GI: Soft, nontender, bowel sounds present, PEG tube in place  Extremities: No edema, atrophic muscles noted, palpable pedal pulses    Results Review     I reviewed the patient's new clinical results.  Results from last 7 days   Lab Units 21  0504 21  0324 21  1848 21  0626 21  0736   WBC 10*3/mm3 8.79 12.79*  --  10.11 9.86   HEMOGLOBIN g/dL 8.1* 8.0* 7.7* 6.5* 7.4*   PLATELETS 10*3/mm3 289 257  --  266 305     Results from last 7 days   Lab Units 21  0504 21  0626 21  0736 21  0602   SODIUM mmol/L 136 137 136 134*   POTASSIUM mmol/L 4.6 3.8 3.4* 4.1   CHLORIDE mmol/L 97* 97* 95* 92*   CO2 mmol/L 28.5 31.3* 28.4 29.3*   BUN mg/dL 18 19 23* 27*   CREATININE mg/dL 0.45* 0.49* 0.64* 0.60*   GLUCOSE mg/dL 96 128* 114* 100*   Estimated Creatinine Clearance: 227 mL/min (A) (by C-G formula based on SCr of 0.45 mg/dL (L)).  Results from last 7 days   Lab Units 21  0626 21  0736 21  0258   ALBUMIN g/dL 3.40* 3.60 3.80   BILIRUBIN mg/dL  1.7* 2.4* 2.4*   ALK PHOS U/L 118* 132* 136*   AST (SGOT) U/L 8 14 13   ALT (SGPT) U/L 16 19 25     Results from last 7 days   Lab Units 03/03/21  0504 03/01/21  0626 02/28/21  0736 02/27/21  0602 02/27/21  0258   CALCIUM mg/dL 9.5 9.1 8.9 9.2 9.6   ALBUMIN g/dL  --  3.40* 3.60  --  3.80   MAGNESIUM mg/dL 1.9  --   --  2.1  --    PHOSPHORUS mg/dL  --   --   --  4.9*  --      Results from last 7 days   Lab Units 03/03/21  0504 03/01/21  0626 02/28/21  0736 02/27/21  0258   PROCALCITONIN ng/mL 0.09 0.13 0.10 0.11   LACTATE mmol/L  --   --   --  1.1     COVID19   Date Value Ref Range Status   02/27/2021 Not Detected Not Detected - Ref. Range Final   02/16/2021 Not Detected Not Detected - Ref. Range Final     No results found for: HGBA1C, POCGLU    CT Chest Without Contrast Diagnostic  Narrative: CT CHEST WO CONTRAST DIAGNOSTIC-     CLINICAL HISTORY: Dyspnea. Pleural effusion. Quadriplegia. History of  mucous plugging and atelectasis.     TECHNIQUE: Spiral CT images were obtained through the chest without IV  contrast and were reconstructed in 3 mm thick slices.     Radiation dose reduction techniques were utilized, including automated  exposure control and exposure modulation based on body size.     COMPARISON: CT chest dated 02/10/2021     FINDINGS: A tracheostomy tube remains in satisfactory position.  Comparison with the previous study is difficult due to extensive  breathing motion artifact that was present on the previous study. There  are moderate-sized bilateral posteriorly layering pleural effusions,  greater in size on the right than the left. The effusion on the right  has increased in size somewhat in the interval. The effusion on the left  appears essentially unchanged. There is dense consolidation in the left  lower lobe containing a few air bronchograms that is most likely  primarily due to atelectasis. This appears slightly larger. Slightly  less extensive patchy consolidation in the right lower lobe  also appears  slightly more prominent. There are patchy reticulonodular opacities  throughout the right upper lobe that appear essentially new since the  preceding CT scan. These have a tree-in-bud appearance and are likely  due to mucus plugging within bronchioles. In addition, a 7 mm diameter  discrete noncalcified nodule in the right upper lobe appears new. There  also several additional smaller nodular opacities in the inferior aspect  of the right upper lobe that are new. These are all favored to be due to  peripheral mucous plugging, as well. Images through the upper abdomen  demonstrate no obvious abnormality.     Impression: Suboptimal study due to artifact from patient breathing  motion. There are moderate-sized bilateral posteriorly layering pleural  effusions. The effusion on the left is unchanged since a CT dated  02/10/2021. The effusion on the right appears slightly larger. Areas of  consolidation within both lower lobes also appear more prominent and are  most likely due to atelectasis, although superimposed pneumonia cannot  be excluded. Patchy reticulonodular opacities in the right upper lobe  are new and are likely secondary to mucous plugging within bronchioles.  There are also multiple macroscopic nodules in the inferior aspect of  the right upper lobe that are new and are quite likely secondary to  peripheral mucous plugging.     This report was finalized on 3/2/2021 8:11 PM by Dr. Rober Monson M.D.       Scheduled Medications  ascorbic acid, 1,000 mg, Per G Tube, Daily  cyclobenzaprine, 10 mg, Per G Tube, Daily  docusate sodium, 100 mg, Per G Tube, BID  fentaNYL, 1 patch, Transdermal, Q72H  furosemide, 20 mg, Oral, Daily  Gabapentin, 800 mg, Per G Tube, Q8H  guaifenesin, 400 mg, Per G Tube, Q6H  HYDROcodone-acetaminophen, 1 tablet, Oral, Q6H  hydrOXYzine, 25 mg, Oral, Daily  lansoprazole, 30 mg, Per G Tube, QAM  midodrine, 7.5 mg, Per G Tube, BID AC  multivitamin with minerals, 1 tablet,  Oral, Daily  polyethylene glycol, 17 g, Oral, Daily  Scopolamine, 1 patch, Transdermal, Q72H  senna, 1 tablet, Per G Tube, BID  sertraline, 100 mg, Per G Tube, Daily  sodium chloride, 10 mL, Intravenous, Q12H    Infusions  sodium chloride, 100 mL/hr, Last Rate: 100 mL/hr (03/04/21 0432)    Diet  JEVITY 1.5 JUSTO PO LIQD  NPO Diet       Assessment/Plan     Active Hospital Problems    Diagnosis  POA   • **Acute on chronic respiratory failure with hypoxemia (CMS/HCC) [J96.21]  Yes   • Hypotension [I95.9]  Yes   • Dyspnea [R06.00]  Yes   • Anemia [D64.9]  Yes   • Leukocytosis [D72.829]  Yes   • Quadriplegia (CMS/HCC) [G82.50]  Yes   • HCAP (healthcare-associated pneumonia) [J18.9]  Yes      Resolved Hospital Problems   No resolved problems to display.       1.Acute on chronic respiratory failure with hypoxemia secondary to mucous plugging and difficulty with secretion clearance. 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 Continue with aggressive pulmonary toilet.  Currently on trilogy unit with approximately 6 L.  Will discuss with pulmonary if patient would benefit from thoracentesis.  2. Anemia,   No evidence of any active bleeding and was transfused with 1 unit of PRBC.  3. Quadriplegia, continue to monitor for pressure ulcers.  4. Hypertension, continue to monitor closely is currently asymptomatic.  5. Episode of anxiety, continue with anxiolytics.    6. Cardiac pauses 3 second long, most likely felt to be vasovagal in etiology.  No further work-up at this point and cardiology did evaluate patient.    Alfredo Coelho MD  [facility]  03/04/21  13:59 EST

## 2021-03-04 NOTE — PLAN OF CARE
Goal Outcome Evaluation:  Plan of Care Reviewed With: patient      Pt has received numerous doses of IV pain meds, may need to explore other medications to control his pain since he will eventually be returning to a long term care facility, No other distress noted, safety maintained, continue plan of care. Despite lots of meds, patient did not sleep all night tonight or last evening either.

## 2021-03-04 NOTE — PLAN OF CARE
Pt progressing or maintaining on goals.    Goal Outcome Evaluation:  Problem: Adult Inpatient Plan of Care  Goal: Absence of Hospital-Acquired Illness or Injury  Outcome: Ongoing, Progressing  Intervention: Identify and Manage Fall Risk  Recent Flowsheet Documentation  Taken 3/4/2021 0800 by Marisela Gomez RN  Safety Promotion/Fall Prevention:   assistive device/personal items within reach   clutter free environment maintained   mobility aid in reach   safety round/check completed  Intervention: Prevent Infection  Recent Flowsheet Documentation  Taken 3/4/2021 0800 by Marisela Gomez RN  Infection Prevention: single patient room provided     Problem: Fall Injury Risk  Goal: Absence of Fall and Fall-Related Injury  Outcome: Ongoing, Progressing  Intervention: Identify and Manage Contributors to Fall Injury Risk  Recent Flowsheet Documentation  Taken 3/4/2021 0800 by Marisela Gomez RN  Medication Review/Management:   medications reviewed   high-risk medications identified   provider consulted  Intervention: Promote Injury-Free Environment  Recent Flowsheet Documentation  Taken 3/4/2021 0800 by Marisela Gomez RN  Safety Promotion/Fall Prevention:   assistive device/personal items within reach   clutter free environment maintained   mobility aid in reach   safety round/check completed

## 2021-03-05 LAB
ALBUMIN SERPL-MCNC: 3.3 G/DL (ref 3.5–5.2)
ALBUMIN/GLOB SERPL: 0.9 G/DL
ALP SERPL-CCNC: 112 U/L (ref 39–117)
ALT SERPL W P-5'-P-CCNC: 27 U/L (ref 1–41)
ANION GAP SERPL CALCULATED.3IONS-SCNC: 11 MMOL/L (ref 5–15)
AST SERPL-CCNC: 17 U/L (ref 1–40)
BASOPHILS # BLD AUTO: 0.01 10*3/MM3 (ref 0–0.2)
BASOPHILS NFR BLD AUTO: 0.1 % (ref 0–1.5)
BILIRUB SERPL-MCNC: 1 MG/DL (ref 0–1.2)
BUN SERPL-MCNC: 20 MG/DL (ref 6–20)
BUN/CREAT SERPL: 37 (ref 7–25)
CALCIUM SPEC-SCNC: 9.3 MG/DL (ref 8.6–10.5)
CHLORIDE SERPL-SCNC: 99 MMOL/L (ref 98–107)
CO2 SERPL-SCNC: 27 MMOL/L (ref 22–29)
CREAT SERPL-MCNC: 0.54 MG/DL (ref 0.76–1.27)
DEPRECATED RDW RBC AUTO: 49.6 FL (ref 37–54)
EOSINOPHIL # BLD AUTO: 0.51 10*3/MM3 (ref 0–0.4)
EOSINOPHIL NFR BLD AUTO: 7.6 % (ref 0.3–6.2)
ERYTHROCYTE [DISTWIDTH] IN BLOOD BY AUTOMATED COUNT: 17 % (ref 12.3–15.4)
GFR SERPL CREATININE-BSD FRML MDRD: >150 ML/MIN/1.73
GLOBULIN UR ELPH-MCNC: 3.7 GM/DL
GLUCOSE SERPL-MCNC: 108 MG/DL (ref 65–99)
HCT VFR BLD AUTO: 23.5 % (ref 37.5–51)
HGB BLD-MCNC: 7.9 G/DL (ref 13–17.7)
IMM GRANULOCYTES # BLD AUTO: 0.03 10*3/MM3 (ref 0–0.05)
IMM GRANULOCYTES NFR BLD AUTO: 0.4 % (ref 0–0.5)
LYMPHOCYTES # BLD AUTO: 1.14 10*3/MM3 (ref 0.7–3.1)
LYMPHOCYTES NFR BLD AUTO: 17.1 % (ref 19.6–45.3)
MCH RBC QN AUTO: 27.1 PG (ref 26.6–33)
MCHC RBC AUTO-ENTMCNC: 33.6 G/DL (ref 31.5–35.7)
MCV RBC AUTO: 80.8 FL (ref 79–97)
MONOCYTES # BLD AUTO: 0.54 10*3/MM3 (ref 0.1–0.9)
MONOCYTES NFR BLD AUTO: 8.1 % (ref 5–12)
NEUTROPHILS NFR BLD AUTO: 4.45 10*3/MM3 (ref 1.7–7)
NEUTROPHILS NFR BLD AUTO: 66.7 % (ref 42.7–76)
NRBC BLD AUTO-RTO: 0 /100 WBC (ref 0–0.2)
PLATELET # BLD AUTO: 277 10*3/MM3 (ref 140–450)
PMV BLD AUTO: 9.9 FL (ref 6–12)
POTASSIUM SERPL-SCNC: 4.2 MMOL/L (ref 3.5–5.2)
PROCALCITONIN SERPL-MCNC: 0.08 NG/ML (ref 0–0.25)
PROT SERPL-MCNC: 7 G/DL (ref 6–8.5)
RBC # BLD AUTO: 2.91 10*6/MM3 (ref 4.14–5.8)
SODIUM SERPL-SCNC: 137 MMOL/L (ref 136–145)
WBC # BLD AUTO: 6.68 10*3/MM3 (ref 3.4–10.8)

## 2021-03-05 PROCEDURE — 94799 UNLISTED PULMONARY SVC/PX: CPT

## 2021-03-05 PROCEDURE — 85025 COMPLETE CBC W/AUTO DIFF WBC: CPT | Performed by: INTERNAL MEDICINE

## 2021-03-05 PROCEDURE — 94760 N-INVAS EAR/PLS OXIMETRY 1: CPT

## 2021-03-05 PROCEDURE — 94660 CPAP INITIATION&MGMT: CPT

## 2021-03-05 PROCEDURE — 87040 BLOOD CULTURE FOR BACTERIA: CPT | Performed by: INTERNAL MEDICINE

## 2021-03-05 PROCEDURE — 99223 1ST HOSP IP/OBS HIGH 75: CPT | Performed by: INTERNAL MEDICINE

## 2021-03-05 PROCEDURE — 84145 PROCALCITONIN (PCT): CPT | Performed by: INTERNAL MEDICINE

## 2021-03-05 PROCEDURE — 25010000002 HYDROMORPHONE 1 MG/ML SOLUTION: Performed by: INTERNAL MEDICINE

## 2021-03-05 PROCEDURE — 80053 COMPREHEN METABOLIC PANEL: CPT | Performed by: INTERNAL MEDICINE

## 2021-03-05 RX ADMIN — SENNOSIDES 1 TABLET: 8.6 TABLET, FILM COATED ORAL at 21:08

## 2021-03-05 RX ADMIN — HYDROCODONE BITARTRATE AND ACETAMINOPHEN 10 ML: 7.5; 325 SOLUTION ORAL at 08:21

## 2021-03-05 RX ADMIN — OXYCODONE HYDROCHLORIDE 10 MG: 5 SOLUTION ORAL at 19:26

## 2021-03-05 RX ADMIN — GABAPENTIN 800 MG: 250 SOLUTION ORAL at 15:03

## 2021-03-05 RX ADMIN — DOCUSATE SODIUM 100 MG: 50 LIQUID ORAL at 23:50

## 2021-03-05 RX ADMIN — CYCLOBENZAPRINE 10 MG: 10 TABLET, FILM COATED ORAL at 10:38

## 2021-03-05 RX ADMIN — Medication 1 TABLET: at 08:22

## 2021-03-05 RX ADMIN — ALPRAZOLAM 0.5 MG: 0.5 TABLET ORAL at 10:38

## 2021-03-05 RX ADMIN — LANSOPRAZOLE 30 MG: KIT at 06:43

## 2021-03-05 RX ADMIN — GABAPENTIN 800 MG: 250 SOLUTION ORAL at 06:42

## 2021-03-05 RX ADMIN — HYDROCODONE BITARTRATE AND ACETAMINOPHEN 10 ML: 7.5; 325 SOLUTION ORAL at 15:03

## 2021-03-05 RX ADMIN — GUAIFENESIN 400 MG: 100 SOLUTION ORAL at 06:42

## 2021-03-05 RX ADMIN — FUROSEMIDE 20 MG: 20 TABLET ORAL at 08:22

## 2021-03-05 RX ADMIN — SODIUM CHLORIDE, PRESERVATIVE FREE 10 ML: 5 INJECTION INTRAVENOUS at 08:46

## 2021-03-05 RX ADMIN — HYDROMORPHONE HYDROCHLORIDE 1 MG: 1 INJECTION, SOLUTION INTRAMUSCULAR; INTRAVENOUS; SUBCUTANEOUS at 23:50

## 2021-03-05 RX ADMIN — OXYCODONE HYDROCHLORIDE AND ACETAMINOPHEN 1000 MG: 500 TABLET ORAL at 08:22

## 2021-03-05 RX ADMIN — HYDROCODONE BITARTRATE AND ACETAMINOPHEN 10 ML: 7.5; 325 SOLUTION ORAL at 21:08

## 2021-03-05 RX ADMIN — GUAIFENESIN 400 MG: 100 SOLUTION ORAL at 17:02

## 2021-03-05 RX ADMIN — HYDROXYZINE HYDROCHLORIDE 25 MG: 25 TABLET ORAL at 08:22

## 2021-03-05 RX ADMIN — HYDROMORPHONE HYDROCHLORIDE 1 MG: 1 INJECTION, SOLUTION INTRAMUSCULAR; INTRAVENOUS; SUBCUTANEOUS at 10:38

## 2021-03-05 RX ADMIN — OXYCODONE HYDROCHLORIDE 10 MG: 5 SOLUTION ORAL at 08:22

## 2021-03-05 RX ADMIN — GABAPENTIN 800 MG: 250 SOLUTION ORAL at 21:08

## 2021-03-05 RX ADMIN — HYDROCODONE BITARTRATE AND ACETAMINOPHEN 10 ML: 7.5; 325 SOLUTION ORAL at 02:37

## 2021-03-05 RX ADMIN — MIDODRINE HYDROCHLORIDE 7.5 MG: 5 TABLET ORAL at 16:56

## 2021-03-05 RX ADMIN — OXYCODONE HYDROCHLORIDE 10 MG: 5 SOLUTION ORAL at 04:31

## 2021-03-05 RX ADMIN — HYDROMORPHONE HYDROCHLORIDE 1 MG: 1 INJECTION, SOLUTION INTRAMUSCULAR; INTRAVENOUS; SUBCUTANEOUS at 16:56

## 2021-03-05 RX ADMIN — FENTANYL 1 PATCH: 25 PATCH TRANSDERMAL at 10:38

## 2021-03-05 RX ADMIN — GUAIFENESIN 400 MG: 100 SOLUTION ORAL at 15:03

## 2021-03-05 RX ADMIN — GUAIFENESIN 400 MG: 100 SOLUTION ORAL at 23:50

## 2021-03-05 RX ADMIN — SODIUM CHLORIDE, PRESERVATIVE FREE 10 ML: 5 INJECTION INTRAVENOUS at 21:09

## 2021-03-05 RX ADMIN — HYDROMORPHONE HYDROCHLORIDE 1 MG: 1 INJECTION, SOLUTION INTRAMUSCULAR; INTRAVENOUS; SUBCUTANEOUS at 03:35

## 2021-03-05 RX ADMIN — ALPRAZOLAM 0.5 MG: 0.5 TABLET ORAL at 19:26

## 2021-03-05 RX ADMIN — ALPRAZOLAM 0.5 MG: 0.5 TABLET ORAL at 02:38

## 2021-03-05 RX ADMIN — SERTRALINE 100 MG: 100 TABLET, FILM COATED ORAL at 08:22
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
## 2021-03-05 NOTE — PROGRESS NOTES
"  PROGRESS NOTE  Patient Name: Maxim Carvajal  Age/Sex: 35 y.o. male  : 1985  MRN: [id number]    Date of Admission: 2021  Date of Encounter Visit: 21   LOS: 6 days   Patient Care Team:  Sheron Perez MD as PCP - General (Family Medicine)    Chief Complaint: Acute respiratory failure, status post recent injury with quadriplegia, MRSA/Pseudomonas colonization, and anemia      Hospital course: Patient is feeling better on the ventilator that he does not want to use the T-piece even though that was discouraged, he needs to use a T-piece as much as tolerated during the daytime to avoid any deconditioning of the respiratory system.  Secretion are much better on the scopolamine patch, he is having decent urine output on the Lasix but unable to quantify.  His oxygen requirement has improved significantly  He is afebrile, his white count is normal no sign of bleeding.       REVIEW OF SYSTEMS:   CONSTITUTIONAL: no fever or chills  CARDIOVASCULAR: No chest pain, chest pressure or chest discomfort. No palpitations or edema.   RESPIRATORY: Currently on the ventilator with 40% FiO2  GASTROINTESTINAL: No anorexia, nausea, vomiting or diarrhea. No abdominal pain or blood.   HEMATOLOGIC: No bleeding or bruising.  However patient has anemia requiring transfusion    Ventilator/Non-Invasive Ventilation Settings (From admission, onward)    T-piece on 70% FiO2            Vital Signs  Temp:  [98.3 °F (36.8 °C)-99.1 °F (37.3 °C)] 98.6 °F (37 °C)  Heart Rate:  [68-90] 90  Resp:  [18] 18  BP: (129-145)/(82-94) 129/82  SpO2:  [97 %-100 %] 97 %  on  Flow (L/min):  [8] 8 Device (Oxygen Therapy): NPPV/NIV    Intake/Output Summary (Last 24 hours) at 3/5/2021 4096  Last data filed at 3/5/2021 0848  Gross per 24 hour   Intake 0 ml   Output --   Net 0 ml     Flowsheet Rows      First Filed Value   Admission Height  177.8 cm (70\") Documented at 2021 0346   Admission Weight  71.4 kg (157 lb 6.5 oz) Documented at " 02/27/2021 0346        Body mass index is 22.59 kg/m².      02/27/21  0346   Weight: 71.4 kg (157 lb 6.5 oz)       Physical Exam:  GEN:  No acute distress, alert, cooperative, well developed   EYES:   Sclerae clear. No icterus. PERRL. Normal EOM  ENT:   External ears/nose normal, no oral lesions, no thrush, mucous membranes moist  NECK:  Supple, midline trachea, no JVD, trach site is clean patient is on T-piece  LUNGS: Normal chest on inspection, improved breath sounds while on the ventilator, no more rhonchi, no wheezes with improvement of the breath sounds bilaterally, respirations regular, even and unlabored breathing in sync with the ventilator.  Still able to mouth and talk despite the above  CV:  Regular rhythm and rate. Normal S1/S2. No murmurs, gallops, or rubs noted.  ABD:  Soft, nontender and nondistended. Normal bowel sounds. No guarding  EXT: Moving upper extremity within his limitation, no movement of the lower extremities. No cyanosis. No redness.  Trace lower extremities edema.   Skin: Dry, intact, no bleeding    Results Review:    Results From Last 14 Days   Lab Units 02/27/21  0258   LACTATE mmol/L 1.1     Results from last 7 days   Lab Units 03/03/21  0504 03/01/21  0626 02/28/21  0736 02/27/21  0602 02/27/21  0258   SODIUM mmol/L 136 137 136 134* 133*   POTASSIUM mmol/L 4.6 3.8 3.4* 4.1 3.8   CHLORIDE mmol/L 97* 97* 95* 92* 92*   CO2 mmol/L 28.5 31.3* 28.4 29.3* 31.0*   BUN mg/dL 18 19 23* 27* 28*   CREATININE mg/dL 0.45* 0.49* 0.64* 0.60* 0.68*   CALCIUM mg/dL 9.5 9.1 8.9 9.2 9.6   AST (SGOT) U/L  --  8 14  --  13   ALT (SGPT) U/L  --  16 19  --  25   ANION GAP mmol/L 10.5 8.7 12.6 12.7 10.0   ALBUMIN g/dL  --  3.40* 3.60  --  3.80     Results from last 7 days   Lab Units 02/27/21  0258   TROPONIN T ng/mL <0.010         Results from last 7 days   Lab Units 03/01/21  0626 02/28/21  0736   PROBNP pg/mL 1,083.0* 517.8*     Results from last 7 days   Lab Units 03/03/21  0504 03/02/21  0324  03/01/21  1848 03/01/21  0626 02/28/21  0736 02/27/21  0602 02/27/21  0258   WBC 10*3/mm3 8.79 12.79*  --  10.11 9.86 14.90* 10.98*   HEMOGLOBIN g/dL 8.1* 8.0* 7.7* 6.5* 7.4* 7.9* 8.3*   HEMATOCRIT % 24.6* 24.3* 23.3* 19.8* 22.4* 24.7* 25.8*   PLATELETS 10*3/mm3 289 257  --  266 305 375 331   MCV fL 82.6 84.4  --  81.5 81.5 81.3 81.6   NEUTROPHIL % %  --   --   --  82.1* 77.1* 85.2* 77.1*   LYMPHOCYTE % %  --   --   --  9.9* 12.0* 8.1* 11.0*   MONOCYTES % %  --   --   --  6.3 7.4 5.2 8.4   EOSINOPHIL % %  --   --   --  1.3 3.0 0.7 3.0   BASOPHIL % %  --   --   --  0.1 0.1 0.1 0.1   IMM GRAN % %  --   --   --  0.3 0.4 0.7* 0.4     Results from last 7 days   Lab Units 02/27/21  0602   INR  1.15*     Results from last 7 days   Lab Units 03/03/21  0504 02/27/21  0602   MAGNESIUM mg/dL 1.9 2.1           Invalid input(s): LDLCALC  Results from last 7 days   Lab Units 02/27/21  0312   PH, ARTERIAL pH units 7.432   PCO2, ARTERIAL mm Hg 45.9*   PO2 ART mm Hg 69.7*   HCO3 ART mmol/L 30.6*         No results found for: POCGLU  Results from last 7 days   Lab Units 03/03/21  0504 03/01/21  0626 02/28/21  0736 02/27/21  0258   PROCALCITONIN ng/mL 0.09 0.13 0.10 0.11   LACTATE mmol/L  --   --   --  1.1     Results from last 7 days   Lab Units 02/27/21  1638 02/27/21  0340 02/27/21  0330 02/27/21  0259   BLOODCX   --  No growth at 5 days  --  No growth at 5 days   RESPCX  Light growth (2+) Pseudomonas aeruginosa*  Scant growth (1+) Normal Respiratory Lisa  --  Scant growth (1+) Pseudomonas aeruginosa*  Rare Normal Respiratory Lisa  --          Results from last 7 days   Lab Units 02/27/21  0236   COVID19  Not Detected   ADENOVIRUS DETECTION BY PCR  Not Detected   CORONAVIRUS 229E  Not Detected   CORONAVIRUS HKU1  Not Detected   CORONAVIRUS NL63  Not Detected   CORONAVIRUS OC43  Not Detected   HUMAN METAPNEUMOVIRUS  Not Detected   HUMAN RHINOVIRUS/ENTEROVIRUS  Not Detected   INFLUENZA B PCR  Not Detected   PARAINFLUENZA 1  Not  Detected   PARAINFLUENZA VIRUS 2  Not Detected   PARAINFLUENZA VIRUS 3  Not Detected   PARAINFLUENZA VIRUS 4  Not Detected   BORDETELLA PERTUSSIS PCR  Not Detected   BORDETELLA PARAPERTUSSIS PCR  Not Detected   CHLAMYDOPHILA PNEUMONIAE PCR  Not Detected   MYCOPLAMA PNEUMO PCR  Not Detected   RSV, PCR  Not Detected               Imaging:   Imaging Results (All)     Procedure Component Value Units Date/Time         I reviewed the patient's new clinical results.  I personally viewed and interpreted the patient's imaging results: Chest CT was reviewed and patient has bilateral pleural effusion moderate to large on the right mild on the left with pneumonia cannot be ruled out but this is more consistent with passive atelectasis .  Medication Review:   ascorbic acid, 1,000 mg, Per G Tube, Daily  cyclobenzaprine, 10 mg, Per G Tube, Daily  docusate sodium, 100 mg, Per G Tube, BID  fentaNYL, 1 patch, Transdermal, Q72H  furosemide, 20 mg, Oral, Daily  Gabapentin, 800 mg, Per G Tube, Q8H  guaifenesin, 400 mg, Per G Tube, Q6H  HYDROcodone-acetaminophen, 10 mL, Per G Tube, Q6H  hydrOXYzine, 25 mg, Oral, Daily  lansoprazole, 30 mg, Per G Tube, QAM  midodrine, 7.5 mg, Per G Tube, BID AC  multivitamin with minerals, 1 tablet, Oral, Daily  polyethylene glycol, 17 g, Oral, Daily  Scopolamine, 1 patch, Transdermal, Q72H  senna, 1 tablet, Per G Tube, BID  [START ON 3/6/2021] sertraline, 150 mg, Per G Tube, Daily  sodium chloride, 10 mL, Intravenous, Q12H             ASSESSMENT:   1. Acute hypoxic respiratory failure  2. Mucous plugging of the airways  3. History of Pseudomonas colonization of the airways  4. History of MRSA pneumonia  5. Quadriplegia  6. Chronic tracheostomy  7. Chronic respiratory failure due to quadriplegia  8. Anemia status post transfusion  9. Pleural effusion, new problem    PLAN:  Continue to improve, off IV fluid, on low-dose p.o. Lasix of 20 mg daily  Continue with the scopolamine  Continue to monitor off  antibiotic  Need to come off the ventilator during the daytime and use it only at night and can have a as needed in case of any respiratory distress.  Need to have a follow-up chest x-ray on Monday to follow-up on the pleural effusion and the atelectasis.  Linda with the patient with education provided about the improvement in his status so far and the need to work on his spontaneous breathing.  The plan is to go back to the nursing home/rehab without any ventilatory support        Disposition: Depending on hospital course    Meg Bynum MD  03/05/21  14:55 EST           Dictated utilizing Dragon dictation

## 2021-03-05 NOTE — PROGRESS NOTES
Continued Stay Note  [facility]     Patient Name: Maxim Carvajal  MRN: [id number]  Today's Date: 3/5/2021    Admit Date: 2/27/2021    Discharge Plan     Row Name 03/05/21 1633       Plan    Plan  Return to [facility]    Patient/Family in Agreement with Plan  yes    Plan Comments  CCP spoke with Ivon/Louis who says patient can return to [facility] but cannot return over weekend due to facility not having enough RT and staff. Will have bed on monday and cannot be on more than 6 L oxygen or 40% fio2. Will require EMS for transportation. CCP to continue to follow. Katie Young RN/CCP        Discharge Codes    No documentation.       Expected Discharge Date and Time     Expected Discharge Date Expected Discharge Time    Mar 5, 2021             Felisa Young

## 2021-03-05 NOTE — PLAN OF CARE
Pt maintaining or progressing on all goals   Goal Outcome Evaluation:    Problem: Adult Inpatient Plan of Care  Goal: Absence of Hospital-Acquired Illness or Injury  Outcome: Ongoing, Progressing  Intervention: Identify and Manage Fall Risk  Recent Flowsheet Documentation  Taken 3/5/2021 0848 by Jason, Marisela, RN  Safety Promotion/Fall Prevention:   activity supervised   assistive device/personal items within reach   fall prevention program maintained   lighting adjusted  Intervention: Prevent Skin Injury  Recent Flowsheet Documentation  Taken 3/5/2021 0848 by Marisela Gomez RN  Body Position: supine  Intervention: Prevent Infection  Recent Flowsheet Documentation  Taken 3/5/2021 0848 by Marisela Gomez RN  Infection Prevention: single patient room provided     Problem: Skin Injury Risk Increased  Goal: Skin Health and Integrity  Outcome: Ongoing, Progressing     Problem: Fall Injury Risk  Goal: Absence of Fall and Fall-Related Injury  Intervention: Promote Injury-Free Environment  Recent Flowsheet Documentation  Taken 3/5/2021 0848 by Jason, Marisela, RN  Safety Promotion/Fall Prevention:   activity supervised   assistive device/personal items within reach   fall prevention program maintained   lighting adjusted

## 2021-03-05 NOTE — PLAN OF CARE
Goal Outcome Evaluation:  Plan of Care Reviewed With: patient      I have cared for this gentleman the last 3 nights, no matter what the pain medication regimen is, he never claims to achieve any pain relief. NO other acute distress noted, patient tolerates BIPAP well and it has decreased his need to be trach suctioned, safety maintained, pt frequently refuses to be turned and repositioned despite explaining to him that is endangers his skin integrity, continue plan of care

## 2021-03-05 NOTE — PROGRESS NOTES
Name: Maxim Carvajal ADMIT: 2021   : 1985  PCP: Sheron Perez MD    MRN: [id number] LOS: 6 days   AGE/SEX: 35 y.o. male  ROOM: [facility]     Subjective   Subjective     Patient is lying on the bed and is on trilogy.  Appears comfortable and is not in any distress.  No new events overnight.      Objective   Objective   Vital Signs  Temp:  [98.3 °F (36.8 °C)-99.1 °F (37.3 °C)] 98.6 °F (37 °C)  Heart Rate:  [68-90] 90  Resp:  [18] 18  BP: (129-145)/(82-94) 129/82  SpO2:  [97 %-100 %] 97 %  on  Flow (L/min):  [8] 8;   Device (Oxygen Therapy): NPPV/NIV  Body mass index is 22.59 kg/m².  Physical Exam  HEENT: PERRLA, extraocular movements intact, sclerae no icterus  Neck: Supple, no JVD, does have a tracheostomy in place  Respiratory: Diminished breath sounds with mild rhonchi  Cardiovascular: Regular rate and rhythm with normal S1 and S2  GI: Soft, nontender, bowel sounds present, PEG tube in place  Extremities: No edema, atrophic muscles noted, palpable pedal pulses    Results Review     I reviewed the patient's new clinical results.  Results from last 7 days   Lab Units 21  0504 21  0324 21  1848 21  0626 21  0736   WBC 10*3/mm3 8.79 12.79*  --  10.11 9.86   HEMOGLOBIN g/dL 8.1* 8.0* 7.7* 6.5* 7.4*   PLATELETS 10*3/mm3 289 257  --  266 305     Results from last 7 days   Lab Units 21  0504 21  0626 21  0736 21  0602   SODIUM mmol/L 136 137 136 134*   POTASSIUM mmol/L 4.6 3.8 3.4* 4.1   CHLORIDE mmol/L 97* 97* 95* 92*   CO2 mmol/L 28.5 31.3* 28.4 29.3*   BUN mg/dL 18 19 23* 27*   CREATININE mg/dL 0.45* 0.49* 0.64* 0.60*   GLUCOSE mg/dL 96 128* 114* 100*   Estimated Creatinine Clearance: 227 mL/min (A) (by C-G formula based on SCr of 0.45 mg/dL (L)).  Results from last 7 days   Lab Units 21  0626 21  0736 21  0258   ALBUMIN g/dL 3.40* 3.60 3.80   BILIRUBIN mg/dL 1.7* 2.4* 2.4*   ALK PHOS U/L 118* 132* 136*   AST (SGOT) U/L 8 14 13    ALT (SGPT) U/L 16 19 25     Results from last 7 days   Lab Units 03/03/21  0504 03/01/21  0626 02/28/21  0736 02/27/21  0602 02/27/21  0258   CALCIUM mg/dL 9.5 9.1 8.9 9.2 9.6   ALBUMIN g/dL  --  3.40* 3.60  --  3.80   MAGNESIUM mg/dL 1.9  --   --  2.1  --    PHOSPHORUS mg/dL  --   --   --  4.9*  --      Results from last 7 days   Lab Units 03/03/21  0504 03/01/21  0626 02/28/21  0736 02/27/21  0258   PROCALCITONIN ng/mL 0.09 0.13 0.10 0.11   LACTATE mmol/L  --   --   --  1.1     COVID19   Date Value Ref Range Status   02/27/2021 Not Detected Not Detected - Ref. Range Final   02/16/2021 Not Detected Not Detected - Ref. Range Final     No results found for: HGBA1C, POCGLU    CT Chest Without Contrast Diagnostic  Narrative: CT CHEST WO CONTRAST DIAGNOSTIC-     CLINICAL HISTORY: Dyspnea. Pleural effusion. Quadriplegia. History of  mucous plugging and atelectasis.     TECHNIQUE: Spiral CT images were obtained through the chest without IV  contrast and were reconstructed in 3 mm thick slices.     Radiation dose reduction techniques were utilized, including automated  exposure control and exposure modulation based on body size.     COMPARISON: CT chest dated 02/10/2021     FINDINGS: A tracheostomy tube remains in satisfactory position.  Comparison with the previous study is difficult due to extensive  breathing motion artifact that was present on the previous study. There  are moderate-sized bilateral posteriorly layering pleural effusions,  greater in size on the right than the left. The effusion on the right  has increased in size somewhat in the interval. The effusion on the left  appears essentially unchanged. There is dense consolidation in the left  lower lobe containing a few air bronchograms that is most likely  primarily due to atelectasis. This appears slightly larger. Slightly  less extensive patchy consolidation in the right lower lobe also appears  slightly more prominent. There are patchy reticulonodular  opacities  throughout the right upper lobe that appear essentially new since the  preceding CT scan. These have a tree-in-bud appearance and are likely  due to mucus plugging within bronchioles. In addition, a 7 mm diameter  discrete noncalcified nodule in the right upper lobe appears new. There  also several additional smaller nodular opacities in the inferior aspect  of the right upper lobe that are new. These are all favored to be due to  peripheral mucous plugging, as well. Images through the upper abdomen  demonstrate no obvious abnormality.     Impression: Suboptimal study due to artifact from patient breathing  motion. There are moderate-sized bilateral posteriorly layering pleural  effusions. The effusion on the left is unchanged since a CT dated  02/10/2021. The effusion on the right appears slightly larger. Areas of  consolidation within both lower lobes also appear more prominent and are  most likely due to atelectasis, although superimposed pneumonia cannot  be excluded. Patchy reticulonodular opacities in the right upper lobe  are new and are likely secondary to mucous plugging within bronchioles.  There are also multiple macroscopic nodules in the inferior aspect of  the right upper lobe that are new and are quite likely secondary to  peripheral mucous plugging.     This report was finalized on 3/2/2021 8:11 PM by Dr. Rober Monson M.D.       Scheduled Medications  ascorbic acid, 1,000 mg, Per G Tube, Daily  cyclobenzaprine, 10 mg, Per G Tube, Daily  docusate sodium, 100 mg, Per G Tube, BID  fentaNYL, 1 patch, Transdermal, Q72H  furosemide, 20 mg, Oral, Daily  Gabapentin, 800 mg, Per G Tube, Q8H  guaifenesin, 400 mg, Per G Tube, Q6H  HYDROcodone-acetaminophen, 10 mL, Per G Tube, Q6H  hydrOXYzine, 25 mg, Oral, Daily  lansoprazole, 30 mg, Per G Tube, QAM  midodrine, 7.5 mg, Per G Tube, BID AC  multivitamin with minerals, 1 tablet, Oral, Daily  polyethylene glycol, 17 g, Oral, Daily  Scopolamine, 1  patch, Transdermal, Q72H  senna, 1 tablet, Per G Tube, BID  [START ON 3/6/2021] sertraline, 150 mg, Per G Tube, Daily  sodium chloride, 10 mL, Intravenous, Q12H    Infusions   Diet  JEVITY 1.5 JUSTO PO LIQD  NPO Diet       Assessment/Plan     Active Hospital Problems    Diagnosis  POA   • **Acute on chronic respiratory failure with hypoxemia (CMS/HCC) [J96.21]  Yes   • Hypotension [I95.9]  Yes   • Dyspnea [R06.00]  Yes   • Anemia [D64.9]  Yes   • Leukocytosis [D72.829]  Yes   • Quadriplegia (CMS/HCC) [G82.50]  Yes   • HCAP (healthcare-associated pneumonia) [J18.9]  Yes      Resolved Hospital Problems   No resolved problems to display.       1.Acute on chronic respiratory failure with hypoxemia secondary to mucous plugging and difficulty with secretion clearance. 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 Continue with aggressive pulmonary toilet.  Trilogy unit is being cut back to mainly during sleep and at night.  Plan is to go on supplemental oxygen and not trilogy unit.  Continue with diuretics for the pleural effusion appreciate pulmonary input.  No plans for thoracentesis per pulmonary at this point.  2. Anemia,   No evidence of any active bleeding and was transfused with 1 unit of PRBC.  3. Quadriplegia, continue to monitor for pressure ulcers.  4. Hypertension, continue to monitor closely is currently asymptomatic.  5. Episode of anxiety, continue with anxiolytics.    6. Cardiac pauses 3 second long, most likely felt to be vasovagal in etiology.  No further work-up at this point and cardiology did evaluate patient.    Alfredo Coelho MD  [facility]  03/05/21  14:43 EST
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
## 2021-03-06 ENCOUNTER — APPOINTMENT (OUTPATIENT)
Dept: GENERAL RADIOLOGY | Facility: HOSPITAL | Age: 36
End: 2021-03-06

## 2021-03-06 LAB
GLUCOSE BLDC GLUCOMTR-MCNC: 105 MG/DL (ref 70–130)
GLUCOSE BLDC GLUCOMTR-MCNC: 113 MG/DL (ref 70–130)
GLUCOSE BLDC GLUCOMTR-MCNC: 117 MG/DL (ref 70–130)

## 2021-03-06 PROCEDURE — 25010000002 PROPOFOL 10 MG/ML EMULSION

## 2021-03-06 PROCEDURE — 5A1955Z RESPIRATORY VENTILATION, GREATER THAN 96 CONSECUTIVE HOURS: ICD-10-PCS | Performed by: INTERNAL MEDICINE

## 2021-03-06 PROCEDURE — 94760 N-INVAS EAR/PLS OXIMETRY 1: CPT

## 2021-03-06 PROCEDURE — 87186 SC STD MICRODIL/AGAR DIL: CPT | Performed by: INTERNAL MEDICINE

## 2021-03-06 PROCEDURE — 94799 UNLISTED PULMONARY SVC/PX: CPT

## 2021-03-06 PROCEDURE — 0WCQ8ZZ EXTIRPATION OF MATTER FROM RESPIRATORY TRACT, VIA NATURAL OR ARTIFICIAL OPENING ENDOSCOPIC: ICD-10-PCS | Performed by: INTERNAL MEDICINE

## 2021-03-06 PROCEDURE — 94002 VENT MGMT INPAT INIT DAY: CPT

## 2021-03-06 PROCEDURE — 25010000002 HYDROMORPHONE 1 MG/ML SOLUTION: Performed by: INTERNAL MEDICINE

## 2021-03-06 PROCEDURE — 94003 VENT MGMT INPAT SUBQ DAY: CPT

## 2021-03-06 PROCEDURE — 82962 GLUCOSE BLOOD TEST: CPT

## 2021-03-06 PROCEDURE — 25010000002 ONDANSETRON PER 1 MG: Performed by: INTERNAL MEDICINE

## 2021-03-06 PROCEDURE — 87070 CULTURE OTHR SPECIMN AEROBIC: CPT | Performed by: INTERNAL MEDICINE

## 2021-03-06 PROCEDURE — 99232 SBSQ HOSP IP/OBS MODERATE 35: CPT | Performed by: INTERNAL MEDICINE

## 2021-03-06 PROCEDURE — 71045 X-RAY EXAM CHEST 1 VIEW: CPT

## 2021-03-06 PROCEDURE — 87205 SMEAR GRAM STAIN: CPT | Performed by: INTERNAL MEDICINE

## 2021-03-06 RX ORDER — SODIUM CHLORIDE FOR INHALATION 7 %
4 VIAL, NEBULIZER (ML) INHALATION
Status: DISCONTINUED | OUTPATIENT
Start: 2021-03-06 | End: 2021-03-16 | Stop reason: HOSPADM

## 2021-03-06 RX ORDER — ACETYLCYSTEINE 200 MG/ML
3 SOLUTION ORAL; RESPIRATORY (INHALATION)
Status: DISCONTINUED | OUTPATIENT
Start: 2021-03-06 | End: 2021-03-16 | Stop reason: HOSPADM

## 2021-03-06 RX ORDER — CHLORHEXIDINE GLUCONATE 0.12 MG/ML
15 RINSE ORAL EVERY 12 HOURS SCHEDULED
Status: DISCONTINUED | OUTPATIENT
Start: 2021-03-06 | End: 2021-03-16 | Stop reason: HOSPADM

## 2021-03-06 RX ORDER — ALBUTEROL SULFATE 90 UG/1
2 AEROSOL, METERED RESPIRATORY (INHALATION) EVERY 4 HOURS PRN
Status: DISCONTINUED | OUTPATIENT
Start: 2021-03-06 | End: 2021-03-08

## 2021-03-06 RX ORDER — ALBUTEROL SULFATE 2.5 MG/3ML
2.5 SOLUTION RESPIRATORY (INHALATION) EVERY 6 HOURS PRN
Status: DISCONTINUED | OUTPATIENT
Start: 2021-03-06 | End: 2021-03-10

## 2021-03-06 RX ORDER — PROPOFOL 10 MG/ML
VIAL (ML) INTRAVENOUS
Status: COMPLETED
Start: 2021-03-06 | End: 2021-03-06

## 2021-03-06 RX ADMIN — Medication 4 ML: at 11:41

## 2021-03-06 RX ADMIN — GUAIFENESIN 400 MG: 100 SOLUTION ORAL at 18:50

## 2021-03-06 RX ADMIN — DOCUSATE SODIUM 100 MG: 50 LIQUID ORAL at 10:26

## 2021-03-06 RX ADMIN — ALBUTEROL SULFATE 2 PUFF: 90 AEROSOL, METERED RESPIRATORY (INHALATION) at 20:05

## 2021-03-06 RX ADMIN — SENNOSIDES 1 TABLET: 8.6 TABLET, FILM COATED ORAL at 20:46

## 2021-03-06 RX ADMIN — HYDROCODONE BITARTRATE AND ACETAMINOPHEN 10 ML: 7.5; 325 SOLUTION ORAL at 16:41

## 2021-03-06 RX ADMIN — ALPRAZOLAM 0.5 MG: 0.5 TABLET ORAL at 20:47

## 2021-03-06 RX ADMIN — OXYCODONE HYDROCHLORIDE 10 MG: 5 SOLUTION ORAL at 19:04

## 2021-03-06 RX ADMIN — ACETYLCYSTEINE 3 ML: 200 SOLUTION ORAL; RESPIRATORY (INHALATION) at 11:39

## 2021-03-06 RX ADMIN — ALPRAZOLAM 0.5 MG: 0.5 TABLET ORAL at 04:08

## 2021-03-06 RX ADMIN — OXYCODONE HYDROCHLORIDE AND ACETAMINOPHEN 1000 MG: 500 TABLET ORAL at 10:25

## 2021-03-06 RX ADMIN — ONDANSETRON 4 MG: 2 INJECTION INTRAMUSCULAR; INTRAVENOUS at 23:47

## 2021-03-06 RX ADMIN — GABAPENTIN 800 MG: 250 SOLUTION ORAL at 14:38

## 2021-03-06 RX ADMIN — GUAIFENESIN 400 MG: 100 SOLUTION ORAL at 12:41

## 2021-03-06 RX ADMIN — HYDROXYZINE HYDROCHLORIDE 25 MG: 25 TABLET ORAL at 10:26

## 2021-03-06 RX ADMIN — OXYCODONE HYDROCHLORIDE 10 MG: 5 SOLUTION ORAL at 14:53

## 2021-03-06 RX ADMIN — HYDROMORPHONE HYDROCHLORIDE 1 MG: 1 INJECTION, SOLUTION INTRAMUSCULAR; INTRAVENOUS; SUBCUTANEOUS at 08:02

## 2021-03-06 RX ADMIN — GABAPENTIN 800 MG: 250 SOLUTION ORAL at 22:35

## 2021-03-06 RX ADMIN — PROPOFOL 15 MCG/KG/MIN: 10 INJECTION, EMULSION INTRAVENOUS at 08:48

## 2021-03-06 RX ADMIN — MIDODRINE HYDROCHLORIDE 7.5 MG: 5 TABLET ORAL at 06:18

## 2021-03-06 RX ADMIN — OXYCODONE HYDROCHLORIDE 10 MG: 5 SOLUTION ORAL at 06:17

## 2021-03-06 RX ADMIN — HYDROCODONE BITARTRATE AND ACETAMINOPHEN 10 ML: 7.5; 325 SOLUTION ORAL at 20:47

## 2021-03-06 RX ADMIN — MIDODRINE HYDROCHLORIDE 7.5 MG: 5 TABLET ORAL at 16:42

## 2021-03-06 RX ADMIN — SODIUM CHLORIDE, PRESERVATIVE FREE 10 ML: 5 INJECTION INTRAVENOUS at 22:43

## 2021-03-06 RX ADMIN — OXYCODONE HYDROCHLORIDE 10 MG: 5 SOLUTION ORAL at 23:36

## 2021-03-06 RX ADMIN — ALBUTEROL SULFATE 2.5 MG: 2.5 SOLUTION RESPIRATORY (INHALATION) at 11:39

## 2021-03-06 RX ADMIN — GABAPENTIN 800 MG: 250 SOLUTION ORAL at 06:16

## 2021-03-06 RX ADMIN — HYDROCODONE BITARTRATE AND ACETAMINOPHEN 10 ML: 7.5; 325 SOLUTION ORAL at 10:24

## 2021-03-06 RX ADMIN — ONDANSETRON 4 MG: 2 INJECTION INTRAMUSCULAR; INTRAVENOUS at 09:48

## 2021-03-06 RX ADMIN — SCOPALAMINE 1 PATCH: 1 PATCH, EXTENDED RELEASE TRANSDERMAL at 18:35

## 2021-03-06 RX ADMIN — LANSOPRAZOLE 30 MG: KIT at 06:18

## 2021-03-06 RX ADMIN — SERTRALINE 150 MG: 100 TABLET, FILM COATED ORAL at 10:25

## 2021-03-06 RX ADMIN — Medication 1 TABLET: at 10:25

## 2021-03-06 RX ADMIN — CYCLOBENZAPRINE 10 MG: 10 TABLET, FILM COATED ORAL at 10:25

## 2021-03-06 RX ADMIN — Medication 4 ML: at 20:05

## 2021-03-06 RX ADMIN — CHLORHEXIDINE GLUCONATE 15 ML: 1.2 RINSE ORAL at 22:43

## 2021-03-06 RX ADMIN — CHLORHEXIDINE GLUCONATE 15 ML: 1.2 RINSE ORAL at 10:26

## 2021-03-06 RX ADMIN — SENNOSIDES 1 TABLET: 8.6 TABLET, FILM COATED ORAL at 10:24

## 2021-03-06 RX ADMIN — FUROSEMIDE 20 MG: 20 TABLET ORAL at 10:25

## 2021-03-06 RX ADMIN — POLYETHYLENE GLYCOL 3350 17 G: 17 POWDER, FOR SOLUTION ORAL at 10:24

## 2021-03-06 RX ADMIN — DEXMEDETOMIDINE HYDROCHLORIDE 0.4 MCG/KG/HR: 100 INJECTION, SOLUTION, CONCENTRATE INTRAVENOUS at 08:32

## 2021-03-06 RX ADMIN — OXYCODONE HYDROCHLORIDE 10 MG: 5 SOLUTION ORAL at 01:50

## 2021-03-06 RX ADMIN — DOCUSATE SODIUM 100 MG: 50 LIQUID ORAL at 22:37

## 2021-03-06 RX ADMIN — GUAIFENESIN 400 MG: 100 SOLUTION ORAL at 06:17

## 2021-03-06 RX ADMIN — HYDROCODONE BITARTRATE AND ACETAMINOPHEN 10 ML: 7.5; 325 SOLUTION ORAL at 04:08

## 2021-03-06 RX ADMIN — SODIUM CHLORIDE, PRESERVATIVE FREE 10 ML: 5 INJECTION INTRAVENOUS at 10:26

## 2021-03-06 RX ADMIN — GUAIFENESIN 400 MG: 100 SOLUTION ORAL at 23:36

## 2021-03-06 NOTE — PROGRESS NOTES
Name: Maxim Carvajal ADMIT: 2021   : 1985  PCP: Sheron Perez MD    MRN: [id number] LOS: 7 days   AGE/SEX: 35 y.o. male  ROOM: [facility]     Subjective   Subjective       Patient is lying on the bed in no major distress.  Currently on trilogy and FiO2 requirement is at 35%.  He is also on tube feeds and the running at 55 mL/hour.  He has no specific complaints today and appears comfortable.      Objective   Objective   Vital Signs  Temp:  [98.1 °F (36.7 °C)-100.4 °F (38 °C)] 98.6 °F (37 °C)  Heart Rate:  [] 79  Resp:  [14-29] 25  BP: ()/(47-86) 110/66  FiO2 (%):  [60 %-100 %] 60 %  SpO2:  [85 %-100 %] 100 %  on  Flow (L/min):  [12] 12;   Device (Oxygen Therapy): ventilator  Body mass index is 22.59 kg/m².  Physical Exam  HEENT: PERRLA, extraocular movements intact, sclerae no icterus  Neck: Supple, no JVD, does have a tracheostomy in place  Respiratory: Diminished breath sounds with mild rhonchi  Cardiovascular: Regular rate and rhythm with normal S1 and S2  GI: Soft, nontender, bowel sounds present, PEG tube in place  Extremities: No edema, atrophic muscles noted, palpable pedal pulses    Results Review     I reviewed the patient's new clinical results.  Results from last 7 days   Lab Units 21  0504 21  0324 21  1848 21  0626   WBC 10*3/mm3 6.68 8.79 12.79*  --  10.11   HEMOGLOBIN g/dL 7.9* 8.1* 8.0* 7.7* 6.5*   PLATELETS 10*3/mm3 277 289 257  --  266     Results from last 7 days   Lab Units 21  0504 21  0626 21  0736   SODIUM mmol/L 137 136 137 136   POTASSIUM mmol/L 4.2 4.6 3.8 3.4*   CHLORIDE mmol/L 99 97* 97* 95*   CO2 mmol/L 27.0 28.5 31.3* 28.4   BUN mg/dL 20 18 19 23*   CREATININE mg/dL 0.54* 0.45* 0.49* 0.64*   GLUCOSE mg/dL 108* 96 128* 114*   Estimated Creatinine Clearance: 189.2 mL/min (A) (by C-G formula based on SCr of 0.54 mg/dL (L)).  Results from last 7 days   Lab Units 21  7758  03/01/21  0626 02/28/21  0736   ALBUMIN g/dL 3.30* 3.40* 3.60   BILIRUBIN mg/dL 1.0 1.7* 2.4*   ALK PHOS U/L 112 118* 132*   AST (SGOT) U/L 17 8 14   ALT (SGPT) U/L 27 16 19     Results from last 7 days   Lab Units 03/05/21  1654 03/03/21  0504 03/01/21  0626 02/28/21  0736   CALCIUM mg/dL 9.3 9.5 9.1 8.9   ALBUMIN g/dL 3.30*  --  3.40* 3.60   MAGNESIUM mg/dL  --  1.9  --   --      Results from last 7 days   Lab Units 03/05/21 1654 03/03/21  0504 03/01/21 0626 02/28/21  0736   PROCALCITONIN ng/mL 0.08 0.09 0.13 0.10     COVID19   Date Value Ref Range Status   02/27/2021 Not Detected Not Detected - Ref. Range Final   02/16/2021 Not Detected Not Detected - Ref. Range Final     Glucose   Date/Time Value Ref Range Status   03/06/2021 1205 113 70 - 130 mg/dL Final   03/06/2021 0825 105 70 - 130 mg/dL Final   03/06/2021 0807 117 70 - 130 mg/dL Final       XR Chest 1 View  ONE VIEW PORTABLE CHEST AT 9:29 AM     HISTORY: Respiratory failure. Quadriplegia. Pleural effusions and  bibasilar pneumonia noted on recent CT scan.     FINDINGS: The lungs are well-expanded with improvement in changes of  bibasilar pneumonia and mucous plugging and pleural effusions noted on  the portable chest x-ray performed 5 days ago as well as a chest CT scan  performed 4 days ago. There remains some vague atelectasis an infiltrate  predominantly at the right base. The heart size is normal. A  tracheostomy tube is in place.     This report was finalized on 3/6/2021 9:50 AM by Dr. Benjamin De Leon M.D.       Scheduled Medications  acetylcysteine, 3 mL, Nebulization, BID - RT  ascorbic acid, 1,000 mg, Per G Tube, Daily  chlorhexidine, 15 mL, Mouth/Throat, Q12H  cyclobenzaprine, 10 mg, Per G Tube, Daily  docusate sodium, 100 mg, Per G Tube, BID  fentaNYL, 1 patch, Transdermal, Q72H  furosemide, 20 mg, Oral, Daily  Gabapentin, 800 mg, Per G Tube, Q8H  guaifenesin, 400 mg, Per G Tube, Q6H  HYDROcodone-acetaminophen, 10 mL, Per G Tube,  Q6H  hydrOXYzine, 25 mg, Oral, Daily  lansoprazole, 30 mg, Per G Tube, QAM  midodrine, 7.5 mg, Per G Tube, BID AC  multivitamin with minerals, 1 tablet, Oral, Daily  polyethylene glycol, 17 g, Oral, Daily  Scopolamine, 1 patch, Transdermal, Q72H  senna, 1 tablet, Per G Tube, BID  sertraline, 150 mg, Per G Tube, Daily  sodium chloride, 10 mL, Intravenous, Q12H  sodium chloride, 4 mL, Nebulization, BID - RT    Infusions   Diet  JEVITY 1.5 JUSTO PO LIQD  NPO Diet       Assessment/Plan     Active Hospital Problems    Diagnosis  POA   • **Acute on chronic respiratory failure with hypoxemia (CMS/HCC) [J96.21]  Yes   • Hypotension [I95.9]  Yes   • Dyspnea [R06.00]  Yes   • Anemia [D64.9]  Yes   • Leukocytosis [D72.829]  Yes   • Quadriplegia (CMS/HCC) [G82.50]  Yes   • HCAP (healthcare-associated pneumonia) [J18.9]  Yes      Resolved Hospital Problems   No resolved problems to display.       1.Acute on chronic respiratory failure with hypoxemia secondary to mucous plugging and difficulty with secretion clearance. 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 Continue with aggressive pulmonary toilet.  Trilogy unit is being cut back to mainly during sleep and at night.  Plan is to be discharged on supplemental oxygen and not trilogy unit.  Continue with diuretics for the pleural effusion appreciate pulmonary input.  No plans for thoracentesis per pulmonary at this point.  2. Anemia,   No evidence of any active bleeding and was transfused with 1 unit of PRBC.  3. Quadriplegia, continue to monitor for pressure ulcers.  4. Hypertension, continue to monitor closely is currently asymptomatic.  5. Episode of anxiety, continue with anxiolytics.    6. Cardiac pauses 3 second long, most likely felt to be vasovagal in etiology.  No further work-up at this point and cardiology did evaluate patient.    Alfredo Coelho MD  [facility]  03/06/21  14:54 EST

## 2021-03-06 NOTE — PROGRESS NOTES
"[facility] Infectious Disease Progress Note    Maxim Carvajal is a 35 y.o. male     Tmax 100.4 F on 3/5/2021 and afebile this AM.        Quadriplegic, vented:  O2/Vent Data  (Last 4)    03/06 0847 03/06 0900 03/06 0909 03/06 0912   SpO2 (%) 100 99 100 99   Device (Oxygen Therapy)  ventilator     FiO2 (%) (%)  100     PIP Observed (cm H2O) (cm)  46     Resp Rate (Set)  16     Resp Rate (Observed) Vent  46     PEEP/CPAP (cm H2O) (cm)  10     Pulmonary status: aggressively cleared secretions from patient's airway by B-scope toiletry on 3/6/2021 and patient's oxygenating improved.    Continued aggressive pulmonary hygiene with chest physiotherapy, CoughAssist and both Mucomyst and hypertonic saline neb treatments had been initiated.     B-scope findings 3/6: \"...huge thick tenacious plugs basically filling the entire airway I used the medium size scope and suction quite a large amount out but I just could not get the airways clear.  His trach was too small for the large scope with his inner cannula we got his oxygenation better we took him off the ventilator for brief moments remove the inner cannula and used a large flexible therapeutic bronchoscope that allowed us to suction out these larger plugs truly some huge plugs came out way too large to come up the channel of the suction scope I suctioned and lavaged this procedure went on for about 55 minutes him on off the ventilator but after the first couple of minutes were saturations dropped into the mid to low 80s we were able to keep his saturations 98% and higher...       03/04 0701   03/05 0700 03/05 0701   03/06 0700 03/06 0701   03/07 0700   P.O. 0 0    Other   30   Total Intake(mL/kg) 0 (0) 0 (0) 30 (0.4)   Urine (mL/kg/hr)  1000 (0.6) 275 (1.2)   Total Output  1000 275   Net 0 -1000 -245         Urine Unmeasured Occurrence 10 x 3 x    Stool Unmeasured Occurrence 1 x 0 x          Medications:   Current Facility-Administered Medications:   •  acetaminophen (TYLENOL) tablet " 650 mg, 650 mg, Oral, Q4H PRN **OR** acetaminophen (TYLENOL) 160 MG/5ML solution 650 mg, 650 mg, Oral, Q4H PRN, 650 mg at 03/04/21 0823 **OR** acetaminophen (TYLENOL) suppository 650 mg, 650 mg, Rectal, Q4H PRN, Dennis Reyna MD  •  acetylcysteine (MUCOMYST) 20 % nebulizer solution 3 mL, 3 mL, Nebulization, BID - RT, Darío Mendez MD  •  albuterol (PROVENTIL) nebulizer solution 0.083% 2.5 mg/3mL, 2.5 mg, Nebulization, Q6H PRN, Darío Mendez MD  •  ALPRAZolam (XANAX) tablet 0.5 mg, 0.5 mg, Per G Tube, Q8H PRN, Dennis Reyna MD, 0.5 mg at 03/06/21 0408  •  ascorbic acid (VITAMIN C) tablet 1,000 mg, 1,000 mg, Per G Tube, Daily, Dennis Reyna MD, 1,000 mg at 03/05/21 0822  •  bisacodyl (DULCOLAX) suppository 10 mg, 10 mg, Rectal, Daily PRN, Dennis Reyna MD  •  chlorhexidine (PERIDEX) 0.12 % solution 15 mL, 15 mL, Mouth/Throat, Q12H, Darío Mendez MD  •  cyclobenzaprine (FLEXERIL) tablet 10 mg, 10 mg, Per G Tube, Daily, Dennis Reyna MD, 10 mg at 03/05/21 1038  •  docusate sodium (COLACE) liquid 100 mg, 100 mg, Per G Tube, BID, Dennis Reyna MD, 100 mg at 03/05/21 2350  •  fentaNYL (DURAGESIC) 25 MCG/HR patch 1 patch, 1 patch, Transdermal, Q72H, Dennis Reyna MD, 1 patch at 03/05/21 1038  •  furosemide (LASIX) tablet 20 mg, 20 mg, Oral, Daily, Meg Bynum MD, 20 mg at 03/05/21 0822  •  Gabapentin (NEURONTIN) 300 MG/6ML solution 800 mg, 800 mg, Per G Tube, Q8H, Dennis Reyna MD, 800 mg at 03/06/21 0616  •  Glycerin-Hypromellose- (ARTIFICIAL TEARS) 0.2-0.2-1 % ophthalmic solution solution 1 drop, 1 drop, Both Eyes, Q1H PRN, Dennis Reyna MD  •  guaifenesin (ROBITUSSIN) 100 MG/5ML liquid 400 mg, 400 mg, Per G Tube, Q6H, Dennis Reyna MD, 400 mg at 03/06/21 0617  •  HYDROcodone-acetaminophen (HYCET) 7.5-325 MG/15ML solution 10 mL, 10 mL, Per G Tube, Q6H, Alfredo Coelho MD, 10 mL at 03/06/21 0408  •  hydrOXYzine (ATARAX) tablet 25 mg, 25 mg, Oral, Daily,  Dennis Reyna MD, 25 mg at 03/05/21 0822  •  ipratropium-albuterol (DUO-NEB) nebulizer solution 3 mL, 3 mL, Nebulization, Q4H PRN, Dennis Reyna MD, 3 mL at 03/02/21 1512  •  lansoprazole (FIRST) oral suspension 30 mg, 30 mg, Per G Tube, QAM, Dennis Reyna MD, 30 mg at 03/06/21 0618  •  Magnesium Sulfate 2 gram Bolus, followed by 8 gram infusion (total Mg dose 10 grams)- Mg less than or equal to 1mg/dL, 2 g, Intravenous, PRN **OR** Magnesium Sulfate 2 gram / 50mL Infusion (GIVE X 3 BAGS TO EQUAL 6GM TOTAL DOSE) - Mg 1.1 - 1.5 mg/dl, 2 g, Intravenous, PRN **OR** Magnesium Sulfate 4 gram infusion- Mg 1.6-1.9 mg/dL, 4 g, Intravenous, PRN, Dennis Reyna MD  •  midodrine (PROAMATINE) tablet 7.5 mg, 7.5 mg, Per G Tube, BID AC, Alfredo Coelho MD, 7.5 mg at 03/06/21 0618  •  multivitamin with minerals 1 tablet, 1 tablet, Oral, Daily, Dennis Reyna MD, 1 tablet at 03/05/21 0822  •  nitroglycerin (NITROSTAT) SL tablet 0.4 mg, 0.4 mg, Sublingual, Q5 Min PRN, Dennis Reyna MD  •  ondansetron (ZOFRAN) injection 4 mg, 4 mg, Intravenous, Q6H PRN, Dennis Reyna MD, 4 mg at 03/06/21 0948  •  oxyCODONE (ROXICODONE) 5 MG/5ML solution 10 mg, 10 mg, Per G Tube, Q4H PRN, Dennis Reyna MD, 10 mg at 03/06/21 0617  •  polyethylene glycol (MIRALAX) packet 17 g, 17 g, Oral, Daily, Dennis Reyna MD, Stopped at 03/04/21 0900  •  potassium chloride (K-DUR,KLOR-CON) ER tablet 40 mEq, 40 mEq, Oral, PRN **OR** potassium chloride (KLOR-CON) packet 40 mEq, 40 mEq, Oral, PRN **OR** potassium chloride 10 mEq in 100 mL IVPB, 10 mEq, Intravenous, Q1H PRN, Dennis Reyna MD  •  potassium phosphate 45 mmol in sodium chloride 0.9 % 500 mL infusion, 45 mmol, Intravenous, PRN **OR** potassium phosphate 30 mmol in sodium chloride 0.9 % 250 mL infusion, 30 mmol, Intravenous, PRN **OR** potassium phosphate 15 mmol in sodium chloride 0.9 % 100 mL infusion, 15 mmol, Intravenous, PRN **OR** sodium phosphates 40 mmol in sodium  "chloride 0.9 % 500 mL IVPB, 40 mmol, Intravenous, PRN **OR** sodium phosphates 20 mmol in sodium chloride 0.9 % 250 mL IVPB, 20 mmol, Intravenous, PRN, Dennis Reyna MD  •  Scopolamine (TRANSDERM-SCOP) 1.5 MG/3DAYS patch 1 patch, 1 patch, Transdermal, Q72H, Meg Bynum MD, 1 patch at 03/03/21 2030  •  senna (SENOKOT) tablet 1 tablet, 1 tablet, Per G Tube, BID, Dennis Reyna MD, 1 tablet at 03/05/21 2108  •  sertraline (ZOLOFT) tablet 150 mg, 150 mg, Per G Tube, Daily, Ricardo Payne III, MD  •  sodium chloride 0.9 % flush 10 mL, 10 mL, Intravenous, Q12H, Dennis Reyna MD, 10 mL at 03/05/21 2109  •  sodium chloride 0.9 % flush 10 mL, 10 mL, Intravenous, PRN, Dennis Reyna MD  •  sodium chloride 7 % nebulizer solution nebulizer solution 4 mL, 4 mL, Nebulization, BID - RT, Darío Mendez MD    Allergies:  is allergic to lisinopril.    Review of Systems: CNS, ENT, CVS, RESPIRATORY, GI, , BENJI, SKIN systems on review are negative except as indicated in the HPI.      Physical Examination update:  Blood pressure 96/57, pulse 84, temperature 98.1 °F (36.7 °C), temperature source Oral, resp. rate (!) 29, height 177.8 cm (70\"), weight 71.4 kg (157 lb 6.5 oz), SpO2 99 %.      Diagnostics:  Lab Results   Component Value Date    WBC 6.68 03/05/2021    HGB 7.9 (L) 03/05/2021    HCT 23.5 (L) 03/05/2021     03/05/2021     No results found for: CRP  No results found for: SEDRATE  Lab Results   Component Value Date    GLUCOSE 108 (H) 03/05/2021    BUN 20 03/05/2021    CREATININE 0.54 (L) 03/05/2021    EGFRIFNONA >150 03/05/2021    BCR 37.0 (H) 03/05/2021    CO2 27.0 03/05/2021    CALCIUM 9.3 03/05/2021    ALBUMIN 3.30 (L) 03/05/2021    LABIL2 2.0 01/28/2020    AST 17 03/05/2021    ALT 27 03/05/2021 2/27/2021 02:58 2/28/2021 07:36 3/1/2021 06:26 3/3/2021 05:04 3/5/2021 16:54   Procalcitonin 0.11 0.10 0.13 0.09 0.08   • Microbiology:  •   Microbiology Results (last 10 days)     Procedure " Component Value - Date/Time    Respiratory Culture - Sputum, Cough [[id number]]  (Abnormal)  (Susceptibility) Collected: 02/27/21 1638    Lab Status: Final result Specimen: Sputum from Cough Updated: 03/02/21 0954     Respiratory Culture Light growth (2+) Pseudomonas aeruginosa      Scant growth (1+) Normal Respiratory Lisa     Gram Stain Many (4+) WBCs seen      Rare (1+) Gram positive cocci in clusters      No Epithelial cells seen    Susceptibility      Pseudomonas aeruginosa      YOGI      Cefepime Intermediate      Ceftazidime Intermediate      Ciprofloxacin Susceptible      Gentamicin Susceptible      Levofloxacin Susceptible      Piperacillin + Tazobactam Susceptible<sup style='font-weight:normal'>1</sup></font>             <sup style='font-weight:normal'>1</sup></font> Appended report. These results have been appended to a previously preliminary verified report.          Linear View               Susceptibility Comments     Pseudomonas aeruginosa    Pip/oscar confirmed with disc diffusion.              Blood Culture - Blood, Arm, Left [[id number]] Collected: 02/27/21 0340    Lab Status: Final result Specimen: Blood from Arm, Left Updated: 03/04/21 0400     Blood Culture No growth at 5 days    Respiratory Culture - Sputum, ET Suction [[id number]]  (Abnormal) Collected: 02/27/21 0330    Lab Status: Final result Specimen: Sputum from ET Suction Updated: 03/02/21 0956     Respiratory Culture Scant growth (1+) Pseudomonas aeruginosa      Rare Normal Respiratory Lisa     Gram Stain Moderate (3+) WBCs seen      Rare (1+) Gram positive cocci      Occasional Gram positive bacilli      No epithelial cells seen    Narrative:      Refer to previous respritory culture collected on 2/27/2021 at 1638 for YOGI's.        Blood Culture - Blood, Arm, Left [[id number]] Collected: 02/27/21 0259    Lab Status: Final result Specimen: Blood from Arm, Left Updated: 03/04/21 0315     Blood Culture No growth at 5 days    Respiratory Panel  PCR w/COVID-19(SARS-CoV-2) BG/RAZ[facility]/BIBI/[facility] In-House, NP Swab in UTM/VTM, 3-4 HR TAT - Swab, Nasopharynx [[id number]]  (Normal) Collected: 02/27/21 0236    Lab Status: Final result Specimen: Swab from Nasopharynx Updated: 02/27/21 0559     ADENOVIRUS, PCR Not Detected     Coronavirus 229E Not Detected     Coronavirus HKU1 Not Detected     Coronavirus NL63 Not Detected     Coronavirus OC43 Not Detected     COVID19 Not Detected     Human Metapneumovirus Not Detected     Human Rhinovirus/Enterovirus Not Detected     Influenza A PCR Not Detected     Influenza B PCR Not Detected     Parainfluenza Virus 1 Not Detected     Parainfluenza Virus 2 Not Detected     Parainfluenza Virus 3 Not Detected     Parainfluenza Virus 4 Not Detected     RSV, PCR Not Detected     Bordetella pertussis pcr Not Detected     Bordetella parapertussis PCR Not Detected     Chlamydophila pneumoniae PCR Not Detected     Mycoplasma pneumo by PCR Not Detected    Narrative:      Fact sheet for providers: https://docs.[employer]/wp-content/uploads/ZHD4748-2369-HC5.1-EUA-Provider-Fact-Sheet-3.pdf    Fact sheet for patients: https://docs.[employer]/wp-content/uploads/QHL6875-4717-SC2.1-EUA-Patient-Fact-Sheet-1.pdf    Test performed by PCR.      o      Imaging:    No radiology results for the last day     CT Chest Without Contrast Diagnostic    Result Date: 3/2/2021  Suboptimal study due to artifact from patient breathing motion. There are moderate-sized bilateral posteriorly layering pleural effusions. The effusion on the left is unchanged since a CT dated 02/10/2021. The effusion on the right appears slightly larger. Areas of consolidation within both lower lobes also appear more prominent and are most likely due to atelectasis, although superimposed pneumonia cannot be excluded. Patchy reticulonodular opacities in the right upper lobe are new and are likely secondary to mucous plugging within bronchioles. There are also multiple  macroscopic nodules in the inferior aspect of the right upper lobe that are new and are quite likely secondary to peripheral mucous plugging.  This report was finalized on 3/2/2021 8:11 PM by Dr. Rober Monson M.D.      XR Chest 1 View    Result Date: 2/28/2021  Moderate right and small left pleural effusions.  Right pleural effusion extends partially into the major fissure. There is dense right basilar opacity/consolidation and potential infiltrate. No evidence for perihilar edema or pneumothorax. Tracheostomy is present.  This report was finalized on 2/28/2021 2:20 PM by Dr. Taran Killian M.D.      XR Chest 1 View    Result Date: 2/27/2021  Electronically signed by Dharmesh Lopez M.D. on 02-27-21 at 0251    CXR FINDINGS 3/6/2021: The lungs are well-expanded with improvement in changes of  bibasilar pneumonia and mucous plugging and pleural effusions noted on  the portable chest x-ray performed 5 days ago as well as a chest CT scan  performed 4 days ago. There remains some vague atelectasis an infiltrate  predominantly at the right base. The heart size is normal. A  tracheostomy tube is in place.     This report was finalized on 3/6/2021         Assessment and Plan:    Fever with sputum culture 2/27 with Ps.aeruginosa in a quadriplegic, tracheostomized patient with 3/5 normal procalcitonin (see table above)    Acute on chronic hypoxic respiratory failure managed for increased respiratory secretions & mucus plugs (B-scope toiletry performed 3/6)    Diarrhea (CDI) monitor, one BM on 3/4 and none recorded on 3/5     No immediate indication for antibacterial treatment will monitor clinical porgress.        OBDULIO Alcazar Jr., MD. EUGENIO, FRCPC, FCAP, FACP.  Infectious Disease & Medical Microbiology locum

## 2021-03-06 NOTE — CODE DOCUMENTATION
"Rapid response for increased resp distress, decreased O2 sats, and increased workload of breathing.  Pt is quadriplegic with chronic tracheostomy.  He is currently not tolerating trilogy vent system.  Upon my arrival, resp therapist providing bag/trach ventilation per ambu bag @ 100%.  Pt appears \"air hungry\" and has rhonchi throughout.  Dr JEAN-PAUL Sam updated.  Orders received to transfer to CCU with plans for possible bronchoscopy.    "

## 2021-03-06 NOTE — PROGRESS NOTES
Pt presented with sp02 85% on AVAPS-AE pt sx x2 with 100% Fi02 Sp02 would increase to 92-94% then titrate back to 87% Fi02 increased to 40% large amt of thick yellow secreations obtained. Pt able to voice at this time 1ml air added to cuff no voice noted. Pt remained c/o SOA and asking Fi02 to be increased Fio2 increased to 100% Sp02 91% pt remained asking for Fi02 to be increased and pain medications rr 25 evt 410-440 increased wob noted. Breath sounds noted for equal coarse.  Pt lavaged with 1ml saline no change ,  Pt removed from AVAPS and ambu completed with 100% Fi02 Sp02 remained at 94%. Rapid response called and pt transported to unit.

## 2021-03-06 NOTE — PLAN OF CARE
Goal Outcome Evaluation:  Plan of Care Reviewed With: patient     Outcome Summary: Patient on trilogy with his trach. Given pain Medication every 4-6 hour scheduled and prn. Patient tube feed continue. Respiratory has come to suction patient a few times. Patient wanted to keep his trilogy on 100% oxygen. Respiratory explained to patient that his sats are good. Respiratory did educate patient about the trilogy and the 100% oxygen .

## 2021-03-06 NOTE — POST-PROCEDURE NOTE
Procedure: Emergent flexible bronchoscopy  Indication patient with apparent mucous plugging and severe respiratory failure.  Patient was transferred from the floor to the intensive care unit where I met him on arrival the was being bag mask ventilated he was placed on a vent he is a quadriplegic with trach he had coarse rhonchorous breath sounds in severe distress desaturating even on 100% on the ventilator.  And it sounded like mucous plugging the patient was indicating him he felt that was the problem.  Briefly discussed and he agreed to emergent bronchoscopy he is apparently had multiple before.  Procedure: Flexible bronchoscope was utilized a Portex adapter was used connect the ventilator  Reduction of the flexible bronchoscope.  There were huge thick tenacious plugs basically filling the entire airway I used the medium size scope and suction quite a large amount out but I just could not get the airways clear.  His trach was too small for the large scope with his inner cannula we got his oxygenation better we took him off the ventilator for brief moments remove the inner cannula and used a large flexible therapeutic bronchoscope that allowed us to suction out these larger plugs truly some huge plugs came out way too large to come up the channel of the suction scope I suctioned and lavaged this procedure went on for about 55 minutes him on off the ventilator but after the first couple of minutes were saturations dropped into the mid to low 80s we were able to keep his saturations 98% and higher.  He is oxygenating much better ventilating much better.

## 2021-03-06 NOTE — PROGRESS NOTES
LOS: 7 days   Patient Care Team:  Sheron Perez MD as PCP - General (Family Medicine)    Subjective     Patient brought down from the floor in a rapid response earlier this morning with respiratory failure.  He was new to me today he is a quadriplegic from the tracheostomy he was on apparently a ventilator on the floor but not doing well he was in distress was not doing well on ventilator here either we did emergency bronc and removed tremendous amount of huge thick plugs.  He is ventilating better now, he looks in much less distress.    Review of Systems:   Again patient is in severe respiratory distress arrival we did not get much review he is still recovering from the bronc not really able to talk well tween the ventilator and the sedatives we have given him for the procedure.      Objective     Vital Signs  Vital Sign Min/Max for last 24 hours  Temp  Min: 98.1 °F (36.7 °C)  Max: 100.4 °F (38 °C)   BP  Min: 92/55  Max: 141/81   Pulse  Min: 81  Max: 102   Resp  Min: 14  Max: 27   SpO2  Min: 85 %  Max: 100 %   Flow (L/min)  Min: 12  Max: 12   No data recorded        Ventilator/Non-Invasive Ventilation Settings (From admission, onward) Comment     Start     Ordered    03/06/21 0927  Ventilator - AC/PC; (16); 100; 90%; 10; 20  Continuous     Question Answer Comment   Vent Mode AC/PC    Breath rate  16   FiO2 100    FiO2 titrate for Sp02% =/> 90%    PEEP 10    Inspiratory Pressure 20        03/06/21 0927    03/04/21 1501  NIPPV (CPAP or BIPAP)  At Bedtime & As Needed-RT     Comments: Patient had a tracheostomy tube, he needs to be connected to the trilogy machine or equivalent hospital ventilator, need to inflate the cuff while on the trilogy per standard protocols   Question Answer Comment   Indication: Acute Respiratory Failure    Type: AVAPS-AE    NIPPV Mask Interface: Other    Mask Type tracheostomy tube    Rate 12    VT (mL) 550    EPAP Min (cm H2O) 10    EPAP Max (cm H2O) 15    Max Pressure (cm  H2O) 30    Pressure Support Min (cm H2O) 4    Pressure Support Max (cm H2O) 15    Titrate for SPO2 90%        03/04/21 1501    03/03/21 1725  NIPPV (CPAP or BIPAP)  Until Discontinued,   Status:  Canceled     Comments: Patient had a tracheostomy tube, he needs to be connected to the trilogy machine or equivalent hospital ventilator, need to inflate the cuff while on the trilogy per standard protocols   Question Answer Comment   Indication: Acute Respiratory Failure    Type: AVAPS-AE    NIPPV Mask Interface: Other    Mask Type tracheostomy tube    Rate 12    VT (mL) 550    EPAP Min (cm H2O) 10    EPAP Max (cm H2O) 15    Max Pressure (cm H2O) 30    Pressure Support Min (cm H2O) 4    Pressure Support Max (cm H2O) 15    Titrate for SPO2 90%        03/03/21 1726                             Body mass index is 22.59 kg/m².  I/O last 3 completed shifts:  In: 0   Out: 1000 [Urine:1000]  I/O this shift:  In: -   Out: 275 [Urine:275]        Physical Exam:  General Appearance: Well-developed white male he is in was in severe distress on arrival he looks much better now he is oxygenating much better.  Currently having back to pressure control 16 inspiratory pressure 20 PEEP of 10 FiO2 100% we will be weaning his O2 down as his SPO2 is 100%.  Tidal volumes are 490-550 cc range.  Eyes: Conjunctiva are clear and anicteric pupils are equal  ENT: Mucous membranes are moist no erythema or exudates he makes copious oral secretions.  Neck: Tracheostomy site looks okay status 6 Shiley with the inner cannula, no jugular venous distention, trachea midline  Lungs: When he first came down he was not moving much air in the indices coarse rhonchorous sounds, post bronchoscopy he still has a few rhonchi but moving tremendously better air.  Cardiac: He is regular rate rhythm no murmur  Abdomen: Soft no palpable hepatosplenomegaly or masses he has a left upper quadrant PEG type tube and the site looks okay  : Normal external male  genitalia  Musculoskeletal: He has very little muscle mass he has some slight movement with his arms he does not really have any  he has marked interosseous muscle wasting  Skin: No jaundice no petechiae skin is warm and dry  Neuro: Quadriplegic  Extremities/P Vascular: Palpable radial and dorsalis pedis pulses bilaterally again marked muscle atrophy  MSE: Extremely anxious       Labs:  Results from last 7 days   Lab Units 03/05/21  1654 03/03/21  0504 03/01/21  0626 02/28/21  0736   GLUCOSE mg/dL 108* 96 128* 114*   SODIUM mmol/L 137 136 137 136   POTASSIUM mmol/L 4.2 4.6 3.8 3.4*   MAGNESIUM mg/dL  --  1.9  --   --    CO2 mmol/L 27.0 28.5 31.3* 28.4   CHLORIDE mmol/L 99 97* 97* 95*   ANION GAP mmol/L 11.0 10.5 8.7 12.6   CREATININE mg/dL 0.54* 0.45* 0.49* 0.64*   BUN mg/dL 20 18 19 23*   BUN / CREAT RATIO  37.0* 40.0* 38.8* 35.9*   CALCIUM mg/dL 9.3 9.5 9.1 8.9   EGFR IF NONAFRICN AM mL/min/1.73 >150 >150 >150 141   ALK PHOS U/L 112  --  118* 132*   TOTAL PROTEIN g/dL 7.0  --  6.8 7.1   ALT (SGPT) U/L 27  --  16 19   AST (SGOT) U/L 17  --  8 14   BILIRUBIN mg/dL 1.0  --  1.7* 2.4*   ALBUMIN g/dL 3.30*  --  3.40* 3.60   GLOBULIN gm/dL 3.7  --  3.4 3.5     Estimated Creatinine Clearance: 189.2 mL/min (A) (by C-G formula based on SCr of 0.54 mg/dL (L)).      Results from last 7 days   Lab Units 03/05/21  1654 03/03/21  0504 03/02/21  0324 03/01/21  1848 03/01/21  0626 02/28/21  0736   WBC 10*3/mm3 6.68 8.79 12.79*  --  10.11 9.86   RBC 10*6/mm3 2.91* 2.98* 2.88*  --  2.43* 2.75*   HEMOGLOBIN g/dL 7.9* 8.1* 8.0* 7.7* 6.5* 7.4*   HEMATOCRIT % 23.5* 24.6* 24.3* 23.3* 19.8* 22.4*   MCV fL 80.8 82.6 84.4  --  81.5 81.5   MCH pg 27.1 27.2 27.8  --  26.7 26.9   MCHC g/dL 33.6 32.9 32.9  --  32.8 33.0   RDW % 17.0* 17.2* 17.3*  --  17.5* 17.2*   RDW-SD fl 49.6 51.8 53.4  --  52.2 50.6   MPV fL 9.9 9.6 10.5  --  10.5 10.4   PLATELETS 10*3/mm3 277 289 257  --  266 305   NEUTROPHIL % % 66.7  --   --   --  82.1* 77.1*    LYMPHOCYTE % % 17.1*  --   --   --  9.9* 12.0*   MONOCYTES % % 8.1  --   --   --  6.3 7.4   EOSINOPHIL % % 7.6*  --   --   --  1.3 3.0   BASOPHIL % % 0.1  --   --   --  0.1 0.1   IMM GRAN % % 0.4  --   --   --  0.3 0.4   NEUTROS ABS 10*3/mm3 4.45  --   --   --  8.30* 7.60*   LYMPHS ABS 10*3/mm3 1.14  --   --   --  1.00 1.18   MONOS ABS 10*3/mm3 0.54  --   --   --  0.64 0.73   EOS ABS 10*3/mm3 0.51*  --   --   --  0.13 0.30   BASOS ABS 10*3/mm3 0.01  --   --   --  0.01 0.01   IMMATURE GRANS (ABS) 10*3/mm3 0.03  --   --   --  0.03 0.04   NRBC /100 WBC 0.0  --   --   --  0.0 0.0             Results from last 7 days   Lab Units 03/01/21  0626 02/28/21  0736   PROBNP pg/mL 1,083.0* 517.8*         Results from last 7 days   Lab Units 03/05/21  1654 03/03/21  0504 03/01/21  0626 02/28/21  0736   PROCALCITONIN ng/mL 0.08 0.09 0.13 0.10         Microbiology Results (last 10 days)     Procedure Component Value - Date/Time    Respiratory Culture - Sputum, Cough [[id number]]  (Abnormal)  (Susceptibility) Collected: 02/27/21 1638    Lab Status: Final result Specimen: Sputum from Cough Updated: 03/02/21 0954     Respiratory Culture Light growth (2+) Pseudomonas aeruginosa      Scant growth (1+) Normal Respiratory Lisa     Gram Stain Many (4+) WBCs seen      Rare (1+) Gram positive cocci in clusters      No Epithelial cells seen    Susceptibility      Pseudomonas aeruginosa      YOGI      Cefepime Intermediate      Ceftazidime Intermediate      Ciprofloxacin Susceptible      Gentamicin Susceptible      Levofloxacin Susceptible      Piperacillin + Tazobactam Susceptible<!--EPICS--><sup style='font-weight:normal'>1</sup></font>             <!--EPICS--><sup style='font-weight:normal'>1</sup></font> Appended report. These results have been appended to a previously preliminary verified report.          Linear View               Susceptibility Comments     Pseudomonas aeruginosa    Pip/oscar confirmed with disc diffusion.               Blood Culture - Blood, Arm, Left [[id number]] Collected: 02/27/21 0340    Lab Status: Final result Specimen: Blood from Arm, Left Updated: 03/04/21 0400     Blood Culture No growth at 5 days    Respiratory Culture - Sputum, ET Suction [[id number]]  (Abnormal) Collected: 02/27/21 0330    Lab Status: Final result Specimen: Sputum from ET Suction Updated: 03/02/21 0956     Respiratory Culture Scant growth (1+) Pseudomonas aeruginosa      Rare Normal Respiratory Lisa     Gram Stain Moderate (3+) WBCs seen      Rare (1+) Gram positive cocci      Occasional Gram positive bacilli      No epithelial cells seen    Narrative:      Refer to previous respritory culture collected on 2/27/2021 at 1638 for YOGI's.        Blood Culture - Blood, Arm, Left [[id number]] Collected: 02/27/21 0259    Lab Status: Final result Specimen: Blood from Arm, Left Updated: 03/04/21 0315     Blood Culture No growth at 5 days    Respiratory Panel PCR w/COVID-19(SARS-CoV-2) [facility] In-House, NP Swab in UTM/VTM, 3-4 HR TAT - Swab, Nasopharynx [[id number]]  (Normal) Collected: 02/27/21 0236    Lab Status: Final result Specimen: Swab from Nasopharynx Updated: 02/27/21 0559     ADENOVIRUS, PCR Not Detected     Coronavirus 229E Not Detected     Coronavirus HKU1 Not Detected     Coronavirus NL63 Not Detected     Coronavirus OC43 Not Detected     COVID19 Not Detected     Human Metapneumovirus Not Detected     Human Rhinovirus/Enterovirus Not Detected     Influenza A PCR Not Detected     Influenza B PCR Not Detected     Parainfluenza Virus 1 Not Detected     Parainfluenza Virus 2 Not Detected     Parainfluenza Virus 3 Not Detected     Parainfluenza Virus 4 Not Detected     RSV, PCR Not Detected     Bordetella pertussis pcr Not Detected     Bordetella parapertussis PCR Not Detected     Chlamydophila pneumoniae PCR Not Detected     Mycoplasma pneumo by PCR Not Detected    Narrative:      Fact sheet for providers:  https://docs.[employer]/wp-content/uploads/BBZ5756-6893-MX1.1-EUA-Provider-Fact-Sheet-3.pdf    Fact sheet for patients: https://docs.[employer]/wp-content/uploads/TUM7077-7362-PZ7.1-EUA-Patient-Fact-Sheet-1.pdf    Test performed by PCR.              ascorbic acid, 1,000 mg, Per G Tube, Daily  chlorhexidine, 15 mL, Mouth/Throat, Q12H  cyclobenzaprine, 10 mg, Per G Tube, Daily  docusate sodium, 100 mg, Per G Tube, BID  fentaNYL, 1 patch, Transdermal, Q72H  furosemide, 20 mg, Oral, Daily  Gabapentin, 800 mg, Per G Tube, Q8H  guaifenesin, 400 mg, Per G Tube, Q6H  HYDROcodone-acetaminophen, 10 mL, Per G Tube, Q6H  hydrOXYzine, 25 mg, Oral, Daily  lansoprazole, 30 mg, Per G Tube, QAM  midodrine, 7.5 mg, Per G Tube, BID AC  multivitamin with minerals, 1 tablet, Oral, Daily  polyethylene glycol, 17 g, Oral, Daily  Scopolamine, 1 patch, Transdermal, Q72H  senna, 1 tablet, Per G Tube, BID  sertraline, 150 mg, Per G Tube, Daily  sodium chloride, 10 mL, Intravenous, Q12H           Diagnostics:  CT Abdomen Pelvis Without Contrast    Result Date: 2/10/2021  CT CHEST, ABDOMEN, AND PELVIS WITHOUT CONTRAST.  HISTORY: 35-year-old male with quadriplegia, pneumonia with tracheitis and mucous plugging. Sepsis. Persistent fever.  TECHNIQUE: Radiation dose reduction techniques were utilized, including automated exposure control and exposure modulation based on body size. 3 mm images were obtained through the chest, abdomen, and pelvis without the administration of IV contrast. Compared with CT of the abdomen and pelvis from 02/04/2021 and chest CTA from 02/04/2021.  FINDINGS: CHEST: There is significant motion/breathing artifact. The tracheostomy is in good position with the distal margin being 4 cm proximal to the titus. There is a significant volume of mucus/secretions within both mainstem bronchi and the lower lobe bronchi and right middle lobe bronchi appear essentially completely opacified. There are small-moderate-sized  bilateral pleural effusions and there are dense consolidations adjacently at both lower lobes. There is no pericardial effusion.  ABDOMEN/PELVIS: The spleen is enlarged measuring 16 cm in diameter, measured on the coronal reconstruction series. The noncontrasted liver appears unremarkable. The gallbladder is contracted. Percutaneous G-tube is noted in place. Noncontrasted pancreas, adrenals, and kidneys appear unremarkable. There are air-fluid levels predominantly within the colon and there is no colonic thickening. Appendix appears within normal limits. The urinary bladder is not abnormally distended.      1. Significant volume of mucus/secretions within the main stem bronchi and the bronchi at the lower lobes and right middle lobe are essentially completely opacified. Adjacent to small-moderate-sized bilateral pleural effusions are dense consolidations at both lower lobes which likely in part represent atelectasis, but pneumonia is possible as well. 2. Mild colonic ileus without evidence for colitis. 3. Splenomegaly.  This report was finalized on 2/10/2021 3:24 PM by Dr. Kenia Farrell M.D.      CT Chest Without Contrast Diagnostic    Result Date: 3/2/2021  CT CHEST WO CONTRAST DIAGNOSTIC-  CLINICAL HISTORY: Dyspnea. Pleural effusion. Quadriplegia. History of mucous plugging and atelectasis.  TECHNIQUE: Spiral CT images were obtained through the chest without IV contrast and were reconstructed in 3 mm thick slices.  Radiation dose reduction techniques were utilized, including automated exposure control and exposure modulation based on body size.  COMPARISON: CT chest dated 02/10/2021  FINDINGS: A tracheostomy tube remains in satisfactory position. Comparison with the previous study is difficult due to extensive breathing motion artifact that was present on the previous study. There are moderate-sized bilateral posteriorly layering pleural effusions, greater in size on the right than the left. The effusion on the  right has increased in size somewhat in the interval. The effusion on the left appears essentially unchanged. There is dense consolidation in the left lower lobe containing a few air bronchograms that is most likely primarily due to atelectasis. This appears slightly larger. Slightly less extensive patchy consolidation in the right lower lobe also appears slightly more prominent. There are patchy reticulonodular opacities throughout the right upper lobe that appear essentially new since the preceding CT scan. These have a tree-in-bud appearance and are likely due to mucus plugging within bronchioles. In addition, a 7 mm diameter discrete noncalcified nodule in the right upper lobe appears new. There also several additional smaller nodular opacities in the inferior aspect of the right upper lobe that are new. These are all favored to be due to peripheral mucous plugging, as well. Images through the upper abdomen demonstrate no obvious abnormality.      Suboptimal study due to artifact from patient breathing motion. There are moderate-sized bilateral posteriorly layering pleural effusions. The effusion on the left is unchanged since a CT dated 02/10/2021. The effusion on the right appears slightly larger. Areas of consolidation within both lower lobes also appear more prominent and are most likely due to atelectasis, although superimposed pneumonia cannot be excluded. Patchy reticulonodular opacities in the right upper lobe are new and are likely secondary to mucous plugging within bronchioles. There are also multiple macroscopic nodules in the inferior aspect of the right upper lobe that are new and are quite likely secondary to peripheral mucous plugging.  This report was finalized on 3/2/2021 8:11 PM by Dr. Rober Monson M.D.      CT Chest Without Contrast Diagnostic    Result Date: 2/10/2021  CT CHEST, ABDOMEN, AND PELVIS WITHOUT CONTRAST.  HISTORY: 35-year-old male with quadriplegia, pneumonia with tracheitis  and mucous plugging. Sepsis. Persistent fever.  TECHNIQUE: Radiation dose reduction techniques were utilized, including automated exposure control and exposure modulation based on body size. 3 mm images were obtained through the chest, abdomen, and pelvis without the administration of IV contrast. Compared with CT of the abdomen and pelvis from 02/04/2021 and chest CTA from 02/04/2021.  FINDINGS: CHEST: There is significant motion/breathing artifact. The tracheostomy is in good position with the distal margin being 4 cm proximal to the titus. There is a significant volume of mucus/secretions within both mainstem bronchi and the lower lobe bronchi and right middle lobe bronchi appear essentially completely opacified. There are small-moderate-sized bilateral pleural effusions and there are dense consolidations adjacently at both lower lobes. There is no pericardial effusion.  ABDOMEN/PELVIS: The spleen is enlarged measuring 16 cm in diameter, measured on the coronal reconstruction series. The noncontrasted liver appears unremarkable. The gallbladder is contracted. Percutaneous G-tube is noted in place. Noncontrasted pancreas, adrenals, and kidneys appear unremarkable. There are air-fluid levels predominantly within the colon and there is no colonic thickening. Appendix appears within normal limits. The urinary bladder is not abnormally distended.      1. Significant volume of mucus/secretions within the main stem bronchi and the bronchi at the lower lobes and right middle lobe are essentially completely opacified. Adjacent to small-moderate-sized bilateral pleural effusions are dense consolidations at both lower lobes which likely in part represent atelectasis, but pneumonia is possible as well. 2. Mild colonic ileus without evidence for colitis. 3. Splenomegaly.  This report was finalized on 2/10/2021 3:24 PM by Dr. Kenia Farrell M.D.      CT Abdomen Pelvis With Contrast    Result Date: 2/4/2021  CT ABDOMEN PELVIS W  CONTRAST-  Radiation dose reduction techniques were utilized, including automated exposure control and exposure modulation based on body size.  Clinical: Acute abdominal pain, nonlocalizing anemia. Aspiration pneumonia  Note: Examination degraded due to respiratory motion artifact  FINDINGS: There is enlargement of the left gluteus superficial to the iliac wing. Mixed attenuation measuring 11.2 cm AP, 5.5 cm transverse and 11.3 cm craniocaudal. Likely muscular hematoma. The upper border is somewhat rounded, I would recommend follow-up to exclude associated mass.  There are again bilateral free-flowing pleural effusions. Attenuation compatible with serous fluid. There is again bibasilar consolidation. There is cardiac enlargement.  There is splenomegaly with the spleen measuring approximately 16 cm cranial caudal. The liver is satisfactory in size and shape, there is a typical area of focal fatty infiltration within the left lobe. No suspicious liver lesion, there is a small cyst demonstrated within the right lobe. Intrahepatic biliary radicals are mildly pronounced. Due to the degree of respiratory motion artifact, difficult to evaluate the gallbladder and extrahepatic biliary tree, no gross gallstones seen. The pancreas is satisfactory in appearance.  The adrenal glands and kidneys have a typical appearance. There is a G-tube demonstrated within the gastric lumen. Its position is satisfactory. Diameter of the aorta is normal. Urinary bladder is typical in appearance.  The appendix is normal. Caliber of the colon and small bowel is within normal limits. No bowel wall thickening nor induration to suggest an inflammatory process. No free intraperitoneal air free fluid.  CONCLUSION: 1. Mixed attenuation collection within the left gluteus having the appearance of a sizable hematoma. See above measurements. Advise follow-up CT after resolved to exclude underlying mass. 2. Bilateral pleural effusions and bibasilar  consolidation consistent with pneumonia. 3. Splenomegaly. 4. Prominent intrahepatic biliary radicals, motion artifact degrades evaluation of the gallbladder and biliary tree, no gross gallstones identified. 5. G-tube position is satisfactory. 6. The appendix, colon and small bowel are unremarkable.   This report was finalized on 2/4/2021 12:42 PM by Dr. Govind Carr M.D.      XR Chest 1 View    Result Date: 3/1/2021  ONE VIEW PORTABLE CHEST AT 3:48 PM  HISTORY: Shortness of breath. Right-sided atelectasis and pleural effusion. Possible mucous plugging.  FINDINGS: There is some atelectasis and pleural effusion at both bases, right greater than left and the appearance at the right base shows improvement from 02/28/2021. There is slight worsening of increased density at the left base. The heart size remains normal. A tracheostomy tube is in place.  This report was finalized on 3/1/2021 4:42 PM by Dr. Benjamin De Leon M.D.      XR Chest 1 View    Result Date: 2/28/2021  CHEST SINGLE VIEW  HISTORY: Mucous plugging. History of hypertension.  COMPARISON: AP chest 2/27/2021, 2/12/2021, CT chest 2/10/2021  FINDINGS: Tracheostomy tube is present. Moderate right and small left pleural effusions are present. Right pleural fluid extends partially into the major fissure. There is right lower lobe consolidation or infiltrate. Left lung is comparatively clear there is no perihilar edema or pneumothorax.      Moderate right and small left pleural effusions.  Right pleural effusion extends partially into the major fissure. There is dense right basilar opacity/consolidation and potential infiltrate. No evidence for perihilar edema or pneumothorax. Tracheostomy is present.  This report was finalized on 2/28/2021 2:20 PM by Dr. Taran Killian M.D.      XR Chest 1 View    Result Date: 2/27/2021  Patient: MARK HARDIN  Time Out: 02:51 Exam(s): FILM CXR 1 VIEW EXAM:   XR Chest, 1 View CLINICAL HISTORY:    Reason for exam: SOA. TECHNIQUE:    Frontal view of the chest. COMPARISON: 2 12 21. FINDINGS:   Redemonstrated tracheostomy.  Interval decrease in moderate right pleural effusion with associated compressive atelectasis.  Decrease in basilar volume loss.  No pneumothorax.  No cardiomegaly.  Cervical spinal fusion hardware. IMPRESSION:     Interval decrease in small-moderate right pleural effusion with associated compressive atelectasis. Tracheostomy.     Electronically signed by Dharmesh Lopez M.D. on 02-27-21 at 0251    XR Chest 1 View    Result Date: 2/12/2021  XR CHEST 1 VW-  HISTORY: Male who is 35 years-old,  chest pain  TECHNIQUE: Frontal view of the chest  COMPARISON: 02/12/2021 at 0605 hours  FINDINGS: Stable appearing tracheostomy tube. The heart is mildly enlarged. Heart, mediastinum and pulmonary vasculature are unremarkable. Mild right pleural effusion with increased fissural fluid suggested. Small atelectasis or infiltrate at the lung bases. No pneumothorax. No acute osseous process.      Mild right pleural effusion with increased partial fluid suggested. Small atelectasis or infiltrate at the lung bases. Continued follow-up recommended.  This report was finalized on 2/12/2021 3:15 PM by Dr. Eddie Reddy M.D.      XR Chest 1 View    Result Date: 2/12/2021  XR CHEST 1 VW-  Clinical: Pleural effusion, right thoracentesis to 11/20/2021  COMPARISON CT scan of the chest 2/10/2021 and chest radiograph to 8/20/2021  FINDINGS: Tracheostomy tube position is satisfactory. Small right-sided pleural effusion demonstrated. Cardiac size within normal limits. No pneumothorax. Blunting of left costophrenic angle suggests trace left pleural effusion. There is vague opacity at both lung bases, suggesting atelectasis/infiltrate. No pulmonary edema identified. No acute consolidation seen. The remainder is unremarkable.  This report was finalized on 2/12/2021 7:40 AM by Dr. Govind Carr M.D.      XR Chest 1 View    Result Date: 2/8/2021  XR CHEST 1  VW-  HISTORY: Male who is 35 years-old,  rhonchi, fever  TECHNIQUE: Frontal views of the chest  COMPARISON: 02/05/2021  FINDINGS: Stable appearing tracheostomy tube. Heart, mediastinum and pulmonary vasculature are unremarkable. Mild bilateral basilar pleural effusions and likely atelectasis or infiltrate. No pneumothorax. No acute osseous process.      Mild bilateral basilar atelectasis/infiltrate/effusion, follow-up suggested.  This report was finalized on 2/8/2021 3:46 PM by Dr. Eddie Reddy M.D.      XR Chest 1 View    Result Date: 2/5/2021  ONE VIEW PORTABLE CHEST AT 1:12 PM  HISTORY: Left lower lobe atelectasis and pneumonia. Follow-up evaluation.  FINDINGS: There has been marked improvement in the left basilar atelectasis and pneumonia when compared to yesterday's exam. The findings are most consistent with resolution of mucus plugging. No new abnormality is seen. The heart size is normal. A tracheostomy tube remains in place.  This report was finalized on 2/5/2021 2:28 PM by Dr. Benjamin De Leon M.D.      Duplex Venous Upper Extremity - Bilateral CAR    Result Date: 2/10/2021  · Acute right upper extremity superficial thrombophlebitis noted in the median cubital. · All other vessels appear normal.      Duplex Venous Lower Extremity - Bilateral CAR    Result Date: 2/10/2021  · Normal bilateral lower extremity venous duplex scan.      US Thoracentesis    Result Date: 2/11/2021  ULTRASOUND-GUIDED RIGHT THORACENTESIS.  HISTORY: Pleural effusion.  After signed informed consent was obtained the patient was prepped and draped in the usual sterile fashion. Lidocaine was used for local anesthesia.  Ultrasound guidance was used to place the thoracentesis catheter into the right pleural fluid collection. 400 mL of straw-colored fluid was removed. Sample was sent to the lab.  Confirmatory images were obtained.  Patient tolerated the procedure well with no complications.      Ultrasound-guided right thoracentesis as  described.   This report was finalized on 2/11/2021 10:39 AM by Dr. Shad Smith M.D.          I reviewed prior chest x-rays we have a stat pending    Active Hospital Problems    Diagnosis  POA   • **Acute on chronic respiratory failure with hypoxemia (CMS/HCC) [J96.21]  Yes   • Hypotension [I95.9]  Yes   • Dyspnea [R06.00]  Yes   • Anemia [D64.9]  Yes   • Leukocytosis [D72.829]  Yes   • Quadriplegia (CMS/HCC) [G82.50]  Yes   • HCAP (healthcare-associated pneumonia) [J18.9]  Yes      Resolved Hospital Problems   No resolved problems to display.         Assessment/Plan     1. Acute hypoxic respiratory failure I think this is a combination obviously of his quadriplegia and chronic respiratory failure complicated by mucous plugging.  Aggressively cleared secretions from his airway he is oxygenating much better.  We will get very aggressive with pulmonary hygiene chest physiotherapy, CoughAssist and both Mucomyst and hypertonic saline neb treatments.  Note he has a history of Pseudomonas colonization and MRSA pneumonia likely see signs of significant infection we will have to cover for both.  I did send cultures on his bronchial washings.  2. Quadriplegia  3. Bilateral pleural effusions seems to be improving  4. Anemia stable no gross bleeding noted  5. Anxiety he has significant anxiety issues  6. Hypertension    Plan for disposition:    Darío Mendez MD  03/06/21  09:30 EST    Time: Critical care time excluding procedures today 41 minutes

## 2021-03-06 NOTE — CODE DOCUMENTATION
"Dr Mendez @ bedside. O2 sats 79%, significant \"air hunger\", Emergent Bronchoscopy at bedside per DR Mendez.    "

## 2021-03-07 ENCOUNTER — APPOINTMENT (OUTPATIENT)
Dept: GENERAL RADIOLOGY | Facility: HOSPITAL | Age: 36
End: 2021-03-07

## 2021-03-07 LAB
ANION GAP SERPL CALCULATED.3IONS-SCNC: 8.3 MMOL/L (ref 5–15)
BUN SERPL-MCNC: 26 MG/DL (ref 6–20)
BUN/CREAT SERPL: 44.8 (ref 7–25)
CALCIUM SPEC-SCNC: 8.9 MG/DL (ref 8.6–10.5)
CHLORIDE SERPL-SCNC: 97 MMOL/L (ref 98–107)
CO2 SERPL-SCNC: 29.7 MMOL/L (ref 22–29)
CREAT SERPL-MCNC: 0.58 MG/DL (ref 0.76–1.27)
GFR SERPL CREATININE-BSD FRML MDRD: >150 ML/MIN/1.73
GLUCOSE SERPL-MCNC: 116 MG/DL (ref 65–99)
MRSA DNA SPEC QL NAA+PROBE: ABNORMAL
POTASSIUM SERPL-SCNC: 4.2 MMOL/L (ref 3.5–5.2)
SODIUM SERPL-SCNC: 135 MMOL/L (ref 136–145)

## 2021-03-07 PROCEDURE — 94799 UNLISTED PULMONARY SVC/PX: CPT

## 2021-03-07 PROCEDURE — 87641 MR-STAPH DNA AMP PROBE: CPT | Performed by: INTERNAL MEDICINE

## 2021-03-07 PROCEDURE — 99232 SBSQ HOSP IP/OBS MODERATE 35: CPT | Performed by: INTERNAL MEDICINE

## 2021-03-07 PROCEDURE — 80048 BASIC METABOLIC PNL TOTAL CA: CPT | Performed by: INTERNAL MEDICINE

## 2021-03-07 PROCEDURE — 94003 VENT MGMT INPAT SUBQ DAY: CPT

## 2021-03-07 PROCEDURE — 94760 N-INVAS EAR/PLS OXIMETRY 1: CPT

## 2021-03-07 PROCEDURE — 25010000002 PROPOFOL 10 MG/ML EMULSION: Performed by: INTERNAL MEDICINE

## 2021-03-07 PROCEDURE — 71045 X-RAY EXAM CHEST 1 VIEW: CPT

## 2021-03-07 PROCEDURE — 0WCQ8ZZ EXTIRPATION OF MATTER FROM RESPIRATORY TRACT, VIA NATURAL OR ARTIFICIAL OPENING ENDOSCOPIC: ICD-10-PCS | Performed by: INTERNAL MEDICINE

## 2021-03-07 RX ORDER — FUROSEMIDE 20 MG/1
20 TABLET ORAL
Status: DISCONTINUED | OUTPATIENT
Start: 2021-03-07 | End: 2021-03-16 | Stop reason: HOSPADM

## 2021-03-07 RX ADMIN — GABAPENTIN 800 MG: 250 SOLUTION ORAL at 22:29

## 2021-03-07 RX ADMIN — GUAIFENESIN 400 MG: 100 SOLUTION ORAL at 05:38

## 2021-03-07 RX ADMIN — SODIUM CHLORIDE, PRESERVATIVE FREE 10 ML: 5 INJECTION INTRAVENOUS at 09:07

## 2021-03-07 RX ADMIN — ALBUTEROL SULFATE 2 PUFF: 90 AEROSOL, METERED RESPIRATORY (INHALATION) at 08:39

## 2021-03-07 RX ADMIN — HYDROCODONE BITARTRATE AND ACETAMINOPHEN 10 ML: 7.5; 325 SOLUTION ORAL at 03:09

## 2021-03-07 RX ADMIN — HYDROCODONE BITARTRATE AND ACETAMINOPHEN 10 ML: 7.5; 325 SOLUTION ORAL at 09:07

## 2021-03-07 RX ADMIN — OXYCODONE HYDROCHLORIDE 10 MG: 5 SOLUTION ORAL at 22:30

## 2021-03-07 RX ADMIN — CYCLOBENZAPRINE 10 MG: 10 TABLET, FILM COATED ORAL at 09:08

## 2021-03-07 RX ADMIN — SENNOSIDES 1 TABLET: 8.6 TABLET, FILM COATED ORAL at 09:09

## 2021-03-07 RX ADMIN — PROPOFOL 30 MCG/KG/MIN: 10 INJECTION, EMULSION INTRAVENOUS at 11:15

## 2021-03-07 RX ADMIN — MIDODRINE HYDROCHLORIDE 7.5 MG: 5 TABLET ORAL at 05:39

## 2021-03-07 RX ADMIN — FUROSEMIDE 20 MG: 20 TABLET ORAL at 09:08

## 2021-03-07 RX ADMIN — FUROSEMIDE 20 MG: 20 TABLET ORAL at 17:10

## 2021-03-07 RX ADMIN — DOCUSATE SODIUM 100 MG: 50 LIQUID ORAL at 09:08

## 2021-03-07 RX ADMIN — ALBUTEROL SULFATE 2 PUFF: 90 AEROSOL, METERED RESPIRATORY (INHALATION) at 20:48

## 2021-03-07 RX ADMIN — DOCUSATE SODIUM 100 MG: 50 LIQUID ORAL at 20:36

## 2021-03-07 RX ADMIN — GUAIFENESIN 400 MG: 100 SOLUTION ORAL at 17:08

## 2021-03-07 RX ADMIN — SENNOSIDES 1 TABLET: 8.6 TABLET, FILM COATED ORAL at 20:35

## 2021-03-07 RX ADMIN — OXYCODONE HYDROCHLORIDE AND ACETAMINOPHEN 1000 MG: 500 TABLET ORAL at 09:08

## 2021-03-07 RX ADMIN — Medication 4 ML: at 20:47

## 2021-03-07 RX ADMIN — OXYCODONE HYDROCHLORIDE 10 MG: 5 SOLUTION ORAL at 14:49

## 2021-03-07 RX ADMIN — GABAPENTIN 800 MG: 250 SOLUTION ORAL at 05:35

## 2021-03-07 RX ADMIN — ALPRAZOLAM 0.5 MG: 0.5 TABLET ORAL at 09:17

## 2021-03-07 RX ADMIN — CHLORHEXIDINE GLUCONATE 15 ML: 1.2 RINSE ORAL at 20:38

## 2021-03-07 RX ADMIN — GABAPENTIN 800 MG: 250 SOLUTION ORAL at 17:09

## 2021-03-07 RX ADMIN — GUAIFENESIN 400 MG: 100 SOLUTION ORAL at 11:17

## 2021-03-07 RX ADMIN — ALPRAZOLAM 0.5 MG: 0.5 TABLET ORAL at 17:09

## 2021-03-07 RX ADMIN — SERTRALINE 150 MG: 100 TABLET, FILM COATED ORAL at 09:08

## 2021-03-07 RX ADMIN — BISACODYL 10 MG: 10 SUPPOSITORY RECTAL at 19:21

## 2021-03-07 RX ADMIN — OXYCODONE HYDROCHLORIDE 10 MG: 5 SOLUTION ORAL at 09:21

## 2021-03-07 RX ADMIN — CHLORHEXIDINE GLUCONATE 15 ML: 1.2 RINSE ORAL at 09:09

## 2021-03-07 RX ADMIN — Medication 1 TABLET: at 09:07

## 2021-03-07 RX ADMIN — HYDROCODONE BITARTRATE AND ACETAMINOPHEN 10 ML: 7.5; 325 SOLUTION ORAL at 14:48

## 2021-03-07 RX ADMIN — OXYCODONE HYDROCHLORIDE 10 MG: 5 SOLUTION ORAL at 05:38

## 2021-03-07 RX ADMIN — HYDROCODONE BITARTRATE AND ACETAMINOPHEN 10 ML: 7.5; 325 SOLUTION ORAL at 20:35

## 2021-03-07 RX ADMIN — POLYETHYLENE GLYCOL 3350 17 G: 17 POWDER, FOR SOLUTION ORAL at 09:07

## 2021-03-07 RX ADMIN — HYDROXYZINE HYDROCHLORIDE 25 MG: 25 TABLET ORAL at 09:09

## 2021-03-07 RX ADMIN — Medication 4 ML: at 08:38

## 2021-03-07 RX ADMIN — ACETYLCYSTEINE 3 ML: 200 SOLUTION ORAL; RESPIRATORY (INHALATION) at 08:38

## 2021-03-07 RX ADMIN — MIDODRINE HYDROCHLORIDE 7.5 MG: 5 TABLET ORAL at 17:09

## 2021-03-07 RX ADMIN — ACETYLCYSTEINE 3 ML: 200 SOLUTION ORAL; RESPIRATORY (INHALATION) at 20:47

## 2021-03-07 RX ADMIN — LANSOPRAZOLE 30 MG: KIT at 06:34

## 2021-03-07 NOTE — PROGRESS NOTES
"[facility] Infectious Disease Progress Note    Maxim Carvajal is a 35 y.o. male    Nurse report in AM: \"Pt sats above 95% remains on the vent. Oral and trach secretions seem to be decreasing . Pt able to tolerate an impressive amount of narcotics > No BM thus far. Tube feed residuals as high as 150 . No evidence of mucus plugs thus far large volume incontinent of urine. Once. T max 99.4\"    Current Temp 98.9 OF .    O2/Vent Data  (Last 4)    03/07 0800 03/07 0838 03/07 0900 03/07 1000   SpO2 (%) 100 100 100 100   Device (Oxygen Therapy) ventilator ventilator ventilator ventilator   FiO2 (%) (%)  50     PIP Observed (cm H2O) (cm)  27     Resp Rate (Set)  16     Resp Rate (Observed) Vent 18 18 23 20   PEEP/CPAP (cm H2O) (cm)  10         P.O. 0  0   Other  730 60   NG/GT  865 165   Total Intake(mL/kg) 0 (0) 1595 (22.3) 225 (3.2)   Urine (mL/kg/hr) 1000 (0.6) 375 (0.2) 700 (2.8)   Emesis/NG output   0   Stool   0   Total Output 1000 375 700   Net -1000 +1220 -475         Urine Unmeasured Occurrence 3 x 3 x    Stool Unmeasured Occurrence 0 x         Medications:   Current Facility-Administered Medications:   •  acetaminophen (TYLENOL) tablet 650 mg, 650 mg, Oral, Q4H PRN **OR** acetaminophen (TYLENOL) 160 MG/5ML solution 650 mg, 650 mg, Oral, Q4H PRN, 650 mg at 03/04/21 0823 **OR** acetaminophen (TYLENOL) suppository 650 mg, 650 mg, Rectal, Q4H PRN, Dennis Reyna MD  •  acetylcysteine (MUCOMYST) 20 % nebulizer solution 3 mL, 3 mL, Nebulization, BID - RT, Darío Mendez MD, 3 mL at 03/07/21 0838  •  albuterol (PROVENTIL) nebulizer solution 0.083% 2.5 mg/3mL, 2.5 mg, Nebulization, Q6H PRN, Darío eMndez MD, 2.5 mg at 03/06/21 1139  •  albuterol sulfate HFA (PROVENTIL HFA;VENTOLIN HFA;PROAIR HFA) inhaler 2 puff, 2 puff, Inhalation, Q4H PRN, Alfredo Coelho MD, 2 puff at 03/07/21 0839  •  ALPRAZolam (XANAX) tablet 0.5 mg, 0.5 mg, Per G Tube, Q8H PRN, Dennis Reyna MD, 0.5 mg at 03/07/21 0917  •  ascorbic acid " (VITAMIN C) tablet 1,000 mg, 1,000 mg, Per G Tube, Daily, Dennis Reyna MD, 1,000 mg at 03/07/21 0908  •  bisacodyl (DULCOLAX) suppository 10 mg, 10 mg, Rectal, Daily PRN, Dennis Reyna MD  •  chlorhexidine (PERIDEX) 0.12 % solution 15 mL, 15 mL, Mouth/Throat, Q12H, Darío Mendez MD, 15 mL at 03/07/21 0909  •  cyclobenzaprine (FLEXERIL) tablet 10 mg, 10 mg, Per G Tube, Daily, Dennis Reyna MD, 10 mg at 03/07/21 0908  •  docusate sodium (COLACE) liquid 100 mg, 100 mg, Per G Tube, BID, Dennis Reyna MD, 100 mg at 03/07/21 0908  •  fentaNYL (DURAGESIC) 25 MCG/HR patch 1 patch, 1 patch, Transdermal, Q72H, Dennis Reyna MD, 1 patch at 03/05/21 1038  •  furosemide (LASIX) tablet 20 mg, 20 mg, Oral, Daily, Meg Bynum MD, 20 mg at 03/07/21 0908  •  Gabapentin (NEURONTIN) 300 MG/6ML solution 800 mg, 800 mg, Per G Tube, Q8H, Dennis Reyna MD, 800 mg at 03/07/21 0535  •  Glycerin-Hypromellose- (ARTIFICIAL TEARS) 0.2-0.2-1 % ophthalmic solution solution 1 drop, 1 drop, Both Eyes, Q1H PRN, Dennis Reyna MD  •  guaifenesin (ROBITUSSIN) 100 MG/5ML liquid 400 mg, 400 mg, Per G Tube, Q6H, Dennis Reyna MD, 400 mg at 03/07/21 0538  •  HYDROcodone-acetaminophen (HYCET) 7.5-325 MG/15ML solution 10 mL, 10 mL, Per G Tube, Q6H, Alfredo Coelho MD, 10 mL at 03/07/21 0907  •  hydrOXYzine (ATARAX) tablet 25 mg, 25 mg, Oral, Daily, Dennis Reyna MD, 25 mg at 03/07/21 0909  •  ipratropium-albuterol (DUO-NEB) nebulizer solution 3 mL, 3 mL, Nebulization, Q4H PRN, Dennis Reyna MD, 3 mL at 03/02/21 1512  •  lansoprazole (FIRST) oral suspension 30 mg, 30 mg, Per G Tube, QAM, Dennis Reyna MD, 30 mg at 03/07/21 0634  •  Magnesium Sulfate 2 gram Bolus, followed by 8 gram infusion (total Mg dose 10 grams)- Mg less than or equal to 1mg/dL, 2 g, Intravenous, PRN **OR** Magnesium Sulfate 2 gram / 50mL Infusion (GIVE X 3 BAGS TO EQUAL 6GM TOTAL DOSE) - Mg 1.1 - 1.5 mg/dl, 2 g, Intravenous, PRN  **OR** Magnesium Sulfate 4 gram infusion- Mg 1.6-1.9 mg/dL, 4 g, Intravenous, PRN, Dennis Reyna MD  •  midodrine (PROAMATINE) tablet 7.5 mg, 7.5 mg, Per G Tube, BID AC, Alfredo Coelho MD, 7.5 mg at 03/07/21 0539  •  multivitamin with minerals 1 tablet, 1 tablet, Oral, Daily, Dennis Reyna MD, 1 tablet at 03/07/21 0907  •  nitroglycerin (NITROSTAT) SL tablet 0.4 mg, 0.4 mg, Sublingual, Q5 Min PRN, Dennis Reyna MD  •  ondansetron (ZOFRAN) injection 4 mg, 4 mg, Intravenous, Q6H PRN, Dennis Reyna MD, 4 mg at 03/06/21 2347  •  oxyCODONE (ROXICODONE) 5 MG/5ML solution 10 mg, 10 mg, Per G Tube, Q4H PRN, Dennis Reyna MD, 10 mg at 03/07/21 0921  •  polyethylene glycol (MIRALAX) packet 17 g, 17 g, Oral, Daily, Dennis Reyna MD, 17 g at 03/07/21 0907  •  potassium chloride (K-DUR,KLOR-CON) ER tablet 40 mEq, 40 mEq, Oral, PRN **OR** potassium chloride (KLOR-CON) packet 40 mEq, 40 mEq, Oral, PRN **OR** potassium chloride 10 mEq in 100 mL IVPB, 10 mEq, Intravenous, Q1H PRN, Dennis Reyna MD  •  potassium phosphate 45 mmol in sodium chloride 0.9 % 500 mL infusion, 45 mmol, Intravenous, PRN **OR** potassium phosphate 30 mmol in sodium chloride 0.9 % 250 mL infusion, 30 mmol, Intravenous, PRN **OR** potassium phosphate 15 mmol in sodium chloride 0.9 % 100 mL infusion, 15 mmol, Intravenous, PRN **OR** sodium phosphates 40 mmol in sodium chloride 0.9 % 500 mL IVPB, 40 mmol, Intravenous, PRN **OR** sodium phosphates 20 mmol in sodium chloride 0.9 % 250 mL IVPB, 20 mmol, Intravenous, PRN, Dennis Reyna MD  •  Scopolamine (TRANSDERM-SCOP) 1.5 MG/3DAYS patch 1 patch, 1 patch, Transdermal, Q72H, Meg Bynum MD, 1 patch at 03/06/21 1835  •  senna (SENOKOT) tablet 1 tablet, 1 tablet, Per G Tube, BID, Dennis Reyna MD, 1 tablet at 03/07/21 0909  •  sertraline (ZOLOFT) tablet 150 mg, 150 mg, Per G Tube, Daily, Ricardo Payne III, MD, 150 mg at 03/07/21 0908  •  sodium chloride 0.9 % flush  "10 mL, 10 mL, Intravenous, Q12H, Dennis Reyna MD, 10 mL at 03/07/21 0907  •  sodium chloride 0.9 % flush 10 mL, 10 mL, Intravenous, PRN, Dennis Reyna MD  •  sodium chloride 7 % nebulizer solution nebulizer solution 4 mL, 4 mL, Nebulization, BID - RT, Darío Mendez MD, 4 mL at 03/07/21 0838    Allergies:  is allergic to lisinopril.    Review of Systems: CNS, ENT, CVS, RESPIRATORY, GI, , BENJI, SKIN systems on review are negative except as indicated in the HPI.      Physical Examination update:  Blood pressure 97/59, pulse 97, temperature 98.9 °F (37.2 °C), temperature source Oral, resp. rate 20, height 177.8 cm (70\"), weight 71.4 kg (157 lb 6.5 oz), SpO2 100 %.      Diagnostics:  Lab Results   Component Value Date    WBC 6.68 03/05/2021    HGB 7.9 (L) 03/05/2021    HCT 23.5 (L) 03/05/2021     03/05/2021     No results found for: CRP  No results found for: SEDRATE  Lab Results   Component Value Date    GLUCOSE 108 (H) 03/05/2021    BUN 20 03/05/2021    CREATININE 0.54 (L) 03/05/2021    EGFRIFNONA >150 03/05/2021    BCR 37.0 (H) 03/05/2021    CO2 27.0 03/05/2021    CALCIUM 9.3 03/05/2021    ALBUMIN 3.30 (L) 03/05/2021    LABIL2 2.0 01/28/2020    AST 17 03/05/2021    ALT 27 03/05/2021 2/27/2021 02:58 2/28/2021 07:36 3/1/2021 06:26 3/3/2021 05:04 3/5/2021 16:54   Procalcitonin 0.11 0.10 0.13 0.09 0.08     • Microbiology:  •   Microbiology Results (last 10 days)     Procedure Component Value - Date/Time    Respiratory Culture - Wash, Bronchus [[id number]]  (Abnormal) Collected: 03/06/21 1016    Lab Status: Preliminary result Specimen: Wash from Bronchus Updated: 03/07/21 0924     Respiratory Culture Scant growth (1+) Pseudomonas species      Rare Normal Respiratory Lisa     Gram Stain No WBCs or organisms seen    Blood Culture - Blood, Arm, Right [[id number]] Collected: 03/05/21 1654    Lab Status: Preliminary result Specimen: Blood from Arm, Right Updated: 03/06/21 1745     Blood Culture No " growth at 24 hours    Blood Culture - Blood, Hand, Left [[id number]] Collected: 03/05/21 1654    Lab Status: Preliminary result Specimen: Blood from Hand, Left Updated: 03/06/21 1745     Blood Culture No growth at 24 hours    Respiratory Culture - Sputum, Cough [[id number]]  (Abnormal)  (Susceptibility) Collected: 02/27/21 1638    Lab Status: Final result Specimen: Sputum from Cough Updated: 03/02/21 0954     Respiratory Culture Light growth (2+) Pseudomonas aeruginosa      Scant growth (1+) Normal Respiratory Lisa     Gram Stain Many (4+) WBCs seen      Rare (1+) Gram positive cocci in clusters      No Epithelial cells seen    Susceptibility      Pseudomonas aeruginosa      YOGI      Cefepime Intermediate      Ceftazidime Intermediate      Ciprofloxacin Susceptible      Gentamicin Susceptible      Levofloxacin Susceptible      Piperacillin + Tazobactam Susceptible<!--EPICS--><sup style='font-weight:normal'>1</sup></font>             <!--EPICS--><sup style='font-weight:normal'>1</sup></font> Appended report. These results have been appended to a previously preliminary verified report.          Linear View               Susceptibility Comments     Pseudomonas aeruginosa    Pip/oscar confirmed with disc diffusion.              Blood Culture - Blood, Arm, Left [[id number]] Collected: 02/27/21 0340    Lab Status: Final result Specimen: Blood from Arm, Left Updated: 03/04/21 0400     Blood Culture No growth at 5 days    Respiratory Culture - Sputum, ET Suction [[id number]]  (Abnormal) Collected: 02/27/21 0330    Lab Status: Final result Specimen: Sputum from ET Suction Updated: 03/02/21 0956     Respiratory Culture Scant growth (1+) Pseudomonas aeruginosa      Rare Normal Respiratory Lisa     Gram Stain Moderate (3+) WBCs seen      Rare (1+) Gram positive cocci      Occasional Gram positive bacilli      No epithelial cells seen    Narrative:      Refer to previous respritory culture collected on 2/27/2021 at 1638 for YOGI's.         Blood Culture - Blood, Arm, Left [[id number]] Collected: 02/27/21 0259    Lab Status: Final result Specimen: Blood from Arm, Left Updated: 03/04/21 0315     Blood Culture No growth at 5 days    Respiratory Panel PCR w/COVID-19(SARS-CoV-2) [facility] In-House, NP Swab in UTM/VTM, 3-4 HR TAT - Swab, Nasopharynx [[id number]]  (Normal) Collected: 02/27/21 0236    Lab Status: Final result Specimen: Swab from Nasopharynx Updated: 02/27/21 0559     ADENOVIRUS, PCR Not Detected     Coronavirus 229E Not Detected     Coronavirus HKU1 Not Detected     Coronavirus NL63 Not Detected     Coronavirus OC43 Not Detected     COVID19 Not Detected     Human Metapneumovirus Not Detected     Human Rhinovirus/Enterovirus Not Detected     Influenza A PCR Not Detected     Influenza B PCR Not Detected     Parainfluenza Virus 1 Not Detected     Parainfluenza Virus 2 Not Detected     Parainfluenza Virus 3 Not Detected     Parainfluenza Virus 4 Not Detected     RSV, PCR Not Detected     Bordetella pertussis pcr Not Detected     Bordetella parapertussis PCR Not Detected     Chlamydophila pneumoniae PCR Not Detected     Mycoplasma pneumo by PCR Not Detected    Narrative:      Fact sheet for providers: https://docs.[employer]/wp-content/uploads/GJP6856-2515-NC4.1-EUA-Provider-Fact-Sheet-3.pdf    Fact sheet for patients: https://docs.[employer]/wp-content/uploads/AYD4625-0017-DJ3.1-EUA-Patient-Fact-Sheet-1.pdf    Test performed by PCR.      o   MRSA Screen, PCR (Inpatient) - Swab, Nares  Order: [id number]  Status:  Final result   Visible to patient:  Yes (MyChart) Next appt:  None  Specimen Information: Nares; Swab        Component   Ref Range & Units    MRSA PCR   No MRSA Detected MRSA DetectedAbnormal     Resulting Agency  [facility]         Specimen Collected: 02/02/21 21:20             Blood Culture   Date Value Ref Range Status   03/05/2021 No growth at 24 hours  Preliminary   03/05/2021 No growth at 24 hours   Preliminary     Imaging:    XR Chest 1 View    Result Date: 3/7/2021  Persistent bibasilar consolidation and bilateral pleural effusions.  This report was finalized on 3/7/2021 5:00 AM by Dr. Carmen Forrest M.D.           CT Chest Without Contrast Diagnostic    Result Date: 3/2/2021  Suboptimal study due to artifact from patient breathing motion. There are moderate-sized bilateral posteriorly layering pleural effusions. The effusion on the left is unchanged since a CT dated 02/10/2021. The effusion on the right appears slightly larger. Areas of consolidation within both lower lobes also appear more prominent and are most likely due to atelectasis, although superimposed pneumonia cannot be excluded. Patchy reticulonodular opacities in the right upper lobe are new and are likely secondary to mucous plugging within bronchioles. There are also multiple macroscopic nodules in the inferior aspect of the right upper lobe that are new and are quite likely secondary to peripheral mucous plugging.  This report was finalized on 3/2/2021 8:11 PM by Dr. Rober Monson M.D.      XR Chest 1 View    Result Date: 3/7/2021  Persistent bibasilar consolidation and bilateral pleural effusions.  This report was finalized on 3/7/2021 5:00 AM by Dr. Carmen Forrest M.D.      XR Chest 1 View    Result Date: 2/28/2021  Moderate right and small left pleural effusions.  Right pleural effusion extends partially into the major fissure. There is dense right basilar opacity/consolidation and potential infiltrate. No evidence for perihilar edema or pneumothorax. Tracheostomy is present.  This report was finalized on 2/28/2021 2:20 PM by Dr. Taran Killian M.D.            Assessment and Plan:  Fever with sputum culture 2/27 with Ps.aeruginosa in a quadriplegic with 2/22/2021 MRSA colonization by nares PCR is a tracheostomized patient with normal procalcitonin levels (see table above)     Acute on chronic hypoxic respiratory failure managed  for increased respiratory secretions & mucus plugs (B-scope toiletry performed 3/6)     Diarrhea (CDI) monitor, one BM on 3/4 and none recorded on 3/5 or 3/6.     No immediate indication for antibacterial treatment and will continue to monitor clinical progress post B-scope toiletry.      OBDULIO Alcazar Jr., MD. EUGENIO, FRCPC, FCAP, FACP.  Infectious Disease & Medical Microbiology locum

## 2021-03-07 NOTE — PROGRESS NOTES
LOS: 8 days   Patient Care Team:  Sheron Perez MD as PCP - General (Family Medicine)    Subjective     Patient is resting comfortably in bed on the ventilator much better today he is not in distress.  I asked him if he was okay to do another bronchoscopy looks like he still got some atelectasis with so suspected we would have to after yesterday's [facility] and he is agreeable.    Review of Systems:         Objective     Vital Signs  Vital Sign Min/Max for last 24 hours  Temp  Min: 98.1 °F (36.7 °C)  Max: 99.6 °F (37.6 °C)   BP  Min: 83/47  Max: 147/82   Pulse  Min: 61  Max: 97   Resp  Min: 20  Max: 29   SpO2  Min: 85 %  Max: 100 %   No data recorded   No data recorded        Ventilator/Non-Invasive Ventilation Settings (From admission, onward) Comment     Start     Ordered    03/06/21 0927  Ventilator - AC/PC; (16); 100; 90%; 10; 20  Continuous     Question Answer Comment   Vent Mode AC/PC    Breath rate  16   FiO2 100    FiO2 titrate for Sp02% =/> 90%    PEEP 10    Inspiratory Pressure 20        03/06/21 0927    03/04/21 1501  NIPPV (CPAP or BIPAP)  At Bedtime & As Needed-RT     Comments: Patient had a tracheostomy tube, he needs to be connected to the trilog machine or equivalent hospital ventilator, need to inflate the cuff while on the trilogy per standard protocols   Question Answer Comment   Indication: Acute Respiratory Failure    Type: AVAPS-AE    NIPPV Mask Interface: Other    Mask Type tracheostomy tube    Rate 12    VT (mL) 550    EPAP Min (cm H2O) 10    EPAP Max (cm H2O) 15    Max Pressure (cm H2O) 30    Pressure Support Min (cm H2O) 4    Pressure Support Max (cm H2O) 15    Titrate for SPO2 90%        03/04/21 1501    03/03/21 1725  NIPPV (CPAP or BIPAP)  Until Discontinued,   Status:  Canceled     Comments: Patient had a tracheostomy tube, he needs to be connected to the trilogy machine or equivalent hospital ventilator, need to inflate the cuff while on the trilogy per standard  protocols   Question Answer Comment   Indication: Acute Respiratory Failure    Type: AVAPS-AE    NIPPV Mask Interface: Other    Mask Type tracheostomy tube    Rate 12    VT (mL) 550    EPAP Min (cm H2O) 10    EPAP Max (cm H2O) 15    Max Pressure (cm H2O) 30    Pressure Support Min (cm H2O) 4    Pressure Support Max (cm H2O) 15    Titrate for SPO2 90%        03/03/21 1726                             Body mass index is 22.59 kg/m².  I/O last 3 completed shifts:  In: 1595 [Other:730; NG/GT:865]  Out: 1375 [Urine:1375]  No intake/output data recorded.        Physical Exam:  General Appearance: Well-developed white male he is trached on a ventilator resting comfortably he is still on 50% FiO2 and 10 cm of PEEP his SPO2 is 100% when he had dropped into 40% O2.  Eyes: Conjunctiva are clear and anicteric pupils are equal  ENT: Mucous membranes are moist no erythema or exudates he makes copious oral secretions.  But he has suction taped to his hand and he is able to reach up and suction himself out well.  Neck: Tracheostomy site looks okay  6 Shiley with the inner cannula, no jugular venous distention, trachea midline  Lungs: He is moving air pretty well but certainly decreased in the right base versus the left I do not hear a whole lot in the way of rhonchi or rales today.  Cardiac: He is regular rate rhythm no murmur  Abdomen: Soft no palpable hepatosplenomegaly or masses he has a left upper quadrant PEG type tube and the site looks okay  : Not examined  Musculoskeletal: He has very little muscle mass he has some slight movement with his arms he does not really have any  he has marked interosseous muscle wasting  Skin: No jaundice no petechiae skin is warm and dry  Neuro: Quadriplegic  Extremities/P Vascular: Palpable radial and dorsalis pedis pulses bilaterally again marked muscle atrophy  MSE: He seems calmer at this time       Labs:  Results from last 7 days   Lab Units 03/05/21  1654 03/03/21  0504 03/01/21  0622  02/28/21  0736   GLUCOSE mg/dL 108* 96 128* 114*   SODIUM mmol/L 137 136 137 136   POTASSIUM mmol/L 4.2 4.6 3.8 3.4*   MAGNESIUM mg/dL  --  1.9  --   --    CO2 mmol/L 27.0 28.5 31.3* 28.4   CHLORIDE mmol/L 99 97* 97* 95*   ANION GAP mmol/L 11.0 10.5 8.7 12.6   CREATININE mg/dL 0.54* 0.45* 0.49* 0.64*   BUN mg/dL 20 18 19 23*   BUN / CREAT RATIO  37.0* 40.0* 38.8* 35.9*   CALCIUM mg/dL 9.3 9.5 9.1 8.9   EGFR IF NONAFRICN AM mL/min/1.73 >150 >150 >150 141   ALK PHOS U/L 112  --  118* 132*   TOTAL PROTEIN g/dL 7.0  --  6.8 7.1   ALT (SGPT) U/L 27  --  16 19   AST (SGOT) U/L 17  --  8 14   BILIRUBIN mg/dL 1.0  --  1.7* 2.4*   ALBUMIN g/dL 3.30*  --  3.40* 3.60   GLOBULIN gm/dL 3.7  --  3.4 3.5     Estimated Creatinine Clearance: 189.2 mL/min (A) (by C-G formula based on SCr of 0.54 mg/dL (L)).      Results from last 7 days   Lab Units 03/05/21  1654 03/03/21  0504 03/02/21  0324 03/01/21  1848 03/01/21  0626 02/28/21  0736   WBC 10*3/mm3 6.68 8.79 12.79*  --  10.11 9.86   RBC 10*6/mm3 2.91* 2.98* 2.88*  --  2.43* 2.75*   HEMOGLOBIN g/dL 7.9* 8.1* 8.0* 7.7* 6.5* 7.4*   HEMATOCRIT % 23.5* 24.6* 24.3* 23.3* 19.8* 22.4*   MCV fL 80.8 82.6 84.4  --  81.5 81.5   MCH pg 27.1 27.2 27.8  --  26.7 26.9   MCHC g/dL 33.6 32.9 32.9  --  32.8 33.0   RDW % 17.0* 17.2* 17.3*  --  17.5* 17.2*   RDW-SD fl 49.6 51.8 53.4  --  52.2 50.6   MPV fL 9.9 9.6 10.5  --  10.5 10.4   PLATELETS 10*3/mm3 277 289 257  --  266 305   NEUTROPHIL % % 66.7  --   --   --  82.1* 77.1*   LYMPHOCYTE % % 17.1*  --   --   --  9.9* 12.0*   MONOCYTES % % 8.1  --   --   --  6.3 7.4   EOSINOPHIL % % 7.6*  --   --   --  1.3 3.0   BASOPHIL % % 0.1  --   --   --  0.1 0.1   IMM GRAN % % 0.4  --   --   --  0.3 0.4   NEUTROS ABS 10*3/mm3 4.45  --   --   --  8.30* 7.60*   LYMPHS ABS 10*3/mm3 1.14  --   --   --  1.00 1.18   MONOS ABS 10*3/mm3 0.54  --   --   --  0.64 0.73   EOS ABS 10*3/mm3 0.51*  --   --   --  0.13 0.30   BASOS ABS 10*3/mm3 0.01  --   --   --  0.01 0.01    IMMATURE GRANS (ABS) 10*3/mm3 0.03  --   --   --  0.03 0.04   NRBC /100 WBC 0.0  --   --   --  0.0 0.0             Results from last 7 days   Lab Units 03/01/21  0626 02/28/21  0736   PROBNP pg/mL 1,083.0* 517.8*         Results from last 7 days   Lab Units 03/05/21  1654 03/03/21  0504 03/01/21  0626 02/28/21  0736   PROCALCITONIN ng/mL 0.08 0.09 0.13 0.10         Microbiology Results (last 10 days)     Procedure Component Value - Date/Time    Respiratory Culture - Wash, Bronchus [[id number]] Collected: 03/06/21 1016    Lab Status: Preliminary result Specimen: Wash from Bronchus Updated: 03/06/21 2038     Gram Stain No WBCs or organisms seen    Blood Culture - Blood, Arm, Right [[id number]] Collected: 03/05/21 1654    Lab Status: Preliminary result Specimen: Blood from Arm, Right Updated: 03/06/21 1745     Blood Culture No growth at 24 hours    Blood Culture - Blood, Hand, Left [[id number]] Collected: 03/05/21 1654    Lab Status: Preliminary result Specimen: Blood from Hand, Left Updated: 03/06/21 1745     Blood Culture No growth at 24 hours    Respiratory Culture - Sputum, Cough [[id number]]  (Abnormal)  (Susceptibility) Collected: 02/27/21 1638    Lab Status: Final result Specimen: Sputum from Cough Updated: 03/02/21 0954     Respiratory Culture Light growth (2+) Pseudomonas aeruginosa      Scant growth (1+) Normal Respiratory Lisa     Gram Stain Many (4+) WBCs seen      Rare (1+) Gram positive cocci in clusters      No Epithelial cells seen    Susceptibility      Pseudomonas aeruginosa      YOGI      Cefepime Intermediate      Ceftazidime Intermediate      Ciprofloxacin Susceptible      Gentamicin Susceptible      Levofloxacin Susceptible      Piperacillin + Tazobactam Susceptible<sup style='font-weight:normal'>1</sup></font>             <sup style='font-weight:normal'>1</sup></font> Appended report. These results have been appended to a previously preliminary verified report.          Linear View                Susceptibility Comments     Pseudomonas aeruginosa    Pip/oscar confirmed with disc diffusion.              Blood Culture - Blood, Arm, Left [[id number]] Collected: 02/27/21 0340    Lab Status: Final result Specimen: Blood from Arm, Left Updated: 03/04/21 0400     Blood Culture No growth at 5 days    Respiratory Culture - Sputum, ET Suction [[id number]]  (Abnormal) Collected: 02/27/21 0330    Lab Status: Final result Specimen: Sputum from ET Suction Updated: 03/02/21 0956     Respiratory Culture Scant growth (1+) Pseudomonas aeruginosa      Rare Normal Respiratory Lisa     Gram Stain Moderate (3+) WBCs seen      Rare (1+) Gram positive cocci      Occasional Gram positive bacilli      No epithelial cells seen    Narrative:      Refer to previous respritory culture collected on 2/27/2021 at 1638 for YOGI's.        Blood Culture - Blood, Arm, Left [[id number]] Collected: 02/27/21 0259    Lab Status: Final result Specimen: Blood from Arm, Left Updated: 03/04/21 0315     Blood Culture No growth at 5 days    Respiratory Panel PCR w/COVID-19(SARS-CoV-2) [facility]/BIBI/[facility] In-House, NP Swab in UTM/VTM, 3-4 HR TAT - Swab, Nasopharynx [[id number]]  (Normal) Collected: 02/27/21 0236    Lab Status: Final result Specimen: Swab from Nasopharynx Updated: 02/27/21 0559     ADENOVIRUS, PCR Not Detected     Coronavirus 229E Not Detected     Coronavirus HKU1 Not Detected     Coronavirus NL63 Not Detected     Coronavirus OC43 Not Detected     COVID19 Not Detected     Human Metapneumovirus Not Detected     Human Rhinovirus/Enterovirus Not Detected     Influenza A PCR Not Detected     Influenza B PCR Not Detected     Parainfluenza Virus 1 Not Detected     Parainfluenza Virus 2 Not Detected     Parainfluenza Virus 3 Not Detected     Parainfluenza Virus 4 Not Detected     RSV, PCR Not Detected     Bordetella pertussis pcr Not Detected     Bordetella parapertussis PCR Not Detected     Chlamydophila pneumoniae PCR Not Detected      Mycoplasma pneumo by PCR Not Detected    Narrative:      Fact sheet for providers: https://docs.[employer]/wp-content/uploads/PFY7673-1331-KB0.1-EUA-Provider-Fact-Sheet-3.pdf    Fact sheet for patients: https://docs.[employer]/wp-content/uploads/FPR7443-3723-ZD5.1-EUA-Patient-Fact-Sheet-1.pdf    Test performed by PCR.              acetylcysteine, 3 mL, Nebulization, BID - RT  ascorbic acid, 1,000 mg, Per G Tube, Daily  chlorhexidine, 15 mL, Mouth/Throat, Q12H  cyclobenzaprine, 10 mg, Per G Tube, Daily  docusate sodium, 100 mg, Per G Tube, BID  fentaNYL, 1 patch, Transdermal, Q72H  furosemide, 20 mg, Oral, Daily  Gabapentin, 800 mg, Per G Tube, Q8H  guaifenesin, 400 mg, Per G Tube, Q6H  HYDROcodone-acetaminophen, 10 mL, Per G Tube, Q6H  hydrOXYzine, 25 mg, Oral, Daily  lansoprazole, 30 mg, Per G Tube, QAM  midodrine, 7.5 mg, Per G Tube, BID AC  multivitamin with minerals, 1 tablet, Oral, Daily  polyethylene glycol, 17 g, Oral, Daily  Scopolamine, 1 patch, Transdermal, Q72H  senna, 1 tablet, Per G Tube, BID  sertraline, 150 mg, Per G Tube, Daily  sodium chloride, 10 mL, Intravenous, Q12H  sodium chloride, 4 mL, Nebulization, BID - RT           Diagnostics:  CT Abdomen Pelvis Without Contrast    Result Date: 2/10/2021  CT CHEST, ABDOMEN, AND PELVIS WITHOUT CONTRAST.  HISTORY: 35-year-old male with quadriplegia, pneumonia with tracheitis and mucous plugging. Sepsis. Persistent fever.  TECHNIQUE: Radiation dose reduction techniques were utilized, including automated exposure control and exposure modulation based on body size. 3 mm images were obtained through the chest, abdomen, and pelvis without the administration of IV contrast. Compared with CT of the abdomen and pelvis from 02/04/2021 and chest CTA from 02/04/2021.  FINDINGS: CHEST: There is significant motion/breathing artifact. The tracheostomy is in good position with the distal margin being 4 cm proximal to the titus. There is a significant volume of  mucus/secretions within both mainstem bronchi and the lower lobe bronchi and right middle lobe bronchi appear essentially completely opacified. There are small-moderate-sized bilateral pleural effusions and there are dense consolidations adjacently at both lower lobes. There is no pericardial effusion.  ABDOMEN/PELVIS: The spleen is enlarged measuring 16 cm in diameter, measured on the coronal reconstruction series. The noncontrasted liver appears unremarkable. The gallbladder is contracted. Percutaneous G-tube is noted in place. Noncontrasted pancreas, adrenals, and kidneys appear unremarkable. There are air-fluid levels predominantly within the colon and there is no colonic thickening. Appendix appears within normal limits. The urinary bladder is not abnormally distended.      1. Significant volume of mucus/secretions within the main stem bronchi and the bronchi at the lower lobes and right middle lobe are essentially completely opacified. Adjacent to small-moderate-sized bilateral pleural effusions are dense consolidations at both lower lobes which likely in part represent atelectasis, but pneumonia is possible as well. 2. Mild colonic ileus without evidence for colitis. 3. Splenomegaly.  This report was finalized on 2/10/2021 3:24 PM by Dr. Kenia Farrell M.D.      CT Chest Without Contrast Diagnostic    Result Date: 3/2/2021  CT CHEST WO CONTRAST DIAGNOSTIC-  CLINICAL HISTORY: Dyspnea. Pleural effusion. Quadriplegia. History of mucous plugging and atelectasis.  TECHNIQUE: Spiral CT images were obtained through the chest without IV contrast and were reconstructed in 3 mm thick slices.  Radiation dose reduction techniques were utilized, including automated exposure control and exposure modulation based on body size.  COMPARISON: CT chest dated 02/10/2021  FINDINGS: A tracheostomy tube remains in satisfactory position. Comparison with the previous study is difficult due to extensive breathing motion artifact that was  present on the previous study. There are moderate-sized bilateral posteriorly layering pleural effusions, greater in size on the right than the left. The effusion on the right has increased in size somewhat in the interval. The effusion on the left appears essentially unchanged. There is dense consolidation in the left lower lobe containing a few air bronchograms that is most likely primarily due to atelectasis. This appears slightly larger. Slightly less extensive patchy consolidation in the right lower lobe also appears slightly more prominent. There are patchy reticulonodular opacities throughout the right upper lobe that appear essentially new since the preceding CT scan. These have a tree-in-bud appearance and are likely due to mucus plugging within bronchioles. In addition, a 7 mm diameter discrete noncalcified nodule in the right upper lobe appears new. There also several additional smaller nodular opacities in the inferior aspect of the right upper lobe that are new. These are all favored to be due to peripheral mucous plugging, as well. Images through the upper abdomen demonstrate no obvious abnormality.      Suboptimal study due to artifact from patient breathing motion. There are moderate-sized bilateral posteriorly layering pleural effusions. The effusion on the left is unchanged since a CT dated 02/10/2021. The effusion on the right appears slightly larger. Areas of consolidation within both lower lobes also appear more prominent and are most likely due to atelectasis, although superimposed pneumonia cannot be excluded. Patchy reticulonodular opacities in the right upper lobe are new and are likely secondary to mucous plugging within bronchioles. There are also multiple macroscopic nodules in the inferior aspect of the right upper lobe that are new and are quite likely secondary to peripheral mucous plugging.  This report was finalized on 3/2/2021 8:11 PM by Dr. Rober Monson M.D.      CT Chest Without  Contrast Diagnostic    Result Date: 2/10/2021  CT CHEST, ABDOMEN, AND PELVIS WITHOUT CONTRAST.  HISTORY: 35-year-old male with quadriplegia, pneumonia with tracheitis and mucous plugging. Sepsis. Persistent fever.  TECHNIQUE: Radiation dose reduction techniques were utilized, including automated exposure control and exposure modulation based on body size. 3 mm images were obtained through the chest, abdomen, and pelvis without the administration of IV contrast. Compared with CT of the abdomen and pelvis from 02/04/2021 and chest CTA from 02/04/2021.  FINDINGS: CHEST: There is significant motion/breathing artifact. The tracheostomy is in good position with the distal margin being 4 cm proximal to the titus. There is a significant volume of mucus/secretions within both mainstem bronchi and the lower lobe bronchi and right middle lobe bronchi appear essentially completely opacified. There are small-moderate-sized bilateral pleural effusions and there are dense consolidations adjacently at both lower lobes. There is no pericardial effusion.  ABDOMEN/PELVIS: The spleen is enlarged measuring 16 cm in diameter, measured on the coronal reconstruction series. The noncontrasted liver appears unremarkable. The gallbladder is contracted. Percutaneous G-tube is noted in place. Noncontrasted pancreas, adrenals, and kidneys appear unremarkable. There are air-fluid levels predominantly within the colon and there is no colonic thickening. Appendix appears within normal limits. The urinary bladder is not abnormally distended.      1. Significant volume of mucus/secretions within the main stem bronchi and the bronchi at the lower lobes and right middle lobe are essentially completely opacified. Adjacent to small-moderate-sized bilateral pleural effusions are dense consolidations at both lower lobes which likely in part represent atelectasis, but pneumonia is possible as well. 2. Mild colonic ileus without evidence for colitis. 3.  Splenomegaly.  This report was finalized on 2/10/2021 3:24 PM by Dr. Kenia Farrell M.D.      CT Abdomen Pelvis With Contrast    Result Date: 2/4/2021  CT ABDOMEN PELVIS W CONTRAST-  Radiation dose reduction techniques were utilized, including automated exposure control and exposure modulation based on body size.  Clinical: Acute abdominal pain, nonlocalizing anemia. Aspiration pneumonia  Note: Examination degraded due to respiratory motion artifact  FINDINGS: There is enlargement of the left gluteus superficial to the iliac wing. Mixed attenuation measuring 11.2 cm AP, 5.5 cm transverse and 11.3 cm craniocaudal. Likely muscular hematoma. The upper border is somewhat rounded, I would recommend follow-up to exclude associated mass.  There are again bilateral free-flowing pleural effusions. Attenuation compatible with serous fluid. There is again bibasilar consolidation. There is cardiac enlargement.  There is splenomegaly with the spleen measuring approximately 16 cm cranial caudal. The liver is satisfactory in size and shape, there is a typical area of focal fatty infiltration within the left lobe. No suspicious liver lesion, there is a small cyst demonstrated within the right lobe. Intrahepatic biliary radicals are mildly pronounced. Due to the degree of respiratory motion artifact, difficult to evaluate the gallbladder and extrahepatic biliary tree, no gross gallstones seen. The pancreas is satisfactory in appearance.  The adrenal glands and kidneys have a typical appearance. There is a G-tube demonstrated within the gastric lumen. Its position is satisfactory. Diameter of the aorta is normal. Urinary bladder is typical in appearance.  The appendix is normal. Caliber of the colon and small bowel is within normal limits. No bowel wall thickening nor induration to suggest an inflammatory process. No free intraperitoneal air free fluid.  CONCLUSION: 1. Mixed attenuation collection within the left gluteus having the  appearance of a sizable hematoma. See above measurements. Advise follow-up CT after resolved to exclude underlying mass. 2. Bilateral pleural effusions and bibasilar consolidation consistent with pneumonia. 3. Splenomegaly. 4. Prominent intrahepatic biliary radicals, motion artifact degrades evaluation of the gallbladder and biliary tree, no gross gallstones identified. 5. G-tube position is satisfactory. 6. The appendix, colon and small bowel are unremarkable.   This report was finalized on 2/4/2021 12:42 PM by Dr. Govind Carr M.D.      XR Chest 1 View    Result Date: 3/1/2021  ONE VIEW PORTABLE CHEST AT 3:48 PM  HISTORY: Shortness of breath. Right-sided atelectasis and pleural effusion. Possible mucous plugging.  FINDINGS: There is some atelectasis and pleural effusion at both bases, right greater than left and the appearance at the right base shows improvement from 02/28/2021. There is slight worsening of increased density at the left base. The heart size remains normal. A tracheostomy tube is in place.  This report was finalized on 3/1/2021 4:42 PM by Dr. Benjamin De Leon M.D.      XR Chest 1 View    Result Date: 2/28/2021  CHEST SINGLE VIEW  HISTORY: Mucous plugging. History of hypertension.  COMPARISON: AP chest 2/27/2021, 2/12/2021, CT chest 2/10/2021  FINDINGS: Tracheostomy tube is present. Moderate right and small left pleural effusions are present. Right pleural fluid extends partially into the major fissure. There is right lower lobe consolidation or infiltrate. Left lung is comparatively clear there is no perihilar edema or pneumothorax.      Moderate right and small left pleural effusions.  Right pleural effusion extends partially into the major fissure. There is dense right basilar opacity/consolidation and potential infiltrate. No evidence for perihilar edema or pneumothorax. Tracheostomy is present.  This report was finalized on 2/28/2021 2:20 PM by Dr. Taran Killian M.D.      XR Chest 1  View    Result Date: 2/27/2021  Patient: MARK HARDIN  Time Out: 02:51 Exam(s): FILM CXR 1 VIEW EXAM:   XR Chest, 1 View CLINICAL HISTORY:    Reason for exam: SOA. TECHNIQUE:   Frontal view of the chest. COMPARISON: 2 12 21. FINDINGS:   Redemonstrated tracheostomy.  Interval decrease in moderate right pleural effusion with associated compressive atelectasis.  Decrease in basilar volume loss.  No pneumothorax.  No cardiomegaly.  Cervical spinal fusion hardware. IMPRESSION:     Interval decrease in small-moderate right pleural effusion with associated compressive atelectasis. Tracheostomy.     Electronically signed by Dharmesh Lopez M.D. on 02-27-21 at 0251    XR Chest 1 View    Result Date: 2/12/2021  XR CHEST 1 VW-  HISTORY: Male who is 35 years-old,  chest pain  TECHNIQUE: Frontal view of the chest  COMPARISON: 02/12/2021 at 0605 hours  FINDINGS: Stable appearing tracheostomy tube. The heart is mildly enlarged. Heart, mediastinum and pulmonary vasculature are unremarkable. Mild right pleural effusion with increased fissural fluid suggested. Small atelectasis or infiltrate at the lung bases. No pneumothorax. No acute osseous process.      Mild right pleural effusion with increased partial fluid suggested. Small atelectasis or infiltrate at the lung bases. Continued follow-up recommended.  This report was finalized on 2/12/2021 3:15 PM by Dr. Eddie Reddy M.D.      XR Chest 1 View    Result Date: 2/12/2021  XR CHEST 1 VW-  Clinical: Pleural effusion, right thoracentesis to 11/20/2021  COMPARISON CT scan of the chest 2/10/2021 and chest radiograph to 8/20/2021  FINDINGS: Tracheostomy tube position is satisfactory. Small right-sided pleural effusion demonstrated. Cardiac size within normal limits. No pneumothorax. Blunting of left costophrenic angle suggests trace left pleural effusion. There is vague opacity at both lung bases, suggesting atelectasis/infiltrate. No pulmonary edema identified. No acute  consolidation seen. The remainder is unremarkable.  This report was finalized on 2/12/2021 7:40 AM by Dr. Govind Carr M.D.      XR Chest 1 View    Result Date: 2/8/2021  XR CHEST 1 VW-  HISTORY: Male who is 35 years-old,  rhonchi, fever  TECHNIQUE: Frontal views of the chest  COMPARISON: 02/05/2021  FINDINGS: Stable appearing tracheostomy tube. Heart, mediastinum and pulmonary vasculature are unremarkable. Mild bilateral basilar pleural effusions and likely atelectasis or infiltrate. No pneumothorax. No acute osseous process.      Mild bilateral basilar atelectasis/infiltrate/effusion, follow-up suggested.  This report was finalized on 2/8/2021 3:46 PM by Dr. Eddie Reddy M.D.      XR Chest 1 View    Result Date: 2/5/2021  ONE VIEW PORTABLE CHEST AT 1:12 PM  HISTORY: Left lower lobe atelectasis and pneumonia. Follow-up evaluation.  FINDINGS: There has been marked improvement in the left basilar atelectasis and pneumonia when compared to yesterday's exam. The findings are most consistent with resolution of mucus plugging. No new abnormality is seen. The heart size is normal. A tracheostomy tube remains in place.  This report was finalized on 2/5/2021 2:28 PM by Dr. Benjamin De Leon M.D.      Duplex Venous Upper Extremity - Bilateral CAR    Result Date: 2/10/2021  · Acute right upper extremity superficial thrombophlebitis noted in the median cubital. · All other vessels appear normal.      Duplex Venous Lower Extremity - Bilateral CAR    Result Date: 2/10/2021  · Normal bilateral lower extremity venous duplex scan.      US Thoracentesis    Result Date: 2/11/2021  ULTRASOUND-GUIDED RIGHT THORACENTESIS.  HISTORY: Pleural effusion.  After signed informed consent was obtained the patient was prepped and draped in the usual sterile fashion. Lidocaine was used for local anesthesia.  Ultrasound guidance was used to place the thoracentesis catheter into the right pleural fluid collection. 400 mL of straw-colored fluid  was removed. Sample was sent to the lab.  Confirmatory images were obtained.  Patient tolerated the procedure well with no complications.      Ultrasound-guided right thoracentesis as described.   This report was finalized on 2/11/2021 10:39 AM by Dr. Shad Smith M.D.          Today's chest x-ray reviewed there is probably some increased atelectasis density in that right base minimal in the left base there may be trace pleural effusions right greater than left    Active Hospital Problems    Diagnosis  POA   • **Acute on chronic respiratory failure with hypoxemia (CMS/HCC) [J96.21]  Yes   • Hypotension [I95.9]  Yes   • Dyspnea [R06.00]  Yes   • Anemia [D64.9]  Yes   • Leukocytosis [D72.829]  Yes   • Quadriplegia (CMS/HCC) [G82.50]  Yes   • HCAP (healthcare-associated pneumonia) [J18.9]  Yes      Resolved Hospital Problems   No resolved problems to display.         Assessment/Plan     1. Acute hypoxic respiratory failure secondary to mucous plugging complicated by his quadriplegia and inability to clear secretions well is much improved.  Continue ventilatory support  2. Mucous plugging looks like he is probably got some reaccumulation particularly in the right base I really anticipated this I think he probably needs a bronchoscopy again today to clear secretions.  His sputum has scant growth of Pseudomonas he is known to be colonized he is afebrile I am not inclined to put him on antibiotics at this time  3. Quadriplegia  4. Bilateral pleural effusions seems to be improving quite small.  5. Anemia stable no gross bleeding noted  6. Anxiety he has significant anxiety issues  7. Hypertension  8. Fluids/electrolytes/nutrition continue tube feeds he has a little extra fluid on board perfusion concerns secretions up his Lasix from daily to twice daily and monitor    Plan for disposition:    Darío Mendez MD  03/07/21  07:19 EST    Time: Critical care time excluding procedures today 40 minutes

## 2021-03-07 NOTE — POST-PROCEDURE NOTE
Procedure: Therapeutic flexible bronchoscopy  Indication patient with mucous plugging.  Consent: Informed consent obtained from the patient.  Procedure correct patient identified patient was placed on a propofol drip he was given 5 cc i.e. 50 mg of propofol and venously and during the procedure another 5 cc a propofol 50 mg because of severe anxiety.  The inner cannula was removed from the tracheostomy tube the tracheostomy was then cannulated with flexible bronchoscope I found both lower lobes full of secretions thick gray secretions are suctioned until airways were clear I did this several times in and out placing him back on the ventilator.  His lowest O2 saturation was 98%.  Once I get his airways cleared I did do a lavage in both lower lobes and the suction further secretions.  He tolerated well again lowest oxygen saturation during the procedure 98%.  No complications.

## 2021-03-07 NOTE — PLAN OF CARE
"Goal Outcome Evaluation:   Patient transferred to CCU this am d/t respiratory distress and need for bronchoscopy. Bronchoscopy completed at bedside. Patient remains on ventilator, tolerating well. Pain medications administered per order. Patient educated on rationale for discontinuation of dilaudid. Patient expressed irritation and frustration saying, \"My pain is controlled with that.\" MD made aware of patient's concerns. No bowel movement. Temp max 99.6. Continuing to monitor.         "

## 2021-03-07 NOTE — PLAN OF CARE
Pt sats above 95% remains on the vent. Oral and trach secretions seem to be decreasing . Pt able to tolerate an impressive amount of narcotics > No BM thus far. Tube feed residuals as high as 150 . No evidence of mucus plugs thus far large volume incontinent of urine. Once. T max 99.4 is a lab collect [facility]

## 2021-03-08 ENCOUNTER — APPOINTMENT (OUTPATIENT)
Dept: GENERAL RADIOLOGY | Facility: HOSPITAL | Age: 36
End: 2021-03-08

## 2021-03-08 LAB
ABO GROUP BLD: NORMAL
ANION GAP SERPL CALCULATED.3IONS-SCNC: 10.9 MMOL/L (ref 5–15)
BLD GP AB SCN SERPL QL: NEGATIVE
BUN SERPL-MCNC: 26 MG/DL (ref 6–20)
BUN/CREAT SERPL: 47.3 (ref 7–25)
CALCIUM SPEC-SCNC: 9.2 MG/DL (ref 8.6–10.5)
CHLORIDE SERPL-SCNC: 94 MMOL/L (ref 98–107)
CO2 SERPL-SCNC: 31.1 MMOL/L (ref 22–29)
CREAT SERPL-MCNC: 0.55 MG/DL (ref 0.76–1.27)
DEPRECATED RDW RBC AUTO: 48.4 FL (ref 37–54)
ERYTHROCYTE [DISTWIDTH] IN BLOOD BY AUTOMATED COUNT: 16.7 % (ref 12.3–15.4)
FOLATE SERPL-MCNC: >20 NG/ML (ref 4.78–24.2)
GFR SERPL CREATININE-BSD FRML MDRD: >150 ML/MIN/1.73
GLUCOSE SERPL-MCNC: 118 MG/DL (ref 65–99)
HCT VFR BLD AUTO: 19.8 % (ref 37.5–51)
HCT VFR BLD AUTO: 19.8 % (ref 37.5–51)
HGB BLD-MCNC: 6.5 G/DL (ref 13–17.7)
HGB BLD-MCNC: 6.5 G/DL (ref 13–17.7)
IRON 24H UR-MRATE: 37 MCG/DL (ref 59–158)
IRON SATN MFR SERPL: 16 % (ref 20–50)
LDH SERPL-CCNC: 111 U/L (ref 135–225)
MCH RBC QN AUTO: 26.4 PG (ref 26.6–33)
MCHC RBC AUTO-ENTMCNC: 32.8 G/DL (ref 31.5–35.7)
MCV RBC AUTO: 80.5 FL (ref 79–97)
PLATELET # BLD AUTO: 228 10*3/MM3 (ref 140–450)
PMV BLD AUTO: 9.5 FL (ref 6–12)
POTASSIUM SERPL-SCNC: 4.2 MMOL/L (ref 3.5–5.2)
RBC # BLD AUTO: 2.46 10*6/MM3 (ref 4.14–5.8)
RETICS # AUTO: 0.07 10*6/MM3 (ref 0.02–0.13)
RETICS/RBC NFR AUTO: 2.69 % (ref 0.7–1.9)
RH BLD: POSITIVE
SODIUM SERPL-SCNC: 136 MMOL/L (ref 136–145)
T&S EXPIRATION DATE: NORMAL
TIBC SERPL-MCNC: 226 MCG/DL (ref 298–536)
TRANSFERRIN SERPL-MCNC: 152 MG/DL (ref 200–360)
VIT B12 BLD-MCNC: 1460 PG/ML (ref 211–946)
WBC # BLD AUTO: 7.67 10*3/MM3 (ref 3.4–10.8)

## 2021-03-08 PROCEDURE — 94799 UNLISTED PULMONARY SVC/PX: CPT

## 2021-03-08 PROCEDURE — 86923 COMPATIBILITY TEST ELECTRIC: CPT

## 2021-03-08 PROCEDURE — 86900 BLOOD TYPING SEROLOGIC ABO: CPT

## 2021-03-08 PROCEDURE — 85027 COMPLETE CBC AUTOMATED: CPT | Performed by: INTERNAL MEDICINE

## 2021-03-08 PROCEDURE — 94667 MNPJ CHEST WALL 1ST: CPT

## 2021-03-08 PROCEDURE — 85018 HEMOGLOBIN: CPT | Performed by: INTERNAL MEDICINE

## 2021-03-08 PROCEDURE — 94668 MNPJ CHEST WALL SBSQ: CPT

## 2021-03-08 PROCEDURE — 82746 ASSAY OF FOLIC ACID SERUM: CPT | Performed by: INTERNAL MEDICINE

## 2021-03-08 PROCEDURE — 85045 AUTOMATED RETICULOCYTE COUNT: CPT | Performed by: INTERNAL MEDICINE

## 2021-03-08 PROCEDURE — 82607 VITAMIN B-12: CPT | Performed by: INTERNAL MEDICINE

## 2021-03-08 PROCEDURE — 25010000002 DIPHENHYDRAMINE PER 50 MG: Performed by: INTERNAL MEDICINE

## 2021-03-08 PROCEDURE — 86900 BLOOD TYPING SEROLOGIC ABO: CPT | Performed by: INTERNAL MEDICINE

## 2021-03-08 PROCEDURE — 94003 VENT MGMT INPAT SUBQ DAY: CPT

## 2021-03-08 PROCEDURE — 86901 BLOOD TYPING SEROLOGIC RH(D): CPT | Performed by: INTERNAL MEDICINE

## 2021-03-08 PROCEDURE — 83615 LACTATE (LD) (LDH) ENZYME: CPT | Performed by: INTERNAL MEDICINE

## 2021-03-08 PROCEDURE — 99232 SBSQ HOSP IP/OBS MODERATE 35: CPT | Performed by: INTERNAL MEDICINE

## 2021-03-08 PROCEDURE — 84466 ASSAY OF TRANSFERRIN: CPT | Performed by: INTERNAL MEDICINE

## 2021-03-08 PROCEDURE — 25010000002 FUROSEMIDE PER 20 MG: Performed by: INTERNAL MEDICINE

## 2021-03-08 PROCEDURE — 83540 ASSAY OF IRON: CPT | Performed by: INTERNAL MEDICINE

## 2021-03-08 PROCEDURE — 36430 TRANSFUSION BLD/BLD COMPNT: CPT

## 2021-03-08 PROCEDURE — 94669 MECHANICAL CHEST WALL OSCILL: CPT

## 2021-03-08 PROCEDURE — P9016 RBC LEUKOCYTES REDUCED: HCPCS

## 2021-03-08 PROCEDURE — 85014 HEMATOCRIT: CPT | Performed by: INTERNAL MEDICINE

## 2021-03-08 PROCEDURE — 86850 RBC ANTIBODY SCREEN: CPT | Performed by: INTERNAL MEDICINE

## 2021-03-08 PROCEDURE — 80048 BASIC METABOLIC PNL TOTAL CA: CPT | Performed by: INTERNAL MEDICINE

## 2021-03-08 PROCEDURE — 71045 X-RAY EXAM CHEST 1 VIEW: CPT

## 2021-03-08 RX ORDER — DIPHENHYDRAMINE HCL 25 MG
25 CAPSULE ORAL ONCE
Status: COMPLETED | OUTPATIENT
Start: 2021-03-08 | End: 2021-03-08

## 2021-03-08 RX ORDER — DIPHENHYDRAMINE HYDROCHLORIDE 50 MG/ML
25 INJECTION INTRAMUSCULAR; INTRAVENOUS ONCE
Status: COMPLETED | OUTPATIENT
Start: 2021-03-08 | End: 2021-03-08

## 2021-03-08 RX ORDER — FUROSEMIDE 10 MG/ML
20 INJECTION INTRAMUSCULAR; INTRAVENOUS AS NEEDED
Status: DISPENSED | OUTPATIENT
Start: 2021-03-08 | End: 2021-03-09

## 2021-03-08 RX ORDER — ALBUTEROL SULFATE 90 UG/1
6 AEROSOL, METERED RESPIRATORY (INHALATION) EVERY 4 HOURS PRN
Status: DISCONTINUED | OUTPATIENT
Start: 2021-03-08 | End: 2021-03-16 | Stop reason: HOSPADM

## 2021-03-08 RX ADMIN — FUROSEMIDE 20 MG: 20 TABLET ORAL at 17:05

## 2021-03-08 RX ADMIN — SENNOSIDES 1 TABLET: 8.6 TABLET, FILM COATED ORAL at 09:03

## 2021-03-08 RX ADMIN — ACETYLCYSTEINE 3 ML: 200 SOLUTION ORAL; RESPIRATORY (INHALATION) at 19:24

## 2021-03-08 RX ADMIN — SODIUM CHLORIDE, PRESERVATIVE FREE 10 ML: 5 INJECTION INTRAVENOUS at 01:09

## 2021-03-08 RX ADMIN — SERTRALINE 150 MG: 100 TABLET, FILM COATED ORAL at 09:04

## 2021-03-08 RX ADMIN — ACETAMINOPHEN ORAL SOLUTION 650 MG: 325 SOLUTION ORAL at 17:04

## 2021-03-08 RX ADMIN — GABAPENTIN 800 MG: 250 SOLUTION ORAL at 06:41

## 2021-03-08 RX ADMIN — FUROSEMIDE 20 MG: 10 INJECTION, SOLUTION INTRAMUSCULAR; INTRAVENOUS at 17:04

## 2021-03-08 RX ADMIN — GUAIFENESIN 400 MG: 100 SOLUTION ORAL at 23:36

## 2021-03-08 RX ADMIN — FUROSEMIDE 20 MG: 20 TABLET ORAL at 09:03

## 2021-03-08 RX ADMIN — MIDODRINE HYDROCHLORIDE 7.5 MG: 5 TABLET ORAL at 17:04

## 2021-03-08 RX ADMIN — LANSOPRAZOLE 30 MG: KIT at 06:42

## 2021-03-08 RX ADMIN — CYCLOBENZAPRINE 10 MG: 10 TABLET, FILM COATED ORAL at 09:04

## 2021-03-08 RX ADMIN — OXYCODONE HYDROCHLORIDE 10 MG: 5 SOLUTION ORAL at 13:01

## 2021-03-08 RX ADMIN — ALPRAZOLAM 0.5 MG: 0.5 TABLET ORAL at 01:09

## 2021-03-08 RX ADMIN — SENNOSIDES 1 TABLET: 8.6 TABLET, FILM COATED ORAL at 20:30

## 2021-03-08 RX ADMIN — ALBUTEROL SULFATE 2 PUFF: 90 AEROSOL, METERED RESPIRATORY (INHALATION) at 07:43

## 2021-03-08 RX ADMIN — DOCUSATE SODIUM 100 MG: 50 LIQUID ORAL at 09:01

## 2021-03-08 RX ADMIN — GABAPENTIN 800 MG: 250 SOLUTION ORAL at 14:44

## 2021-03-08 RX ADMIN — SODIUM CHLORIDE, PRESERVATIVE FREE 10 ML: 5 INJECTION INTRAVENOUS at 20:30

## 2021-03-08 RX ADMIN — DIPHENHYDRAMINE HYDROCHLORIDE 25 MG: 50 INJECTION, SOLUTION INTRAMUSCULAR; INTRAVENOUS at 12:30

## 2021-03-08 RX ADMIN — HYDROCODONE BITARTRATE AND ACETAMINOPHEN 10 ML: 7.5; 325 SOLUTION ORAL at 03:12

## 2021-03-08 RX ADMIN — FENTANYL 1 PATCH: 25 PATCH TRANSDERMAL at 10:35

## 2021-03-08 RX ADMIN — OXYCODONE HYDROCHLORIDE 10 MG: 5 SOLUTION ORAL at 17:04

## 2021-03-08 RX ADMIN — FUROSEMIDE 20 MG: 10 INJECTION, SOLUTION INTRAMUSCULAR; INTRAVENOUS at 21:46

## 2021-03-08 RX ADMIN — Medication 4 ML: at 19:25

## 2021-03-08 RX ADMIN — CHLORHEXIDINE GLUCONATE 15 ML: 1.2 RINSE ORAL at 09:00

## 2021-03-08 RX ADMIN — OXYCODONE HYDROCHLORIDE 10 MG: 5 SOLUTION ORAL at 09:01

## 2021-03-08 RX ADMIN — DOCUSATE SODIUM 100 MG: 50 LIQUID ORAL at 20:30

## 2021-03-08 RX ADMIN — GUAIFENESIN 400 MG: 100 SOLUTION ORAL at 06:41

## 2021-03-08 RX ADMIN — POLYETHYLENE GLYCOL 3350 17 G: 17 POWDER, FOR SOLUTION ORAL at 09:01

## 2021-03-08 RX ADMIN — HYDROCODONE BITARTRATE AND ACETAMINOPHEN 10 ML: 7.5; 325 SOLUTION ORAL at 09:00

## 2021-03-08 RX ADMIN — OXYCODONE HYDROCHLORIDE AND ACETAMINOPHEN 1000 MG: 500 TABLET ORAL at 09:03

## 2021-03-08 RX ADMIN — ALBUTEROL SULFATE 6 PUFF: 90 AEROSOL, METERED RESPIRATORY (INHALATION) at 23:10

## 2021-03-08 RX ADMIN — SODIUM CHLORIDE, PRESERVATIVE FREE 10 ML: 5 INJECTION INTRAVENOUS at 09:00

## 2021-03-08 RX ADMIN — ACETYLCYSTEINE 3 ML: 200 SOLUTION ORAL; RESPIRATORY (INHALATION) at 07:43

## 2021-03-08 RX ADMIN — MIDODRINE HYDROCHLORIDE 7.5 MG: 5 TABLET ORAL at 06:46

## 2021-03-08 RX ADMIN — ALBUTEROL SULFATE 6 PUFF: 90 AEROSOL, METERED RESPIRATORY (INHALATION) at 19:30

## 2021-03-08 RX ADMIN — ALPRAZOLAM 0.5 MG: 0.5 TABLET ORAL at 17:04

## 2021-03-08 RX ADMIN — OXYCODONE HYDROCHLORIDE 10 MG: 5 SOLUTION ORAL at 04:20

## 2021-03-08 RX ADMIN — OXYCODONE HYDROCHLORIDE 10 MG: 5 SOLUTION ORAL at 21:47

## 2021-03-08 RX ADMIN — GABAPENTIN 800 MG: 250 SOLUTION ORAL at 21:47

## 2021-03-08 RX ADMIN — ALPRAZOLAM 0.5 MG: 0.5 TABLET ORAL at 09:04

## 2021-03-08 RX ADMIN — ACETAMINOPHEN ORAL SOLUTION 650 MG: 325 SOLUTION ORAL at 13:01

## 2021-03-08 RX ADMIN — Medication 4 ML: at 07:43

## 2021-03-08 RX ADMIN — GUAIFENESIN 400 MG: 100 SOLUTION ORAL at 01:08

## 2021-03-08 RX ADMIN — GUAIFENESIN 400 MG: 100 SOLUTION ORAL at 12:30

## 2021-03-08 RX ADMIN — CHLORHEXIDINE GLUCONATE 15 ML: 1.2 RINSE ORAL at 20:30

## 2021-03-08 RX ADMIN — HYDROXYZINE HYDROCHLORIDE 25 MG: 25 TABLET ORAL at 09:03

## 2021-03-08 RX ADMIN — GUAIFENESIN 400 MG: 100 SOLUTION ORAL at 17:04

## 2021-03-08 NOTE — PROGRESS NOTES
Adult Nutrition  Assessment/PES    Patient Name:  Maxim Carvajal  YOB: 1985  MRN: [id number]  Admit Date:  2/27/2021    Assessment Date:  3/8/2021    Comments:  TF follow up. TF's via PEG continue with Nutren 1.5 at 55ml/hr and water [id number]. TF's at goal rate. BM+. Skin reviewed. Discussed care in CCU rounds. Will continue to follow.    Reason for Assessment     Row Name 03/08/21 1238          Reason for Assessment    Reason For Assessment  follow-up protocol;TF/PN         Nutrition/Diet History     Row Name 03/08/21 1249          Nutrition/Diet History    Typical Food/Fluid Intake  tolerating TF's           Labs/Tests/Procedures/Meds     Row Name 03/08/21 1249          Labs/Procedures/Meds    Lab Results Reviewed  reviewed     Lab Results Comments  bun, alb, iron, transferrin, H/H        Diagnostic Tests/Procedures    Diagnostic Test/Procedure Reviewed  reviewed        Medications    Pertinent Medications Reviewed  reviewed     Pertinent Medications Comments  vit C, colace, fentanyl, lasix, lansoprazole, mvi, senokot, miralax         Physical Findings     Row Name 03/08/21 1252          Physical Findings    Overall Physical Appearance  on oxygen therapy     Gastrointestinal  feeding tube     Tubes  PEG     Skin  poor skin integrity/turgor;pressure injury wounds coccyx, and right heel           Nutrition Prescription Ordered     Row Name 03/08/21 1252          Nutrition Prescription PO    Current PO Diet  NPO        Nutrition Prescription EN    Enteral Route  PEG     Product  Nutren 1.5 (Osmolite 1.5)     TF Delivery Method  Continuous     Continuous TF Goal Rate (mL/hr)  55 mL/hr     Water flush (mL)   30 mL     Water Flush Frequency  Every 4 hours         Evaluation of Received Nutrient/Fluid Intake     Row Name 03/08/21 1252          Calories Evaluation    Enteral Calories (kcal)  1980     % of Kcal Needs  100        Protein Evaluation    Enteral Protein (gm)  89.8     % of Protein Needs  100         Fluid Intake Evaluation    Enteral (Free Water) Fluid (mL)  1003     Free Water Flush Fluid (mL)  180     Total Free Water Intake (mL)  1183 mL        EN Evaluation    TF Tolerance  -- BM+     HOB  Greater than or equal to 30 degress               Problem/Interventions:          Intervention Goal     Row Name 03/08/21 1253          Intervention Goal    General  Maintain nutrition;Nutrition support treatment;Improved nutrition related lab(s);Reduce/improve symptoms;Meet nutritional needs for age/condition;Disease management/therapy     TF/PN  Tolerate TF at goal     Weight  Maintain weight         Nutrition Intervention     Row Name 03/08/21 1253          Nutrition Intervention    RD/Tech Action  Follow Tx progress;Care plan reviewd         Nutrition Prescription     Row Name 03/08/21 1253          Nutrition Prescription EN    Enteral Prescription  Continue same protocol         Education/Evaluation     Row Name 03/08/21 1253          Monitor/Evaluation    Monitor  Per protocol;I&O;Pertinent labs;TF delivery/tolerance;Weight;Skin status;Symptoms           Electronically signed by:  Edwige Rodríguez RD  03/08/21 12:57 EST

## 2021-03-08 NOTE — PROGRESS NOTES
LOS: 9 days     Chief Complaint: Fever    Interval History: No more episodes of fever, required bronchoscopies over the weekend clear mucus plugging.  Reports chest pain.  No abdominal pain nausea or vomiting.  No diarrhea.  No rashes    Vital Signs  Temp:  [97.5 °F (36.4 °C)-98.7 °F (37.1 °C)] 97.8 °F (36.6 °C)  Heart Rate:  [] 84  Resp:  [16-20] 17  BP: ()/(51-88) 119/75  FiO2 (%):  [51 %] 51 %    Physical Exam:  General: In no acute distress  Cardiovascular: RRR, no LE edema   Respiratory: Coarse breath sounds bilaterally  GI: Soft, NT/ND, + bowel sounds bilaterally, G-tube in place   Skin: No rashes    Antibiotics:  None     Results Review:    Lab Results   Component Value Date    WBC 7.67 03/08/2021    HGB 6.5 (C) 03/08/2021    HCT 19.8 (C) 03/08/2021    MCV 80.5 03/08/2021     03/08/2021     Lab Results   Component Value Date    GLUCOSE 116 (H) 03/07/2021    BUN 26 (H) 03/07/2021    CREATININE 0.58 (L) 03/07/2021    EGFRIFNONA >150 03/07/2021    BCR 44.8 (H) 03/07/2021    CO2 29.7 (H) 03/07/2021    CALCIUM 8.9 03/07/2021    ALBUMIN 3.30 (L) 03/05/2021    LABIL2 2.0 01/28/2020    AST 17 03/05/2021    ALT 27 03/05/2021       Microbiology:  3/7 MRSA nasal PCR positive  3/6 Bronch cx scant pseudomonas  3/5 BCx NGTD x 2  2/27 SCx Pseudomonas  2/27 BCx neg x 2     2/4 SCx MRSA  2/2 SCx moderate Pseudomonas, light growth MRSA    3/8 chest x-ray personally viewed by me shows residual small right-sided pleural effusion.  No infiltrates    Assessment/Plan   Fever  Positive sputum culture  Acute on chronic hypoxic respiratory failure  Tracheostomy in place  Quadriplegia complicating above    Fevers resolved with antibiotic therapy.  Sputum cultures positive for Pseudomonas which is a colonizer.  Procalcitonin remains negative.  White blood cell count remains normal.  No indication for antibiotics at this time.  Blood cultures are negative to date    Discussed the above with Dr. Mendez    ID will  follow PRN

## 2021-03-08 NOTE — PLAN OF CARE
Goal Outcome Evaluation:        With the addition of dulcolax supp pt was able to have a large semi liquid  brown stool  no obvious signs of blood. Pt did become anxious with clean up and bathing also incontinence of urine.  Sats between  percent thus far. Chest xray pending secretions thick moderate in nature oral secretions thin and clear pt able to suction self with hand held adaptive tape to kerlex wrap mepilex applied to coccyx wound unable to locate  specific kind suggested by wound care nurse. Able to collect 800 ml of urine from external cath. No fever. Is am lab collect. . WCM

## 2021-03-08 NOTE — PROGRESS NOTES
Continued Stay Note  [facility]     Patient Name: Maxim Carvajal  MRN: [id number]  Today's Date: 3/8/2021    Admit Date: 2/27/2021    Discharge Plan     Row Name 03/08/21 1144       Plan    Plan  [place] ?    Plan Comments  previos referral to Th[facility]berg following        Discharge Codes    No documentation.       Expected Discharge Date and Time     Expected Discharge Date Expected Discharge Time    Mar 5, 2021             Noelle Contreras RN

## 2021-03-08 NOTE — PLAN OF CARE
Goal Outcome Evaluation:  Plan of Care Reviewed With: patient, father  Progress: improving  Outcome Summary: Pain and anxiety meds given per mar, MD discussed pain management with patient as he feels it hinders his respiratory status, pain meds adjusted, see MD note, FIO2 weaned down to 21%, moderate secretions, ETCO2 monitored, occult blood sample ordered, no BM this shift, additional doses of lasix given along with scheduled d/t receiving 2 PRBCS this shift for H&H 6.5/19.8, recheck H&H at midnight, labs sent and reviewed, updated patient and family on POC, palliative consult ordered d/t inconsistent goals of care between patient and providers

## 2021-03-08 NOTE — CONSULTS
The patient is now in ICU.  Nursing notes indicate that the patient became anxious during bathing early this morning.  Otherwise, he is unchanged from a psychiatric standpoint

## 2021-03-08 NOTE — PROGRESS NOTES
LOS: 9 days   Patient Care Team:  Sheron Perez MD as PCP - General (Family Medicine)    Subjective   Patient sleeping in bed on my arrival he did awaken fairly easily.  His main complaint is he wants more narcotics.  He wants IV Dilaudid he wants more maintenance medication he indicates he hurts everywhere.  Nursing has reported to me the last couple of days that he is always asking for more medication.      Review of Systems:         Objective     Vital Signs  Vital Sign Min/Max for last 24 hours  Temp  Min: 97.5 °F (36.4 °C)  Max: 98.9 °F (37.2 °C)   BP  Min: 79/55  Max: 163/88   Pulse  Min: 60  Max: 103   Resp  Min: 16  Max: 20   SpO2  Min: 98 %  Max: 100 %   No data recorded   Weight  Min: 69.4 kg (153 lb)  Max: 69.4 kg (153 lb)        Ventilator/Non-Invasive Ventilation Settings (From admission, onward) Comment     Start     Ordered    03/06/21 0927  Ventilator - AC/PC; (16); 100; 90%; 10; 20  Continuous     Question Answer Comment   Vent Mode AC/PC    Breath rate  16   FiO2 100    FiO2 titrate for Sp02% =/> 90%    PEEP 10    Inspiratory Pressure 20        03/06/21 0927    03/04/21 1501  NIPPV (CPAP or BIPAP)  At Bedtime & As Needed-RT     Comments: Patient had a tracheostomy tube, he needs to be connected to the [facility] machine or equivalent hospital ventilator, need to inflate the cuff while on the [facility] per standard protocols   Question Answer Comment   Indication: Acute Respiratory Failure    Type: AVAPS-AE    NIPPV Mask Interface: Other    Mask Type tracheostomy tube    Rate 12    VT (mL) 550    EPAP Min (cm H2O) 10    EPAP Max (cm H2O) 15    Max Pressure (cm H2O) 30    Pressure Support Min (cm H2O) 4    Pressure Support Max (cm H2O) 15    Titrate for SPO2 90%        03/04/21 1501    03/03/21 1725  NIPPV (CPAP or BIPAP)  Until Discontinued,   Status:  Canceled     Comments: Patient had a tracheostomy tube, he needs to be connected to the trilogy machine or [facility]  ventilator, need to inflate the cuff while on the trilogy per standard protocols   Question Answer Comment   Indication: Acute Respiratory Failure    Type: AVAPS-AE    NIPPV Mask Interface: Other    Mask Type tracheostomy tube    Rate 12    VT (mL) 550    EPAP Min (cm H2O) 10    EPAP Max (cm H2O) 15    Max Pressure (cm H2O) 30    Pressure Support Min (cm H2O) 4    Pressure Support Max (cm H2O) 15    Titrate for SPO2 90%        03/03/21 1726                             Body mass index is 21.95 kg/m².  I/O last 3 completed shifts:  In: 2995 [I.V.:7; Other:620; NG/GT:2368]  Out: 2050 [Urine:2050]  No intake/output data recorded.        Physical Exam:  General Appearance: Well-developed white male he is trached on a ventilator resting comfortably he is still on 30% FiO2 and 10 cm of PEEP his SPO2 is 100%   Eyes: Conjunctiva are clear and anicteric pupils are equal  ENT: Mucous membranes are moist no erythema or exudates he makes copious oral secretions.  But he has suction taped to his hand and he is able to reach up and suction himself out well.  Neck: Tracheostomy site looks okay  6 Shiley with the inner cannula, no jugular venous distention, trachea midline  Lungs: He is moving air well today there was a little rhonchi heard on the left after suctioning it was clear otherwise both lungs are pretty clear to auscultation  Cardiac: He is regular rate rhythm no murmur  Abdomen: Soft no palpable hepatosplenomegaly or masses he has a left upper quadrant PEG type tube and the site looks okay  : Not examined  Musculoskeletal: He has very little muscle mass he has some slight movement with his arms he does not really have any  he has marked interosseous muscle wasting  Skin: No jaundice no petechiae skin is warm and dry  Neuro: Quadriplegic  Extremities/P Vascular: Palpable radial and dorsalis pedis pulses bilaterally again marked muscle atrophy  MSE: He very much wants lots of medication seems to be is overwhelming  request       Labs:  Results from last 7 days   Lab Units 03/07/21  1130 03/05/21  1654 03/03/21  0504   GLUCOSE mg/dL 116* 108* 96   SODIUM mmol/L 135* 137 136   POTASSIUM mmol/L 4.2 4.2 4.6   MAGNESIUM mg/dL  --   --  1.9   CO2 mmol/L 29.7* 27.0 28.5   CHLORIDE mmol/L 97* 99 97*   ANION GAP mmol/L 8.3 11.0 10.5   CREATININE mg/dL 0.58* 0.54* 0.45*   BUN mg/dL 26* 20 18   BUN / CREAT RATIO  44.8* 37.0* 40.0*   CALCIUM mg/dL 8.9 9.3 9.5   EGFR IF NONAFRICN AM mL/min/1.73 >150 >150 >150   ALK PHOS U/L  --  112  --    TOTAL PROTEIN g/dL  --  7.0  --    ALT (SGPT) U/L  --  27  --    AST (SGOT) U/L  --  17  --    BILIRUBIN mg/dL  --  1.0  --    ALBUMIN g/dL  --  3.30*  --    GLOBULIN gm/dL  --  3.7  --      Estimated Creatinine Clearance: 171.2 mL/min (A) (by C-G formula based on SCr of 0.58 mg/dL (L)).      Results from last 7 days   Lab Units 03/05/21  1654 03/03/21  0504 03/02/21  0324 03/01/21  1848   WBC 10*3/mm3 6.68 8.79 12.79*  --    RBC 10*6/mm3 2.91* 2.98* 2.88*  --    HEMOGLOBIN g/dL 7.9* 8.1* 8.0* 7.7*   HEMATOCRIT % 23.5* 24.6* 24.3* 23.3*   MCV fL 80.8 82.6 84.4  --    MCH pg 27.1 27.2 27.8  --    MCHC g/dL 33.6 32.9 32.9  --    RDW % 17.0* 17.2* 17.3*  --    RDW-SD fl 49.6 51.8 53.4  --    MPV fL 9.9 9.6 10.5  --    PLATELETS 10*3/mm3 277 289 257  --    NEUTROPHIL % % 66.7  --   --   --    LYMPHOCYTE % % 17.1*  --   --   --    MONOCYTES % % 8.1  --   --   --    EOSINOPHIL % % 7.6*  --   --   --    BASOPHIL % % 0.1  --   --   --    IMM GRAN % % 0.4  --   --   --    NEUTROS ABS 10*3/mm3 4.45  --   --   --    LYMPHS ABS 10*3/mm3 1.14  --   --   --    MONOS ABS 10*3/mm3 0.54  --   --   --    EOS ABS 10*3/mm3 0.51*  --   --   --    BASOS ABS 10*3/mm3 0.01  --   --   --    IMMATURE GRANS (ABS) 10*3/mm3 0.03  --   --   --    NRBC /100 WBC 0.0  --   --   --                      Results from last 7 days   Lab Units 03/05/21  1654 03/03/21  0504   PROCALCITONIN ng/mL 0.08 0.09         Microbiology Results (last 10  days)     Procedure Component Value - Date/Time    MRSA Screen, PCR (Inpatient) - Swab, Nares [[id number]]  (Abnormal) Collected: 03/07/21 1454    Lab Status: Final result Specimen: Swab from Nares Updated: 03/07/21 1622     MRSA PCR MRSA Detected    Respiratory Culture - Wash, Bronchus [[id number]]  (Abnormal) Collected: 03/06/21 1016    Lab Status: Preliminary result Specimen: Wash from Bronchus Updated: 03/07/21 0924     Respiratory Culture Scant growth (1+) Pseudomonas species      Rare Normal Respiratory Lisa     Gram Stain No WBCs or organisms seen    Blood Culture - Blood, Arm, Right [[id number]] Collected: 03/05/21 1654    Lab Status: Preliminary result Specimen: Blood from Arm, Right Updated: 03/07/21 1745     Blood Culture No growth at 2 days    Blood Culture - Blood, Hand, Left [[id number]] Collected: 03/05/21 1654    Lab Status: Preliminary result Specimen: Blood from Hand, Left Updated: 03/07/21 1745     Blood Culture No growth at 2 days    Respiratory Culture - Sputum, Cough [[id number]]  (Abnormal)  (Susceptibility) Collected: 02/27/21 1638    Lab Status: Final result Specimen: Sputum from Cough Updated: 03/02/21 0954     Respiratory Culture Light growth (2+) Pseudomonas aeruginosa      Scant growth (1+) Normal Respiratory Lisa     Gram Stain Many (4+) WBCs seen      Rare (1+) Gram positive cocci in clusters      No Epithelial cells seen    Susceptibility      Pseudomonas aeruginosa      YOGI      Cefepime Intermediate      Ceftazidime Intermediate      Ciprofloxacin Susceptible      Gentamicin Susceptible      Levofloxacin Susceptible      Piperacillin + Tazobactam Susceptible<sup style='font-weight:normal'>1</sup></font>             <sup style='font-weight:normal'>1</sup></font> Appended report. These results have been appended to a previously preliminary verified report.          Linear View               Susceptibility Comments     Pseudomonas aeruginosa    Pip/oscar confirmed with disc diffusion.               Blood Culture - Blood, Arm, Left [[id number]] Collected: 02/27/21 0340    Lab Status: Final result Specimen: Blood from Arm, Left Updated: 03/04/21 0400     Blood Culture No growth at 5 days    Respiratory Culture - Sputum, ET Suction [[id number]]  (Abnormal) Collected: 02/27/21 0330    Lab Status: Final result Specimen: Sputum from ET Suction Updated: 03/02/21 0956     Respiratory Culture Scant growth (1+) Pseudomonas aeruginosa      Rare Normal Respiratory Lisa     Gram Stain Moderate (3+) WBCs seen      Rare (1+) Gram positive cocci      Occasional Gram positive bacilli      No epithelial cells seen    Narrative:      Refer to previous respritory culture collected on 2/27/2021 at 1638 for YOGI's.        Blood Culture - Blood, Arm, Left [[id number]] Collected: 02/27/21 0259    Lab Status: Final result Specimen: Blood from Arm, Left Updated: 03/04/21 0315     Blood Culture No growth at 5 days    Respiratory Panel PCR w/COVID-19(SARS-CoV-2) [facility]I/[facility] In-House, NP Swab in UTM/VTM, 3-4 HR TAT - Swab, Nasopharynx [[id number]]  (Normal) Collected: 02/27/21 0236    Lab Status: Final result Specimen: Swab from Nasopharynx Updated: 02/27/21 0559     ADENOVIRUS, PCR Not Detected     Coronavirus 229E Not Detected     Coronavirus HKU1 Not Detected     Coronavirus NL63 Not Detected     Coronavirus OC43 Not Detected     COVID19 Not Detected     Human Metapneumovirus Not Detected     Human Rhinovirus/Enterovirus Not Detected     Influenza A PCR Not Detected     Influenza B PCR Not Detected     Parainfluenza Virus 1 Not Detected     Parainfluenza Virus 2 Not Detected     Parainfluenza Virus 3 Not Detected     Parainfluenza Virus 4 Not Detected     RSV, PCR Not Detected     Bordetella pertussis pcr Not Detected     Bordetella parapertussis PCR Not Detected     Chlamydophila pneumoniae PCR Not Detected     Mycoplasma pneumo by PCR Not Detected    Narrative:      Fact sheet for providers:  https://docs.[employer]/wp-content/uploads/KVM4740-8045-GP5.1-EUA-Provider-Fact-Sheet-3.pdf    Fact sheet for patients: https://docs.[employer]/wp-content/uploads/TRC1610-4483-EN2.1-EUA-Patient-Fact-Sheet-1.pdf    Test performed by PCR.              acetylcysteine, 3 mL, Nebulization, BID - RT  ascorbic acid, 1,000 mg, Per G Tube, Daily  chlorhexidine, 15 mL, Mouth/Throat, Q12H  cyclobenzaprine, 10 mg, Per G Tube, Daily  docusate sodium, 100 mg, Per G Tube, BID  fentaNYL, 1 patch, Transdermal, Q72H  furosemide, 20 mg, Oral, BID  Gabapentin, 800 mg, Per G Tube, Q8H  guaifenesin, 400 mg, Per G Tube, Q6H  HYDROcodone-acetaminophen, 10 mL, Per G Tube, Q6H  hydrOXYzine, 25 mg, Oral, Daily  lansoprazole, 30 mg, Per G Tube, QAM  midodrine, 7.5 mg, Per G Tube, BID AC  multivitamin with minerals, 1 tablet, Oral, Daily  polyethylene glycol, 17 g, Oral, Daily  Scopolamine, 1 patch, Transdermal, Q72H  senna, 1 tablet, Per G Tube, BID  sertraline, 150 mg, Per G Tube, Daily  sodium chloride, 10 mL, Intravenous, Q12H  sodium chloride, 4 mL, Nebulization, BID - RT      propofol, 5-50 mcg/kg/min, Last Rate: Stopped (03/07/21 1120)        Diagnostics:  CT Abdomen Pelvis Without Contrast    Result Date: 2/10/2021  CT CHEST, ABDOMEN, AND PELVIS WITHOUT CONTRAST.  HISTORY: 35-year-old male with quadriplegia, pneumonia with tracheitis and mucous plugging. Sepsis. Persistent fever.  TECHNIQUE: Radiation dose reduction techniques were utilized, including automated exposure control and exposure modulation based on body size. 3 mm images were obtained through the chest, abdomen, and pelvis without the administration of IV contrast. Compared with CT of the abdomen and pelvis from 02/04/2021 and chest CTA from 02/04/2021.  FINDINGS: CHEST: There is significant motion/breathing artifact. The tracheostomy is in good position with the distal margin being 4 cm proximal to the titus. There is a significant volume of mucus/secretions within  both mainstem bronchi and the lower lobe bronchi and right middle lobe bronchi appear essentially completely opacified. There are small-moderate-sized bilateral pleural effusions and there are dense consolidations adjacently at both lower lobes. There is no pericardial effusion.  ABDOMEN/PELVIS: The spleen is enlarged measuring 16 cm in diameter, measured on the coronal reconstruction series. The noncontrasted liver appears unremarkable. The gallbladder is contracted. Percutaneous G-tube is noted in place. Noncontrasted pancreas, adrenals, and kidneys appear unremarkable. There are air-fluid levels predominantly within the colon and there is no colonic thickening. Appendix appears within normal limits. The urinary bladder is not abnormally distended.      1. Significant volume of mucus/secretions within the main stem bronchi and the bronchi at the lower lobes and right middle lobe are essentially completely opacified. Adjacent to small-moderate-sized bilateral pleural effusions are dense consolidations at both lower lobes which likely in part represent atelectasis, but pneumonia is possible as well. 2. Mild colonic ileus without evidence for colitis. 3. Splenomegaly.  This report was finalized on 2/10/2021 3:24 PM by Dr. Kenia Farrell M.D.      CT Chest Without Contrast Diagnostic    Result Date: 3/2/2021  CT CHEST WO CONTRAST DIAGNOSTIC-  CLINICAL HISTORY: Dyspnea. Pleural effusion. Quadriplegia. History of mucous plugging and atelectasis.  TECHNIQUE: Spiral CT images were obtained through the chest without IV contrast and were reconstructed in 3 mm thick slices.  Radiation dose reduction techniques were utilized, including automated exposure control and exposure modulation based on body size.  COMPARISON: CT chest dated 02/10/2021  FINDINGS: A tracheostomy tube remains in satisfactory position. Comparison with the previous study is difficult due to extensive breathing motion artifact that was present on the previous  study. There are moderate-sized bilateral posteriorly layering pleural effusions, greater in size on the right than the left. The effusion on the right has increased in size somewhat in the interval. The effusion on the left appears essentially unchanged. There is dense consolidation in the left lower lobe containing a few air bronchograms that is most likely primarily due to atelectasis. This appears slightly larger. Slightly less extensive patchy consolidation in the right lower lobe also appears slightly more prominent. There are patchy reticulonodular opacities throughout the right upper lobe that appear essentially new since the preceding CT scan. These have a tree-in-bud appearance and are likely due to mucus plugging within bronchioles. In addition, a 7 mm diameter discrete noncalcified nodule in the right upper lobe appears new. There also several additional smaller nodular opacities in the inferior aspect of the right upper lobe that are new. These are all favored to be due to peripheral mucous plugging, as well. Images through the upper abdomen demonstrate no obvious abnormality.      Suboptimal study due to artifact from patient breathing motion. There are moderate-sized bilateral posteriorly layering pleural effusions. The effusion on the left is unchanged since a CT dated 02/10/2021. The effusion on the right appears slightly larger. Areas of consolidation within both lower lobes also appear more prominent and are most likely due to atelectasis, although superimposed pneumonia cannot be excluded. Patchy reticulonodular opacities in the right upper lobe are new and are likely secondary to mucous plugging within bronchioles. There are also multiple macroscopic nodules in the inferior aspect of the right upper lobe that are new and are quite likely secondary to peripheral mucous plugging.  This report was finalized on 3/2/2021 8:11 PM by Dr. Rober Monson M.D.      CT Chest Without Contrast  Diagnostic    Result Date: 2/10/2021  CT CHEST, ABDOMEN, AND PELVIS WITHOUT CONTRAST.  HISTORY: 35-year-old male with quadriplegia, pneumonia with tracheitis and mucous plugging. Sepsis. Persistent fever.  TECHNIQUE: Radiation dose reduction techniques were utilized, including automated exposure control and exposure modulation based on body size. 3 mm images were obtained through the chest, abdomen, and pelvis without the administration of IV contrast. Compared with CT of the abdomen and pelvis from 02/04/2021 and chest CTA from 02/04/2021.  FINDINGS: CHEST: There is significant motion/breathing artifact. The tracheostomy is in good position with the distal margin being 4 cm proximal to the titus. There is a significant volume of mucus/secretions within both mainstem bronchi and the lower lobe bronchi and right middle lobe bronchi appear essentially completely opacified. There are small-moderate-sized bilateral pleural effusions and there are dense consolidations adjacently at both lower lobes. There is no pericardial effusion.  ABDOMEN/PELVIS: The spleen is enlarged measuring 16 cm in diameter, measured on the coronal reconstruction series. The noncontrasted liver appears unremarkable. The gallbladder is contracted. Percutaneous G-tube is noted in place. Noncontrasted pancreas, adrenals, and kidneys appear unremarkable. There are air-fluid levels predominantly within the colon and there is no colonic thickening. Appendix appears within normal limits. The urinary bladder is not abnormally distended.      1. Significant volume of mucus/secretions within the main stem bronchi and the bronchi at the lower lobes and right middle lobe are essentially completely opacified. Adjacent to small-moderate-sized bilateral pleural effusions are dense consolidations at both lower lobes which likely in part represent atelectasis, but pneumonia is possible as well. 2. Mild colonic ileus without evidence for colitis. 3.  Splenomegaly.  This report was finalized on 2/10/2021 3:24 PM by Dr. Kenia Farrell M.D.      CT Abdomen Pelvis With Contrast    Result Date: 2/4/2021  CT ABDOMEN PELVIS W CONTRAST-  Radiation dose reduction techniques were utilized, including automated exposure control and exposure modulation based on body size.  Clinical: Acute abdominal pain, nonlocalizing anemia. Aspiration pneumonia  Note: Examination degraded due to respiratory motion artifact  FINDINGS: There is enlargement of the left gluteus superficial to the iliac wing. Mixed attenuation measuring 11.2 cm AP, 5.5 cm transverse and 11.3 cm craniocaudal. Likely muscular hematoma. The upper border is somewhat rounded, I would recommend follow-up to exclude associated mass.  There are again bilateral free-flowing pleural effusions. Attenuation compatible with serous fluid. There is again bibasilar consolidation. There is cardiac enlargement.  There is splenomegaly with the spleen measuring approximately 16 cm cranial caudal. The liver is satisfactory in size and shape, there is a typical area of focal fatty infiltration within the left lobe. No suspicious liver lesion, there is a small cyst demonstrated within the right lobe. Intrahepatic biliary radicals are mildly pronounced. Due to the degree of respiratory motion artifact, difficult to evaluate the gallbladder and extrahepatic biliary tree, no gross gallstones seen. The pancreas is satisfactory in appearance.  The adrenal glands and kidneys have a typical appearance. There is a G-tube demonstrated within the gastric lumen. Its position is satisfactory. Diameter of the aorta is normal. Urinary bladder is typical in appearance.  The appendix is normal. Caliber of the colon and small bowel is within normal limits. No bowel wall thickening nor induration to suggest an inflammatory process. No free intraperitoneal air free fluid.  CONCLUSION: 1. Mixed attenuation collection within the left gluteus having the  appearance of a sizable hematoma. See above measurements. Advise follow-up CT after resolved to exclude underlying mass. 2. Bilateral pleural effusions and bibasilar consolidation consistent with pneumonia. 3. Splenomegaly. 4. Prominent intrahepatic biliary radicals, motion artifact degrades evaluation of the gallbladder and biliary tree, no gross gallstones identified. 5. G-tube position is satisfactory. 6. The appendix, colon and small bowel are unremarkable.   This report was finalized on 2/4/2021 12:42 PM by Dr. Govind Carr M.D.      XR Chest 1 View    Result Date: 3/1/2021  ONE VIEW PORTABLE CHEST AT 3:48 PM  HISTORY: Shortness of breath. Right-sided atelectasis and pleural effusion. Possible mucous plugging.  FINDINGS: There is some atelectasis and pleural effusion at both bases, right greater than left and the appearance at the right base shows improvement from 02/28/2021. There is slight worsening of increased density at the left base. The heart size remains normal. A tracheostomy tube is in place.  This report was finalized on 3/1/2021 4:42 PM by Dr. Benjamin De Leon M.D.      XR Chest 1 View    Result Date: 2/28/2021  CHEST SINGLE VIEW  HISTORY: Mucous plugging. History of hypertension.  COMPARISON: AP chest 2/27/2021, 2/12/2021, CT chest 2/10/2021  FINDINGS: Tracheostomy tube is present. Moderate right and small left pleural effusions are present. Right pleural fluid extends partially into the major fissure. There is right lower lobe consolidation or infiltrate. Left lung is comparatively clear there is no perihilar edema or pneumothorax.      Moderate right and small left pleural effusions.  Right pleural effusion extends partially into the major fissure. There is dense right basilar opacity/consolidation and potential infiltrate. No evidence for perihilar edema or pneumothorax. Tracheostomy is present.  This report was finalized on 2/28/2021 2:20 PM by Dr. Taran Killian M.D.      XR Chest 1  View    Result Date: 2/27/2021  Patient: MARK HARDIN  Time Out: 02:51 Exam(s): FILM CXR 1 VIEW EXAM:   XR Chest, 1 View CLINICAL HISTORY:    Reason for exam: SOA. TECHNIQUE:   Frontal view of the chest. COMPARISON: 2 12 21. FINDINGS:   Redemonstrated tracheostomy.  Interval decrease in moderate right pleural effusion with associated compressive atelectasis.  Decrease in basilar volume loss.  No pneumothorax.  No cardiomegaly.  Cervical spinal fusion hardware. IMPRESSION:     Interval decrease in small-moderate right pleural effusion with associated compressive atelectasis. Tracheostomy.     Electronically signed by Dharmesh Lopez M.D. on 02-27-21 at 0251    XR Chest 1 View    Result Date: 2/12/2021  XR CHEST 1 VW-  HISTORY: Male who is 35 years-old,  chest pain  TECHNIQUE: Frontal view of the chest  COMPARISON: 02/12/2021 at 0605 hours  FINDINGS: Stable appearing tracheostomy tube. The heart is mildly enlarged. Heart, mediastinum and pulmonary vasculature are unremarkable. Mild right pleural effusion with increased fissural fluid suggested. Small atelectasis or infiltrate at the lung bases. No pneumothorax. No acute osseous process.      Mild right pleural effusion with increased partial fluid suggested. Small atelectasis or infiltrate at the lung bases. Continued follow-up recommended.  This report was finalized on 2/12/2021 3:15 PM by Dr. Eddie Reddy M.D.      XR Chest 1 View    Result Date: 2/12/2021  XR CHEST 1 VW-  Clinical: Pleural effusion, right thoracentesis to 11/20/2021  COMPARISON CT scan of the chest 2/10/2021 and chest radiograph to 8/20/2021  FINDINGS: Tracheostomy tube position is satisfactory. Small right-sided pleural effusion demonstrated. Cardiac size within normal limits. No pneumothorax. Blunting of left costophrenic angle suggests trace left pleural effusion. There is vague opacity at both lung bases, suggesting atelectasis/infiltrate. No pulmonary edema identified. No acute  consolidation seen. The remainder is unremarkable.  This report was finalized on 2/12/2021 7:40 AM by Dr. Govind Carr M.D.      XR Chest 1 View    Result Date: 2/8/2021  XR CHEST 1 VW-  HISTORY: Male who is 35 years-old,  rhonchi, fever  TECHNIQUE: Frontal views of the chest  COMPARISON: 02/05/2021  FINDINGS: Stable appearing tracheostomy tube. Heart, mediastinum and pulmonary vasculature are unremarkable. Mild bilateral basilar pleural effusions and likely atelectasis or infiltrate. No pneumothorax. No acute osseous process.      Mild bilateral basilar atelectasis/infiltrate/effusion, follow-up suggested.  This report was finalized on 2/8/2021 3:46 PM by Dr. Eddie Reddy M.D.      XR Chest 1 View    Result Date: 2/5/2021  ONE VIEW PORTABLE CHEST AT 1:12 PM  HISTORY: Left lower lobe atelectasis and pneumonia. Follow-up evaluation.  FINDINGS: There has been marked improvement in the left basilar atelectasis and pneumonia when compared to yesterday's exam. The findings are most consistent with resolution of mucus plugging. No new abnormality is seen. The heart size is normal. A tracheostomy tube remains in place.  This report was finalized on 2/5/2021 2:28 PM by Dr. Benjamin De Leon M.D.      Duplex Venous Upper Extremity - Bilateral CAR    Result Date: 2/10/2021  · Acute right upper extremity superficial thrombophlebitis noted in the median cubital. · All other vessels appear normal.      Duplex Venous Lower Extremity - Bilateral CAR    Result Date: 2/10/2021  · Normal bilateral lower extremity venous duplex scan.      US Thoracentesis    Result Date: 2/11/2021  ULTRASOUND-GUIDED RIGHT THORACENTESIS.  HISTORY: Pleural effusion.  After signed informed consent was obtained the patient was prepped and draped in the usual sterile fashion. Lidocaine was used for local anesthesia.  Ultrasound guidance was used to place the thoracentesis catheter into the right pleural fluid collection. 400 mL of straw-colored fluid  was removed. Sample was sent to the lab.  Confirmatory images were obtained.  Patient tolerated the procedure well with no complications.      Ultrasound-guided right thoracentesis as described.   This report was finalized on 2/11/2021 10:39 AM by Dr. Shad Smith M.D.          Today's chest x-ray reviewed improvement in both bibasilar areas of atelectasis.    Active Hospital Problems    Diagnosis  POA   • **Acute on chronic respiratory failure with hypoxemia (CMS/HCC) [J96.21]  Yes   • Hypotension [I95.9]  Yes   • Dyspnea [R06.00]  Yes   • Anemia [D64.9]  Yes   • Leukocytosis [D72.829]  Yes   • Quadriplegia (CMS/HCC) [G82.50]  Yes   • HCAP (healthcare-associated pneumonia) [J18.9]  Yes      Resolved Hospital Problems   No resolved problems to display.         Assessment/Plan     1. Acute hypoxic respiratory failure secondary to mucous plugging complicated by his quadriplegia and inability to clear secretions well is much improved.  Continue ventilatory support and wean down.  2. Mucous plugging he is going to continue needing aggressive pulmonary therapy, he needs chest physiotherapy, CoughAssist and mucolytic therapy, bronchodilators hypertonic saline to try and prevent these recurrent episodes.  Also I think that his constantly wanting to be totally knocked out with medications just contributes to hypoventilation as he does have some respiratory function.  3. Quadriplegia  4. Bilateral pleural effusions seems to be improving quite small.  5. Anemia hemoglobin drifting down he is below 7 we reconfirmed abdomen go ahead and transfuse we will continue to check stools I will check vitamins and again recheck his iron stores looks like they were checked previously and he looked more like anemia of chronic disease.  We do not see any evidence of gross bleeding.  6. Anxiety he has significant anxiety issues  7. Hypertension  8. Fluids/electrolytes/nutrition continue tube feeds fluid status looks a little better  today  9. Narcotic use understand the patient is a quadriplegic but discontinued request for high doses of narcotics is not going to benefit him is going increases problems I tried to explain this to him.  Wife told him that if he just wants to be kept comfortable and then knocked out all the time which is basically what it seems that he is asking for then we should talk about palliative care he does not want that has but again that seems to be what he is asking for repeatedly.    Plan for disposition:    Darío Mendez MD  03/08/21  07:16 EST    Time: I spent over 38 minutes on patient's care today

## 2021-03-09 ENCOUNTER — APPOINTMENT (OUTPATIENT)
Dept: GENERAL RADIOLOGY | Facility: HOSPITAL | Age: 36
End: 2021-03-09

## 2021-03-09 LAB
ANION GAP SERPL CALCULATED.3IONS-SCNC: 11.4 MMOL/L (ref 5–15)
BACTERIA SPEC RESP CULT: ABNORMAL
BACTERIA SPEC RESP CULT: ABNORMAL
BASOPHILS # BLD AUTO: 0.02 10*3/MM3 (ref 0–0.2)
BASOPHILS NFR BLD AUTO: 0.1 % (ref 0–1.5)
BH BB BLOOD EXPIRATION DATE: NORMAL
BH BB BLOOD EXPIRATION DATE: NORMAL
BH BB BLOOD TYPE BARCODE: 5100
BH BB BLOOD TYPE BARCODE: 5100
BH BB DISPENSE STATUS: NORMAL
BH BB DISPENSE STATUS: NORMAL
BH BB PRODUCT CODE: NORMAL
BH BB PRODUCT CODE: NORMAL
BH BB UNIT NUMBER: NORMAL
BH BB UNIT NUMBER: NORMAL
BUN SERPL-MCNC: 30 MG/DL (ref 6–20)
BUN/CREAT SERPL: 50 (ref 7–25)
CALCIUM SPEC-SCNC: 9.5 MG/DL (ref 8.6–10.5)
CHLORIDE SERPL-SCNC: 92 MMOL/L (ref 98–107)
CO2 SERPL-SCNC: 32.6 MMOL/L (ref 22–29)
CREAT SERPL-MCNC: 0.6 MG/DL (ref 0.76–1.27)
CROSSMATCH INTERPRETATION: NORMAL
CROSSMATCH INTERPRETATION: NORMAL
DEPRECATED RDW RBC AUTO: 49.3 FL (ref 37–54)
EOSINOPHIL # BLD AUTO: 0.07 10*3/MM3 (ref 0–0.4)
EOSINOPHIL NFR BLD AUTO: 0.5 % (ref 0.3–6.2)
ERYTHROCYTE [DISTWIDTH] IN BLOOD BY AUTOMATED COUNT: 16.4 % (ref 12.3–15.4)
GFR SERPL CREATININE-BSD FRML MDRD: >150 ML/MIN/1.73
GLUCOSE SERPL-MCNC: 148 MG/DL (ref 65–99)
GRAM STN SPEC: ABNORMAL
HCT VFR BLD AUTO: 28.1 % (ref 37.5–51)
HGB BLD-MCNC: 9.6 G/DL (ref 13–17.7)
IMM GRANULOCYTES # BLD AUTO: 0.06 10*3/MM3 (ref 0–0.05)
IMM GRANULOCYTES NFR BLD AUTO: 0.4 % (ref 0–0.5)
LYMPHOCYTES # BLD AUTO: 1.6 10*3/MM3 (ref 0.7–3.1)
LYMPHOCYTES NFR BLD AUTO: 10.3 % (ref 19.6–45.3)
MCH RBC QN AUTO: 28.3 PG (ref 26.6–33)
MCHC RBC AUTO-ENTMCNC: 34.2 G/DL (ref 31.5–35.7)
MCV RBC AUTO: 82.9 FL (ref 79–97)
MONOCYTES # BLD AUTO: 0.81 10*3/MM3 (ref 0.1–0.9)
MONOCYTES NFR BLD AUTO: 5.2 % (ref 5–12)
NEUTROPHILS NFR BLD AUTO: 12.94 10*3/MM3 (ref 1.7–7)
NEUTROPHILS NFR BLD AUTO: 83.5 % (ref 42.7–76)
NRBC BLD AUTO-RTO: 0 /100 WBC (ref 0–0.2)
PLATELET # BLD AUTO: 247 10*3/MM3 (ref 140–450)
PMV BLD AUTO: 9.7 FL (ref 6–12)
POTASSIUM SERPL-SCNC: 4.1 MMOL/L (ref 3.5–5.2)
RBC # BLD AUTO: 3.39 10*6/MM3 (ref 4.14–5.8)
SODIUM SERPL-SCNC: 136 MMOL/L (ref 136–145)
UNIT  ABO: NORMAL
UNIT  ABO: NORMAL
UNIT  RH: NORMAL
UNIT  RH: NORMAL
WBC # BLD AUTO: 15.5 10*3/MM3 (ref 3.4–10.8)

## 2021-03-09 PROCEDURE — 94002 VENT MGMT INPAT INIT DAY: CPT

## 2021-03-09 PROCEDURE — 85025 COMPLETE CBC W/AUTO DIFF WBC: CPT | Performed by: INTERNAL MEDICINE

## 2021-03-09 PROCEDURE — 94669 MECHANICAL CHEST WALL OSCILL: CPT

## 2021-03-09 PROCEDURE — 94760 N-INVAS EAR/PLS OXIMETRY 1: CPT

## 2021-03-09 PROCEDURE — 94668 MNPJ CHEST WALL SBSQ: CPT

## 2021-03-09 PROCEDURE — 94799 UNLISTED PULMONARY SVC/PX: CPT

## 2021-03-09 PROCEDURE — 71045 X-RAY EXAM CHEST 1 VIEW: CPT

## 2021-03-09 PROCEDURE — 80048 BASIC METABOLIC PNL TOTAL CA: CPT | Performed by: INTERNAL MEDICINE

## 2021-03-09 PROCEDURE — 94003 VENT MGMT INPAT SUBQ DAY: CPT

## 2021-03-09 RX ORDER — ALPRAZOLAM 0.5 MG/1
0.5 TABLET ORAL EVERY 6 HOURS PRN
Status: DISCONTINUED | OUTPATIENT
Start: 2021-03-09 | End: 2021-03-11

## 2021-03-09 RX ADMIN — FUROSEMIDE 20 MG: 20 TABLET ORAL at 17:38

## 2021-03-09 RX ADMIN — GABAPENTIN 800 MG: 250 SOLUTION ORAL at 05:44

## 2021-03-09 RX ADMIN — CYCLOBENZAPRINE 10 MG: 10 TABLET, FILM COATED ORAL at 08:28

## 2021-03-09 RX ADMIN — SCOPALAMINE 1 PATCH: 1 PATCH, EXTENDED RELEASE TRANSDERMAL at 17:40

## 2021-03-09 RX ADMIN — DOCUSATE SODIUM 100 MG: 50 LIQUID ORAL at 14:07

## 2021-03-09 RX ADMIN — ALBUTEROL SULFATE 6 PUFF: 90 AEROSOL, METERED RESPIRATORY (INHALATION) at 07:45

## 2021-03-09 RX ADMIN — OXYCODONE HYDROCHLORIDE AND ACETAMINOPHEN 1000 MG: 500 TABLET ORAL at 08:29

## 2021-03-09 RX ADMIN — Medication 4 ML: at 07:45

## 2021-03-09 RX ADMIN — DOCUSATE SODIUM 100 MG: 50 LIQUID ORAL at 08:04

## 2021-03-09 RX ADMIN — SENNOSIDES 1 TABLET: 8.6 TABLET, FILM COATED ORAL at 08:28

## 2021-03-09 RX ADMIN — GUAIFENESIN 400 MG: 100 SOLUTION ORAL at 14:08

## 2021-03-09 RX ADMIN — OXYCODONE HYDROCHLORIDE 10 MG: 5 SOLUTION ORAL at 00:40

## 2021-03-09 RX ADMIN — Medication 1 TABLET: at 08:29

## 2021-03-09 RX ADMIN — GUAIFENESIN 400 MG: 100 SOLUTION ORAL at 08:29

## 2021-03-09 RX ADMIN — GUAIFENESIN 400 MG: 100 SOLUTION ORAL at 12:04

## 2021-03-09 RX ADMIN — GUAIFENESIN 400 MG: 100 SOLUTION ORAL at 05:44

## 2021-03-09 RX ADMIN — ALPRAZOLAM 0.5 MG: 0.5 TABLET ORAL at 22:28

## 2021-03-09 RX ADMIN — SENNOSIDES 1 TABLET: 8.6 TABLET, FILM COATED ORAL at 21:26

## 2021-03-09 RX ADMIN — GABAPENTIN 800 MG: 250 SOLUTION ORAL at 14:06

## 2021-03-09 RX ADMIN — MIDODRINE HYDROCHLORIDE 7.5 MG: 5 TABLET ORAL at 05:44

## 2021-03-09 RX ADMIN — CHLORHEXIDINE GLUCONATE 15 ML: 1.2 RINSE ORAL at 21:27

## 2021-03-09 RX ADMIN — OXYCODONE HYDROCHLORIDE 10 MG: 5 SOLUTION ORAL at 04:47

## 2021-03-09 RX ADMIN — SERTRALINE 150 MG: 100 TABLET, FILM COATED ORAL at 08:29

## 2021-03-09 RX ADMIN — OXYCODONE HYDROCHLORIDE 10 MG: 5 SOLUTION ORAL at 19:47

## 2021-03-09 RX ADMIN — OXYCODONE HYDROCHLORIDE 10 MG: 5 SOLUTION ORAL at 08:27

## 2021-03-09 RX ADMIN — ALPRAZOLAM 0.5 MG: 0.5 TABLET ORAL at 01:18

## 2021-03-09 RX ADMIN — POLYETHYLENE GLYCOL 3350 17 G: 17 POWDER, FOR SOLUTION ORAL at 08:28

## 2021-03-09 RX ADMIN — HYDROXYZINE HYDROCHLORIDE 25 MG: 25 TABLET ORAL at 08:28

## 2021-03-09 RX ADMIN — GABAPENTIN 800 MG: 250 SOLUTION ORAL at 21:26

## 2021-03-09 RX ADMIN — SODIUM CHLORIDE, PRESERVATIVE FREE 10 ML: 5 INJECTION INTRAVENOUS at 12:05

## 2021-03-09 RX ADMIN — ACETYLCYSTEINE 3 ML: 200 SOLUTION ORAL; RESPIRATORY (INHALATION) at 20:23

## 2021-03-09 RX ADMIN — SODIUM CHLORIDE, PRESERVATIVE FREE 10 ML: 5 INJECTION INTRAVENOUS at 21:27

## 2021-03-09 RX ADMIN — ALBUTEROL SULFATE 6 PUFF: 90 AEROSOL, METERED RESPIRATORY (INHALATION) at 20:24

## 2021-03-09 RX ADMIN — GUAIFENESIN 400 MG: 100 SOLUTION ORAL at 17:38

## 2021-03-09 RX ADMIN — ALPRAZOLAM 0.5 MG: 0.5 TABLET ORAL at 08:28

## 2021-03-09 RX ADMIN — MIDODRINE HYDROCHLORIDE 7.5 MG: 5 TABLET ORAL at 17:38

## 2021-03-09 RX ADMIN — OXYCODONE HYDROCHLORIDE 10 MG: 5 SOLUTION ORAL at 14:07

## 2021-03-09 RX ADMIN — ALPRAZOLAM 0.5 MG: 0.5 TABLET ORAL at 14:08

## 2021-03-09 RX ADMIN — CHLORHEXIDINE GLUCONATE 15 ML: 1.2 RINSE ORAL at 12:05

## 2021-03-09 RX ADMIN — DOCUSATE SODIUM 100 MG: 50 LIQUID ORAL at 21:26

## 2021-03-09 RX ADMIN — LANSOPRAZOLE 30 MG: KIT at 08:28

## 2021-03-09 RX ADMIN — FUROSEMIDE 20 MG: 20 TABLET ORAL at 08:28

## 2021-03-09 RX ADMIN — Medication 4 ML: at 20:24

## 2021-03-09 RX ADMIN — ACETYLCYSTEINE 3 ML: 200 SOLUTION ORAL; RESPIRATORY (INHALATION) at 07:45

## 2021-03-09 NOTE — PROGRESS NOTES
LOS: 10 days   Patient Care Team:  Sheron Perez MD as PCP - General (Family Medicine)    Subjective   Patient continues to have severe anxiety he is always: The nurses he is morning able by the pulse his oxygen when his oxygen levels and are perfectly normal his carbon dioxide level is normal and the end-tidal CO2.  I have tried to talk to him and he just will not understand that this is anxiety and not low oxygen or failure to ventilate the ventilator is ventilating him adequately.  All the medications he wants to treat this either narcotics and benzodiazepines are just been to further suppress his respiratory system        Review of Systems:         Objective     Vital Signs  Vital Sign Min/Max for last 24 hours  Temp  Min: 96.6 °F (35.9 °C)  Max: 98.9 °F (37.2 °C)   BP  Min: 94/55  Max: 156/88   Pulse  Min: 63  Max: 94   Resp  Min: 16  Max: 18   SpO2  Min: 90 %  Max: 100 %   No data recorded   Weight  Min: 66.3 kg (146 lb 2.6 oz)  Max: 66.3 kg (146 lb 2.6 oz)        Ventilator/Non-Invasive Ventilation Settings (From admission, onward) Comment     Start     Ordered    03/06/21 0927  Ventilator - AC/PC; (16); 100; 90%; 10; 20  Continuous     Question Answer Comment   Vent Mode AC/PC    Breath rate  16   FiO2 100    FiO2 titrate for Sp02% =/> 90%    PEEP 10    Inspiratory Pressure 20        03/06/21 0927    03/04/21 1501  NIPPV (CPAP or BIPAP)  At Bedtime & As Needed-RT     Comments: Patient had a tracheostomy tube, he needs to be connected to the trilogy machine or equivalent hospital ventilator, need to inflate the cuff while on the trilogy per standard protocols   Question Answer Comment   Indication: Acute Respiratory Failure    Type: AVAPS-AE    NIPPV Mask Interface: Other    Mask Type tracheostomy tube    Rate 12    VT (mL) 550    EPAP Min (cm H2O) 10    EPAP Max (cm H2O) 15    Max Pressure (cm H2O) 30    Pressure Support Min (cm H2O) 4    Pressure Support Max (cm H2O) 15    Titrate for SPO2  90%        03/04/21 1501    03/03/21 1725  NIPPV (CPAP or BIPAP)  Until Discontinued,   Status:  Canceled     Comments: Patient had a tracheostomy tube, he needs to be connected to the trilogy machine or equivalent hospital ventilator, need to inflate the cuff while on the trilogy per standard protocols   Question Answer Comment   Indication: Acute Respiratory Failure    Type: AVAPS-AE    NIPPV Mask Interface: Other    Mask Type tracheostomy tube    Rate 12    VT (mL) 550    EPAP Min (cm H2O) 10    EPAP Max (cm H2O) 15    Max Pressure (cm H2O) 30    Pressure Support Min (cm H2O) 4    Pressure Support Max (cm H2O) 15    Titrate for SPO2 90%        03/03/21 1726                             Body mass index is 20.97 kg/m².  I/O last 3 completed shifts:  In: 5449 [Blood:1677; Other:631; NG/GT:3141]  Out: 3250 [Urine:3250]  No intake/output data recorded.        Physical Exam:  General Appearance: Well-developed white male he is trached on a ventilator resting comfortably he is still on 30% FiO2 and 10 cm of PEEP his SPO2 is 100%   Eyes: Conjunctiva are clear and anicteric pupils are equal  ENT: Mucous membranes are moist no erythema or exudates he makes copious oral secretions.  I suspected him to be secretions are draining down around his endotracheal tube contributing to his lower respiratory secretion issue.  Neck: Tracheostomy site looks okay  6 Shiley with the inner cannula, no jugular venous distention, trachea midline  Lungs: He is moving air well today no rales no rhonchi I suctioned and lavaged him and got very minimal secretions back.  Cardiac: He is regular rate rhythm no murmur  Abdomen: Soft no palpable hepatosplenomegaly or masses he has a left upper quadrant PEG type tube and the site looks okay  : Not examined  Musculoskeletal: He has very little muscle mass he has some slight movement with his arms he does not really have any  he has marked interosseous muscle wasting  Skin: No jaundice no  petechiae skin is warm and dry  Neuro: Quadriplegic with minimal proximal upper extremity movement  Extremities/P Vascular: Palpable radial and dorsalis pedis pulses bilaterally again marked muscle atrophy  MSE: He very much wants lots of medication seems to be his overwhelming request       Labs:  Results from last 7 days   Lab Units 03/08/21  0919 03/07/21  1130 03/05/21  1654 03/03/21  0504   GLUCOSE mg/dL 118* 116* 108* 96   SODIUM mmol/L 136 135* 137 136   POTASSIUM mmol/L 4.2 4.2 4.2 4.6   MAGNESIUM mg/dL  --   --   --  1.9   CO2 mmol/L 31.1* 29.7* 27.0 28.5   CHLORIDE mmol/L 94* 97* 99 97*   ANION GAP mmol/L 10.9 8.3 11.0 10.5   CREATININE mg/dL 0.55* 0.58* 0.54* 0.45*   BUN mg/dL 26* 26* 20 18   BUN / CREAT RATIO  47.3* 44.8* 37.0* 40.0*   CALCIUM mg/dL 9.2 8.9 9.3 9.5   EGFR IF NONAFRICN AM mL/min/1.73 >150 >150 >150 >150   ALK PHOS U/L  --   --  112  --    TOTAL PROTEIN g/dL  --   --  7.0  --    ALT (SGPT) U/L  --   --  27  --    AST (SGOT) U/L  --   --  17  --    BILIRUBIN mg/dL  --   --  1.0  --    ALBUMIN g/dL  --   --  3.30*  --    GLOBULIN gm/dL  --   --  3.7  --      Estimated Creatinine Clearance: 172.4 mL/min (A) (by C-G formula based on SCr of 0.55 mg/dL (L)).      Results from last 7 days   Lab Units 03/08/21  1017 03/08/21  0717 03/05/21  1654 03/03/21  0504   WBC 10*3/mm3  --  7.67 6.68 8.79   RBC 10*6/mm3  --  2.46* 2.91* 2.98*   HEMOGLOBIN g/dL 6.5* 6.5* 7.9* 8.1*   HEMATOCRIT % 19.8* 19.8* 23.5* 24.6*   MCV fL  --  80.5 80.8 82.6   MCH pg  --  26.4* 27.1 27.2   MCHC g/dL  --  32.8 33.6 32.9   RDW %  --  16.7* 17.0* 17.2*   RDW-SD fl  --  48.4 49.6 51.8   MPV fL  --  9.5 9.9 9.6   PLATELETS 10*3/mm3  --  228 277 289   NEUTROPHIL % %  --   --  66.7  --    LYMPHOCYTE % %  --   --  17.1*  --    MONOCYTES % %  --   --  8.1  --    EOSINOPHIL % %  --   --  7.6*  --    BASOPHIL % %  --   --  0.1  --    IMM GRAN % %  --   --  0.4  --    NEUTROS ABS 10*3/mm3  --   --  4.45  --    LYMPHS ABS 10*3/mm3   --   --  1.14  --    MONOS ABS 10*3/mm3  --   --  0.54  --    EOS ABS 10*3/mm3  --   --  0.51*  --    BASOS ABS 10*3/mm3  --   --  0.01  --    IMMATURE GRANS (ABS) 10*3/mm3  --   --  0.03  --    NRBC /100 WBC  --   --  0.0  --                      Results from last 7 days   Lab Units 03/05/21 1654 03/03/21  0504   PROCALCITONIN ng/mL 0.08 0.09         Microbiology Results (last 10 days)     Procedure Component Value - Date/Time    MRSA Screen, PCR (Inpatient) - Swab, Nares [[id number]]  (Abnormal) Collected: 03/07/21 1454    Lab Status: Final result Specimen: Swab from Nares Updated: 03/07/21 1622     MRSA PCR MRSA Detected    Respiratory Culture - Wash, Bronchus [[id number]]  (Abnormal)  (Susceptibility) Collected: 03/06/21 1016    Lab Status: Preliminary result Specimen: Wash from Bronchus Updated: 03/08/21 1142     Respiratory Culture Scant growth (1+) Pseudomonas aeruginosa      Rare Normal Respiratory Lisa     Gram Stain No WBCs or organisms seen    Narrative:      Pip/Angel Luis to follow.    Susceptibility      Pseudomonas aeruginosa      YOGI (Preliminary)      Cefepime Intermediate      Ceftazidime Intermediate      Ciprofloxacin Susceptible      Gentamicin Susceptible      Levofloxacin Susceptible               Linear View                   Blood Culture - Blood, Arm, Right [[id number]] Collected: 03/05/21 1654    Lab Status: Preliminary result Specimen: Blood from Arm, Right Updated: 03/08/21 1745     Blood Culture No growth at 3 days    Blood Culture - Blood, Hand, Left [[id number]] Collected: 03/05/21 1654    Lab Status: Preliminary result Specimen: Blood from Hand, Left Updated: 03/08/21 1745     Blood Culture No growth at 3 days    Respiratory Culture - Sputum, Cough [[id number]]  (Abnormal)  (Susceptibility) Collected: 02/27/21 1638    Lab Status: Final result Specimen: Sputum from Cough Updated: 03/02/21 0954     Respiratory Culture Light growth (2+) Pseudomonas aeruginosa      Scant growth (1+) Normal  Respiratory Lisa     Gram Stain Many (4+) WBCs seen      Rare (1+) Gram positive cocci in clusters      No Epithelial cells seen    Susceptibility      Pseudomonas aeruginosa      YOGI      Cefepime Intermediate      Ceftazidime Intermediate      Ciprofloxacin Susceptible      Gentamicin Susceptible      Levofloxacin Susceptible      Piperacillin + Tazobactam Susceptible<sup style='font-weight:normal'>1</sup></font>             <sup style='font-weight:normal'>1</sup></font> Appended report. These results have been appended to a previously preliminary verified report.          Linear View               Susceptibility Comments     Pseudomonas aeruginosa    Pip/oscar confirmed with disc diffusion.                        acetylcysteine, 3 mL, Nebulization, BID - RT  ascorbic acid, 1,000 mg, Per G Tube, Daily  chlorhexidine, 15 mL, Mouth/Throat, Q12H  cyclobenzaprine, 10 mg, Per G Tube, Daily  docusate sodium, 100 mg, Per G Tube, BID  fentaNYL, 1 patch, Transdermal, Q72H  furosemide, 20 mg, Oral, BID  Gabapentin, 800 mg, Per G Tube, Q8H  guaifenesin, 400 mg, Per G Tube, Q6H  hydrOXYzine, 25 mg, Oral, Daily  lansoprazole, 30 mg, Per G Tube, QAM  midodrine, 7.5 mg, Per G Tube, BID AC  multivitamin with minerals, 1 tablet, Oral, Daily  polyethylene glycol, 17 g, Oral, Daily  Scopolamine, 1 patch, Transdermal, Q72H  senna, 1 tablet, Per G Tube, BID  sertraline, 150 mg, Per G Tube, Daily  sodium chloride, 10 mL, Intravenous, Q12H  sodium chloride, 4 mL, Nebulization, BID - RT           Diagnostics:  CT Abdomen Pelvis Without Contrast    Result Date: 2/10/2021  CT CHEST, ABDOMEN, AND PELVIS WITHOUT CONTRAST.  HISTORY: 35-year-old male with quadriplegia, pneumonia with tracheitis and mucous plugging. Sepsis. Persistent fever.  TECHNIQUE: Radiation dose reduction techniques were utilized, including automated exposure control and exposure modulation based on body size. 3 mm images were obtained through the chest, abdomen, and  pelvis without the administration of IV contrast. Compared with CT of the abdomen and pelvis from 02/04/2021 and chest CTA from 02/04/2021.  FINDINGS: CHEST: There is significant motion/breathing artifact. The tracheostomy is in good position with the distal margin being 4 cm proximal to the titus. There is a significant volume of mucus/secretions within both mainstem bronchi and the lower lobe bronchi and right middle lobe bronchi appear essentially completely opacified. There are small-moderate-sized bilateral pleural effusions and there are dense consolidations adjacently at both lower lobes. There is no pericardial effusion.  ABDOMEN/PELVIS: The spleen is enlarged measuring 16 cm in diameter, measured on the coronal reconstruction series. The noncontrasted liver appears unremarkable. The gallbladder is contracted. Percutaneous G-tube is noted in place. Noncontrasted pancreas, adrenals, and kidneys appear unremarkable. There are air-fluid levels predominantly within the colon and there is no colonic thickening. Appendix appears within normal limits. The urinary bladder is not abnormally distended.      1. Significant volume of mucus/secretions within the main stem bronchi and the bronchi at the lower lobes and right middle lobe are essentially completely opacified. Adjacent to small-moderate-sized bilateral pleural effusions are dense consolidations at both lower lobes which likely in part represent atelectasis, but pneumonia is possible as well. 2. Mild colonic ileus without evidence for colitis. 3. Splenomegaly.  This report was finalized on 2/10/2021 3:24 PM by Dr. Kenia Farrell M.D.      CT Chest Without Contrast Diagnostic    Result Date: 3/2/2021  CT CHEST WO CONTRAST DIAGNOSTIC-  CLINICAL HISTORY: Dyspnea. Pleural effusion. Quadriplegia. History of mucous plugging and atelectasis.  TECHNIQUE: Spiral CT images were obtained through the chest without IV contrast and were reconstructed in 3 mm thick slices.   Radiation dose reduction techniques were utilized, including automated exposure control and exposure modulation based on body size.  COMPARISON: CT chest dated 02/10/2021  FINDINGS: A tracheostomy tube remains in satisfactory position. Comparison with the previous study is difficult due to extensive breathing motion artifact that was present on the previous study. There are moderate-sized bilateral posteriorly layering pleural effusions, greater in size on the right than the left. The effusion on the right has increased in size somewhat in the interval. The effusion on the left appears essentially unchanged. There is dense consolidation in the left lower lobe containing a few air bronchograms that is most likely primarily due to atelectasis. This appears slightly larger. Slightly less extensive patchy consolidation in the right lower lobe also appears slightly more prominent. There are patchy reticulonodular opacities throughout the right upper lobe that appear essentially new since the preceding CT scan. These have a tree-in-bud appearance and are likely due to mucus plugging within bronchioles. In addition, a 7 mm diameter discrete noncalcified nodule in the right upper lobe appears new. There also several additional smaller nodular opacities in the inferior aspect of the right upper lobe that are new. These are all favored to be due to peripheral mucous plugging, as well. Images through the upper abdomen demonstrate no obvious abnormality.      Suboptimal study due to artifact from patient breathing motion. There are moderate-sized bilateral posteriorly layering pleural effusions. The effusion on the left is unchanged since a CT dated 02/10/2021. The effusion on the right appears slightly larger. Areas of consolidation within both lower lobes also appear more prominent and are most likely due to atelectasis, although superimposed pneumonia cannot be excluded. Patchy reticulonodular opacities in the right upper  lobe are new and are likely secondary to mucous plugging within bronchioles. There are also multiple macroscopic nodules in the inferior aspect of the right upper lobe that are new and are quite likely secondary to peripheral mucous plugging.  This report was finalized on 3/2/2021 8:11 PM by Dr. Rober Monson M.D.      CT Chest Without Contrast Diagnostic    Result Date: 2/10/2021  CT CHEST, ABDOMEN, AND PELVIS WITHOUT CONTRAST.  HISTORY: 35-year-old male with quadriplegia, pneumonia with tracheitis and mucous plugging. Sepsis. Persistent fever.  TECHNIQUE: Radiation dose reduction techniques were utilized, including automated exposure control and exposure modulation based on body size. 3 mm images were obtained through the chest, abdomen, and pelvis without the administration of IV contrast. Compared with CT of the abdomen and pelvis from 02/04/2021 and chest CTA from 02/04/2021.  FINDINGS: CHEST: There is significant motion/breathing artifact. The tracheostomy is in good position with the distal margin being 4 cm proximal to the titus. There is a significant volume of mucus/secretions within both mainstem bronchi and the lower lobe bronchi and right middle lobe bronchi appear essentially completely opacified. There are small-moderate-sized bilateral pleural effusions and there are dense consolidations adjacently at both lower lobes. There is no pericardial effusion.  ABDOMEN/PELVIS: The spleen is enlarged measuring 16 cm in diameter, measured on the coronal reconstruction series. The noncontrasted liver appears unremarkable. The gallbladder is contracted. Percutaneous G-tube is noted in place. Noncontrasted pancreas, adrenals, and kidneys appear unremarkable. There are air-fluid levels predominantly within the colon and there is no colonic thickening. Appendix appears within normal limits. The urinary bladder is not abnormally distended.      1. Significant volume of mucus/secretions within the main stem bronchi  and the bronchi at the lower lobes and right middle lobe are essentially completely opacified. Adjacent to small-moderate-sized bilateral pleural effusions are dense consolidations at both lower lobes which likely in part represent atelectasis, but pneumonia is possible as well. 2. Mild colonic ileus without evidence for colitis. 3. Splenomegaly.  This report was finalized on 2/10/2021 3:24 PM by Dr. Kenia Farrell M.D.      CT Abdomen Pelvis With Contrast    Result Date: 2/4/2021  CT ABDOMEN PELVIS W CONTRAST-  Radiation dose reduction techniques were utilized, including automated exposure control and exposure modulation based on body size.  Clinical: Acute abdominal pain, nonlocalizing anemia. Aspiration pneumonia  Note: Examination degraded due to respiratory motion artifact  FINDINGS: There is enlargement of the left gluteus superficial to the iliac wing. Mixed attenuation measuring 11.2 cm AP, 5.5 cm transverse and 11.3 cm craniocaudal. Likely muscular hematoma. The upper border is somewhat rounded, I would recommend follow-up to exclude associated mass.  There are again bilateral free-flowing pleural effusions. Attenuation compatible with serous fluid. There is again bibasilar consolidation. There is cardiac enlargement.  There is splenomegaly with the spleen measuring approximately 16 cm cranial caudal. The liver is satisfactory in size and shape, there is a typical area of focal fatty infiltration within the left lobe. No suspicious liver lesion, there is a small cyst demonstrated within the right lobe. Intrahepatic biliary radicals are mildly pronounced. Due to the degree of respiratory motion artifact, difficult to evaluate the gallbladder and extrahepatic biliary tree, no gross gallstones seen. The pancreas is satisfactory in appearance.  The adrenal glands and kidneys have a typical appearance. There is a G-tube demonstrated within the gastric lumen. Its position is satisfactory. Diameter of the aorta is  normal. Urinary bladder is typical in appearance.  The appendix is normal. Caliber of the colon and small bowel is within normal limits. No bowel wall thickening nor induration to suggest an inflammatory process. No free intraperitoneal air free fluid.  CONCLUSION: 1. Mixed attenuation collection within the left gluteus having the appearance of a sizable hematoma. See above measurements. Advise follow-up CT after resolved to exclude underlying mass. 2. Bilateral pleural effusions and bibasilar consolidation consistent with pneumonia. 3. Splenomegaly. 4. Prominent intrahepatic biliary radicals, motion artifact degrades evaluation of the gallbladder and biliary tree, no gross gallstones identified. 5. G-tube position is satisfactory. 6. The appendix, colon and small bowel are unremarkable.   This report was finalized on 2/4/2021 12:42 PM by Dr. Govind Carr M.D.      XR Chest 1 View    Result Date: 3/1/2021  ONE VIEW PORTABLE CHEST AT 3:48 PM  HISTORY: Shortness of breath. Right-sided atelectasis and pleural effusion. Possible mucous plugging.  FINDINGS: There is some atelectasis and pleural effusion at both bases, right greater than left and the appearance at the right base shows improvement from 02/28/2021. There is slight worsening of increased density at the left base. The heart size remains normal. A tracheostomy tube is in place.  This report was finalized on 3/1/2021 4:42 PM by Dr. Benjamin De Leon M.D.      XR Chest 1 View    Result Date: 2/28/2021  CHEST SINGLE VIEW  HISTORY: Mucous plugging. History of hypertension.  COMPARISON: AP chest 2/27/2021, 2/12/2021, CT chest 2/10/2021  FINDINGS: Tracheostomy tube is present. Moderate right and small left pleural effusions are present. Right pleural fluid extends partially into the major fissure. There is right lower lobe consolidation or infiltrate. Left lung is comparatively clear there is no perihilar edema or pneumothorax.      Moderate right and small left  pleural effusions.  Right pleural effusion extends partially into the major fissure. There is dense right basilar opacity/consolidation and potential infiltrate. No evidence for perihilar edema or pneumothorax. Tracheostomy is present.  This report was finalized on 2/28/2021 2:20 PM by Dr. Taran Killian M.D.      XR Chest 1 View    Result Date: 2/27/2021  Patient: MARK HARDIN  Time Out: 02:51 Exam(s): FILM CXR 1 VIEW EXAM:   XR Chest, 1 View CLINICAL HISTORY:    Reason for exam: SOA. TECHNIQUE:   Frontal view of the chest. COMPARISON: 2 12 21. FINDINGS:   Redemonstrated tracheostomy.  Interval decrease in moderate right pleural effusion with associated compressive atelectasis.  Decrease in basilar volume loss.  No pneumothorax.  No cardiomegaly.  Cervical spinal fusion hardware. IMPRESSION:     Interval decrease in small-moderate right pleural effusion with associated compressive atelectasis. Tracheostomy.     Electronically signed by Dharmesh Lopez M.D. on 02-27-21 at 0251    XR Chest 1 View    Result Date: 2/12/2021  XR CHEST 1 VW-  HISTORY: Male who is 35 years-old,  chest pain  TECHNIQUE: Frontal view of the chest  COMPARISON: 02/12/2021 at 0605 hours  FINDINGS: Stable appearing tracheostomy tube. The heart is mildly enlarged. Heart, mediastinum and pulmonary vasculature are unremarkable. Mild right pleural effusion with increased fissural fluid suggested. Small atelectasis or infiltrate at the lung bases. No pneumothorax. No acute osseous process.      Mild right pleural effusion with increased partial fluid suggested. Small atelectasis or infiltrate at the lung bases. Continued follow-up recommended.  This report was finalized on 2/12/2021 3:15 PM by Dr. Eddie Reddy M.D.      XR Chest 1 View    Result Date: 2/12/2021  XR CHEST 1 VW-  Clinical: Pleural effusion, right thoracentesis to 11/20/2021  COMPARISON CT scan of the chest 2/10/2021 and chest radiograph to 8/20/2021  FINDINGS: Tracheostomy tube  position is satisfactory. Small right-sided pleural effusion demonstrated. Cardiac size within normal limits. No pneumothorax. Blunting of left costophrenic angle suggests trace left pleural effusion. There is vague opacity at both lung bases, suggesting atelectasis/infiltrate. No pulmonary edema identified. No acute consolidation seen. The remainder is unremarkable.  This report was finalized on 2/12/2021 7:40 AM by Dr. Govind Carr M.D.      XR Chest 1 View    Result Date: 2/8/2021  XR CHEST 1 VW-  HISTORY: Male who is 35 years-old,  rhonchi, fever  TECHNIQUE: Frontal views of the chest  COMPARISON: 02/05/2021  FINDINGS: Stable appearing tracheostomy tube. Heart, mediastinum and pulmonary vasculature are unremarkable. Mild bilateral basilar pleural effusions and likely atelectasis or infiltrate. No pneumothorax. No acute osseous process.      Mild bilateral basilar atelectasis/infiltrate/effusion, follow-up suggested.  This report was finalized on 2/8/2021 3:46 PM by Dr. Eddie Reddy M.D.      XR Chest 1 View    Result Date: 2/5/2021  ONE VIEW PORTABLE CHEST AT 1:12 PM  HISTORY: Left lower lobe atelectasis and pneumonia. Follow-up evaluation.  FINDINGS: There has been marked improvement in the left basilar atelectasis and pneumonia when compared to yesterday's exam. The findings are most consistent with resolution of mucus plugging. No new abnormality is seen. The heart size is normal. A tracheostomy tube remains in place.  This report was finalized on 2/5/2021 2:28 PM by Dr. Benjamin De Leon M.D.      Duplex Venous Upper Extremity - Bilateral CAR    Result Date: 2/10/2021  · Acute right upper extremity superficial thrombophlebitis noted in the median cubital. · All other vessels appear normal.      Duplex Venous Lower Extremity - Bilateral CAR    Result Date: 2/10/2021  · Normal bilateral lower extremity venous duplex scan.      US Thoracentesis    Result Date: 2/11/2021  ULTRASOUND-GUIDED RIGHT  THORACENTESIS.  HISTORY: Pleural effusion.  After signed informed consent was obtained the patient was prepped and draped in the usual sterile fashion. Lidocaine was used for local anesthesia.  Ultrasound guidance was used to place the thoracentesis catheter into the right pleural fluid collection. 400 mL of straw-colored fluid was removed. Sample was sent to the lab.  Confirmatory images were obtained.  Patient tolerated the procedure well with no complications.      Ultrasound-guided right thoracentesis as described.   This report was finalized on 2/11/2021 10:39 AM by Dr. Shad Smith M.D.          Today's chest x-ray reviewed improvement in both bibasilar areas of atelectasis.    Active Hospital Problems    Diagnosis  POA   • **Acute on chronic respiratory failure with hypoxemia (CMS/HCC) [J96.21]  Yes   • Hypotension [I95.9]  Yes   • Dyspnea [R06.00]  Yes   • Anemia [D64.9]  Yes   • Leukocytosis [D72.829]  Yes   • Quadriplegia (CMS/HCC) [G82.50]  Yes   • HCAP (healthcare-associated pneumonia) [J18.9]  Yes      Resolved Hospital Problems   No resolved problems to display.         Assessment/Plan     1. Acute hypoxic respiratory failure secondary to mucous plugging complicated by his quadriplegia and inability to clear secretions .  Continue ventilatory support.  Is good to be impossible to wean him if he has this much anxiety on the ventilator and was ventilating well.  2. Mucous plugging he is going to continue needing aggressive pulmonary therapy, he needs chest physiotherapy, CoughAssist and mucolytic therapy, bronchodilators, hypertonic saline to try and prevent these recurrent episodes.  Also I think that his constantly wanting to be totally knocked out with medications just contributes to hypoventilation as he does have some respiratory function.  3. Quadriplegia  4. Bilateral pleural effusions seems to be improving quite small.  Probably mostly cannot completely resolved on today's x-ray  5. Anemia  hemoglobin drifting down looks like it is anemia of chronic disease his hemoglobin is up after transfusion we will continue to follow see no evidence of active bleeding  6. Anxiety he has significant anxiety issues Dr. Tuttle saw him and increased his Ativan further.  7. Hypertension  8. Fluids/electrolytes/nutrition continue tube feeds fluid status looks a little better today  9. Narcotic use understand the patient is a quadriplegic but continued request for high doses of narcotics is not going to benefit him is going increases problems .          Plan for disposition:    Darío Mendez MD  03/09/21  07:15 EST    Time: I spent over 34 minutes on patient's care today

## 2021-03-09 NOTE — PLAN OF CARE
Goal Outcome Evaluation:         Pt remains in icu on vent with FiO2 at 21% and PEEP of 10. Pt frequently requests to bump O2 up to 100% due to SOA, O2 sats 93%-98%. No changes in lung sounds or work of breathing. Lots of oral secretions. NSR rate 60s-90s. BP stable. 1200ml UOP, and medium unmeasured. No BM. Awaiting morning labs. Continue to monitor.      PRN pain and anxiety meds given, pt frequently requests them before able to give them per MAR. Still complains of pain after meds given. Pt refused to be turned second half of shift due to pain, pt educated on importance of being turned.

## 2021-03-09 NOTE — CONSULTS
"Purpose of the visit was to evaluate for: goals of care/advanced care planning, support for patient/family, hospice referral/discussion, pain/symptom management, withdrawal of interventions, transfer to comfort care bed/unit and comfort care. Spoke with RN as well as patient and discussed palliative care, goals of care, care options, resuscitation status, [facility] and discharge options.      Assessment:  Patient is palliative care appropriate given acute on chronic respiratory failure, dyspnea, anemia, quadriplegia, pneumonia. Patient complains of dyspnea, pain, and anxiety. Awake and alert, PPS 30%    Recommendations/Plan:Continue current treatment plan for now and follow up tomorrow with goals of care discussion with mother and father present.     Other Comments: Spoke with patient alone at bedside. Explained options going forward including palliative/hospice care that would focus on symptom management. Patient initially stated his goal would be to \"get stronger\", which I explained that unfortunately with palliative medicine in his compromised state these would not be compatible. He verbalized understanding, stated he would like to think about it and we could talk tomorrow    ADDENDUM: After initial conversation primary RN called back and stated patient wanted to talk to me again. He verbalized both to primary RN and myself that he wants to pursue palliative/hospice care, and he was adamant about this happening immediately. At this point his father was in the room and asked that I call his wife who has a better understanding of English. I did call her and she expressed hesitation with patient's decision. I explained to her that I do want to honor his wishes as he is stating very clearly this is what he wants, but I agree that having a family discussion before this decision is made is important. She and her  are planning on coming together tomorrow for goals of care conversation. I conveyed this to patient " and promised that I would follow up tomorrow, he verbalized understanding

## 2021-03-09 NOTE — CONSULTS
Patient continues to complain of severe anxiety.  I will increase the frequency of as needed Xanax to every 6 hours.

## 2021-03-10 LAB
BACTERIA SPEC AEROBE CULT: NORMAL
BACTERIA SPEC AEROBE CULT: NORMAL

## 2021-03-10 PROCEDURE — 94760 N-INVAS EAR/PLS OXIMETRY 1: CPT

## 2021-03-10 PROCEDURE — 94799 UNLISTED PULMONARY SVC/PX: CPT

## 2021-03-10 RX ADMIN — MIDODRINE HYDROCHLORIDE 7.5 MG: 5 TABLET ORAL at 17:45

## 2021-03-10 RX ADMIN — GUAIFENESIN 400 MG: 100 SOLUTION ORAL at 11:51

## 2021-03-10 RX ADMIN — POLYETHYLENE GLYCOL 3350 17 G: 17 POWDER, FOR SOLUTION ORAL at 08:01

## 2021-03-10 RX ADMIN — ALPRAZOLAM 0.5 MG: 0.5 TABLET ORAL at 23:09

## 2021-03-10 RX ADMIN — DOCUSATE SODIUM 100 MG: 50 LIQUID ORAL at 08:01

## 2021-03-10 RX ADMIN — LANSOPRAZOLE 30 MG: KIT at 06:07

## 2021-03-10 RX ADMIN — OXYCODONE HYDROCHLORIDE 10 MG: 5 SOLUTION ORAL at 17:45

## 2021-03-10 RX ADMIN — CHLORHEXIDINE GLUCONATE 15 ML: 1.2 RINSE ORAL at 20:02

## 2021-03-10 RX ADMIN — GABAPENTIN 800 MG: 250 SOLUTION ORAL at 14:51

## 2021-03-10 RX ADMIN — GABAPENTIN 800 MG: 250 SOLUTION ORAL at 21:46

## 2021-03-10 RX ADMIN — GABAPENTIN 800 MG: 250 SOLUTION ORAL at 06:06

## 2021-03-10 RX ADMIN — FUROSEMIDE 20 MG: 20 TABLET ORAL at 08:01

## 2021-03-10 RX ADMIN — Medication 4 ML: at 19:47

## 2021-03-10 RX ADMIN — ALPRAZOLAM 0.5 MG: 0.5 TABLET ORAL at 11:49

## 2021-03-10 RX ADMIN — CYCLOBENZAPRINE 10 MG: 10 TABLET, FILM COATED ORAL at 08:01

## 2021-03-10 RX ADMIN — SERTRALINE 150 MG: 100 TABLET, FILM COATED ORAL at 08:01

## 2021-03-10 RX ADMIN — GUAIFENESIN 400 MG: 100 SOLUTION ORAL at 02:21

## 2021-03-10 RX ADMIN — ALPRAZOLAM 0.5 MG: 0.5 TABLET ORAL at 17:46

## 2021-03-10 RX ADMIN — SENNOSIDES 1 TABLET: 8.6 TABLET, FILM COATED ORAL at 08:01

## 2021-03-10 RX ADMIN — OXYCODONE HYDROCHLORIDE 10 MG: 5 SOLUTION ORAL at 06:05

## 2021-03-10 RX ADMIN — MIDODRINE HYDROCHLORIDE 7.5 MG: 5 TABLET ORAL at 06:06

## 2021-03-10 RX ADMIN — FUROSEMIDE 20 MG: 20 TABLET ORAL at 17:45

## 2021-03-10 RX ADMIN — OXYCODONE HYDROCHLORIDE 10 MG: 5 SOLUTION ORAL at 21:46

## 2021-03-10 RX ADMIN — GUAIFENESIN 400 MG: 100 SOLUTION ORAL at 17:45

## 2021-03-10 RX ADMIN — ACETYLCYSTEINE 3 ML: 200 SOLUTION ORAL; RESPIRATORY (INHALATION) at 19:48

## 2021-03-10 RX ADMIN — Medication 1 TABLET: at 08:01

## 2021-03-10 RX ADMIN — HYDROXYZINE HYDROCHLORIDE 25 MG: 25 TABLET ORAL at 08:01

## 2021-03-10 RX ADMIN — OXYCODONE HYDROCHLORIDE 10 MG: 5 SOLUTION ORAL at 02:00

## 2021-03-10 RX ADMIN — SODIUM CHLORIDE, PRESERVATIVE FREE 10 ML: 5 INJECTION INTRAVENOUS at 20:02

## 2021-03-10 RX ADMIN — ALPRAZOLAM 0.5 MG: 0.5 TABLET ORAL at 04:44

## 2021-03-10 RX ADMIN — GUAIFENESIN 400 MG: 100 SOLUTION ORAL at 23:09

## 2021-03-10 RX ADMIN — OXYCODONE HYDROCHLORIDE AND ACETAMINOPHEN 1000 MG: 500 TABLET ORAL at 08:01

## 2021-03-10 RX ADMIN — CHLORHEXIDINE GLUCONATE 15 ML: 1.2 RINSE ORAL at 08:02

## 2021-03-10 RX ADMIN — SODIUM CHLORIDE, PRESERVATIVE FREE 10 ML: 5 INJECTION INTRAVENOUS at 08:02

## 2021-03-10 RX ADMIN — OXYCODONE HYDROCHLORIDE 10 MG: 5 SOLUTION ORAL at 11:49

## 2021-03-10 RX ADMIN — GUAIFENESIN 400 MG: 100 SOLUTION ORAL at 06:06

## 2021-03-10 NOTE — CONSULTS
"The patient is resting comfortably today.  Staff reports that he seems less anxious this morning but \"was on the call light\" a great deal last evening.  Further upward titration of alprazolam may need to be considered.  "

## 2021-03-10 NOTE — PLAN OF CARE
Goal Outcome Evaluation:  Plan of Care Reviewed With: patient  Progress: no change  Outcome Summary: Pt remains in CCU on vent with 10 PEEP, FiO2 21%. VS stable overnight. Frequent repeated request regarding pain meds. No BM this shift so occult was not collected. Family care conference today at 1500.

## 2021-03-10 NOTE — PROGRESS NOTES
LOS: 11 days   Patient Care Team:  Sheron Perez MD as PCP - General (Family Medicine)    Subjective     Patient has been refusing respiratory treatments, suctioning chest therapy.  He continues to want lots of medications.    His father pulled me out of the room today to talk to me and he said please do not give him all the medications he will ask for them and just be totally knocked out we want him to fight to live and explained to him that as long as he wants to live were not clinic knock him out with medications and he needs to take the directed medications and therapies to try and prevent the respiratory congestion mucous plugging pneumonias and that taking too many medications as this can increase that risk.  The patient does have to decide whether he wants to live or whether he wants palliative care.      Review of Systems:         Objective     Vital Signs  Vital Sign Min/Max for last 24 hours  Temp  Min: 97.7 °F (36.5 °C)  Max: 98.3 °F (36.8 °C)   BP  Min: 109/67  Max: 138/80   Pulse  Min: 59  Max: 107   Resp  Min: 20  Max: 24   SpO2  Min: 87 %  Max: 100 %   No data recorded   No data recorded        Ventilator/Non-Invasive Ventilation Settings (From admission, onward) Comment     Start     Ordered    03/06/21 0927  Ventilator - AC/PC; (16); 100; 90%; 10; 20  Continuous     Question Answer Comment   Vent Mode AC/PC    Breath rate  16   FiO2 100    FiO2 titrate for Sp02% =/> 90%    PEEP 10    Inspiratory Pressure 20        03/06/21 0927    03/04/21 1501  NIPPV (CPAP or BIPAP)  At Bedtime & As Needed-RT     Comments: Patient had a tracheostomy tube, he needs to be connected to the trilogy machine or equivalent hospital ventilator, need to inflate the cuff while on the trilogy per standard protocols   Question Answer Comment   Indication: Acute Respiratory Failure    Type: AVAPS-AE    NIPPV Mask Interface: Other    Mask Type tracheostomy tube    Rate 12    VT (mL) 550    EPAP Min (cm H2O) 10     EPAP Max (cm H2O) 15    Max Pressure (cm H2O) 30    Pressure Support Min (cm H2O) 4    Pressure Support Max (cm H2O) 15    Titrate for SPO2 90%        03/04/21 1501    03/03/21 1725  NIPPV (CPAP or BIPAP)  Until Discontinued,   Status:  Canceled     Comments: Patient had a tracheostomy tube, he needs to be connected to the trilogy machine or equivalent hospital ventilator, need to inflate the cuff while on the trilogy per standard protocols   Question Answer Comment   Indication: Acute Respiratory Failure    Type: AVAPS-AE    NIPPV Mask Interface: Other    Mask Type tracheostomy tube    Rate 12    VT (mL) 550    EPAP Min (cm H2O) 10    EPAP Max (cm H2O) 15    Max Pressure (cm H2O) 30    Pressure Support Min (cm H2O) 4    Pressure Support Max (cm H2O) 15    Titrate for SPO2 90%        03/03/21 1726                             Body mass index is 20.97 kg/m².  I/O last 3 completed shifts:  In: 4713 [Blood:1377; Other:1009; NG/GT:2327]  Out: 2900 [Urine:2900]  No intake/output data recorded.        Physical Exam:  General Appearance: Well-developed white male he is trached on a ventilator this control 16 tidal volume of 550 cc resting comfortably he is still on 35% FiO2 and 10 cm of PEEP his SPO2 is 97%   Eyes: Conjunctiva are clear and anicteric pupils are equal  ENT: Mucous membranes little better today they are moist he does not have any blood in his oropharynx.  Neck: Tracheostomy site looks okay  6 Shiley with the inner cannula, no jugular venous distention, trachea midline  Lungs: Just follow coarse rhonchi I did not really give him an option I just suctioned him and got copious amounts of secretions from his airways after which she sounded much better and his oxygen saturations improved  Cardiac: He is regular rate rhythm no murmur  Abdomen: Soft no palpable hepatosplenomegaly or masses he has a left upper quadrant PEG type tube and the site looks okay  : Not examined  Musculoskeletal: He has very little  muscle mass he has some slight movement with his arms he does not really have any  he has marked interosseous muscle wasting  Skin: No jaundice no petechiae skin is warm and dry  Neuro: Quadriplegic with minimal proximal upper extremity movement  Extremities/P Vascular: Palpable radial and dorsalis pedis pulses bilaterally again marked muscle atrophy  MSE: No change anxiety and drug-seeking behavior    Labs:  Results from last 7 days   Lab Units 03/09/21  0826 03/08/21  0919 03/07/21  1130 03/05/21  1654   GLUCOSE mg/dL 148* 118* 116* 108*   SODIUM mmol/L 136 136 135* 137   POTASSIUM mmol/L 4.1 4.2 4.2 4.2   CO2 mmol/L 32.6* 31.1* 29.7* 27.0   CHLORIDE mmol/L 92* 94* 97* 99   ANION GAP mmol/L 11.4 10.9 8.3 11.0   CREATININE mg/dL 0.60* 0.55* 0.58* 0.54*   BUN mg/dL 30* 26* 26* 20   BUN / CREAT RATIO  50.0* 47.3* 44.8* 37.0*   CALCIUM mg/dL 9.5 9.2 8.9 9.3   EGFR IF NONAFRICN AM mL/min/1.73 >150 >150 >150 >150   ALK PHOS U/L  --   --   --  112   TOTAL PROTEIN g/dL  --   --   --  7.0   ALT (SGPT) U/L  --   --   --  27   AST (SGOT) U/L  --   --   --  17   BILIRUBIN mg/dL  --   --   --  1.0   ALBUMIN g/dL  --   --   --  3.30*   GLOBULIN gm/dL  --   --   --  3.7     Estimated Creatinine Clearance: 158.1 mL/min (A) (by C-G formula based on SCr of 0.6 mg/dL (L)).      Results from last 7 days   Lab Units 03/09/21  0826 03/08/21  1017 03/08/21  0717 03/05/21  1654   WBC 10*3/mm3 15.50*  --  7.67 6.68   RBC 10*6/mm3 3.39*  --  2.46* 2.91*   HEMOGLOBIN g/dL 9.6* 6.5* 6.5* 7.9*   HEMATOCRIT % 28.1* 19.8* 19.8* 23.5*   MCV fL 82.9  --  80.5 80.8   MCH pg 28.3  --  26.4* 27.1   MCHC g/dL 34.2  --  32.8 33.6   RDW % 16.4*  --  16.7* 17.0*   RDW-SD fl 49.3  --  48.4 49.6   MPV fL 9.7  --  9.5 9.9   PLATELETS 10*3/mm3 247  --  228 277   NEUTROPHIL % % 83.5*  --   --  66.7   LYMPHOCYTE % % 10.3*  --   --  17.1*   MONOCYTES % % 5.2  --   --  8.1   EOSINOPHIL % % 0.5  --   --  7.6*   BASOPHIL % % 0.1  --   --  0.1   IMM GRAN % %  0.4  --   --  0.4   NEUTROS ABS 10*3/mm3 12.94*  --   --  4.45   LYMPHS ABS 10*3/mm3 1.60  --   --  1.14   MONOS ABS 10*3/mm3 0.81  --   --  0.54   EOS ABS 10*3/mm3 0.07  --   --  0.51*   BASOS ABS 10*3/mm3 0.02  --   --  0.01   IMMATURE GRANS (ABS) 10*3/mm3 0.06*  --   --  0.03   NRBC /100 WBC 0.0  --   --  0.0                     Results from last 7 days   Lab Units 03/05/21  1654   PROCALCITONIN ng/mL 0.08         Microbiology Results (last 10 days)     Procedure Component Value - Date/Time    MRSA Screen, PCR (Inpatient) - Swab, Nares [[id number]]  (Abnormal) Collected: 03/07/21 1454    Lab Status: Final result Specimen: Swab from Nares Updated: 03/07/21 1622     MRSA PCR MRSA Detected    Respiratory Culture - Wash, Bronchus [[id number]]  (Abnormal)  (Susceptibility) Collected: 03/06/21 1016    Lab Status: Final result Specimen: Wash from Bronchus Updated: 03/09/21 0936     Respiratory Culture Scant growth (1+) Pseudomonas aeruginosa      Rare Normal Respiratory Lisa     Gram Stain No WBCs or organisms seen    Susceptibility      Pseudomonas aeruginosa      YOGI      Cefepime Intermediate      Ceftazidime Intermediate      Ciprofloxacin Susceptible      Gentamicin Susceptible      Levofloxacin Susceptible      Piperacillin + Tazobactam Susceptible<sup style='font-weight:normal'>1</sup></font>             <sup style='font-weight:normal'>1</sup></font> Appended report. These results have been appended to a previously preliminary verified report.          Linear View                   Blood Culture - Blood, Arm, Right [[id number]] Collected: 03/05/21 1654    Lab Status: Preliminary result Specimen: Blood from Arm, Right Updated: 03/09/21 1746     Blood Culture No growth at 4 days    Blood Culture - Blood, Hand, Left [[id number]] Collected: 03/05/21 1654    Lab Status: Preliminary result Specimen: Blood from Hand, Left Updated: 03/09/21 1746     Blood Culture No growth at 4 days              acetylcysteine, 3 mL,  Nebulization, BID - RT  ascorbic acid, 1,000 mg, Per G Tube, Daily  chlorhexidine, 15 mL, Mouth/Throat, Q12H  cyclobenzaprine, 10 mg, Per G Tube, Daily  docusate sodium, 100 mg, Per G Tube, BID  fentaNYL, 1 patch, Transdermal, Q72H  furosemide, 20 mg, Oral, BID  Gabapentin, 800 mg, Per G Tube, Q8H  guaifenesin, 400 mg, Per G Tube, Q6H  hydrOXYzine, 25 mg, Oral, Daily  lansoprazole, 30 mg, Per G Tube, QAM  midodrine, 7.5 mg, Per G Tube, BID AC  multivitamin with minerals, 1 tablet, Oral, Daily  polyethylene glycol, 17 g, Oral, Daily  Scopolamine, 1 patch, Transdermal, Q72H  senna, 1 tablet, Per G Tube, BID  sertraline, 150 mg, Per G Tube, Daily  sodium chloride, 10 mL, Intravenous, Q12H  sodium chloride, 4 mL, Nebulization, BID - RT           Diagnostics:  CT Abdomen Pelvis Without Contrast    Result Date: 2/10/2021  CT CHEST, ABDOMEN, AND PELVIS WITHOUT CONTRAST.  HISTORY: 35-year-old male with quadriplegia, pneumonia with tracheitis and mucous plugging. Sepsis. Persistent fever.  TECHNIQUE: Radiation dose reduction techniques were utilized, including automated exposure control and exposure modulation based on body size. 3 mm images were obtained through the chest, abdomen, and pelvis without the administration of IV contrast. Compared with CT of the abdomen and pelvis from 02/04/2021 and chest CTA from 02/04/2021.  FINDINGS: CHEST: There is significant motion/breathing artifact. The tracheostomy is in good position with the distal margin being 4 cm proximal to the titus. There is a significant volume of mucus/secretions within both mainstem bronchi and the lower lobe bronchi and right middle lobe bronchi appear essentially completely opacified. There are small-moderate-sized bilateral pleural effusions and there are dense consolidations adjacently at both lower lobes. There is no pericardial effusion.  ABDOMEN/PELVIS: The spleen is enlarged measuring 16 cm in diameter, measured on the coronal reconstruction  series. The noncontrasted liver appears unremarkable. The gallbladder is contracted. Percutaneous G-tube is noted in place. Noncontrasted pancreas, adrenals, and kidneys appear unremarkable. There are air-fluid levels predominantly within the colon and there is no colonic thickening. Appendix appears within normal limits. The urinary bladder is not abnormally distended.      1. Significant volume of mucus/secretions within the main stem bronchi and the bronchi at the lower lobes and right middle lobe are essentially completely opacified. Adjacent to small-moderate-sized bilateral pleural effusions are dense consolidations at both lower lobes which likely in part represent atelectasis, but pneumonia is possible as well. 2. Mild colonic ileus without evidence for colitis. 3. Splenomegaly.  This report was finalized on 2/10/2021 3:24 PM by Dr. Kenia Farrell M.D.      CT Chest Without Contrast Diagnostic    Result Date: 3/2/2021  CT CHEST WO CONTRAST DIAGNOSTIC-  CLINICAL HISTORY: Dyspnea. Pleural effusion. Quadriplegia. History of mucous plugging and atelectasis.  TECHNIQUE: Spiral CT images were obtained through the chest without IV contrast and were reconstructed in 3 mm thick slices.  Radiation dose reduction techniques were utilized, including automated exposure control and exposure modulation based on body size.  COMPARISON: CT chest dated 02/10/2021  FINDINGS: A tracheostomy tube remains in satisfactory position. Comparison with the previous study is difficult due to extensive breathing motion artifact that was present on the previous study. There are moderate-sized bilateral posteriorly layering pleural effusions, greater in size on the right than the left. The effusion on the right has increased in size somewhat in the interval. The effusion on the left appears essentially unchanged. There is dense consolidation in the left lower lobe containing a few air bronchograms that is most likely primarily due to  atelectasis. This appears slightly larger. Slightly less extensive patchy consolidation in the right lower lobe also appears slightly more prominent. There are patchy reticulonodular opacities throughout the right upper lobe that appear essentially new since the preceding CT scan. These have a tree-in-bud appearance and are likely due to mucus plugging within bronchioles. In addition, a 7 mm diameter discrete noncalcified nodule in the right upper lobe appears new. There also several additional smaller nodular opacities in the inferior aspect of the right upper lobe that are new. These are all favored to be due to peripheral mucous plugging, as well. Images through the upper abdomen demonstrate no obvious abnormality.      Suboptimal study due to artifact from patient breathing motion. There are moderate-sized bilateral posteriorly layering pleural effusions. The effusion on the left is unchanged since a CT dated 02/10/2021. The effusion on the right appears slightly larger. Areas of consolidation within both lower lobes also appear more prominent and are most likely due to atelectasis, although superimposed pneumonia cannot be excluded. Patchy reticulonodular opacities in the right upper lobe are new and are likely secondary to mucous plugging within bronchioles. There are also multiple macroscopic nodules in the inferior aspect of the right upper lobe that are new and are quite likely secondary to peripheral mucous plugging.  This report was finalized on 3/2/2021 8:11 PM by Dr. Rober Monson M.D.      CT Chest Without Contrast Diagnostic    Result Date: 2/10/2021  CT CHEST, ABDOMEN, AND PELVIS WITHOUT CONTRAST.  HISTORY: 35-year-old male with quadriplegia, pneumonia with tracheitis and mucous plugging. Sepsis. Persistent fever.  TECHNIQUE: Radiation dose reduction techniques were utilized, including automated exposure control and exposure modulation based on body size. 3 mm images were obtained through the chest,  abdomen, and pelvis without the administration of IV contrast. Compared with CT of the abdomen and pelvis from 02/04/2021 and chest CTA from 02/04/2021.  FINDINGS: CHEST: There is significant motion/breathing artifact. The tracheostomy is in good position with the distal margin being 4 cm proximal to the titus. There is a significant volume of mucus/secretions within both mainstem bronchi and the lower lobe bronchi and right middle lobe bronchi appear essentially completely opacified. There are small-moderate-sized bilateral pleural effusions and there are dense consolidations adjacently at both lower lobes. There is no pericardial effusion.  ABDOMEN/PELVIS: The spleen is enlarged measuring 16 cm in diameter, measured on the coronal reconstruction series. The noncontrasted liver appears unremarkable. The gallbladder is contracted. Percutaneous G-tube is noted in place. Noncontrasted pancreas, adrenals, and kidneys appear unremarkable. There are air-fluid levels predominantly within the colon and there is no colonic thickening. Appendix appears within normal limits. The urinary bladder is not abnormally distended.      1. Significant volume of mucus/secretions within the main stem bronchi and the bronchi at the lower lobes and right middle lobe are essentially completely opacified. Adjacent to small-moderate-sized bilateral pleural effusions are dense consolidations at both lower lobes which likely in part represent atelectasis, but pneumonia is possible as well. 2. Mild colonic ileus without evidence for colitis. 3. Splenomegaly.  This report was finalized on 2/10/2021 3:24 PM by Dr. Kenia Farrell M.D.      CT Abdomen Pelvis With Contrast    Result Date: 2/4/2021  CT ABDOMEN PELVIS W CONTRAST-  Radiation dose reduction techniques were utilized, including automated exposure control and exposure modulation based on body size.  Clinical: Acute abdominal pain, nonlocalizing anemia. Aspiration pneumonia  Note: Examination  degraded due to respiratory motion artifact  FINDINGS: There is enlargement of the left gluteus superficial to the iliac wing. Mixed attenuation measuring 11.2 cm AP, 5.5 cm transverse and 11.3 cm craniocaudal. Likely muscular hematoma. The upper border is somewhat rounded, I would recommend follow-up to exclude associated mass.  There are again bilateral free-flowing pleural effusions. Attenuation compatible with serous fluid. There is again bibasilar consolidation. There is cardiac enlargement.  There is splenomegaly with the spleen measuring approximately 16 cm cranial caudal. The liver is satisfactory in size and shape, there is a typical area of focal fatty infiltration within the left lobe. No suspicious liver lesion, there is a small cyst demonstrated within the right lobe. Intrahepatic biliary radicals are mildly pronounced. Due to the degree of respiratory motion artifact, difficult to evaluate the gallbladder and extrahepatic biliary tree, no gross gallstones seen. The pancreas is satisfactory in appearance.  The adrenal glands and kidneys have a typical appearance. There is a G-tube demonstrated within the gastric lumen. Its position is satisfactory. Diameter of the aorta is normal. Urinary bladder is typical in appearance.  The appendix is normal. Caliber of the colon and small bowel is within normal limits. No bowel wall thickening nor induration to suggest an inflammatory process. No free intraperitoneal air free fluid.  CONCLUSION: 1. Mixed attenuation collection within the left gluteus having the appearance of a sizable hematoma. See above measurements. Advise follow-up CT after resolved to exclude underlying mass. 2. Bilateral pleural effusions and bibasilar consolidation consistent with pneumonia. 3. Splenomegaly. 4. Prominent intrahepatic biliary radicals, motion artifact degrades evaluation of the gallbladder and biliary tree, no gross gallstones identified. 5. G-tube position is satisfactory. 6.  The appendix, colon and small bowel are unremarkable.   This report was finalized on 2/4/2021 12:42 PM by Dr. Govind Carr M.D.      XR Chest 1 View    Result Date: 3/1/2021  ONE VIEW PORTABLE CHEST AT 3:48 PM  HISTORY: Shortness of breath. Right-sided atelectasis and pleural effusion. Possible mucous plugging.  FINDINGS: There is some atelectasis and pleural effusion at both bases, right greater than left and the appearance at the right base shows improvement from 02/28/2021. There is slight worsening of increased density at the left base. The heart size remains normal. A tracheostomy tube is in place.  This report was finalized on 3/1/2021 4:42 PM by Dr. Benjamin De Leon M.D.      XR Chest 1 View    Result Date: 2/28/2021  CHEST SINGLE VIEW  HISTORY: Mucous plugging. History of hypertension.  COMPARISON: AP chest 2/27/2021, 2/12/2021, CT chest 2/10/2021  FINDINGS: Tracheostomy tube is present. Moderate right and small left pleural effusions are present. Right pleural fluid extends partially into the major fissure. There is right lower lobe consolidation or infiltrate. Left lung is comparatively clear there is no perihilar edema or pneumothorax.      Moderate right and small left pleural effusions.  Right pleural effusion extends partially into the major fissure. There is dense right basilar opacity/consolidation and potential infiltrate. No evidence for perihilar edema or pneumothorax. Tracheostomy is present.  This report was finalized on 2/28/2021 2:20 PM by Dr. Taran Killian M.D.      XR Chest 1 View    Result Date: 2/27/2021  Patient: MARK HARDIN  Time Out: 02:51 Exam(s): FILM CXR 1 VIEW EXAM:   XR Chest, 1 View CLINICAL HISTORY:    Reason for exam: SOA. TECHNIQUE:   Frontal view of the chest. COMPARISON: 2 12 21. FINDINGS:   Redemonstrated tracheostomy.  Interval decrease in moderate right pleural effusion with associated compressive atelectasis.  Decrease in basilar volume loss.  No pneumothorax.  No  cardiomegaly.  Cervical spinal fusion hardware. IMPRESSION:     Interval decrease in small-moderate right pleural effusion with associated compressive atelectasis. Tracheostomy.     Electronically signed by Dharmesh Lopez M.D. on 02-27-21 at 0251    XR Chest 1 View    Result Date: 2/12/2021  XR CHEST 1 VW-  HISTORY: Male who is 35 years-old,  chest pain  TECHNIQUE: Frontal view of the chest  COMPARISON: 02/12/2021 at 0605 hours  FINDINGS: Stable appearing tracheostomy tube. The heart is mildly enlarged. Heart, mediastinum and pulmonary vasculature are unremarkable. Mild right pleural effusion with increased fissural fluid suggested. Small atelectasis or infiltrate at the lung bases. No pneumothorax. No acute osseous process.      Mild right pleural effusion with increased partial fluid suggested. Small atelectasis or infiltrate at the lung bases. Continued follow-up recommended.  This report was finalized on 2/12/2021 3:15 PM by Dr. Eddie Reddy M.D.      XR Chest 1 View    Result Date: 2/12/2021  XR CHEST 1 VW-  Clinical: Pleural effusion, right thoracentesis to 11/20/2021  COMPARISON CT scan of the chest 2/10/2021 and chest radiograph to 8/20/2021  FINDINGS: Tracheostomy tube position is satisfactory. Small right-sided pleural effusion demonstrated. Cardiac size within normal limits. No pneumothorax. Blunting of left costophrenic angle suggests trace left pleural effusion. There is vague opacity at both lung bases, suggesting atelectasis/infiltrate. No pulmonary edema identified. No acute consolidation seen. The remainder is unremarkable.  This report was finalized on 2/12/2021 7:40 AM by Dr. Govind Carr M.D.      XR Chest 1 View    Result Date: 2/8/2021  XR CHEST 1 VW-  HISTORY: Male who is 35 years-old,  rhonchi, fever  TECHNIQUE: Frontal views of the chest  COMPARISON: 02/05/2021  FINDINGS: Stable appearing tracheostomy tube. Heart, mediastinum and pulmonary vasculature are unremarkable. Mild bilateral  basilar pleural effusions and likely atelectasis or infiltrate. No pneumothorax. No acute osseous process.      Mild bilateral basilar atelectasis/infiltrate/effusion, follow-up suggested.  This report was finalized on 2/8/2021 3:46 PM by Dr. Eddie Reddy M.D.      XR Chest 1 View    Result Date: 2/5/2021  ONE VIEW PORTABLE CHEST AT 1:12 PM  HISTORY: Left lower lobe atelectasis and pneumonia. Follow-up evaluation.  FINDINGS: There has been marked improvement in the left basilar atelectasis and pneumonia when compared to yesterday's exam. The findings are most consistent with resolution of mucus plugging. No new abnormality is seen. The heart size is normal. A tracheostomy tube remains in place.  This report was finalized on 2/5/2021 2:28 PM by Dr. Benjamin De Leon M.D.      Duplex Venous Upper Extremity - Bilateral CAR    Result Date: 2/10/2021  · Acute right upper extremity superficial thrombophlebitis noted in the median cubital. · All other vessels appear normal.      Duplex Venous Lower Extremity - Bilateral CAR    Result Date: 2/10/2021  · Normal bilateral lower extremity venous duplex scan.      US Thoracentesis    Result Date: 2/11/2021  ULTRASOUND-GUIDED RIGHT THORACENTESIS.  HISTORY: Pleural effusion.  After signed informed consent was obtained the patient was prepped and draped in the usual sterile fashion. Lidocaine was used for local anesthesia.  Ultrasound guidance was used to place the thoracentesis catheter into the right pleural fluid collection. 400 mL of straw-colored fluid was removed. Sample was sent to the lab.  Confirmatory images were obtained.  Patient tolerated the procedure well with no complications.      Ultrasound-guided right thoracentesis as described.   This report was finalized on 2/11/2021 10:39 AM by Dr. Shad Smith M.D.          Today's chest x-ray reviewed and minimal right basilar atelectasis essentially unchanged otherwise pretty clear with tracheostomy good  position    Active Hospital Problems    Diagnosis  POA   • **Acute on chronic respiratory failure with hypoxemia (CMS/HCC) [J96.21]  Yes   • Hypotension [I95.9]  Yes   • Dyspnea [R06.00]  Yes   • Anemia [D64.9]  Yes   • Leukocytosis [D72.829]  Yes   • Quadriplegia (CMS/HCC) [G82.50]  Yes   • HCAP (healthcare-associated pneumonia) [J18.9]  Yes      Resolved Hospital Problems   No resolved problems to display.         Assessment/Plan     1. Acute hypoxic respiratory failure secondary to mucous plugging complicated by his quadriplegia and inability to clear secretions .  Continue ventilatory support.  Is going to be impossible to wean him if he has this much anxiety on the ventilator especially when he is ventilating this well.  2. Mucous plugging he is going to continue needing aggressive pulmonary therapy, he needs chest physiotherapy, CoughAssist and mucolytic therapy, bronchodilators, hypertonic saline to try and prevent these recurrent episodes.  Also I think that his constantly wanting to be totally knocked out with medications just contributes to hypoventilation as he does have some respiratory function.  Also he has some many oral secretions even with scopolamine and when he is awake he keeps his mouth clear to the secretions but when he is obtunded with medication the secretions just pool in the back of his throat and get down around his tracheostomy cuff.  Patient refusing respiratory therapy suctioning is going to be a disaster he is absolutely full of secretions today after refusing most of the day.  I discussed with him.  Also with his father.  3. Quadriplegia  4. Bilateral pleural effusions improved almost completely resolved  5. Anemia hemoglobin drifting down looks like it is anemia of chronic disease his hemoglobin is up after transfusion we will continue to follow  6. Anxiety he has significant anxiety issues Dr. Tuttle saw him and increased his Ativan  further.  7. Hypertension  8. Fluids/electrolytes/nutrition continue tube feeds fluid status looks a little better today  9. Narcotic use understand the patient is a quadriplegic but continued request for high doses of narcotics is not going to benefit him is going increase problems .      Yesterday patient apparently requested to talk with palliative care and they spoke with him and he expressed interest going towards palliative care.  His family particularly his mother were quite distressed with this I believe there is a meeting set up today I spoke with the patient today about it he says sometimes he wants to go palliative care and sometimes he does not I told him that this is not a go back decision he has to be certain on what he wants    Plan for disposition:    Darío Mendez MD  03/10/21  07:25 EST    Time: I have spent over 50 minutes on patient's care today part of this time was discussing coordinating care the other 37 part was critical care

## 2021-03-10 NOTE — NURSING NOTE
"Spoke with patient, his mother and father with palliative care SW and  iPad. We discussed the potter option of symptom management with goal of comfort vs full medical management and goal of recovery. Patient clearly states he does want more symptom management, but does not sound like he's quite ready for the consequences of that decision. His mother clearly wants him to \"keep fighting and get better\". We encouraged them to talk about their goals and next steps together as a family, provided support and education.       "

## 2021-03-11 ENCOUNTER — APPOINTMENT (OUTPATIENT)
Dept: GENERAL RADIOLOGY | Facility: HOSPITAL | Age: 36
End: 2021-03-11

## 2021-03-11 LAB
ANION GAP SERPL CALCULATED.3IONS-SCNC: 11.7 MMOL/L (ref 5–15)
BUN SERPL-MCNC: 31 MG/DL (ref 6–20)
BUN/CREAT SERPL: 63.3 (ref 7–25)
CALCIUM SPEC-SCNC: 9.7 MG/DL (ref 8.6–10.5)
CHLORIDE SERPL-SCNC: 95 MMOL/L (ref 98–107)
CO2 SERPL-SCNC: 32.3 MMOL/L (ref 22–29)
CREAT SERPL-MCNC: 0.49 MG/DL (ref 0.76–1.27)
DEPRECATED RDW RBC AUTO: 48.6 FL (ref 37–54)
ERYTHROCYTE [DISTWIDTH] IN BLOOD BY AUTOMATED COUNT: 16.5 % (ref 12.3–15.4)
FUNGUS WND CULT: NORMAL
GFR SERPL CREATININE-BSD FRML MDRD: >150 ML/MIN/1.73
GLUCOSE SERPL-MCNC: 118 MG/DL (ref 65–99)
HCT VFR BLD AUTO: 28.7 % (ref 37.5–51)
HGB BLD-MCNC: 9.3 G/DL (ref 13–17.7)
MCH RBC QN AUTO: 26.5 PG (ref 26.6–33)
MCHC RBC AUTO-ENTMCNC: 32.4 G/DL (ref 31.5–35.7)
MCV RBC AUTO: 81.8 FL (ref 79–97)
PLATELET # BLD AUTO: 299 10*3/MM3 (ref 140–450)
PMV BLD AUTO: 10.5 FL (ref 6–12)
POTASSIUM SERPL-SCNC: 4.1 MMOL/L (ref 3.5–5.2)
RBC # BLD AUTO: 3.51 10*6/MM3 (ref 4.14–5.8)
SODIUM SERPL-SCNC: 139 MMOL/L (ref 136–145)
WBC # BLD AUTO: 12.13 10*3/MM3 (ref 3.4–10.8)

## 2021-03-11 PROCEDURE — 94003 VENT MGMT INPAT SUBQ DAY: CPT

## 2021-03-11 PROCEDURE — 80048 BASIC METABOLIC PNL TOTAL CA: CPT | Performed by: INTERNAL MEDICINE

## 2021-03-11 PROCEDURE — 71045 X-RAY EXAM CHEST 1 VIEW: CPT

## 2021-03-11 PROCEDURE — 94799 UNLISTED PULMONARY SVC/PX: CPT

## 2021-03-11 PROCEDURE — 85027 COMPLETE CBC AUTOMATED: CPT | Performed by: INTERNAL MEDICINE

## 2021-03-11 RX ORDER — ALPRAZOLAM 0.5 MG/1
0.5 TABLET ORAL
Status: DISCONTINUED | OUTPATIENT
Start: 2021-03-11 | End: 2021-03-16 | Stop reason: HOSPADM

## 2021-03-11 RX ADMIN — ALPRAZOLAM 0.5 MG: 0.5 TABLET ORAL at 08:03

## 2021-03-11 RX ADMIN — MIDODRINE HYDROCHLORIDE 7.5 MG: 5 TABLET ORAL at 16:48

## 2021-03-11 RX ADMIN — SENNOSIDES 1 TABLET: 8.6 TABLET, FILM COATED ORAL at 08:03

## 2021-03-11 RX ADMIN — OXYCODONE HYDROCHLORIDE 10 MG: 5 SOLUTION ORAL at 02:53

## 2021-03-11 RX ADMIN — DOCUSATE SODIUM 100 MG: 50 LIQUID ORAL at 08:04

## 2021-03-11 RX ADMIN — OXYCODONE HYDROCHLORIDE 10 MG: 5 SOLUTION ORAL at 12:31

## 2021-03-11 RX ADMIN — SERTRALINE 150 MG: 100 TABLET, FILM COATED ORAL at 08:03

## 2021-03-11 RX ADMIN — OXYCODONE HYDROCHLORIDE 10 MG: 5 SOLUTION ORAL at 16:48

## 2021-03-11 RX ADMIN — CHLORHEXIDINE GLUCONATE 15 ML: 1.2 RINSE ORAL at 08:05

## 2021-03-11 RX ADMIN — GABAPENTIN 800 MG: 250 SOLUTION ORAL at 21:06

## 2021-03-11 RX ADMIN — Medication 1 TABLET: at 08:03

## 2021-03-11 RX ADMIN — FUROSEMIDE 20 MG: 20 TABLET ORAL at 18:07

## 2021-03-11 RX ADMIN — GABAPENTIN 800 MG: 250 SOLUTION ORAL at 15:50

## 2021-03-11 RX ADMIN — GUAIFENESIN 400 MG: 100 SOLUTION ORAL at 12:31

## 2021-03-11 RX ADMIN — OXYCODONE HYDROCHLORIDE 10 MG: 5 SOLUTION ORAL at 21:06

## 2021-03-11 RX ADMIN — OXYCODONE HYDROCHLORIDE 10 MG: 5 SOLUTION ORAL at 08:04

## 2021-03-11 RX ADMIN — OXYCODONE HYDROCHLORIDE AND ACETAMINOPHEN 1000 MG: 500 TABLET ORAL at 08:03

## 2021-03-11 RX ADMIN — CHLORHEXIDINE GLUCONATE 15 ML: 1.2 RINSE ORAL at 22:46

## 2021-03-11 RX ADMIN — CYCLOBENZAPRINE 10 MG: 10 TABLET, FILM COATED ORAL at 08:04

## 2021-03-11 RX ADMIN — FENTANYL 1 PATCH: 25 PATCH TRANSDERMAL at 12:33

## 2021-03-11 RX ADMIN — SODIUM CHLORIDE, PRESERVATIVE FREE 10 ML: 5 INJECTION INTRAVENOUS at 08:05

## 2021-03-11 RX ADMIN — POLYETHYLENE GLYCOL 3350 17 G: 17 POWDER, FOR SOLUTION ORAL at 08:04

## 2021-03-11 RX ADMIN — LANSOPRAZOLE 30 MG: KIT at 06:39

## 2021-03-11 RX ADMIN — SENNOSIDES 1 TABLET: 8.6 TABLET, FILM COATED ORAL at 21:06

## 2021-03-11 RX ADMIN — GABAPENTIN 800 MG: 250 SOLUTION ORAL at 05:48

## 2021-03-11 RX ADMIN — FUROSEMIDE 20 MG: 20 TABLET ORAL at 08:04

## 2021-03-11 RX ADMIN — HYDROXYZINE HYDROCHLORIDE 25 MG: 25 TABLET ORAL at 08:04

## 2021-03-11 RX ADMIN — ALPRAZOLAM 0.5 MG: 0.5 TABLET ORAL at 18:07

## 2021-03-11 RX ADMIN — ALPRAZOLAM 0.5 MG: 0.5 TABLET ORAL at 12:30

## 2021-03-11 RX ADMIN — DOCUSATE SODIUM 100 MG: 50 LIQUID ORAL at 21:06

## 2021-03-11 RX ADMIN — GUAIFENESIN 400 MG: 100 SOLUTION ORAL at 05:48

## 2021-03-11 RX ADMIN — GUAIFENESIN 400 MG: 100 SOLUTION ORAL at 18:07

## 2021-03-11 NOTE — CONSULTS
The patient is resting comfortably today but nursing notes indicate ongoing issues with anxiety particularly prevalent at night.  Instead of ordering alprazolam on an as-needed basis, I think the patient's anxiety symptoms will be better addressed by scheduling this medication.  I will change the frequency of administration to every 6 hours while awake.

## 2021-03-11 NOTE — PLAN OF CARE
Pt remains in CCU on ventilator fi02 21%, very anxious and wants 100% button on vent pushed frequently, explained to patient his oxygen saturation are WNL and we use the button when suctioning or in distress. Refused turns and some medications, explained to patient why we turn and risks of not turning. VSS, afebrile, good UOP, TF at goal, no BM.   Goal Outcome Evaluation:  Plan of Care Reviewed With: patient  Progress: no change

## 2021-03-11 NOTE — PLAN OF CARE
Goal Outcome Evaluation:   Remains in CCU on vent.  Trach care done.  Site red no drainage noted.  Repeated request to boost his O2 to !00%.   Only increased if sats were low or pt needed suctioned.  Palliative care came and spoke with pt, mother and father.  Not leaning to palliative at present.

## 2021-03-11 NOTE — PLAN OF CARE
Problem: Adult Inpatient Plan of Care  Goal: Plan of Care Review  Outcome: Ongoing, Not Progressing  Goal: Patient-Specific Goal (Individualized)  Outcome: Ongoing, Not Progressing  Goal: Absence of Hospital-Acquired Illness or Injury  Outcome: Ongoing, Not Progressing  Intervention: Identify and Manage Fall Risk  Recent Flowsheet Documentation  Taken 3/11/2021 1645 by Betty Brown RN  Safety Promotion/Fall Prevention: safety round/check completed  Taken 3/11/2021 1600 by Betty Brown RN  Safety Promotion/Fall Prevention: safety round/check completed  Taken 3/11/2021 1500 by Betty Brown RN  Safety Promotion/Fall Prevention: safety round/check completed  Taken 3/11/2021 1400 by Betty Brown RN  Safety Promotion/Fall Prevention: safety round/check completed  Taken 3/11/2021 1330 by Betty Brown RN  Safety Promotion/Fall Prevention: safety round/check completed  Taken 3/11/2021 1230 by Betty Brown RN  Safety Promotion/Fall Prevention: safety round/check completed  Taken 3/11/2021 1000 by Betty Brown RN  Safety Promotion/Fall Prevention: safety round/check completed  Taken 3/11/2021 0900 by Betty Brown RN  Safety Promotion/Fall Prevention: safety round/check completed  Taken 3/11/2021 0800 by Betty Brown RN  Safety Promotion/Fall Prevention: safety round/check completed  Intervention: Prevent Skin Injury  Recent Flowsheet Documentation  Taken 3/11/2021 1600 by Betty Brown RN  Body Position: weight shift assistance provided  Taken 3/11/2021 1230 by Betty Brown RN  Body Position:   turned   tilted, right  Taken 3/11/2021 1000 by Betty Brown RN  Body Position: patient/family refused  Taken 3/11/2021 0800 by Betty Brown RN  Body Position: patient/family refused  Intervention: Prevent and Manage VTE (venous thromboembolism) Risk  Recent Flowsheet Documentation  Taken 3/11/2021 1600 by Betty Brown RN  VTE Prevention/Management:   bilateral    dorsiflexion/plantar flexion performed  Taken 3/11/2021 1230 by Betty Brown RN  VTE Prevention/Management:   bilateral   dorsiflexion/plantar flexion performed  Taken 3/11/2021 0800 by Betty Brown RN  VTE Prevention/Management:   bilateral   dorsiflexion/plantar flexion performed  Intervention: Prevent Infection  Recent Flowsheet Documentation  Taken 3/11/2021 0800 by Betty Brown RN  Infection Prevention:   rest/sleep promoted   single patient room provided  Goal: Optimal Comfort and Wellbeing  Outcome: Ongoing, Not Progressing  Intervention: Provide Person-Centered Care  Recent Flowsheet Documentation  Taken 3/11/2021 1600 by Betty Brown RN  Trust Relationship/Rapport:   care explained   choices provided   emotional support provided   thoughts/feelings acknowledged   reassurance provided  Taken 3/11/2021 1230 by Betty Brown RN  Trust Relationship/Rapport:   care explained   choices provided   emotional support provided   thoughts/feelings acknowledged   reassurance provided  Taken 3/11/2021 0800 by Betty Brown RN  Trust Relationship/Rapport:   care explained   choices provided   emotional support provided   thoughts/feelings acknowledged   reassurance provided  Goal: Readiness for Transition of Care  Outcome: Ongoing, Not Progressing     Problem: Skin Injury Risk Increased  Goal: Skin Health and Integrity  Outcome: Ongoing, Not Progressing  Intervention: Optimize Skin Protection  Recent Flowsheet Documentation  Taken 3/11/2021 1600 by Betty Brown RN  Pressure Reduction Techniques:   frequent weight shift encouraged   weight shift assistance provided  Head of Bed (HOB): HOB at 45 degrees  Pressure Reduction Devices: specialty bed utilized  Skin Protection:   adhesive use limited   tubing/devices free from skin contact  Taken 3/11/2021 1230 by Betty Brown RN  Pressure Reduction Techniques:   frequent weight shift encouraged   weight shift assistance provided  Head of Bed (HOB):  HOB at 45 degrees  Pressure Reduction Devices: specialty bed utilized  Skin Protection:   adhesive use limited   tubing/devices free from skin contact  Taken 3/11/2021 0800 by Betty Brown, RN  Pressure Reduction Techniques:   frequent weight shift encouraged   weight shift assistance provided  Head of Bed (HOB): HOB at 60 degrees  Pressure Reduction Devices: specialty bed utilized  Skin Protection:   adhesive use limited   tubing/devices free from skin contact     Problem: Fall Injury Risk  Goal: Absence of Fall and Fall-Related Injury  Outcome: Ongoing, Not Progressing  Intervention: Identify and Manage Contributors to Fall Injury Risk  Recent Flowsheet Documentation  Taken 3/11/2021 0800 by Betty Brown, RN  Medication Review/Management: medications reviewed  Intervention: Promote Injury-Free Environment  Recent Flowsheet Documentation  Taken 3/11/2021 1645 by Betty Brown, RN  Safety Promotion/Fall Prevention: safety round/check completed  Taken 3/11/2021 1600 by Betty Brown RN  Safety Promotion/Fall Prevention: safety round/check completed  Taken 3/11/2021 1500 by Betyt Brown, RN  Safety Promotion/Fall Prevention: safety round/check completed  Taken 3/11/2021 1400 by Betty Brown RN  Safety Promotion/Fall Prevention: safety round/check completed  Taken 3/11/2021 1330 by Betty Brown, RN  Safety Promotion/Fall Prevention: safety round/check completed  Taken 3/11/2021 1230 by Betty Brown RN  Safety Promotion/Fall Prevention: safety round/check completed  Taken 3/11/2021 1000 by Betty Brown, RN  Safety Promotion/Fall Prevention: safety round/check completed  Taken 3/11/2021 0900 by Betty Brown, RN  Safety Promotion/Fall Prevention: safety round/check completed  Taken 3/11/2021 0800 by Betty Brown, RN  Safety Promotion/Fall Prevention: safety round/check completed     Problem: Constipation  Goal: Effective Bowel Elimination  Outcome: Ongoing, Not Progressing  Intervention:  Promote Effective Bowel Elimination  Recent Flowsheet Documentation  Taken 3/11/2021 1600 by Betty Brown RN  Bowel Elimination Management: relaxation techniques promoted  Taken 3/11/2021 0800 by Betty Brown RN  Bowel Elimination Management: relaxation techniques promoted     Problem: Communication Impairment (Mechanical Ventilation, Invasive)  Goal: Effective Communication  Outcome: Ongoing, Not Progressing  Intervention: Ensure Effective Communication  Recent Flowsheet Documentation  Taken 3/11/2021 1600 by Betty Brown RN  Communication Enhancement Strategies:   call light answered in person   extra time allowed for response  Taken 3/11/2021 1230 by Betty Brown RN  Communication Enhancement Strategies:   call light answered in person   extra time allowed for response  Taken 3/11/2021 0800 by Betty Brown RN  Communication Enhancement Strategies:   call light answered in person   extra time allowed for response     Problem: Device-Related Complication Risk (Mechanical Ventilation, Invasive)  Goal: Optimal Device Function  Outcome: Ongoing, Not Progressing  Intervention: Optimize Device Care and Function  Recent Flowsheet Documentation  Taken 3/11/2021 0800 by Betty Brown RN  Aspiration Precautions:   NPO pending swallow screening/evaluation   oral hygiene care promoted  Airway Safety Measures:   manual resuscitator at bedside   suction at bedside     Problem: Inability to Wean (Mechanical Ventilation, Invasive)  Goal: Mechanical Ventilation Liberation  Outcome: Ongoing, Not Progressing  Intervention: Promote Extubation and Mechanical Ventilation Liberation  Recent Flowsheet Documentation  Taken 3/11/2021 1600 by Betty Brown RN  Environmental Support: calm environment promoted  Sleep/Rest Enhancement: awakenings minimized  Taken 3/11/2021 1230 by Betty Brown RN  Environmental Support: calm environment promoted  Sleep/Rest Enhancement: awakenings minimized  Taken 3/11/2021 0800  by Betty Brown RN  Environmental Support: calm environment promoted  Sleep/Rest Enhancement:   awakenings minimized   relaxation techniques promoted  Medication Review/Management: medications reviewed     Problem: Nutrition Impairment (Mechanical Ventilation, Invasive)  Goal: Optimal Nutrition Delivery  Outcome: Ongoing, Not Progressing     Problem: Skin and Tissue Injury (Mechanical Ventilation, Invasive)  Goal: Absence of Device-Related Skin and Tissue Injury  Outcome: Ongoing, Not Progressing  Intervention: Maintain Skin and Tissue Health  Recent Flowsheet Documentation  Taken 3/11/2021 1600 by Betty Brown RN  Device Skin Pressure Protection: absorbent pad utilized/changed  Taken 3/11/2021 1230 by Betty Brown RN  Device Skin Pressure Protection: absorbent pad utilized/changed  Taken 3/11/2021 0800 by Betty Brown RN  Device Skin Pressure Protection: absorbent pad utilized/changed     Problem: Ventilator-Induced Lung Injury (Mechanical Ventilation, Invasive)  Goal: Absence of Ventilator-Induced Lung Injury  Outcome: Ongoing, Not Progressing  Intervention: Facilitate Lung-Protection Measures  Recent Flowsheet Documentation  Taken 3/11/2021 0800 by Betty Brown RN  Lung Protection Measures:   ventilator synchrony promoted   ventilator waveforms monitored  Intervention: Prevent Ventilator-Associated Pneumonia  Recent Flowsheet Documentation  Taken 3/11/2021 1600 by Betty Brown RN  Head of Bed (HOB): HOB at 45 degrees  Oral Care: swabbed with antiseptic solution  Taken 3/11/2021 1230 by Betty Brown RN  Head of Bed (HOB): HOB at 45 degrees  Oral Care: swabbed with antiseptic solution  Taken 3/11/2021 0800 by Betty Brown RN  Head of Bed ([facility]): HOB at 60 degrees  VAP Prevention Bundle:   HOB elevation maintained   oral care regularly provided   readiness to extubate assessed   sedation interruption performed   stress ulcer prophylaxis provided   vent circuit breaks minimized    spontaneous breathing trial performed   VTE prophylaxis provided  VAP Prevention Measures: completed  Oral Care:   swabbed with antiseptic solution   teeth brushed     Problem: Communication Impairment (Artificial Airway)  Goal: Effective Communication  Outcome: Ongoing, Not Progressing  Intervention: Ensure Effective Communication  Recent Flowsheet Documentation  Taken 3/11/2021 1600 by Betty Brown, RN  Communication Enhancement Strategies:   call light answered in person   extra time allowed for response  Taken 3/11/2021 1230 by Betty Brown RN  Communication Enhancement Strategies:   call light answered in person   extra time allowed for response  Taken 3/11/2021 0800 by Betty Brown RN  Communication Enhancement Strategies:   call light answered in person   extra time allowed for response     Problem: Device-Related Complication Risk (Artificial Airway)  Goal: Optimal Device Function  Outcome: Ongoing, Not Progressing  Intervention: Optimize Device Care and Function  Recent Flowsheet Documentation  Taken 3/11/2021 1600 by Betty Brown RN  Airway/Ventilation Management: airway patency maintained  Oral Care: swabbed with antiseptic solution  Taken 3/11/2021 1230 by Betty Brown, RN  Oral Care: swabbed with antiseptic solution  Taken 3/11/2021 0800 by Betty Brown RN  Airway/Ventilation Management: airway patency maintained  Aspiration Precautions:   NPO pending swallow screening/evaluation   oral hygiene care promoted  Oral Care:   swabbed with antiseptic solution   teeth brushed  Airway Safety Measures:   manual resuscitator at bedside   suction at bedside     Problem: Skin and Tissue Injury (Artificial Airway)  Goal: Absence of Device-Related Skin or Tissue Injury  Outcome: Ongoing, Not Progressing  Intervention: Maintain Skin and Tissue Health  Recent Flowsheet Documentation  Taken 3/11/2021 1600 by Betty Brown, RN  Device Skin Pressure Protection: absorbent pad  utilized/changed  Taken 3/11/2021 1230 by Betty Brown, RN  Device Skin Pressure Protection: absorbent pad utilized/changed  Taken 3/11/2021 0800 by Betty Brown, RN  Device Skin Pressure Protection: absorbent pad utilized/changed     Problem: Wound  Goal: Optimal Wound Healing  Outcome: Ongoing, Not Progressing  Intervention: Promote Effective Wound Healing  Recent Flowsheet Documentation  Taken 3/11/2021 1600 by Betty Brown, RN  Sleep/Rest Enhancement: awakenings minimized  Taken 3/11/2021 1230 by Betty Brown, RN  Pain Management Interventions: see MAR  Sleep/Rest Enhancement: awakenings minimized  Taken 3/11/2021 0800 by Betty Brown, RN  Pain Management Interventions: see MAR  Sleep/Rest Enhancement:   awakenings minimized   relaxation techniques promoted   Goal Outcome Evaluation:   VSS. Pt remains anxious. Pain and anxiety med's given PRN. Pt refused to be turned at times throughout the day, education provided. Continue to monitor per orders.

## 2021-03-12 LAB
ANION GAP SERPL CALCULATED.3IONS-SCNC: 13.6 MMOL/L (ref 5–15)
BUN SERPL-MCNC: 33 MG/DL (ref 6–20)
BUN/CREAT SERPL: 62.3 (ref 7–25)
CALCIUM SPEC-SCNC: 9.5 MG/DL (ref 8.6–10.5)
CHLORIDE SERPL-SCNC: 96 MMOL/L (ref 98–107)
CO2 SERPL-SCNC: 29.4 MMOL/L (ref 22–29)
CREAT SERPL-MCNC: 0.53 MG/DL (ref 0.76–1.27)
GFR SERPL CREATININE-BSD FRML MDRD: >150 ML/MIN/1.73
GLUCOSE SERPL-MCNC: 133 MG/DL (ref 65–99)
POTASSIUM SERPL-SCNC: 3.6 MMOL/L (ref 3.5–5.2)
SODIUM SERPL-SCNC: 139 MMOL/L (ref 136–145)

## 2021-03-12 PROCEDURE — 94799 UNLISTED PULMONARY SVC/PX: CPT

## 2021-03-12 PROCEDURE — 94003 VENT MGMT INPAT SUBQ DAY: CPT

## 2021-03-12 PROCEDURE — 80048 BASIC METABOLIC PNL TOTAL CA: CPT | Performed by: INTERNAL MEDICINE

## 2021-03-12 RX ORDER — ZOLPIDEM TARTRATE 5 MG/1
5 TABLET ORAL NIGHTLY PRN
Status: DISCONTINUED | OUTPATIENT
Start: 2021-03-12 | End: 2021-03-13

## 2021-03-12 RX ADMIN — CHLORHEXIDINE GLUCONATE 15 ML: 1.2 RINSE ORAL at 20:41

## 2021-03-12 RX ADMIN — OXYCODONE HYDROCHLORIDE 10 MG: 5 SOLUTION ORAL at 21:04

## 2021-03-12 RX ADMIN — OXYCODONE HYDROCHLORIDE 10 MG: 5 SOLUTION ORAL at 01:17

## 2021-03-12 RX ADMIN — Medication 4 ML: at 22:52

## 2021-03-12 RX ADMIN — DOCUSATE SODIUM 100 MG: 50 LIQUID ORAL at 20:41

## 2021-03-12 RX ADMIN — MIDODRINE HYDROCHLORIDE 7.5 MG: 5 TABLET ORAL at 17:12

## 2021-03-12 RX ADMIN — OXYCODONE HYDROCHLORIDE AND ACETAMINOPHEN 1000 MG: 500 TABLET ORAL at 08:28

## 2021-03-12 RX ADMIN — ACETYLCYSTEINE 3 ML: 200 SOLUTION ORAL; RESPIRATORY (INHALATION) at 22:53

## 2021-03-12 RX ADMIN — CYCLOBENZAPRINE 10 MG: 10 TABLET, FILM COATED ORAL at 08:28

## 2021-03-12 RX ADMIN — GUAIFENESIN 400 MG: 100 SOLUTION ORAL at 01:17

## 2021-03-12 RX ADMIN — ALPRAZOLAM 0.5 MG: 0.5 TABLET ORAL at 06:08

## 2021-03-12 RX ADMIN — OXYCODONE HYDROCHLORIDE 10 MG: 5 SOLUTION ORAL at 12:38

## 2021-03-12 RX ADMIN — ALPRAZOLAM 0.5 MG: 0.5 TABLET ORAL at 12:38

## 2021-03-12 RX ADMIN — SENNOSIDES 1 TABLET: 8.6 TABLET, FILM COATED ORAL at 08:28

## 2021-03-12 RX ADMIN — LANSOPRAZOLE 30 MG: KIT at 06:08

## 2021-03-12 RX ADMIN — SODIUM CHLORIDE, PRESERVATIVE FREE 10 ML: 5 INJECTION INTRAVENOUS at 20:41

## 2021-03-12 RX ADMIN — DOCUSATE SODIUM 100 MG: 50 LIQUID ORAL at 08:42

## 2021-03-12 RX ADMIN — HYDROXYZINE HYDROCHLORIDE 25 MG: 25 TABLET ORAL at 08:28

## 2021-03-12 RX ADMIN — SERTRALINE 150 MG: 100 TABLET, FILM COATED ORAL at 08:28

## 2021-03-12 RX ADMIN — MIDODRINE HYDROCHLORIDE 7.5 MG: 5 TABLET ORAL at 05:21

## 2021-03-12 RX ADMIN — GUAIFENESIN 400 MG: 100 SOLUTION ORAL at 12:38

## 2021-03-12 RX ADMIN — GUAIFENESIN 400 MG: 100 SOLUTION ORAL at 17:12

## 2021-03-12 RX ADMIN — ALBUTEROL SULFATE 6 PUFF: 90 AEROSOL, METERED RESPIRATORY (INHALATION) at 22:54

## 2021-03-12 RX ADMIN — GABAPENTIN 800 MG: 250 SOLUTION ORAL at 21:04

## 2021-03-12 RX ADMIN — Medication 1 TABLET: at 08:28

## 2021-03-12 RX ADMIN — SODIUM CHLORIDE, PRESERVATIVE FREE 10 ML: 5 INJECTION INTRAVENOUS at 08:42

## 2021-03-12 RX ADMIN — GABAPENTIN 800 MG: 250 SOLUTION ORAL at 15:21

## 2021-03-12 RX ADMIN — ALPRAZOLAM 0.5 MG: 0.5 TABLET ORAL at 20:40

## 2021-03-12 RX ADMIN — ALBUTEROL SULFATE 6 PUFF: 90 AEROSOL, METERED RESPIRATORY (INHALATION) at 10:25

## 2021-03-12 RX ADMIN — GABAPENTIN 800 MG: 250 SOLUTION ORAL at 05:21

## 2021-03-12 RX ADMIN — CHLORHEXIDINE GLUCONATE 15 ML: 1.2 RINSE ORAL at 08:42

## 2021-03-12 RX ADMIN — FUROSEMIDE 20 MG: 20 TABLET ORAL at 17:12

## 2021-03-12 RX ADMIN — POLYETHYLENE GLYCOL 3350 17 G: 17 POWDER, FOR SOLUTION ORAL at 08:42

## 2021-03-12 RX ADMIN — FUROSEMIDE 20 MG: 20 TABLET ORAL at 08:28

## 2021-03-12 RX ADMIN — SCOPALAMINE 1 PATCH: 1 PATCH, EXTENDED RELEASE TRANSDERMAL at 17:15

## 2021-03-12 RX ADMIN — GUAIFENESIN 400 MG: 100 SOLUTION ORAL at 05:24

## 2021-03-12 NOTE — PROGRESS NOTES
Continued Stay Note  [facility]     Patient Name: Maxim Carvajal  MRN: [id number]  Today's Date: 3/12/2021    Admit Date: 2/27/2021    Discharge Plan     Row Name 03/12/21 0801       Plan    Plan  [facility]    Plan Comments  Spoke to the patient at bedside to discuss discharge plan  Pt states he does not wish to go home at discharge  He agrees to referral to [place] He declines referral to another facility.Spoke to patient's mother and she agrees to go to [facility].  mother states she cannot come everyday and wonders if a brother can come to see him to keep him encouraged.  CCP will speak with nursing        Discharge Codes    No documentation.       Expected Discharge Date and Time     Expected Discharge Date Expected Discharge Time    Mar 5, 2021             Noelle Contreras RN

## 2021-03-12 NOTE — PROGRESS NOTES
Adult Nutrition  Assessment/PES    Patient Name:  Maxim Carvajal  YOB: 1985  MRN: [id number]  Admit Date:  2/27/2021    Assessment Date:  3/12/2021    Comments:  Follow up note. Pt remains on Nutren 1.5 at 55ml/hr and water flush [id number]. TF's at goal rate. Skin condition reviewed. Last BM 3/8. Discussed care in rounds. Will continue to follow.    Reason for Assessment     Row Name 03/12/21 1226          Reason for Assessment    Reason For Assessment  TF/PN;follow-up protocol         Nutrition/Diet History     Row Name 03/12/21 1226          Nutrition/Diet History    Typical Food/Fluid Intake  tolerating TF's           Labs/Tests/Procedures/Meds     Row Name 03/12/21 1226          Labs/Procedures/Meds    Lab Results Reviewed  reviewed     Lab Results Comments  glu, bun, alb, h/h wbc        Diagnostic Tests/Procedures    Diagnostic Test/Procedure Reviewed  reviewed        Medications    Pertinent Medications Reviewed  reviewed         Physical Findings     Row Name 03/12/21 1233          Physical Findings    Overall Physical Appearance  on ventilator support trach     Gastrointestinal  feeding tube     Tubes  PEG     Skin  poor skin integrity/turgor;pressure injury wounds coccyx, and right heel           Nutrition Prescription Ordered     Row Name 03/12/21 1233          Nutrition Prescription PO    Current PO Diet  NPO        Nutrition Prescription EN    Enteral Route  PEG     Product  Nutren 1.5 (Osmolite 1.5)     TF Delivery Method  Continuous     Continuous TF Goal Rate (mL/hr)  55 mL/hr     Water flush (mL)   30 mL     Water Flush Frequency  Every 4 hours         Evaluation of Received Nutrient/Fluid Intake     Row Name 03/12/21 1234          Calories Evaluation    Enteral Calories (kcal)  1980     % of Kcal Needs  100        Protein Evaluation    Enteral Protein (gm)  89.8     % of Protein Needs  100        Fluid Intake Evaluation    Enteral (Free Water) Fluid (mL)  1003     Free Water Flush Fluid  (mL)  180     Total Free Water Intake (mL)  1183 mL        EN Evaluation    TF Tolerance  -- last BM 3/8     HOB  Greater than or equal to 30 degress               Problem/Interventions:          Intervention Goal     Row Name 03/12/21 1235          Intervention Goal    General  Maintain nutrition;Nutrition support treatment;Improved nutrition related lab(s);Reduce/improve symptoms;Meet nutritional needs for age/condition;Disease management/therapy     TF/PN  Tolerate TF at goal     Weight  Maintain weight         Nutrition Intervention     Row Name 03/12/21 1235          Nutrition Intervention    RD/Tech Action  Care plan reviewd;Follow Tx progress         Nutrition Prescription     Row Name 03/12/21 1235          Nutrition Prescription EN    Enteral Prescription  Continue same protocol         Education/Evaluation     Row Name 03/12/21 1235          Monitor/Evaluation    Monitor  Per protocol;I&O;Pertinent labs;TF delivery/tolerance;Weight;Skin status           Electronically signed by:  Edwige Rodríguez RD  03/12/21 12:35 EST

## 2021-03-12 NOTE — CONSULTS
The patient reports some improvement in anxiety symptoms with initiation of scheduled alprazolam, but continues to complain of poor sleep.  I will add Ambien 5 mg nightly as needed insomnia.

## 2021-03-12 NOTE — PROGRESS NOTES
LOS: 13 days   Patient Care Team:  Sheron Perez MD as PCP - General (Family Medicine)    Subjective     Patient has been refusing respiratory treatments, suctioning chest therapy.  He continues to want lots of medications.  Respiratory therapy and nursing come to me he is constantly refusing chest physiotherapy coughing suctioning breathing treatments he is constantly asking for more medications to sleep for to treat pain he is constantly asking for the ventilator oxygen to be turned up even though his oxygen saturation and CO2 tidal volumes are great.  His father is at bedside father tells me do not give into him he will ask for narcotics and benzodiazepines to be totally knocked out all the time.  Ultimate the only way that is acceptable is if doing palliative care.          Review of Systems:         Objective     Vital Signs  Vital Sign Min/Max for last 24 hours  Temp  Min: 97.9 °F (36.6 °C)  Max: 98.3 °F (36.8 °C)   BP  Min: 110/72  Max: 156/108   Pulse  Min: 56  Max: 91   Resp  Min: 20  Max: 25   SpO2  Min: 89 %  Max: 100 %   No data recorded   No data recorded        Ventilator/Non-Invasive Ventilation Settings (From admission, onward) Comment     Start     Ordered    03/06/21 0927  Ventilator - AC/PC; (16); 100; 90%; 10; 20  Continuous     Question Answer Comment   Vent Mode AC/PC    Breath rate  16   FiO2 100    FiO2 titrate for Sp02% =/> 90%    PEEP 10    Inspiratory Pressure 20        03/06/21 0927    03/04/21 1501  NIPPV (CPAP or BIPAP)  At Bedtime & As Needed-RT     Comments: Patient had a tracheostomy tube, he needs to be connected to the tr[employer] machine or equivalent hospital ventilator, need to inflate the cuff while on the trilogy per standard protocols   Question Answer Comment   Indication: Acute Respiratory Failure    Type: AVAPS-AE    NIPPV Mask Interface: Other    Mask Type tracheostomy tube    Rate 12    VT (mL) 550    EPAP Min (cm H2O) 10    EPAP Max (cm H2O) 15    Max  Pressure (cm H2O) 30    Pressure Support Min (cm H2O) 4    Pressure Support Max (cm H2O) 15    Titrate for SPO2 90%        03/04/21 1501    03/03/21 1725  NIPPV (CPAP or BIPAP)  Until Discontinued,   Status:  Canceled     Comments: Patient had a tracheostomy tube, he needs to be connected to the trilogy machine or equivalent hospital ventilator, need to inflate the cuff while on the trilogy per standard protocols   Question Answer Comment   Indication: Acute Respiratory Failure    Type: AVAPS-AE    NIPPV Mask Interface: Other    Mask Type tracheostomy tube    Rate 12    VT (mL) 550    EPAP Min (cm H2O) 10    EPAP Max (cm H2O) 15    Max Pressure (cm H2O) 30    Pressure Support Min (cm H2O) 4    Pressure Support Max (cm H2O) 15    Titrate for SPO2 90%        03/03/21 1726                             Body mass index is 20.97 kg/m².  I/O last 3 completed shifts:  In: 2472 [Other:630; NG/GT:1842]  Out: 500 [Urine:500]  No intake/output data recorded.        Physical Exam:  General Appearance: Well-developed white male he is trached on a ventilator this control 16 tidal volume of 550 cc resting comfortably he is still on 30% FiO2 and 10 cm of PEEP his SPO2 is 96%   Eyes: Conjunctiva are clear and anicteric pupils are equal  ENT: Mucous membranes little better today they are moist he does not have any blood in his oropharynx.  Neck: Tracheostomy site looks okay  6 Shiley with the inner cannula, no jugular venous distention, trachea midline  Lungs: coarse rhonchi I did not really give him an option had very thick secretions I had to lavage him got a number of secretions out  Cardiac: He is regular rate rhythm no murmur  Abdomen: Soft no palpable hepatosplenomegaly or masses he has a left upper quadrant PEG type tube and the site looks okay  : Not examined  Musculoskeletal: He has very little muscle mass he has some slight movement with his arms he does not really have any  he has marked interosseous muscle  wasting  Skin: No jaundice no petechiae skin is warm and dry  Neuro: Quadriplegic with minimal proximal upper extremity movement  Extremities/P Vascular: Palpable radial and dorsalis pedis pulses bilaterally again marked muscle atrophy  MSE: No change anxiety and drug-seeking behavior    Labs:  Results from last 7 days   Lab Units 03/11/21  0607 03/09/21  0826 03/08/21  0919 03/07/21  1130 03/05/21  1654   GLUCOSE mg/dL 118* 148* 118* 116* 108*   SODIUM mmol/L 139 136 136 135* 137   POTASSIUM mmol/L 4.1 4.1 4.2 4.2 4.2   CO2 mmol/L 32.3* 32.6* 31.1* 29.7* 27.0   CHLORIDE mmol/L 95* 92* 94* 97* 99   ANION GAP mmol/L 11.7 11.4 10.9 8.3 11.0   CREATININE mg/dL 0.49* 0.60* 0.55* 0.58* 0.54*   BUN mg/dL 31* 30* 26* 26* 20   BUN / CREAT RATIO  63.3* 50.0* 47.3* 44.8* 37.0*   CALCIUM mg/dL 9.7 9.5 9.2 8.9 9.3   EGFR IF NONAFRICN AM mL/min/1.73 >150 >150 >150 >150 >150   ALK PHOS U/L  --   --   --   --  112   TOTAL PROTEIN g/dL  --   --   --   --  7.0   ALT (SGPT) U/L  --   --   --   --  27   AST (SGOT) U/L  --   --   --   --  17   BILIRUBIN mg/dL  --   --   --   --  1.0   ALBUMIN g/dL  --   --   --   --  3.30*   GLOBULIN gm/dL  --   --   --   --  3.7     Estimated Creatinine Clearance: 193.6 mL/min (A) (by C-G formula based on SCr of 0.49 mg/dL (L)).      Results from last 7 days   Lab Units 03/11/21  0607 03/09/21  0826 03/08/21  1017 03/08/21  0717 03/05/21  1654   WBC 10*3/mm3 12.13* 15.50*  --  7.67 6.68   RBC 10*6/mm3 3.51* 3.39*  --  2.46* 2.91*   HEMOGLOBIN g/dL 9.3* 9.6* 6.5* 6.5* 7.9*   HEMATOCRIT % 28.7* 28.1* 19.8* 19.8* 23.5*   MCV fL 81.8 82.9  --  80.5 80.8   MCH pg 26.5* 28.3  --  26.4* 27.1   MCHC g/dL 32.4 34.2  --  32.8 33.6   RDW % 16.5* 16.4*  --  16.7* 17.0*   RDW-SD fl 48.6 49.3  --  48.4 49.6   MPV fL 10.5 9.7  --  9.5 9.9   PLATELETS 10*3/mm3 299 247  --  228 277   NEUTROPHIL % %  --  83.5*  --   --  66.7   LYMPHOCYTE % %  --  10.3*  --   --  17.1*   MONOCYTES % %  --  5.2  --   --  8.1   EOSINOPHIL  % %  --  0.5  --   --  7.6*   BASOPHIL % %  --  0.1  --   --  0.1   IMM GRAN % %  --  0.4  --   --  0.4   NEUTROS ABS 10*3/mm3  --  12.94*  --   --  4.45   LYMPHS ABS 10*3/mm3  --  1.60  --   --  1.14   MONOS ABS 10*3/mm3  --  0.81  --   --  0.54   EOS ABS 10*3/mm3  --  0.07  --   --  0.51*   BASOS ABS 10*3/mm3  --  0.02  --   --  0.01   IMMATURE GRANS (ABS) 10*3/mm3  --  0.06*  --   --  0.03   NRBC /100 WBC  --  0.0  --   --  0.0                     Results from last 7 days   Lab Units 03/05/21  1654   PROCALCITONIN ng/mL 0.08         Microbiology Results (last 10 days)     Procedure Component Value - Date/Time    MRSA Screen, PCR (Inpatient) - Swab, Nares [[id number]]  (Abnormal) Collected: 03/07/21 1454    Lab Status: Final result Specimen: Swab from Nares Updated: 03/07/21 1622     MRSA PCR MRSA Detected    Respiratory Culture - Wash, Bronchus [[id number]]  (Abnormal)  (Susceptibility) Collected: 03/06/21 1016    Lab Status: Final result Specimen: Wash from Bronchus Updated: 03/09/21 0936     Respiratory Culture Scant growth (1+) Pseudomonas aeruginosa      Rare Normal Respiratory Lisa     Gram Stain No WBCs or organisms seen    Susceptibility      Pseudomonas aeruginosa      YOGI      Cefepime Intermediate      Ceftazidime Intermediate      Ciprofloxacin Susceptible      Gentamicin Susceptible      Levofloxacin Susceptible      Piperacillin + Tazobactam Susceptible<sup style='font-weight:normal'>1</sup></font>             <sup style='font-weight:normal'>1</sup></font> Appended report. These results have been appended to a previously preliminary verified report.          Linear View                   Blood Culture - Blood, Arm, Right [[id number]] Collected: 03/05/21 1654    Lab Status: Final result Specimen: Blood from Arm, Right Updated: 03/10/21 1746     Blood Culture No growth at 5 days    Blood Culture - Blood, Hand, Left [[id number]] Collected: 03/05/21 1658    Lab Status: Final result Specimen: Blood from  Hand, Left Updated: 03/10/21 1746     Blood Culture No growth at 5 days              acetylcysteine, 3 mL, Nebulization, BID - RT  ALPRAZolam, 0.5 mg, Per G Tube, Q6H While Awake - RT  ascorbic acid, 1,000 mg, Per G Tube, Daily  chlorhexidine, 15 mL, Mouth/Throat, Q12H  cyclobenzaprine, 10 mg, Per G Tube, Daily  docusate sodium, 100 mg, Per G Tube, BID  fentaNYL, 1 patch, Transdermal, Q72H  furosemide, 20 mg, Oral, BID  Gabapentin, 800 mg, Per G Tube, Q8H  guaifenesin, 400 mg, Per G Tube, Q6H  hydrOXYzine, 25 mg, Oral, Daily  lansoprazole, 30 mg, Per G Tube, QAM  midodrine, 7.5 mg, Per G Tube, BID AC  multivitamin with minerals, 1 tablet, Oral, Daily  polyethylene glycol, 17 g, Oral, Daily  Scopolamine, 1 patch, Transdermal, Q72H  senna, 1 tablet, Per G Tube, BID  sertraline, 150 mg, Per G Tube, Daily  sodium chloride, 10 mL, Intravenous, Q12H  sodium chloride, 4 mL, Nebulization, BID - RT           Diagnostics:  CT Abdomen Pelvis Without Contrast    Result Date: 2/10/2021  CT CHEST, ABDOMEN, AND PELVIS WITHOUT CONTRAST.  HISTORY: 35-year-old male with quadriplegia, pneumonia with tracheitis and mucous plugging. Sepsis. Persistent fever.  TECHNIQUE: Radiation dose reduction techniques were utilized, including automated exposure control and exposure modulation based on body size. 3 mm images were obtained through the chest, abdomen, and pelvis without the administration of IV contrast. Compared with CT of the abdomen and pelvis from 02/04/2021 and chest CTA from 02/04/2021.  FINDINGS: CHEST: There is significant motion/breathing artifact. The tracheostomy is in good position with the distal margin being 4 cm proximal to the titus. There is a significant volume of mucus/secretions within both mainstem bronchi and the lower lobe bronchi and right middle lobe bronchi appear essentially completely opacified. There are small-moderate-sized bilateral pleural effusions and there are dense consolidations adjacently at both  lower lobes. There is no pericardial effusion.  ABDOMEN/PELVIS: The spleen is enlarged measuring 16 cm in diameter, measured on the coronal reconstruction series. The noncontrasted liver appears unremarkable. The gallbladder is contracted. Percutaneous G-tube is noted in place. Noncontrasted pancreas, adrenals, and kidneys appear unremarkable. There are air-fluid levels predominantly within the colon and there is no colonic thickening. Appendix appears within normal limits. The urinary bladder is not abnormally distended.      1. Significant volume of mucus/secretions within the main stem bronchi and the bronchi at the lower lobes and right middle lobe are essentially completely opacified. Adjacent to small-moderate-sized bilateral pleural effusions are dense consolidations at both lower lobes which likely in part represent atelectasis, but pneumonia is possible as well. 2. Mild colonic ileus without evidence for colitis. 3. Splenomegaly.  This report was finalized on 2/10/2021 3:24 PM by Dr. Kenia Farrell M.D.      CT Chest Without Contrast Diagnostic    Result Date: 3/2/2021  CT CHEST WO CONTRAST DIAGNOSTIC-  CLINICAL HISTORY: Dyspnea. Pleural effusion. Quadriplegia. History of mucous plugging and atelectasis.  TECHNIQUE: Spiral CT images were obtained through the chest without IV contrast and were reconstructed in 3 mm thick slices.  Radiation dose reduction techniques were utilized, including automated exposure control and exposure modulation based on body size.  COMPARISON: CT chest dated 02/10/2021  FINDINGS: A tracheostomy tube remains in satisfactory position. Comparison with the previous study is difficult due to extensive breathing motion artifact that was present on the previous study. There are moderate-sized bilateral posteriorly layering pleural effusions, greater in size on the right than the left. The effusion on the right has increased in size somewhat in the interval. The effusion on the left appears  essentially unchanged. There is dense consolidation in the left lower lobe containing a few air bronchograms that is most likely primarily due to atelectasis. This appears slightly larger. Slightly less extensive patchy consolidation in the right lower lobe also appears slightly more prominent. There are patchy reticulonodular opacities throughout the right upper lobe that appear essentially new since the preceding CT scan. These have a tree-in-bud appearance and are likely due to mucus plugging within bronchioles. In addition, a 7 mm diameter discrete noncalcified nodule in the right upper lobe appears new. There also several additional smaller nodular opacities in the inferior aspect of the right upper lobe that are new. These are all favored to be due to peripheral mucous plugging, as well. Images through the upper abdomen demonstrate no obvious abnormality.      Suboptimal study due to artifact from patient breathing motion. There are moderate-sized bilateral posteriorly layering pleural effusions. The effusion on the left is unchanged since a CT dated 02/10/2021. The effusion on the right appears slightly larger. Areas of consolidation within both lower lobes also appear more prominent and are most likely due to atelectasis, although superimposed pneumonia cannot be excluded. Patchy reticulonodular opacities in the right upper lobe are new and are likely secondary to mucous plugging within bronchioles. There are also multiple macroscopic nodules in the inferior aspect of the right upper lobe that are new and are quite likely secondary to peripheral mucous plugging.  This report was finalized on 3/2/2021 8:11 PM by Dr. Rober Monson M.D.      CT Chest Without Contrast Diagnostic    Result Date: 2/10/2021  CT CHEST, ABDOMEN, AND PELVIS WITHOUT CONTRAST.  HISTORY: 35-year-old male with quadriplegia, pneumonia with tracheitis and mucous plugging. Sepsis. Persistent fever.  TECHNIQUE: Radiation dose reduction  techniques were utilized, including automated exposure control and exposure modulation based on body size. 3 mm images were obtained through the chest, abdomen, and pelvis without the administration of IV contrast. Compared with CT of the abdomen and pelvis from 02/04/2021 and chest CTA from 02/04/2021.  FINDINGS: CHEST: There is significant motion/breathing artifact. The tracheostomy is in good position with the distal margin being 4 cm proximal to the titus. There is a significant volume of mucus/secretions within both mainstem bronchi and the lower lobe bronchi and right middle lobe bronchi appear essentially completely opacified. There are small-moderate-sized bilateral pleural effusions and there are dense consolidations adjacently at both lower lobes. There is no pericardial effusion.  ABDOMEN/PELVIS: The spleen is enlarged measuring 16 cm in diameter, measured on the coronal reconstruction series. The noncontrasted liver appears unremarkable. The gallbladder is contracted. Percutaneous G-tube is noted in place. Noncontrasted pancreas, adrenals, and kidneys appear unremarkable. There are air-fluid levels predominantly within the colon and there is no colonic thickening. Appendix appears within normal limits. The urinary bladder is not abnormally distended.      1. Significant volume of mucus/secretions within the main stem bronchi and the bronchi at the lower lobes and right middle lobe are essentially completely opacified. Adjacent to small-moderate-sized bilateral pleural effusions are dense consolidations at both lower lobes which likely in part represent atelectasis, but pneumonia is possible as well. 2. Mild colonic ileus without evidence for colitis. 3. Splenomegaly.  This report was finalized on 2/10/2021 3:24 PM by Dr. Kenia Farrell M.D.      CT Abdomen Pelvis With Contrast    Result Date: 2/4/2021  CT ABDOMEN PELVIS W CONTRAST-  Radiation dose reduction techniques were utilized, including automated  exposure control and exposure modulation based on body size.  Clinical: Acute abdominal pain, nonlocalizing anemia. Aspiration pneumonia  Note: Examination degraded due to respiratory motion artifact  FINDINGS: There is enlargement of the left gluteus superficial to the iliac wing. Mixed attenuation measuring 11.2 cm AP, 5.5 cm transverse and 11.3 cm craniocaudal. Likely muscular hematoma. The upper border is somewhat rounded, I would recommend follow-up to exclude associated mass.  There are again bilateral free-flowing pleural effusions. Attenuation compatible with serous fluid. There is again bibasilar consolidation. There is cardiac enlargement.  There is splenomegaly with the spleen measuring approximately 16 cm cranial caudal. The liver is satisfactory in size and shape, there is a typical area of focal fatty infiltration within the left lobe. No suspicious liver lesion, there is a small cyst demonstrated within the right lobe. Intrahepatic biliary radicals are mildly pronounced. Due to the degree of respiratory motion artifact, difficult to evaluate the gallbladder and extrahepatic biliary tree, no gross gallstones seen. The pancreas is satisfactory in appearance.  The adrenal glands and kidneys have a typical appearance. There is a G-tube demonstrated within the gastric lumen. Its position is satisfactory. Diameter of the aorta is normal. Urinary bladder is typical in appearance.  The appendix is normal. Caliber of the colon and small bowel is within normal limits. No bowel wall thickening nor induration to suggest an inflammatory process. No free intraperitoneal air free fluid.  CONCLUSION: 1. Mixed attenuation collection within the left gluteus having the appearance of a sizable hematoma. See above measurements. Advise follow-up CT after resolved to exclude underlying mass. 2. Bilateral pleural effusions and bibasilar consolidation consistent with pneumonia. 3. Splenomegaly. 4. Prominent intrahepatic  biliary radicals, motion artifact degrades evaluation of the gallbladder and biliary tree, no gross gallstones identified. 5. G-tube position is satisfactory. 6. The appendix, colon and small bowel are unremarkable.   This report was finalized on 2/4/2021 12:42 PM by Dr. Govind Carr M.D.      XR Chest 1 View    Result Date: 3/1/2021  ONE VIEW PORTABLE CHEST AT 3:48 PM  HISTORY: Shortness of breath. Right-sided atelectasis and pleural effusion. Possible mucous plugging.  FINDINGS: There is some atelectasis and pleural effusion at both bases, right greater than left and the appearance at the right base shows improvement from 02/28/2021. There is slight worsening of increased density at the left base. The heart size remains normal. A tracheostomy tube is in place.  This report was finalized on 3/1/2021 4:42 PM by Dr. Benjamin De Leon M.D.      XR Chest 1 View    Result Date: 2/28/2021  CHEST SINGLE VIEW  HISTORY: Mucous plugging. History of hypertension.  COMPARISON: AP chest 2/27/2021, 2/12/2021, CT chest 2/10/2021  FINDINGS: Tracheostomy tube is present. Moderate right and small left pleural effusions are present. Right pleural fluid extends partially into the major fissure. There is right lower lobe consolidation or infiltrate. Left lung is comparatively clear there is no perihilar edema or pneumothorax.      Moderate right and small left pleural effusions.  Right pleural effusion extends partially into the major fissure. There is dense right basilar opacity/consolidation and potential infiltrate. No evidence for perihilar edema or pneumothorax. Tracheostomy is present.  This report was finalized on 2/28/2021 2:20 PM by Dr. Taran Killian M.D.      XR Chest 1 View    Result Date: 2/27/2021  Patient: MARK HARDIN  Time Out: 02:51 Exam(s): FILM CXR 1 VIEW EXAM:   XR Chest, 1 View CLINICAL HISTORY:    Reason for exam: SOA. TECHNIQUE:   Frontal view of the chest. COMPARISON: 2 12 21. FINDINGS:   Redemonstrated  tracheostomy.  Interval decrease in moderate right pleural effusion with associated compressive atelectasis.  Decrease in basilar volume loss.  No pneumothorax.  No cardiomegaly.  Cervical spinal fusion hardware. IMPRESSION:     Interval decrease in small-moderate right pleural effusion with associated compressive atelectasis. Tracheostomy.     Electronically signed by Dharmesh Lopez M.D. on 02-27-21 at 0251    XR Chest 1 View    Result Date: 2/12/2021  XR CHEST 1 VW-  HISTORY: Male who is 35 years-old,  chest pain  TECHNIQUE: Frontal view of the chest  COMPARISON: 02/12/2021 at 0605 hours  FINDINGS: Stable appearing tracheostomy tube. The heart is mildly enlarged. Heart, mediastinum and pulmonary vasculature are unremarkable. Mild right pleural effusion with increased fissural fluid suggested. Small atelectasis or infiltrate at the lung bases. No pneumothorax. No acute osseous process.      Mild right pleural effusion with increased partial fluid suggested. Small atelectasis or infiltrate at the lung bases. Continued follow-up recommended.  This report was finalized on 2/12/2021 3:15 PM by Dr. Eddie Reddy M.D.      XR Chest 1 View    Result Date: 2/12/2021  XR CHEST 1 VW-  Clinical: Pleural effusion, right thoracentesis to 11/20/2021  COMPARISON CT scan of the chest 2/10/2021 and chest radiograph to 8/20/2021  FINDINGS: Tracheostomy tube position is satisfactory. Small right-sided pleural effusion demonstrated. Cardiac size within normal limits. No pneumothorax. Blunting of left costophrenic angle suggests trace left pleural effusion. There is vague opacity at both lung bases, suggesting atelectasis/infiltrate. No pulmonary edema identified. No acute consolidation seen. The remainder is unremarkable.  This report was finalized on 2/12/2021 7:40 AM by Dr. Govind Carr M.D.      XR Chest 1 View    Result Date: 2/8/2021  XR CHEST 1 VW-  HISTORY: Male who is 35 years-old,  rhonchi, fever  TECHNIQUE: Frontal  views of the chest  COMPARISON: 02/05/2021  FINDINGS: Stable appearing tracheostomy tube. Heart, mediastinum and pulmonary vasculature are unremarkable. Mild bilateral basilar pleural effusions and likely atelectasis or infiltrate. No pneumothorax. No acute osseous process.      Mild bilateral basilar atelectasis/infiltrate/effusion, follow-up suggested.  This report was finalized on 2/8/2021 3:46 PM by Dr. Eddie Reddy M.D.      XR Chest 1 View    Result Date: 2/5/2021  ONE VIEW PORTABLE CHEST AT 1:12 PM  HISTORY: Left lower lobe atelectasis and pneumonia. Follow-up evaluation.  FINDINGS: There has been marked improvement in the left basilar atelectasis and pneumonia when compared to yesterday's exam. The findings are most consistent with resolution of mucus plugging. No new abnormality is seen. The heart size is normal. A tracheostomy tube remains in place.  This report was finalized on 2/5/2021 2:28 PM by Dr. Benjamin De Leon M.D.      Duplex Venous Upper Extremity - Bilateral CAR    Result Date: 2/10/2021  · Acute right upper extremity superficial thrombophlebitis noted in the median cubital. · All other vessels appear normal.      Duplex Venous Lower Extremity - Bilateral CAR    Result Date: 2/10/2021  · Normal bilateral lower extremity venous duplex scan.      US Thoracentesis    Result Date: 2/11/2021  ULTRASOUND-GUIDED RIGHT THORACENTESIS.  HISTORY: Pleural effusion.  After signed informed consent was obtained the patient was prepped and draped in the usual sterile fashion. Lidocaine was used for local anesthesia.  Ultrasound guidance was used to place the thoracentesis catheter into the right pleural fluid collection. 400 mL of straw-colored fluid was removed. Sample was sent to the lab.  Confirmatory images were obtained.  Patient tolerated the procedure well with no complications.      Ultrasound-guided right thoracentesis as described.   This report was finalized on 2/11/2021 10:39 AM by Dr. Shad KIMBROUGH  CARIN Smith.          Today's chest x-ray reviewed and minimal right basilar atelectasis essentially unchanged otherwise pretty clear with tracheostomy good position    Active Hospital Problems    Diagnosis  POA   • **Acute on chronic respiratory failure with hypoxemia (CMS/HCC) [J96.21]  Yes   • Hypotension [I95.9]  Yes   • Dyspnea [R06.00]  Yes   • Anemia [D64.9]  Yes   • Leukocytosis [D72.829]  Yes   • Quadriplegia (CMS/HCC) [G82.50]  Yes   • HCAP (healthcare-associated pneumonia) [J18.9]  Yes      Resolved Hospital Problems   No resolved problems to display.         Assessment/Plan     1. Acute hypoxic respiratory failure secondary to mucous plugging complicated by his quadriplegia and inability to clear secretions .  Continue ventilatory support.  Is going to be impossible to wean him if he has this much anxiety on the ventilator especially when he is ventilating this well.  2. Mucous plugging he is going to continue needing aggressive pulmonary therapy, he needs chest physiotherapy, CoughAssist and mucolytic therapy, bronchodilators, hypertonic saline to try and prevent these recurrent episodes.  Also I think that his constantly wanting to be totally knocked out with medications just contributes to hypoventilation as he does have some respiratory function.  Also he has some many oral secretions even with scopolamine and when he is awake he keeps his mouth clear to the secretions but when he is obtunded with medication the secretions just pool in the back of his throat and get down around his tracheostomy cuff.  Patient refusing respiratory therapy treatments is going to be a disaster he is absolutely full of secretions today after refusing most of the day.  I discussed with him again.  Also with his father.  3. Quadriplegia  4. Bilateral pleural effusions improved almost completely resolved  5. Anemia hemoglobin drifting down looks like it is anemia of chronic disease his hemoglobin is up after transfusion we  will continue to follow  6. Anxiety he has significant anxiety issues Dr. Tuttle saw him and increased his Ativan further.  7. Hypertension  8. Fluids/electrolytes/nutrition continue tube feeds fluid status looks a little better today  9. Narcotic use understand the patient is a quadriplegic but continued request for high doses of narcotics is not going to benefit him is going increase problems .          Plan for disposition:    Darío Mendez MD  03/12/21  07:55 EST    Time: I have spent over 32 minutes on patient's care today part of this time was discussing coordinating care the other part was critical care

## 2021-03-12 NOTE — PROGRESS NOTES
Performed Vent assessment and found lung sounds to be extremely coarse.  The Patient has CPT ordered but adamantly refused x 3 despite instruction of its importance.  Patient immediately asked for O2 boost upon entering room.  Per MD instruction and policy boosts are only to be given with suctioning or when relevant objective markers indicate low O2 saturation.  The patient allowed me to suction reluctantly with O2 boost.  Moderate thick white secretions were removed.  Additional clearance techniques seem indicated however the patient does not want further intervention other than increased O2 and makes suctioning difficult by blocking with his hands and trying to communicate while performing.  Further suctioning was refused.  Shad Lozada RRT

## 2021-03-12 NOTE — PLAN OF CARE
"Goal Outcome Evaluation:  Plan of Care Reviewed With: patient  Progress: no change     Patient continues to be very attention seeking. Requesting pain medication and \"more oxygen\" constantly. Educated patient that his 02 levels were adequate with sats 92-96%, still patient requesting for a \"boost\" of oxygen from the ventilator (he is requesting the vent be switched to 100% fi02) Patient refusing turning. Patient refusing bath. Patient refusing all respiratory treatments, as well as suctioning. Again educated patient on importance. Patient stating \"I will let you suction me, if I can have another Xanax.\" Educated patient on pulmonary treatments.   Prn pain medication given as often as possible.   Patient expressing frustration most of the night. Reassurance given.   "

## 2021-03-12 NOTE — PLAN OF CARE
Goal Outcome Evaluation:  Pt remains in CCU waiting for a edwina bed. Still on 30% fio2. Refuses any suctioning, breathing treatments and and attempt at turning. Attempted many times to turn patient and he becomes very agitated and demands to lay flat even after education on his already stage II on his coccyx. Repeated requests and constant call lights. Family at bedside and aware of patient status.

## 2021-03-12 NOTE — PROGRESS NOTES
Was called to patient's room stating he was agreeable to a breathing treatment despite refusals.  Patient appeared anxious and reluctantly agreed to medication.  I informed him of what I was going to do and patient seemed agreeable.  Mucomyst was drawn up and albuterol given.  After the albuterol was given the patient again became apprehensive and asked that I come back in an hour.  I explained the importance of performing mucous clearance and the negative affects of not doing.  Will assess in an hour.  Shad Lozada RRT    Returned an hour later and patient refused medication.  Shad Lozada RRT

## 2021-03-13 ENCOUNTER — APPOINTMENT (OUTPATIENT)
Dept: GENERAL RADIOLOGY | Facility: HOSPITAL | Age: 36
End: 2021-03-13

## 2021-03-13 LAB
DEPRECATED RDW RBC AUTO: 48 FL (ref 37–54)
ERYTHROCYTE [DISTWIDTH] IN BLOOD BY AUTOMATED COUNT: 16.2 % (ref 12.3–15.4)
HCT VFR BLD AUTO: 28.6 % (ref 37.5–51)
HGB BLD-MCNC: 9.6 G/DL (ref 13–17.7)
MCH RBC QN AUTO: 27.7 PG (ref 26.6–33)
MCHC RBC AUTO-ENTMCNC: 33.6 G/DL (ref 31.5–35.7)
MCV RBC AUTO: 82.7 FL (ref 79–97)
PLATELET # BLD AUTO: 279 10*3/MM3 (ref 140–450)
PMV BLD AUTO: 9.5 FL (ref 6–12)
RBC # BLD AUTO: 3.46 10*6/MM3 (ref 4.14–5.8)
WBC # BLD AUTO: 22.9 10*3/MM3 (ref 3.4–10.8)

## 2021-03-13 PROCEDURE — 94799 UNLISTED PULMONARY SVC/PX: CPT

## 2021-03-13 PROCEDURE — 71045 X-RAY EXAM CHEST 1 VIEW: CPT

## 2021-03-13 PROCEDURE — 94003 VENT MGMT INPAT SUBQ DAY: CPT

## 2021-03-13 PROCEDURE — 85027 COMPLETE CBC AUTOMATED: CPT | Performed by: INTERNAL MEDICINE

## 2021-03-13 RX ORDER — POLYETHYLENE GLYCOL 3350 17 G/17G
17 POWDER, FOR SOLUTION ORAL DAILY PRN
Status: DISCONTINUED | OUTPATIENT
Start: 2021-03-13 | End: 2021-03-16 | Stop reason: HOSPADM

## 2021-03-13 RX ORDER — ZOLPIDEM TARTRATE 5 MG/1
5 TABLET ORAL NIGHTLY
Status: DISCONTINUED | OUTPATIENT
Start: 2021-03-13 | End: 2021-03-16 | Stop reason: HOSPADM

## 2021-03-13 RX ADMIN — SODIUM CHLORIDE, PRESERVATIVE FREE 10 ML: 5 INJECTION INTRAVENOUS at 22:35

## 2021-03-13 RX ADMIN — ALBUTEROL SULFATE 6 PUFF: 90 AEROSOL, METERED RESPIRATORY (INHALATION) at 12:52

## 2021-03-13 RX ADMIN — OXYCODONE HYDROCHLORIDE 10 MG: 5 SOLUTION ORAL at 12:44

## 2021-03-13 RX ADMIN — GUAIFENESIN 400 MG: 100 SOLUTION ORAL at 12:37

## 2021-03-13 RX ADMIN — ALPRAZOLAM 0.5 MG: 0.5 TABLET ORAL at 08:00

## 2021-03-13 RX ADMIN — GUAIFENESIN 400 MG: 100 SOLUTION ORAL at 17:20

## 2021-03-13 RX ADMIN — GUAIFENESIN 400 MG: 100 SOLUTION ORAL at 06:22

## 2021-03-13 RX ADMIN — LANSOPRAZOLE 30 MG: KIT at 08:00

## 2021-03-13 RX ADMIN — Medication 1 TABLET: at 08:00

## 2021-03-13 RX ADMIN — ALPRAZOLAM 0.5 MG: 0.5 TABLET ORAL at 18:51

## 2021-03-13 RX ADMIN — SENNOSIDES 1 TABLET: 8.6 TABLET, FILM COATED ORAL at 00:40

## 2021-03-13 RX ADMIN — ALPRAZOLAM 0.5 MG: 0.5 TABLET ORAL at 12:56

## 2021-03-13 RX ADMIN — SODIUM CHLORIDE, PRESERVATIVE FREE 10 ML: 5 INJECTION INTRAVENOUS at 08:00

## 2021-03-13 RX ADMIN — GABAPENTIN 800 MG: 250 SOLUTION ORAL at 22:37

## 2021-03-13 RX ADMIN — GUAIFENESIN 400 MG: 100 SOLUTION ORAL at 00:40

## 2021-03-13 RX ADMIN — ALBUTEROL SULFATE 6 PUFF: 90 AEROSOL, METERED RESPIRATORY (INHALATION) at 07:57

## 2021-03-13 RX ADMIN — OXYCODONE HYDROCHLORIDE AND ACETAMINOPHEN 1000 MG: 500 TABLET ORAL at 08:00

## 2021-03-13 RX ADMIN — CHLORHEXIDINE GLUCONATE 15 ML: 1.2 RINSE ORAL at 08:00

## 2021-03-13 RX ADMIN — GABAPENTIN 800 MG: 250 SOLUTION ORAL at 14:34

## 2021-03-13 RX ADMIN — CYCLOBENZAPRINE 10 MG: 10 TABLET, FILM COATED ORAL at 08:00

## 2021-03-13 RX ADMIN — GABAPENTIN 800 MG: 250 SOLUTION ORAL at 06:22

## 2021-03-13 RX ADMIN — CHLORHEXIDINE GLUCONATE 15 ML: 1.2 RINSE ORAL at 22:38

## 2021-03-13 RX ADMIN — FUROSEMIDE 20 MG: 20 TABLET ORAL at 08:00

## 2021-03-13 RX ADMIN — SERTRALINE 150 MG: 100 TABLET, FILM COATED ORAL at 08:00

## 2021-03-13 RX ADMIN — FUROSEMIDE 20 MG: 20 TABLET ORAL at 17:20

## 2021-03-13 RX ADMIN — OXYCODONE HYDROCHLORIDE 10 MG: 5 SOLUTION ORAL at 17:20

## 2021-03-13 RX ADMIN — HYDROXYZINE HYDROCHLORIDE 25 MG: 25 TABLET ORAL at 08:00

## 2021-03-13 RX ADMIN — OXYCODONE HYDROCHLORIDE 10 MG: 5 SOLUTION ORAL at 22:36

## 2021-03-13 RX ADMIN — OXYCODONE HYDROCHLORIDE 10 MG: 5 SOLUTION ORAL at 06:21

## 2021-03-13 RX ADMIN — ZOLPIDEM TARTRATE 5 MG: 5 TABLET ORAL at 22:34

## 2021-03-13 RX ADMIN — MIDODRINE HYDROCHLORIDE 7.5 MG: 5 TABLET ORAL at 16:50

## 2021-03-13 RX ADMIN — MIDODRINE HYDROCHLORIDE 7.5 MG: 5 TABLET ORAL at 06:22

## 2021-03-13 RX ADMIN — DOCUSATE SODIUM 100 MG: 50 LIQUID ORAL at 22:37

## 2021-03-13 NOTE — PROGRESS NOTES
LOS: 14 days   Patient Care Team:  Sheron Perez MD as PCP - General (Family Medicine)    Subjective               Review of Systems:         Objective     Vital Signs  Vital Sign Min/Max for last 24 hours  Temp  Min: 97.8 °F (36.6 °C)  Max: 98.4 °F (36.9 °C)   BP  Min: 105/71  Max: 153/97   Pulse  Min: 64  Max: 98   Resp  Min: 22  Max: 26   SpO2  Min: 89 %  Max: 100 %   No data recorded   No data recorded        Ventilator/Non-Invasive Ventilation Settings (From admission, onward) Comment     Start     Ordered    03/06/21 0927  Ventilator - AC/PC; (16); 100; 90%; 10; 20  Continuous     Question Answer Comment   Vent Mode AC/PC    Breath rate  16   FiO2 100    FiO2 titrate for Sp02% =/> 90%    PEEP 10    Inspiratory Pressure 20        03/06/21 0927    03/04/21 1501  NIPPV (CPAP or BIPAP)  At Bedtime & As Needed-RT     Comments: Patient had a tracheostomy tube, he needs to be connected to the trilogy machine or equivalent hospital ventilator, need to inflate the cuff while on the trilogy per standard protocols   Question Answer Comment   Indication: Acute Respiratory Failure    Type: AVAPS-AE    NIPPV Mask Interface: Other    Mask Type tracheostomy tube    Rate 12    VT (mL) 550    EPAP Min (cm H2O) 10    EPAP Max (cm H2O) 15    Max Pressure (cm H2O) 30    Pressure Support Min (cm H2O) 4    Pressure Support Max (cm H2O) 15    Titrate for SPO2 90%        03/04/21 1501    03/03/21 1725  NIPPV (CPAP or BIPAP)  Until Discontinued,   Status:  Canceled     Comments: Patient had a tracheostomy tube, he needs to be connected to the trilogy machine or equivalent hospital ventilator, need to inflate the cuff while on the trilogy per standard protocols   Question Answer Comment   Indication: Acute Respiratory Failure    Type: AVAPS-AE    NIPPV Mask Interface: Other    Mask Type tracheostomy tube    Rate 12    VT (mL) 550    EPAP Min (cm H2O) 10    EPAP Max (cm H2O) 15    Max Pressure (cm H2O) 30    Pressure  Support Min (cm H2O) 4    Pressure Support Max (cm H2O) 15    Titrate for SPO2 90%        03/03/21 1726                             Body mass index is 20.97 kg/m².  I/O last 3 completed shifts:  In: 2415 [Other:881; NG/GT:1534]  Out: -   No intake/output data recorded.        Physical Exam:  General Appearance: Well-developed white male he is trached on a ventilator this control 16 tidal volume of 550 cc resting comfortably he is still on 30% FiO2 and 10 cm of PEEP his SPO2 is 96%   Eyes: Conjunctiva are clear and anicteric pupils are equal  ENT: Mucous membranes little better today they are moist he does not have any blood in his oropharynx.  Neck: Tracheostomy site looks okay  6 Shiley with the inner cannula, no jugular venous distention, trachea midline  Lungs: coarse rhonchi I did not really give him an option had very thick secretions I had to lavage him got a number of secretions out  Cardiac: He is regular rate rhythm no murmur  Abdomen: Soft no palpable hepatosplenomegaly or masses he has a left upper quadrant PEG type tube and the site looks okay  : Not examined  Musculoskeletal: He has very little muscle mass he has some slight movement with his arms he does not really have any  he has marked interosseous muscle wasting  Skin: No jaundice no petechiae skin is warm and dry  Neuro: Quadriplegic with minimal proximal upper extremity movement  Extremities/P Vascular: Palpable radial and dorsalis pedis pulses bilaterally again marked muscle atrophy  MSE: No change anxiety and drug-seeking behavior    Labs:  Results from last 7 days   Lab Units 03/12/21  1734 03/11/21  0607 03/09/21  0826 03/08/21  0919 03/07/21  1130   GLUCOSE mg/dL 133* 118* 148* 118* 116*   SODIUM mmol/L 139 139 136 136 135*   POTASSIUM mmol/L 3.6 4.1 4.1 4.2 4.2   CO2 mmol/L 29.4* 32.3* 32.6* 31.1* 29.7*   CHLORIDE mmol/L 96* 95* 92* 94* 97*   ANION GAP mmol/L 13.6 11.7 11.4 10.9 8.3   CREATININE mg/dL 0.53* 0.49* 0.60* 0.55* 0.58*    BUN mg/dL 33* 31* 30* 26* 26*   BUN / CREAT RATIO  62.3* 63.3* 50.0* 47.3* 44.8*   CALCIUM mg/dL 9.5 9.7 9.5 9.2 8.9   EGFR IF NONAFRICN AM mL/min/1.73 >150 >150 >150 >150 >150     Estimated Creatinine Clearance: 179 mL/min (A) (by C-G formula based on SCr of 0.53 mg/dL (L)).      Results from last 7 days   Lab Units 03/11/21  0607 03/09/21  0826 03/08/21  1017 03/08/21  0717   WBC 10*3/mm3 12.13* 15.50*  --  7.67   RBC 10*6/mm3 3.51* 3.39*  --  2.46*   HEMOGLOBIN g/dL 9.3* 9.6* 6.5* 6.5*   HEMATOCRIT % 28.7* 28.1* 19.8* 19.8*   MCV fL 81.8 82.9  --  80.5   MCH pg 26.5* 28.3  --  26.4*   MCHC g/dL 32.4 34.2  --  32.8   RDW % 16.5* 16.4*  --  16.7*   RDW-SD fl 48.6 49.3  --  48.4   MPV fL 10.5 9.7  --  9.5   PLATELETS 10*3/mm3 299 247  --  228   NEUTROPHIL % %  --  83.5*  --   --    LYMPHOCYTE % %  --  10.3*  --   --    MONOCYTES % %  --  5.2  --   --    EOSINOPHIL % %  --  0.5  --   --    BASOPHIL % %  --  0.1  --   --    IMM GRAN % %  --  0.4  --   --    NEUTROS ABS 10*3/mm3  --  12.94*  --   --    LYMPHS ABS 10*3/mm3  --  1.60  --   --    MONOS ABS 10*3/mm3  --  0.81  --   --    EOS ABS 10*3/mm3  --  0.07  --   --    BASOS ABS 10*3/mm3  --  0.02  --   --    IMMATURE GRANS (ABS) 10*3/mm3  --  0.06*  --   --    NRBC /100 WBC  --  0.0  --   --                              Microbiology Results (last 10 days)     Procedure Component Value - Date/Time    MRSA Screen, PCR (Inpatient) - Swab, Nares [[id number]]  (Abnormal) Collected: 03/07/21 1454    Lab Status: Final result Specimen: Swab from Nares Updated: 03/07/21 1622     MRSA PCR MRSA Detected    Respiratory Culture - Wash, Bronchus [[id number]]  (Abnormal)  (Susceptibility) Collected: 03/06/21 1016    Lab Status: Final result Specimen: Wash from Bronchus Updated: 03/09/21 0936     Respiratory Culture Scant growth (1+) Pseudomonas aeruginosa      Rare Normal Respiratory Lisa     Gram Stain No WBCs or organisms seen    Susceptibility      Pseudomonas aeruginosa       YOGI      Cefepime Intermediate      Ceftazidime Intermediate      Ciprofloxacin Susceptible      Gentamicin Susceptible      Levofloxacin Susceptible      Piperacillin + Tazobactam Susceptible<sup style='font-weight:normal'>1</sup></font>             <sup style='font-weight:normal'>1</sup></font> Appended report. These results have been appended to a previously preliminary verified report.          Linear View                   Blood Culture - Blood, Arm, Right [[id number]] Collected: 03/05/21 1654    Lab Status: Final result Specimen: Blood from Arm, Right Updated: 03/10/21 1746     Blood Culture No growth at 5 days    Blood Culture - Blood, Hand, Left [[id number]] Collected: 03/05/21 1654    Lab Status: Final result Specimen: Blood from Hand, Left Updated: 03/10/21 1746     Blood Culture No growth at 5 days              acetylcysteine, 3 mL, Nebulization, BID - RT  ALPRAZolam, 0.5 mg, Per G Tube, Q6H While Awake - RT  ascorbic acid, 1,000 mg, Per G Tube, Daily  chlorhexidine, 15 mL, Mouth/Throat, Q12H  cyclobenzaprine, 10 mg, Per G Tube, Daily  docusate sodium, 100 mg, Per G Tube, BID  fentaNYL, 1 patch, Transdermal, Q72H  furosemide, 20 mg, Oral, BID  Gabapentin, 800 mg, Per G Tube, Q8H  guaifenesin, 400 mg, Per G Tube, Q6H  hydrOXYzine, 25 mg, Oral, Daily  lansoprazole, 30 mg, Per G Tube, QAM  midodrine, 7.5 mg, Per G Tube, BID AC  multivitamin with minerals, 1 tablet, Oral, Daily  polyethylene glycol, 17 g, Oral, Daily  Scopolamine, 1 patch, Transdermal, Q72H  senna, 1 tablet, Per G Tube, BID  sertraline, 150 mg, Per G Tube, Daily  sodium chloride, 10 mL, Intravenous, Q12H  sodium chloride, 4 mL, Nebulization, BID - RT           Diagnostics:  CT Abdomen Pelvis Without Contrast    Result Date: 2/10/2021  CT CHEST, ABDOMEN, AND PELVIS WITHOUT CONTRAST.  HISTORY: 35-year-old male with quadriplegia, pneumonia with tracheitis and mucous plugging. Sepsis. Persistent fever.  TECHNIQUE: Radiation dose reduction  techniques were utilized, including automated exposure control and exposure modulation based on body size. 3 mm images were obtained through the chest, abdomen, and pelvis without the administration of IV contrast. Compared with CT of the abdomen and pelvis from 02/04/2021 and chest CTA from 02/04/2021.  FINDINGS: CHEST: There is significant motion/breathing artifact. The tracheostomy is in good position with the distal margin being 4 cm proximal to the titus. There is a significant volume of mucus/secretions within both mainstem bronchi and the lower lobe bronchi and right middle lobe bronchi appear essentially completely opacified. There are small-moderate-sized bilateral pleural effusions and there are dense consolidations adjacently at both lower lobes. There is no pericardial effusion.  ABDOMEN/PELVIS: The spleen is enlarged measuring 16 cm in diameter, measured on the coronal reconstruction series. The noncontrasted liver appears unremarkable. The gallbladder is contracted. Percutaneous G-tube is noted in place. Noncontrasted pancreas, adrenals, and kidneys appear unremarkable. There are air-fluid levels predominantly within the colon and there is no colonic thickening. Appendix appears within normal limits. The urinary bladder is not abnormally distended.      1. Significant volume of mucus/secretions within the main stem bronchi and the bronchi at the lower lobes and right middle lobe are essentially completely opacified. Adjacent to small-moderate-sized bilateral pleural effusions are dense consolidations at both lower lobes which likely in part represent atelectasis, but pneumonia is possible as well. 2. Mild colonic ileus without evidence for colitis. 3. Splenomegaly.  This report was finalized on 2/10/2021 3:24 PM by Dr. Kenia Farrell M.D.      CT Chest Without Contrast Diagnostic    Result Date: 3/2/2021  CT CHEST WO CONTRAST DIAGNOSTIC-  CLINICAL HISTORY: Dyspnea. Pleural effusion. Quadriplegia. History  of mucous plugging and atelectasis.  TECHNIQUE: Spiral CT images were obtained through the chest without IV contrast and were reconstructed in 3 mm thick slices.  Radiation dose reduction techniques were utilized, including automated exposure control and exposure modulation based on body size.  COMPARISON: CT chest dated 02/10/2021  FINDINGS: A tracheostomy tube remains in satisfactory position. Comparison with the previous study is difficult due to extensive breathing motion artifact that was present on the previous study. There are moderate-sized bilateral posteriorly layering pleural effusions, greater in size on the right than the left. The effusion on the right has increased in size somewhat in the interval. The effusion on the left appears essentially unchanged. There is dense consolidation in the left lower lobe containing a few air bronchograms that is most likely primarily due to atelectasis. This appears slightly larger. Slightly less extensive patchy consolidation in the right lower lobe also appears slightly more prominent. There are patchy reticulonodular opacities throughout the right upper lobe that appear essentially new since the preceding CT scan. These have a tree-in-bud appearance and are likely due to mucus plugging within bronchioles. In addition, a 7 mm diameter discrete noncalcified nodule in the right upper lobe appears new. There also several additional smaller nodular opacities in the inferior aspect of the right upper lobe that are new. These are all favored to be due to peripheral mucous plugging, as well. Images through the upper abdomen demonstrate no obvious abnormality.      Suboptimal study due to artifact from patient breathing motion. There are moderate-sized bilateral posteriorly layering pleural effusions. The effusion on the left is unchanged since a CT dated 02/10/2021. The effusion on the right appears slightly larger. Areas of consolidation within both lower lobes also  appear more prominent and are most likely due to atelectasis, although superimposed pneumonia cannot be excluded. Patchy reticulonodular opacities in the right upper lobe are new and are likely secondary to mucous plugging within bronchioles. There are also multiple macroscopic nodules in the inferior aspect of the right upper lobe that are new and are quite likely secondary to peripheral mucous plugging.  This report was finalized on 3/2/2021 8:11 PM by Dr. Rober Monson M.D.      CT Chest Without Contrast Diagnostic    Result Date: 2/10/2021  CT CHEST, ABDOMEN, AND PELVIS WITHOUT CONTRAST.  HISTORY: 35-year-old male with quadriplegia, pneumonia with tracheitis and mucous plugging. Sepsis. Persistent fever.  TECHNIQUE: Radiation dose reduction techniques were utilized, including automated exposure control and exposure modulation based on body size. 3 mm images were obtained through the chest, abdomen, and pelvis without the administration of IV contrast. Compared with CT of the abdomen and pelvis from 02/04/2021 and chest CTA from 02/04/2021.  FINDINGS: CHEST: There is significant motion/breathing artifact. The tracheostomy is in good position with the distal margin being 4 cm proximal to the titus. There is a significant volume of mucus/secretions within both mainstem bronchi and the lower lobe bronchi and right middle lobe bronchi appear essentially completely opacified. There are small-moderate-sized bilateral pleural effusions and there are dense consolidations adjacently at both lower lobes. There is no pericardial effusion.  ABDOMEN/PELVIS: The spleen is enlarged measuring 16 cm in diameter, measured on the coronal reconstruction series. The noncontrasted liver appears unremarkable. The gallbladder is contracted. Percutaneous G-tube is noted in place. Noncontrasted pancreas, adrenals, and kidneys appear unremarkable. There are air-fluid levels predominantly within the colon and there is no colonic  thickening. Appendix appears within normal limits. The urinary bladder is not abnormally distended.      1. Significant volume of mucus/secretions within the main stem bronchi and the bronchi at the lower lobes and right middle lobe are essentially completely opacified. Adjacent to small-moderate-sized bilateral pleural effusions are dense consolidations at both lower lobes which likely in part represent atelectasis, but pneumonia is possible as well. 2. Mild colonic ileus without evidence for colitis. 3. Splenomegaly.  This report was finalized on 2/10/2021 3:24 PM by Dr. Kenia Farrell M.D.      CT Abdomen Pelvis With Contrast    Result Date: 2/4/2021  CT ABDOMEN PELVIS W CONTRAST-  Radiation dose reduction techniques were utilized, including automated exposure control and exposure modulation based on body size.  Clinical: Acute abdominal pain, nonlocalizing anemia. Aspiration pneumonia  Note: Examination degraded due to respiratory motion artifact  FINDINGS: There is enlargement of the left gluteus superficial to the iliac wing. Mixed attenuation measuring 11.2 cm AP, 5.5 cm transverse and 11.3 cm craniocaudal. Likely muscular hematoma. The upper border is somewhat rounded, I would recommend follow-up to exclude associated mass.  There are again bilateral free-flowing pleural effusions. Attenuation compatible with serous fluid. There is again bibasilar consolidation. There is cardiac enlargement.  There is splenomegaly with the spleen measuring approximately 16 cm cranial caudal. The liver is satisfactory in size and shape, there is a typical area of focal fatty infiltration within the left lobe. No suspicious liver lesion, there is a small cyst demonstrated within the right lobe. Intrahepatic biliary radicals are mildly pronounced. Due to the degree of respiratory motion artifact, difficult to evaluate the gallbladder and extrahepatic biliary tree, no gross gallstones seen. The pancreas is satisfactory in  appearance.  The adrenal glands and kidneys have a typical appearance. There is a G-tube demonstrated within the gastric lumen. Its position is satisfactory. Diameter of the aorta is normal. Urinary bladder is typical in appearance.  The appendix is normal. Caliber of the colon and small bowel is within normal limits. No bowel wall thickening nor induration to suggest an inflammatory process. No free intraperitoneal air free fluid.  CONCLUSION: 1. Mixed attenuation collection within the left gluteus having the appearance of a sizable hematoma. See above measurements. Advise follow-up CT after resolved to exclude underlying mass. 2. Bilateral pleural effusions and bibasilar consolidation consistent with pneumonia. 3. Splenomegaly. 4. Prominent intrahepatic biliary radicals, motion artifact degrades evaluation of the gallbladder and biliary tree, no gross gallstones identified. 5. G-tube position is satisfactory. 6. The appendix, colon and small bowel are unremarkable.   This report was finalized on 2/4/2021 12:42 PM by Dr. Govind Carr M.D.      XR Chest 1 View    Result Date: 3/1/2021  ONE VIEW PORTABLE CHEST AT 3:48 PM  HISTORY: Shortness of breath. Right-sided atelectasis and pleural effusion. Possible mucous plugging.  FINDINGS: There is some atelectasis and pleural effusion at both bases, right greater than left and the appearance at the right base shows improvement from 02/28/2021. There is slight worsening of increased density at the left base. The heart size remains normal. A tracheostomy tube is in place.  This report was finalized on 3/1/2021 4:42 PM by Dr. Benjamin De Leon M.D.      XR Chest 1 View    Result Date: 2/28/2021  CHEST SINGLE VIEW  HISTORY: Mucous plugging. History of hypertension.  COMPARISON: AP chest 2/27/2021, 2/12/2021, CT chest 2/10/2021  FINDINGS: Tracheostomy tube is present. Moderate right and small left pleural effusions are present. Right pleural fluid extends partially into the  major fissure. There is right lower lobe consolidation or infiltrate. Left lung is comparatively clear there is no perihilar edema or pneumothorax.      Moderate right and small left pleural effusions.  Right pleural effusion extends partially into the major fissure. There is dense right basilar opacity/consolidation and potential infiltrate. No evidence for perihilar edema or pneumothorax. Tracheostomy is present.  This report was finalized on 2/28/2021 2:20 PM by Dr. Taran Killian M.D.      XR Chest 1 View    Result Date: 2/27/2021  Patient: MARK HARDIN  Time Out: 02:51 Exam(s): FILM CXR 1 VIEW EXAM:   XR Chest, 1 View CLINICAL HISTORY:    Reason for exam: SOA. TECHNIQUE:   Frontal view of the chest. COMPARISON: 2 12 21. FINDINGS:   Redemonstrated tracheostomy.  Interval decrease in moderate right pleural effusion with associated compressive atelectasis.  Decrease in basilar volume loss.  No pneumothorax.  No cardiomegaly.  Cervical spinal fusion hardware. IMPRESSION:     Interval decrease in small-moderate right pleural effusion with associated compressive atelectasis. Tracheostomy.     Electronically signed by Dharmesh Lopez M.D. on 02-27-21 at 0251    XR Chest 1 View    Result Date: 2/12/2021  XR CHEST 1 VW-  HISTORY: Male who is 35 years-old,  chest pain  TECHNIQUE: Frontal view of the chest  COMPARISON: 02/12/2021 at 0605 hours  FINDINGS: Stable appearing tracheostomy tube. The heart is mildly enlarged. Heart, mediastinum and pulmonary vasculature are unremarkable. Mild right pleural effusion with increased fissural fluid suggested. Small atelectasis or infiltrate at the lung bases. No pneumothorax. No acute osseous process.      Mild right pleural effusion with increased partial fluid suggested. Small atelectasis or infiltrate at the lung bases. Continued follow-up recommended.  This report was finalized on 2/12/2021 3:15 PM by Dr. Eddie Reddy M.D.      XR Chest 1 View    Result Date:  2/12/2021  XR CHEST 1 VW-  Clinical: Pleural effusion, right thoracentesis to 11/20/2021  COMPARISON CT scan of the chest 2/10/2021 and chest radiograph to 8/20/2021  FINDINGS: Tracheostomy tube position is satisfactory. Small right-sided pleural effusion demonstrated. Cardiac size within normal limits. No pneumothorax. Blunting of left costophrenic angle suggests trace left pleural effusion. There is vague opacity at both lung bases, suggesting atelectasis/infiltrate. No pulmonary edema identified. No acute consolidation seen. The remainder is unremarkable.  This report was finalized on 2/12/2021 7:40 AM by Dr. Govind Carr M.D.      XR Chest 1 View    Result Date: 2/8/2021  XR CHEST 1 VW-  HISTORY: Male who is 35 years-old,  rhonchi, fever  TECHNIQUE: Frontal views of the chest  COMPARISON: 02/05/2021  FINDINGS: Stable appearing tracheostomy tube. Heart, mediastinum and pulmonary vasculature are unremarkable. Mild bilateral basilar pleural effusions and likely atelectasis or infiltrate. No pneumothorax. No acute osseous process.      Mild bilateral basilar atelectasis/infiltrate/effusion, follow-up suggested.  This report was finalized on 2/8/2021 3:46 PM by Dr. Eddie Reddy M.D.      XR Chest 1 View    Result Date: 2/5/2021  ONE VIEW PORTABLE CHEST AT 1:12 PM  HISTORY: Left lower lobe atelectasis and pneumonia. Follow-up evaluation.  FINDINGS: There has been marked improvement in the left basilar atelectasis and pneumonia when compared to yesterday's exam. The findings are most consistent with resolution of mucus plugging. No new abnormality is seen. The heart size is normal. A tracheostomy tube remains in place.  This report was finalized on 2/5/2021 2:28 PM by Dr. Benjamin De Leon M.D.      Duplex Venous Upper Extremity - Bilateral CAR    Result Date: 2/10/2021  · Acute right upper extremity superficial thrombophlebitis noted in the median cubital. · All other vessels appear normal.      Duplex Venous  Lower Extremity - Bilateral CAR    Result Date: 2/10/2021  · Normal bilateral lower extremity venous duplex scan.      US Thoracentesis    Result Date: 2/11/2021  ULTRASOUND-GUIDED RIGHT THORACENTESIS.  HISTORY: Pleural effusion.  After signed informed consent was obtained the patient was prepped and draped in the usual sterile fashion. Lidocaine was used for local anesthesia.  Ultrasound guidance was used to place the thoracentesis catheter into the right pleural fluid collection. 400 mL of straw-colored fluid was removed. Sample was sent to the lab.  Confirmatory images were obtained.  Patient tolerated the procedure well with no complications.      Ultrasound-guided right thoracentesis as described.   This report was finalized on 2/11/2021 10:39 AM by Dr. Shad Smith M.D.          Today's chest x-ray reviewed and best I have seen with no significant atelectasis or infiltrates tracheostomy tube in good position    Active Hospital Problems    Diagnosis  POA   • **Acute on chronic respiratory failure with hypoxemia (CMS/HCC) [J96.21]  Yes   • Hypotension [I95.9]  Yes   • Dyspnea [R06.00]  Yes   • Anemia [D64.9]  Yes   • Leukocytosis [D72.829]  Yes   • Quadriplegia (CMS/HCC) [G82.50]  Yes   • HCAP (healthcare-associated pneumonia) [J18.9]  Yes      Resolved Hospital Problems   No resolved problems to display.         Assessment/Plan     1. Acute hypoxic respiratory failure secondary to mucous plugging complicated by his quadriplegia and inability to clear secretions .  Continue ventilatory support.  Is going to be impossible to wean him if he has this much anxiety on the ventilator especially when he is ventilating this well.  2. Mucous plugging he is going to continue needing aggressive pulmonary therapy, he needs chest physiotherapy, CoughAssist and mucolytic therapy, bronchodilators, hypertonic saline to try and prevent these recurrent episodes.  Also I think that his constantly wanting to be totally knocked  out with medications just contributes to hypoventilation as he does have some respiratory function.  Also he has some many oral secretions even with scopolamine and when he is awake he keeps his mouth clear to the secretions but when he is obtunded with medication the secretions just pool in the back of his throat and get down around his tracheostomy cuff.  Patient refusing respiratory therapy treatments is going to be a disaster he is absolutely full of secretions today after refusing most of the day.  I discussed with him again.  Also with his father.  3. Quadriplegia  4. Bilateral pleural effusions improved almost completely resolved  5. Anemia hemoglobin drifting down looks like it is anemia of chronic disease his hemoglobin is up after transfusion we will continue to follow  6. Anxiety he has significant anxiety issues Dr. Tuttle saw him and increased his Ativan further.  7. Hypertension  8. Fluids/electrolytes/nutrition continue tube feeds fluid status looks a little better today  9. Narcotic use understand the patient is a quadriplegic but continued request for high doses of narcotics is not going to benefit him is going increase problems .  10. Leukocytosis is concerning to me I am not sure why his chest x-ray is the best we have seen he is afebrile, he only had 2 stools yesterday and 1 thus far today.  I am going to repeat her white count and pro calcitonin in the morning we will watch him closely          Plan for disposition:    Darío Mendez MD  03/13/21  07:25 EST    Time: I have spent over 32 minutes on patient's care today part of this time was discussing coordinating care the other part was critical care

## 2021-03-13 NOTE — PLAN OF CARE
"Goal Outcome Evaluation:     Progress: no change   Patient remains in CCU. Anxious all night. Constant use of call light and waving to staff. Patient constantly asking for a \"boost\" of oxygen. Patient denied 100% O2 unless accompanied with suction. Did accept RT treatments this evening. Patient moves hand excessively when laying flat to clean. Shakes with anxiety. Not compliant. Refusing turns. Unable to get comfortable in bed. Charge RN (Cecy) helped with bathing and explained to patient his new baseline and reviewed options that have been given to him.  Patient continues to say he is SOB and wants RN to phone physician constantly. No parameters noted or change in exam to warrant phone call to MD. O2 >90% except during anxiety attacks while staff is in room.  Patient has almost continuous loose stool. Still has orders for scheduled senna and docusate.  VSS. CTM.  "

## 2021-03-13 NOTE — CONSULTS
The patient continues to complain of severe anxiety and sleeplessness.  It is not clear whether he received his dose of Ambien last evening, so I will plan to make this a scheduled rather than as needed medication.  Upward titration of Xanax may need to be considered.

## 2021-03-13 NOTE — PLAN OF CARE
Goal Outcome Evaluation:   Pt remains in CCU on vent @ 25%. Pt continues to have frequent repeated requests. Pt refusing any turns offered. Patient requests father at bedside repeatedly, and after his father arrived it became apparent he was pushing the 100% o2 button on the vent per patients requests. The vent was then locked out along with the patient and family member given education on the use of that feature, as well as staff is only allowed to adjust settings. Patient has been very anxious and repeatatively requesting more pain medications. Dr. Mendez saw patient this afternoon and was told he can have no additional pain medications. Pt complains of back pain and neck pain, but refuses any positional help/turns. Pt is now saying he wants to be hospice, which is what he has done in the past for pain medications. Family at bedside is aware of this. Will alert palliative care nurses when available.

## 2021-03-14 ENCOUNTER — APPOINTMENT (OUTPATIENT)
Dept: GENERAL RADIOLOGY | Facility: HOSPITAL | Age: 36
End: 2021-03-14

## 2021-03-14 LAB
BACTERIA UR QL AUTO: ABNORMAL /HPF
BILIRUB UR QL STRIP: NEGATIVE
CLARITY UR: ABNORMAL
COLOR UR: YELLOW
DEPRECATED RDW RBC AUTO: 49.1 FL (ref 37–54)
ERYTHROCYTE [DISTWIDTH] IN BLOOD BY AUTOMATED COUNT: 16.4 % (ref 12.3–15.4)
GLUCOSE BLDC GLUCOMTR-MCNC: 159 MG/DL (ref 70–130)
GLUCOSE UR STRIP-MCNC: NEGATIVE MG/DL
HCT VFR BLD AUTO: 25.6 % (ref 37.5–51)
HGB BLD-MCNC: 8.3 G/DL (ref 13–17.7)
HGB UR QL STRIP.AUTO: NEGATIVE
HYALINE CASTS UR QL AUTO: ABNORMAL /LPF
KETONES UR QL STRIP: NEGATIVE
LEUKOCYTE ESTERASE UR QL STRIP.AUTO: ABNORMAL
MCH RBC QN AUTO: 26.8 PG (ref 26.6–33)
MCHC RBC AUTO-ENTMCNC: 32.4 G/DL (ref 31.5–35.7)
MCV RBC AUTO: 82.6 FL (ref 79–97)
NITRITE UR QL STRIP: NEGATIVE
PH UR STRIP.AUTO: 7.5 [PH] (ref 5–8)
PLATELET # BLD AUTO: 285 10*3/MM3 (ref 140–450)
PMV BLD AUTO: 10 FL (ref 6–12)
PROCALCITONIN SERPL-MCNC: 0.38 NG/ML (ref 0–0.25)
PROT UR QL STRIP: ABNORMAL
RBC # BLD AUTO: 3.1 10*6/MM3 (ref 4.14–5.8)
RBC # UR: ABNORMAL /HPF
REF LAB TEST METHOD: ABNORMAL
SP GR UR STRIP: 1.02 (ref 1–1.03)
SQUAMOUS #/AREA URNS HPF: ABNORMAL /HPF
UROBILINOGEN UR QL STRIP: ABNORMAL
WBC # BLD AUTO: 17.08 10*3/MM3 (ref 3.4–10.8)
WBC UR QL AUTO: ABNORMAL /HPF

## 2021-03-14 PROCEDURE — 71045 X-RAY EXAM CHEST 1 VIEW: CPT

## 2021-03-14 PROCEDURE — 94003 VENT MGMT INPAT SUBQ DAY: CPT

## 2021-03-14 PROCEDURE — 94799 UNLISTED PULMONARY SVC/PX: CPT

## 2021-03-14 PROCEDURE — 84145 PROCALCITONIN (PCT): CPT | Performed by: INTERNAL MEDICINE

## 2021-03-14 PROCEDURE — 81001 URINALYSIS AUTO W/SCOPE: CPT | Performed by: INTERNAL MEDICINE

## 2021-03-14 PROCEDURE — 85027 COMPLETE CBC AUTOMATED: CPT | Performed by: INTERNAL MEDICINE

## 2021-03-14 PROCEDURE — 82962 GLUCOSE BLOOD TEST: CPT

## 2021-03-14 RX ADMIN — DOCUSATE SODIUM 100 MG: 50 LIQUID ORAL at 21:32

## 2021-03-14 RX ADMIN — ALBUTEROL SULFATE 6 PUFF: 90 AEROSOL, METERED RESPIRATORY (INHALATION) at 07:36

## 2021-03-14 RX ADMIN — OXYCODONE HYDROCHLORIDE AND ACETAMINOPHEN 1000 MG: 500 TABLET ORAL at 08:35

## 2021-03-14 RX ADMIN — MIDODRINE HYDROCHLORIDE 7.5 MG: 5 TABLET ORAL at 05:59

## 2021-03-14 RX ADMIN — FUROSEMIDE 20 MG: 20 TABLET ORAL at 08:35

## 2021-03-14 RX ADMIN — ALPRAZOLAM 0.5 MG: 0.5 TABLET ORAL at 06:00

## 2021-03-14 RX ADMIN — ALBUTEROL SULFATE 6 PUFF: 90 AEROSOL, METERED RESPIRATORY (INHALATION) at 12:20

## 2021-03-14 RX ADMIN — CHLORHEXIDINE GLUCONATE 15 ML: 1.2 RINSE ORAL at 21:47

## 2021-03-14 RX ADMIN — LANSOPRAZOLE 30 MG: KIT at 08:36

## 2021-03-14 RX ADMIN — GUAIFENESIN 400 MG: 100 SOLUTION ORAL at 05:21

## 2021-03-14 RX ADMIN — GUAIFENESIN 400 MG: 100 SOLUTION ORAL at 13:21

## 2021-03-14 RX ADMIN — MIDODRINE HYDROCHLORIDE 7.5 MG: 5 TABLET ORAL at 17:37

## 2021-03-14 RX ADMIN — HYDROXYZINE HYDROCHLORIDE 25 MG: 25 TABLET ORAL at 08:36

## 2021-03-14 RX ADMIN — FUROSEMIDE 20 MG: 20 TABLET ORAL at 17:37

## 2021-03-14 RX ADMIN — OXYCODONE HYDROCHLORIDE 10 MG: 5 SOLUTION ORAL at 21:32

## 2021-03-14 RX ADMIN — ALPRAZOLAM 0.5 MG: 0.5 TABLET ORAL at 13:22

## 2021-03-14 RX ADMIN — SODIUM CHLORIDE, PRESERVATIVE FREE 10 ML: 5 INJECTION INTRAVENOUS at 21:33

## 2021-03-14 RX ADMIN — OXYCODONE HYDROCHLORIDE 10 MG: 5 SOLUTION ORAL at 17:37

## 2021-03-14 RX ADMIN — GABAPENTIN 800 MG: 250 SOLUTION ORAL at 13:21

## 2021-03-14 RX ADMIN — ALBUTEROL SULFATE 6 PUFF: 90 AEROSOL, METERED RESPIRATORY (INHALATION) at 16:14

## 2021-03-14 RX ADMIN — ALPRAZOLAM 0.5 MG: 0.5 TABLET ORAL at 19:50

## 2021-03-14 RX ADMIN — CHLORHEXIDINE GLUCONATE 15 ML: 1.2 RINSE ORAL at 08:35

## 2021-03-14 RX ADMIN — POTASSIUM CHLORIDE 40 MEQ: 1.5 POWDER, FOR SOLUTION ORAL at 17:37

## 2021-03-14 RX ADMIN — OXYCODONE HYDROCHLORIDE 10 MG: 5 SOLUTION ORAL at 05:21

## 2021-03-14 RX ADMIN — Medication 1 TABLET: at 08:35

## 2021-03-14 RX ADMIN — ZOLPIDEM TARTRATE 5 MG: 5 TABLET ORAL at 21:34

## 2021-03-14 RX ADMIN — SERTRALINE 150 MG: 100 TABLET, FILM COATED ORAL at 08:35

## 2021-03-14 RX ADMIN — OXYCODONE HYDROCHLORIDE 10 MG: 5 SOLUTION ORAL at 13:22

## 2021-03-14 RX ADMIN — FENTANYL 1 PATCH: 25 PATCH TRANSDERMAL at 11:57

## 2021-03-14 RX ADMIN — GABAPENTIN 800 MG: 250 SOLUTION ORAL at 05:21

## 2021-03-14 RX ADMIN — SODIUM CHLORIDE, PRESERVATIVE FREE 10 ML: 5 INJECTION INTRAVENOUS at 10:20

## 2021-03-14 RX ADMIN — GABAPENTIN 800 MG: 250 SOLUTION ORAL at 21:32

## 2021-03-14 RX ADMIN — DOCUSATE SODIUM 100 MG: 50 LIQUID ORAL at 08:35

## 2021-03-14 RX ADMIN — CYCLOBENZAPRINE 10 MG: 10 TABLET, FILM COATED ORAL at 08:35

## 2021-03-14 RX ADMIN — GUAIFENESIN 400 MG: 100 SOLUTION ORAL at 17:37

## 2021-03-14 RX ADMIN — GUAIFENESIN 400 MG: 100 SOLUTION ORAL at 00:00

## 2021-03-14 NOTE — PLAN OF CARE
Goal Outcome Evaluation  Pt remains in CCU on vent at 25% fio2 waiting on bed at facility. Frequent requests for pain meds this shift. New Fentanyl patch placed this am on R arm, and oral pain meds given X 3. Potassium 3.6 - replaced per protocol. Pt remained very calm and pleasant this shift, and has allowed us to turn and suction him appropriately. Mother at bedside aware of patient condition.

## 2021-03-14 NOTE — PROGRESS NOTES
LOS: 15 days   Patient Care Team:  Sheron Perez MD as PCP - General (Family Medicine)    Subjective     As usual patient's first request walking in the room to turn his oxygen up.  His SPO2 was 95% with end-tidal CO2 was 33 and he was getting tidal volumes between 588 and 650 cc.  As usual I explained to him I suctioned him out even the biopsy just got minimal secretions but then nursing had actually just done that short time prior I think he is now figuring out that will give him 100% oxygen when he gets suction and is starting to ask for suctioning, he is constantly on the [place] with nursing he really wants somebody with him constantly I think          Review of Systems:         Objective     Vital Signs  Vital Sign Min/Max for last 24 hours  Temp  Min: 98.6 °F (37 °C)  Max: 98.6 °F (37 °C)   BP  Min: 93/53  Max: 153/76   Pulse  Min: 71  Max: 101   Resp  Min: 25  Max: 28   SpO2  Min: 91 %  Max: 99 %   No data recorded   No data recorded        Ventilator/Non-Invasive Ventilation Settings (From admission, onward) Comment     Start     Ordered    03/06/21 0927  Ventilator - AC/PC; (16); 100; 90%; 10; 20  Continuous     Question Answer Comment   Vent Mode AC/PC    Breath rate  16   FiO2 100    FiO2 titrate for Sp02% =/> 90%    PEEP 10    Inspiratory Pressure 20        03/06/21 0927    03/04/21 1501  NIPPV (CPAP or BIPAP)  At Bedtime & As Needed-RT     Comments: Patient had a tracheostomy tube, he needs to be connected to the trilogy machine or equivalent hospital ventilator, need to inflate the cuff while on the trilogy per standard protocols   Question Answer Comment   Indication: Acute Respiratory Failure    Type: AVAPS-AE    NIPPV Mask Interface: Other    Mask Type tracheostomy tube    Rate 12    VT (mL) 550    EPAP Min (cm H2O) 10    EPAP Max (cm H2O) 15    Max Pressure (cm H2O) 30    Pressure Support Min (cm H2O) 4    Pressure Support Max (cm H2O) 15    Titrate for SPO2 90%        03/04/21 1501     03/03/21 1725  NIPPV (CPAP or BIPAP)  Until Discontinued,   Status:  Canceled     Comments: Patient had a tracheostomy tube, he needs to be connected to the trilogy machine or equivalent hospital ventilator, need to inflate the cuff while on the trilogy per standard protocols   Question Answer Comment   Indication: Acute Respiratory Failure    Type: AVAPS-AE    NIPPV Mask Interface: Other    Mask Type tracheostomy tube    Rate 12    VT (mL) 550    EPAP Min (cm H2O) 10    EPAP Max (cm H2O) 15    Max Pressure (cm H2O) 30    Pressure Support Min (cm H2O) 4    Pressure Support Max (cm H2O) 15    Titrate for SPO2 90%        03/03/21 1726                             Body mass index is 20.97 kg/m².  I/O last 3 completed shifts:  In: 1982 [Other:586; NG/GT:1396]  Out: 1150 [Urine:1150]  No intake/output data recorded.        Physical Exam:  General Appearance: Well-developed white male he is trached on a ventilator volume control plus 16 tidal volume of 550 cc he is actually: Much higher volumes currently, on 25 % FiO2 and 10 cm of PEEP his SPO2 is 96% end-tidal CO2 is 33-38  Eyes: Conjunctiva are clear and anicteric pupils are equal  ENT: Mucous membranes little better today they are moist he does not have any blood in his oropharynx.  Neck: Tracheostomy site looks okay  6 Shiley with the inner cannula, no jugular venous distention, trachea midline  Lungs chest was clear no secretions or minimal secretions on suctioning even with lavaging.  Cardiac: He is regular rate rhythm no murmur  Abdomen: Soft no palpable hepatosplenomegaly or masses he has a left upper quadrant PEG type tube and the site looks okay  : Not examined  Musculoskeletal: He has very little muscle mass he has some slight movement with his arms he does not really have any  he has marked interosseous muscle wasting  Skin: No jaundice no petechiae skin is warm and dry  Neuro: Quadriplegic with minimal proximal upper extremity movement  Extremities/P  Vascular: Palpable radial and dorsalis pedis pulses bilaterally again marked muscle atrophy  MSE: No change anxiety and drug-seeking behavior    Labs:  Results from last 7 days   Lab Units 03/12/21  1734 03/11/21  0607 03/09/21  0826 03/08/21  0919 03/07/21  1130   GLUCOSE mg/dL 133* 118* 148* 118* 116*   SODIUM mmol/L 139 139 136 136 135*   POTASSIUM mmol/L 3.6 4.1 4.1 4.2 4.2   CO2 mmol/L 29.4* 32.3* 32.6* 31.1* 29.7*   CHLORIDE mmol/L 96* 95* 92* 94* 97*   ANION GAP mmol/L 13.6 11.7 11.4 10.9 8.3   CREATININE mg/dL 0.53* 0.49* 0.60* 0.55* 0.58*   BUN mg/dL 33* 31* 30* 26* 26*   BUN / CREAT RATIO  62.3* 63.3* 50.0* 47.3* 44.8*   CALCIUM mg/dL 9.5 9.7 9.5 9.2 8.9   EGFR IF NONAFRICN AM mL/min/1.73 >150 >150 >150 >150 >150     Estimated Creatinine Clearance: 179 mL/min (A) (by C-G formula based on SCr of 0.53 mg/dL (L)).      Results from last 7 days   Lab Units 03/14/21  0605 03/13/21  0928 03/11/21  0607 03/09/21  0826 03/08/21  1017 03/08/21  0717   WBC 10*3/mm3 17.08* 22.90* 12.13* 15.50*  --  7.67   RBC 10*6/mm3 3.10* 3.46* 3.51* 3.39*  --  2.46*   HEMOGLOBIN g/dL 8.3* 9.6* 9.3* 9.6* 6.5* 6.5*   HEMATOCRIT % 25.6* 28.6* 28.7* 28.1* 19.8* 19.8*   MCV fL 82.6 82.7 81.8 82.9  --  80.5   MCH pg 26.8 27.7 26.5* 28.3  --  26.4*   MCHC g/dL 32.4 33.6 32.4 34.2  --  32.8   RDW % 16.4* 16.2* 16.5* 16.4*  --  16.7*   RDW-SD fl 49.1 48.0 48.6 49.3  --  48.4   MPV fL 10.0 9.5 10.5 9.7  --  9.5   PLATELETS 10*3/mm3 285 279 299 247  --  228   NEUTROPHIL % %  --   --   --  83.5*  --   --    LYMPHOCYTE % %  --   --   --  10.3*  --   --    MONOCYTES % %  --   --   --  5.2  --   --    EOSINOPHIL % %  --   --   --  0.5  --   --    BASOPHIL % %  --   --   --  0.1  --   --    IMM GRAN % %  --   --   --  0.4  --   --    NEUTROS ABS 10*3/mm3  --   --   --  12.94*  --   --    LYMPHS ABS 10*3/mm3  --   --   --  1.60  --   --    MONOS ABS 10*3/mm3  --   --   --  0.81  --   --    EOS ABS 10*3/mm3  --   --   --  0.07  --   --    BASOS ABS  10*3/mm3  --   --   --  0.02  --   --    IMMATURE GRANS (ABS) 10*3/mm3  --   --   --  0.06*  --   --    NRBC /100 WBC  --   --   --  0.0  --   --                      Results from last 7 days   Lab Units 03/14/21  0605   PROCALCITONIN ng/mL 0.38*         Microbiology Results (last 10 days)     Procedure Component Value - Date/Time    MRSA Screen, PCR (Inpatient) - Swab, Nares [[id number]]  (Abnormal) Collected: 03/07/21 1454    Lab Status: Final result Specimen: Swab from Nares Updated: 03/07/21 1622     MRSA PCR MRSA Detected    Respiratory Culture - Wash, Bronchus [[id number]]  (Abnormal)  (Susceptibility) Collected: 03/06/21 1016    Lab Status: Final result Specimen: Wash from Bronchus Updated: 03/09/21 0936     Respiratory Culture Scant growth (1+) Pseudomonas aeruginosa      Rare Normal Respiratory Lisa     Gram Stain No WBCs or organisms seen    Susceptibility      Pseudomonas aeruginosa      YOGI      Cefepime Intermediate      Ceftazidime Intermediate      Ciprofloxacin Susceptible      Gentamicin Susceptible      Levofloxacin Susceptible      Piperacillin + Tazobactam Susceptible<sup style='font-weight:normal'>1</sup></font>             <sup style='font-weight:normal'>1</sup></font> Appended report. These results have been appended to a previously preliminary verified report.          Linear View                   Blood Culture - Blood, Arm, Right [[id number]] Collected: 03/05/21 1654    Lab Status: Final result Specimen: Blood from Arm, Right Updated: 03/10/21 1746     Blood Culture No growth at 5 days    Blood Culture - Blood, Hand, Left [[id number]] Collected: 03/05/21 1654    Lab Status: Final result Specimen: Blood from Hand, Left Updated: 03/10/21 1746     Blood Culture No growth at 5 days              acetylcysteine, 3 mL, Nebulization, BID - RT  ALPRAZolam, 0.5 mg, Per G Tube, Q6H While Awake - RT  ascorbic acid, 1,000 mg, Per G Tube, Daily  chlorhexidine, 15 mL, Mouth/Throat, Q12H  cyclobenzaprine, 10  mg, Per G Tube, Daily  docusate sodium, 100 mg, Per G Tube, BID  fentaNYL, 1 patch, Transdermal, Q72H  furosemide, 20 mg, Oral, BID  Gabapentin, 800 mg, Per G Tube, Q8H  guaifenesin, 400 mg, Per G Tube, Q6H  hydrOXYzine, 25 mg, Oral, Daily  lansoprazole, 30 mg, Per G Tube, QAM  midodrine, 7.5 mg, Per G Tube, BID AC  multivitamin with minerals, 1 tablet, Oral, Daily  Scopolamine, 1 patch, Transdermal, Q72H  sertraline, 150 mg, Per G Tube, Daily  sodium chloride, 10 mL, Intravenous, Q12H  sodium chloride, 4 mL, Nebulization, BID - RT  zolpidem, 5 mg, Oral, Nightly           Diagnostics:  CT Abdomen Pelvis Without Contrast    Result Date: 2/10/2021  CT CHEST, ABDOMEN, AND PELVIS WITHOUT CONTRAST.  HISTORY: 35-year-old male with quadriplegia, pneumonia with tracheitis and mucous plugging. Sepsis. Persistent fever.  TECHNIQUE: Radiation dose reduction techniques were utilized, including automated exposure control and exposure modulation based on body size. 3 mm images were obtained through the chest, abdomen, and pelvis without the administration of IV contrast. Compared with CT of the abdomen and pelvis from 02/04/2021 and chest CTA from 02/04/2021.  FINDINGS: CHEST: There is significant motion/breathing artifact. The tracheostomy is in good position with the distal margin being 4 cm proximal to the titus. There is a significant volume of mucus/secretions within both mainstem bronchi and the lower lobe bronchi and right middle lobe bronchi appear essentially completely opacified. There are small-moderate-sized bilateral pleural effusions and there are dense consolidations adjacently at both lower lobes. There is no pericardial effusion.  ABDOMEN/PELVIS: The spleen is enlarged measuring 16 cm in diameter, measured on the coronal reconstruction series. The noncontrasted liver appears unremarkable. The gallbladder is contracted. Percutaneous G-tube is noted in place. Noncontrasted pancreas, adrenals, and kidneys appear  unremarkable. There are air-fluid levels predominantly within the colon and there is no colonic thickening. Appendix appears within normal limits. The urinary bladder is not abnormally distended.      1. Significant volume of mucus/secretions within the main stem bronchi and the bronchi at the lower lobes and right middle lobe are essentially completely opacified. Adjacent to small-moderate-sized bilateral pleural effusions are dense consolidations at both lower lobes which likely in part represent atelectasis, but pneumonia is possible as well. 2. Mild colonic ileus without evidence for colitis. 3. Splenomegaly.  This report was finalized on 2/10/2021 3:24 PM by Dr. Kenia Farrell M.D.      CT Chest Without Contrast Diagnostic    Result Date: 3/2/2021  CT CHEST WO CONTRAST DIAGNOSTIC-  CLINICAL HISTORY: Dyspnea. Pleural effusion. Quadriplegia. History of mucous plugging and atelectasis.  TECHNIQUE: Spiral CT images were obtained through the chest without IV contrast and were reconstructed in 3 mm thick slices.  Radiation dose reduction techniques were utilized, including automated exposure control and exposure modulation based on body size.  COMPARISON: CT chest dated 02/10/2021  FINDINGS: A tracheostomy tube remains in satisfactory position. Comparison with the previous study is difficult due to extensive breathing motion artifact that was present on the previous study. There are moderate-sized bilateral posteriorly layering pleural effusions, greater in size on the right than the left. The effusion on the right has increased in size somewhat in the interval. The effusion on the left appears essentially unchanged. There is dense consolidation in the left lower lobe containing a few air bronchograms that is most likely primarily due to atelectasis. This appears slightly larger. Slightly less extensive patchy consolidation in the right lower lobe also appears slightly more prominent. There are patchy reticulonodular  opacities throughout the right upper lobe that appear essentially new since the preceding CT scan. These have a tree-in-bud appearance and are likely due to mucus plugging within bronchioles. In addition, a 7 mm diameter discrete noncalcified nodule in the right upper lobe appears new. There also several additional smaller nodular opacities in the inferior aspect of the right upper lobe that are new. These are all favored to be due to peripheral mucous plugging, as well. Images through the upper abdomen demonstrate no obvious abnormality.      Suboptimal study due to artifact from patient breathing motion. There are moderate-sized bilateral posteriorly layering pleural effusions. The effusion on the left is unchanged since a CT dated 02/10/2021. The effusion on the right appears slightly larger. Areas of consolidation within both lower lobes also appear more prominent and are most likely due to atelectasis, although superimposed pneumonia cannot be excluded. Patchy reticulonodular opacities in the right upper lobe are new and are likely secondary to mucous plugging within bronchioles. There are also multiple macroscopic nodules in the inferior aspect of the right upper lobe that are new and are quite likely secondary to peripheral mucous plugging.  This report was finalized on 3/2/2021 8:11 PM by Dr. Rober Monson M.D.      CT Chest Without Contrast Diagnostic    Result Date: 2/10/2021  CT CHEST, ABDOMEN, AND PELVIS WITHOUT CONTRAST.  HISTORY: 35-year-old male with quadriplegia, pneumonia with tracheitis and mucous plugging. Sepsis. Persistent fever.  TECHNIQUE: Radiation dose reduction techniques were utilized, including automated exposure control and exposure modulation based on body size. 3 mm images were obtained through the chest, abdomen, and pelvis without the administration of IV contrast. Compared with CT of the abdomen and pelvis from 02/04/2021 and chest CTA from 02/04/2021.  FINDINGS: CHEST: There is  significant motion/breathing artifact. The tracheostomy is in good position with the distal margin being 4 cm proximal to the titus. There is a significant volume of mucus/secretions within both mainstem bronchi and the lower lobe bronchi and right middle lobe bronchi appear essentially completely opacified. There are small-moderate-sized bilateral pleural effusions and there are dense consolidations adjacently at both lower lobes. There is no pericardial effusion.  ABDOMEN/PELVIS: The spleen is enlarged measuring 16 cm in diameter, measured on the coronal reconstruction series. The noncontrasted liver appears unremarkable. The gallbladder is contracted. Percutaneous G-tube is noted in place. Noncontrasted pancreas, adrenals, and kidneys appear unremarkable. There are air-fluid levels predominantly within the colon and there is no colonic thickening. Appendix appears within normal limits. The urinary bladder is not abnormally distended.      1. Significant volume of mucus/secretions within the main stem bronchi and the bronchi at the lower lobes and right middle lobe are essentially completely opacified. Adjacent to small-moderate-sized bilateral pleural effusions are dense consolidations at both lower lobes which likely in part represent atelectasis, but pneumonia is possible as well. 2. Mild colonic ileus without evidence for colitis. 3. Splenomegaly.  This report was finalized on 2/10/2021 3:24 PM by Dr. Kenia Farrell M.D.      CT Abdomen Pelvis With Contrast    Result Date: 2/4/2021  CT ABDOMEN PELVIS W CONTRAST-  Radiation dose reduction techniques were utilized, including automated exposure control and exposure modulation based on body size.  Clinical: Acute abdominal pain, nonlocalizing anemia. Aspiration pneumonia  Note: Examination degraded due to respiratory motion artifact  FINDINGS: There is enlargement of the left gluteus superficial to the iliac wing. Mixed attenuation measuring 11.2 cm AP, 5.5 cm  transverse and 11.3 cm craniocaudal. Likely muscular hematoma. The upper border is somewhat rounded, I would recommend follow-up to exclude associated mass.  There are again bilateral free-flowing pleural effusions. Attenuation compatible with serous fluid. There is again bibasilar consolidation. There is cardiac enlargement.  There is splenomegaly with the spleen measuring approximately 16 cm cranial caudal. The liver is satisfactory in size and shape, there is a typical area of focal fatty infiltration within the left lobe. No suspicious liver lesion, there is a small cyst demonstrated within the right lobe. Intrahepatic biliary radicals are mildly pronounced. Due to the degree of respiratory motion artifact, difficult to evaluate the gallbladder and extrahepatic biliary tree, no gross gallstones seen. The pancreas is satisfactory in appearance.  The adrenal glands and kidneys have a typical appearance. There is a G-tube demonstrated within the gastric lumen. Its position is satisfactory. Diameter of the aorta is normal. Urinary bladder is typical in appearance.  The appendix is normal. Caliber of the colon and small bowel is within normal limits. No bowel wall thickening nor induration to suggest an inflammatory process. No free intraperitoneal air free fluid.  CONCLUSION: 1. Mixed attenuation collection within the left gluteus having the appearance of a sizable hematoma. See above measurements. Advise follow-up CT after resolved to exclude underlying mass. 2. Bilateral pleural effusions and bibasilar consolidation consistent with pneumonia. 3. Splenomegaly. 4. Prominent intrahepatic biliary radicals, motion artifact degrades evaluation of the gallbladder and biliary tree, no gross gallstones identified. 5. G-tube position is satisfactory. 6. The appendix, colon and small bowel are unremarkable.   This report was finalized on 2/4/2021 12:42 PM by Dr. Govind Carr M.D.      XR Chest 1 View    Result Date:  3/1/2021  ONE VIEW PORTABLE CHEST AT 3:48 PM  HISTORY: Shortness of breath. Right-sided atelectasis and pleural effusion. Possible mucous plugging.  FINDINGS: There is some atelectasis and pleural effusion at both bases, right greater than left and the appearance at the right base shows improvement from 02/28/2021. There is slight worsening of increased density at the left base. The heart size remains normal. A tracheostomy tube is in place.  This report was finalized on 3/1/2021 4:42 PM by Dr. Benjamin De Leon M.D.      XR Chest 1 View    Result Date: 2/28/2021  CHEST SINGLE VIEW  HISTORY: Mucous plugging. History of hypertension.  COMPARISON: AP chest 2/27/2021, 2/12/2021, CT chest 2/10/2021  FINDINGS: Tracheostomy tube is present. Moderate right and small left pleural effusions are present. Right pleural fluid extends partially into the major fissure. There is right lower lobe consolidation or infiltrate. Left lung is comparatively clear there is no perihilar edema or pneumothorax.      Moderate right and small left pleural effusions.  Right pleural effusion extends partially into the major fissure. There is dense right basilar opacity/consolidation and potential infiltrate. No evidence for perihilar edema or pneumothorax. Tracheostomy is present.  This report was finalized on 2/28/2021 2:20 PM by Dr. Taran Killian M.D.      XR Chest 1 View    Result Date: 2/27/2021  Patient: MARK HARDIN  Time Out: 02:51 Exam(s): FILM CXR 1 VIEW EXAM:   XR Chest, 1 View CLINICAL HISTORY:    Reason for exam: SOA. TECHNIQUE:   Frontal view of the chest. COMPARISON: 2 12 21. FINDINGS:   Redemonstrated tracheostomy.  Interval decrease in moderate right pleural effusion with associated compressive atelectasis.  Decrease in basilar volume loss.  No pneumothorax.  No cardiomegaly.  Cervical spinal fusion hardware. IMPRESSION:     Interval decrease in small-moderate right pleural effusion with associated compressive atelectasis.  Tracheostomy.     Electronically signed by Dharmesh Lopez M.D. on 02-27-21 at 0251    XR Chest 1 View    Result Date: 2/12/2021  XR CHEST 1 VW-  HISTORY: Male who is 35 years-old,  chest pain  TECHNIQUE: Frontal view of the chest  COMPARISON: 02/12/2021 at 0605 hours  FINDINGS: Stable appearing tracheostomy tube. The heart is mildly enlarged. Heart, mediastinum and pulmonary vasculature are unremarkable. Mild right pleural effusion with increased fissural fluid suggested. Small atelectasis or infiltrate at the lung bases. No pneumothorax. No acute osseous process.      Mild right pleural effusion with increased partial fluid suggested. Small atelectasis or infiltrate at the lung bases. Continued follow-up recommended.  This report was finalized on 2/12/2021 3:15 PM by Dr. Eddie Reddy M.D.      XR Chest 1 View    Result Date: 2/12/2021  XR CHEST 1 VW-  Clinical: Pleural effusion, right thoracentesis to 11/20/2021  COMPARISON CT scan of the chest 2/10/2021 and chest radiograph to 8/20/2021  FINDINGS: Tracheostomy tube position is satisfactory. Small right-sided pleural effusion demonstrated. Cardiac size within normal limits. No pneumothorax. Blunting of left costophrenic angle suggests trace left pleural effusion. There is vague opacity at both lung bases, suggesting atelectasis/infiltrate. No pulmonary edema identified. No acute consolidation seen. The remainder is unremarkable.  This report was finalized on 2/12/2021 7:40 AM by Dr. Govind Carr M.D.      XR Chest 1 View    Result Date: 2/8/2021  XR CHEST 1 VW-  HISTORY: Male who is 35 years-old,  rhonchi, fever  TECHNIQUE: Frontal views of the chest  COMPARISON: 02/05/2021  FINDINGS: Stable appearing tracheostomy tube. Heart, mediastinum and pulmonary vasculature are unremarkable. Mild bilateral basilar pleural effusions and likely atelectasis or infiltrate. No pneumothorax. No acute osseous process.      Mild bilateral basilar  atelectasis/infiltrate/effusion, follow-up suggested.  This report was finalized on 2/8/2021 3:46 PM by Dr. Eddie Reddy M.D.      XR Chest 1 View    Result Date: 2/5/2021  ONE VIEW PORTABLE CHEST AT 1:12 PM  HISTORY: Left lower lobe atelectasis and pneumonia. Follow-up evaluation.  FINDINGS: There has been marked improvement in the left basilar atelectasis and pneumonia when compared to yesterday's exam. The findings are most consistent with resolution of mucus plugging. No new abnormality is seen. The heart size is normal. A tracheostomy tube remains in place.  This report was finalized on 2/5/2021 2:28 PM by Dr. Benjamin De Leon M.D.      Duplex Venous Upper Extremity - Bilateral CAR    Result Date: 2/10/2021  · Acute right upper extremity superficial thrombophlebitis noted in the median cubital. · All other vessels appear normal.      Duplex Venous Lower Extremity - Bilateral CAR    Result Date: 2/10/2021  · Normal bilateral lower extremity venous duplex scan.      US Thoracentesis    Result Date: 2/11/2021  ULTRASOUND-GUIDED RIGHT THORACENTESIS.  HISTORY: Pleural effusion.  After signed informed consent was obtained the patient was prepped and draped in the usual sterile fashion. Lidocaine was used for local anesthesia.  Ultrasound guidance was used to place the thoracentesis catheter into the right pleural fluid collection. 400 mL of straw-colored fluid was removed. Sample was sent to the lab.  Confirmatory images were obtained.  Patient tolerated the procedure well with no complications.      Ultrasound-guided right thoracentesis as described.   This report was finalized on 2/11/2021 10:39 AM by Dr. Shad Smith M.D.          Today's chest x-ray reviewed and best I have seen with no significant atelectasis or infiltrates tracheostomy tube in good position    Active Hospital Problems    Diagnosis  POA   • **Acute on chronic respiratory failure with hypoxemia (CMS/HCC) [J96.21]  Yes   • Hypotension  [I95.9]  Yes   • Dyspnea [R06.00]  Yes   • Anemia [D64.9]  Yes   • Leukocytosis [D72.829]  Yes   • Quadriplegia (CMS/HCC) [G82.50]  Yes   • HCAP (healthcare-associated pneumonia) [J18.9]  Yes      Resolved Hospital Problems   No resolved problems to display.         Assessment/Plan     1. Acute hypoxic respiratory failure secondary to mucous plugging complicated by his quadriplegia and inability to clear secretions .  Continue ventilatory support.  Is going to be impossible to wean him if he has this much anxiety on the ventilator especially when he is ventilating this well.  Also his frequent refusal of suctioning, respiratory therapies, treatments just increase the mucus secretions and mucous plugs  2. Mucous plugging he is going to continue needing aggressive pulmonary therapy, he needs chest physiotherapy, CoughAssist and mucolytic therapy, bronchodilators, hypertonic saline to try and prevent these recurrent episodes.  Also I think that his constantly wanting to be totally knocked out with medications just contributes to hypoventilation as he does have some respiratory function.  Also he has some many oral secretions even with scopolamine and when he is awake he keeps his mouth clear to the secretions but when he is obtunded with medication the secretions just pool in the back of his throat and get down around his tracheostomy cuff.  I discussed this every day with the patient and his father, the father wants him to do everything absolutely does not want him to have a lot of sedating medications the patient is just the opposite but the patient does not want palliative care.  3. Quadriplegia  4. Bilateral pleural effusions improved almost completely resolved  5. Anemia hemoglobin drifting down looks like it is anemia of chronic disease his hemoglobin is up after transfusion we will continue to follow  6. Anxiety he has significant anxiety issues Dr. Tuttle saw him and increased his Ativan  further.  7. Hypertension  8. Fluids/electrolytes/nutrition continue tube feeds fluid status looks a little better today  9. Narcotic use understand the patient is a quadriplegic but continued request for high doses of narcotics is not going to benefit him is going increase problems .  10. Leukocytosis White count has come down significantly without intervention the pro calcitonin is now borderline elevated range chest x-ray was the best it has been yesterday he has been afebrile, will check a urine on him will repeat white count procalcitonin the morning if he shows any decline will start antibiotics.          Plan for disposition:    Darío Mendez MD  03/14/21  08:32 EDT    Time: I have spent over 35 minutes on patient's care today part of this time was discussing coordinating care the other part was critical care

## 2021-03-14 NOTE — CONSULTS
The patient reports that he slept a bit better last evening with addition of Ambien.  He reports that the Xanax is generally effective in addressing his anxiety at its new dose.

## 2021-03-15 ENCOUNTER — APPOINTMENT (OUTPATIENT)
Dept: GENERAL RADIOLOGY | Facility: HOSPITAL | Age: 36
End: 2021-03-15

## 2021-03-15 LAB
DEPRECATED RDW RBC AUTO: 50.4 FL (ref 37–54)
ERYTHROCYTE [DISTWIDTH] IN BLOOD BY AUTOMATED COUNT: 16.4 % (ref 12.3–15.4)
HCT VFR BLD AUTO: 29.9 % (ref 37.5–51)
HGB BLD-MCNC: 9.7 G/DL (ref 13–17.7)
MCH RBC QN AUTO: 27.6 PG (ref 26.6–33)
MCHC RBC AUTO-ENTMCNC: 32.4 G/DL (ref 31.5–35.7)
MCV RBC AUTO: 85.2 FL (ref 79–97)
PLATELET # BLD AUTO: 346 10*3/MM3 (ref 140–450)
PMV BLD AUTO: 10.4 FL (ref 6–12)
PROCALCITONIN SERPL-MCNC: 0.29 NG/ML (ref 0–0.25)
RBC # BLD AUTO: 3.51 10*6/MM3 (ref 4.14–5.8)
WBC # BLD AUTO: 15.18 10*3/MM3 (ref 3.4–10.8)

## 2021-03-15 PROCEDURE — 71045 X-RAY EXAM CHEST 1 VIEW: CPT

## 2021-03-15 PROCEDURE — 94799 UNLISTED PULMONARY SVC/PX: CPT

## 2021-03-15 PROCEDURE — 85027 COMPLETE CBC AUTOMATED: CPT | Performed by: INTERNAL MEDICINE

## 2021-03-15 PROCEDURE — 84145 PROCALCITONIN (PCT): CPT | Performed by: INTERNAL MEDICINE

## 2021-03-15 PROCEDURE — 94003 VENT MGMT INPAT SUBQ DAY: CPT

## 2021-03-15 PROCEDURE — 94760 N-INVAS EAR/PLS OXIMETRY 1: CPT

## 2021-03-15 RX ADMIN — ALPRAZOLAM 0.5 MG: 0.5 TABLET ORAL at 18:27

## 2021-03-15 RX ADMIN — ALBUTEROL SULFATE 6 PUFF: 90 AEROSOL, METERED RESPIRATORY (INHALATION) at 21:01

## 2021-03-15 RX ADMIN — ALPRAZOLAM 0.5 MG: 0.5 TABLET ORAL at 13:08

## 2021-03-15 RX ADMIN — MIDODRINE HYDROCHLORIDE 7.5 MG: 5 TABLET ORAL at 18:27

## 2021-03-15 RX ADMIN — CYCLOBENZAPRINE 10 MG: 10 TABLET, FILM COATED ORAL at 10:18

## 2021-03-15 RX ADMIN — OXYCODONE HYDROCHLORIDE 10 MG: 5 SOLUTION ORAL at 05:49

## 2021-03-15 RX ADMIN — GABAPENTIN 800 MG: 250 SOLUTION ORAL at 13:08

## 2021-03-15 RX ADMIN — LANSOPRAZOLE 30 MG: KIT at 06:27

## 2021-03-15 RX ADMIN — ZOLPIDEM TARTRATE 5 MG: 5 TABLET ORAL at 21:08

## 2021-03-15 RX ADMIN — HYDROXYZINE HYDROCHLORIDE 25 MG: 25 TABLET ORAL at 10:19

## 2021-03-15 RX ADMIN — SODIUM CHLORIDE, PRESERVATIVE FREE 10 ML: 5 INJECTION INTRAVENOUS at 09:14

## 2021-03-15 RX ADMIN — ALPRAZOLAM 0.5 MG: 0.5 TABLET ORAL at 06:27

## 2021-03-15 RX ADMIN — SODIUM CHLORIDE, PRESERVATIVE FREE 10 ML: 5 INJECTION INTRAVENOUS at 21:09

## 2021-03-15 RX ADMIN — DOCUSATE SODIUM 100 MG: 50 LIQUID ORAL at 10:18

## 2021-03-15 RX ADMIN — Medication 4 ML: at 21:02

## 2021-03-15 RX ADMIN — GUAIFENESIN 400 MG: 100 SOLUTION ORAL at 18:27

## 2021-03-15 RX ADMIN — ACETYLCYSTEINE 3 ML: 200 SOLUTION ORAL; RESPIRATORY (INHALATION) at 21:02

## 2021-03-15 RX ADMIN — OXYCODONE HYDROCHLORIDE 10 MG: 5 SOLUTION ORAL at 10:19

## 2021-03-15 RX ADMIN — GABAPENTIN 800 MG: 250 SOLUTION ORAL at 21:07

## 2021-03-15 RX ADMIN — FUROSEMIDE 20 MG: 20 TABLET ORAL at 10:18

## 2021-03-15 RX ADMIN — CHLORHEXIDINE GLUCONATE 15 ML: 1.2 RINSE ORAL at 21:08

## 2021-03-15 RX ADMIN — MIDODRINE HYDROCHLORIDE 7.5 MG: 5 TABLET ORAL at 05:49

## 2021-03-15 RX ADMIN — OXYCODONE HYDROCHLORIDE AND ACETAMINOPHEN 1000 MG: 500 TABLET ORAL at 10:19

## 2021-03-15 RX ADMIN — ALBUTEROL SULFATE 6 PUFF: 90 AEROSOL, METERED RESPIRATORY (INHALATION) at 15:58

## 2021-03-15 RX ADMIN — FUROSEMIDE 20 MG: 20 TABLET ORAL at 18:28

## 2021-03-15 RX ADMIN — ACETYLCYSTEINE 3 ML: 200 SOLUTION ORAL; RESPIRATORY (INHALATION) at 09:02

## 2021-03-15 RX ADMIN — GUAIFENESIN 400 MG: 100 SOLUTION ORAL at 00:29

## 2021-03-15 RX ADMIN — OXYCODONE HYDROCHLORIDE 10 MG: 5 SOLUTION ORAL at 21:07

## 2021-03-15 RX ADMIN — GUAIFENESIN 400 MG: 100 SOLUTION ORAL at 23:53

## 2021-03-15 RX ADMIN — GABAPENTIN 800 MG: 250 SOLUTION ORAL at 10:16

## 2021-03-15 RX ADMIN — Medication 1 TABLET: at 10:18

## 2021-03-15 RX ADMIN — OXYCODONE HYDROCHLORIDE 10 MG: 5 SOLUTION ORAL at 15:18

## 2021-03-15 RX ADMIN — SCOPALAMINE 1 PATCH: 1 PATCH, EXTENDED RELEASE TRANSDERMAL at 18:29

## 2021-03-15 RX ADMIN — GUAIFENESIN 400 MG: 100 SOLUTION ORAL at 05:49

## 2021-03-15 RX ADMIN — DOCUSATE SODIUM 100 MG: 50 LIQUID ORAL at 21:07

## 2021-03-15 RX ADMIN — CHLORHEXIDINE GLUCONATE 15 ML: 1.2 RINSE ORAL at 09:14

## 2021-03-15 RX ADMIN — GUAIFENESIN 400 MG: 100 SOLUTION ORAL at 13:08

## 2021-03-15 RX ADMIN — OXYCODONE HYDROCHLORIDE 10 MG: 5 SOLUTION ORAL at 01:40

## 2021-03-15 RX ADMIN — SERTRALINE 150 MG: 100 TABLET, FILM COATED ORAL at 10:18

## 2021-03-15 RX ADMIN — ALBUTEROL SULFATE 6 PUFF: 90 AEROSOL, METERED RESPIRATORY (INHALATION) at 09:02

## 2021-03-15 NOTE — SIGNIFICANT NOTE
Resting, calm, no soa, no increase wob, refused mucomyst and nacl, refused cpt, refused suction ,   bs clear. When walking out he started with needing a boost of 100%. I passed catheater with boost, scant /no secretions obtained. Sat 97%, boost ended.

## 2021-03-15 NOTE — PLAN OF CARE
Goal Outcome Evaluation:  Plan of Care Reviewed With: patient  Progress: improving  Outcome Summary: Pt remains in CCU on vent. VSS. Only asked for pain meds when they were available. Still requested to have his O2 bumped, but after being told no, he quit asking. Pt able to suction himself when device is attached to his hand. Slept for a few hours early on in shift. More agreeable than usual.

## 2021-03-15 NOTE — PROGRESS NOTES
LOS: 16 days   Patient Care Team:  Sheron Perez MD as PCP - General (Family Medicine)    Subjective     Again he is complaining of cannot breathe and he hurts all over.          Review of Systems:         Objective     Vital Signs  Vital Sign Min/Max for last 24 hours  Temp  Min: 98.3 °F (36.8 °C)  Max: 98.8 °F (37.1 °C)   BP  Min: 100/66  Max: 151/91   Pulse  Min: 53  Max: 83   Resp  Min: 19  Max: 23   SpO2  Min: 91 %  Max: 97 %   No data recorded   No data recorded        Ventilator/Non-Invasive Ventilation Settings (From admission, onward) Comment     Start     Ordered    03/06/21 0927  Ventilator - AC/PC; (16); 100; 90%; 10; 20  Continuous     Question Answer Comment   Vent Mode AC/PC    Breath rate  16   FiO2 100    FiO2 titrate for Sp02% =/> 90%    PEEP 10    Inspiratory Pressure 20        03/06/21 0927    03/04/21 1501  NIPPV (CPAP or BIPAP)  At Bedtime & As Needed-RT     Comments: Patient had a tracheostomy tube, he needs to be connected to the trilogy machine or equivalent hospital ventilator, need to inflate the cuff while on the trilogy per standard protocols   Question Answer Comment   Indication: Acute Respiratory Failure    Type: AVAPS-AE    NIPPV Mask Interface: Other    Mask Type tracheostomy tube    Rate 12    VT (mL) 550    EPAP Min (cm H2O) 10    EPAP Max (cm H2O) 15    Max Pressure (cm H2O) 30    Pressure Support Min (cm H2O) 4    Pressure Support Max (cm H2O) 15    Titrate for SPO2 90%        03/04/21 1501    03/03/21 1725  NIPPV (CPAP or BIPAP)  Until Discontinued,   Status:  Canceled     Comments: Patient had a tracheostomy tube, he needs to be connected to the trilogy machine or equivalent hospital ventilator, need to inflate the cuff while on the trilogy per standard protocols   Question Answer Comment   Indication: Acute Respiratory Failure    Type: AVAPS-AE    NIPPV Mask Interface: Other    Mask Type tracheostomy tube    Rate 12    VT (mL) 550    EPAP Min (cm H2O) 10     EPAP Max (cm H2O) 15    Max Pressure (cm H2O) 30    Pressure Support Min (cm H2O) 4    Pressure Support Max (cm H2O) 15    Titrate for SPO2 90%        03/03/21 1726                             Body mass index is 20.97 kg/m².  I/O last 3 completed shifts:  In: 2043 [Other:311; NG/GT:1732]  Out: 1100 [Urine:1100]  No intake/output data recorded.        Physical Exam:  General Appearance: Well-developed white male he is trached on a ventilator some pressure control rate of 16 inspiratory pressure of 18 PEEP of 8 cm FiO2 of 25% he is pulling tidal volumes from 500 to 850 cc.  His oxygen saturations are 93 to 94% his end-tidal CO2 is 35.  Eyes: Conjunctiva are clear and anicteric pupils are equal  ENT: Mucous membranes  are moist he does not have any blood in his oropharynx.  Neck: Tracheostomy site looks okay  6 Shiley with the inner cannula, no jugular venous distention, trachea midline  Lungs few mild rhonchi that cleared with suctioning  Cardiac: He is regular rate rhythm no murmur  Abdomen: Soft no palpable hepatosplenomegaly or masses he has a left upper quadrant PEG type tube and the site looks okay  : Not examined  Musculoskeletal: He has very little muscle mass he has some slight movement with his arms he does not really have any  he has marked interosseous muscle wasting  Skin: No jaundice no petechiae skin is warm and dry  Neuro: Quadriplegic with minimal proximal upper extremity movement  Extremities/P Vascular: Palpable radial and dorsalis pedis pulses bilaterally again marked muscle atrophy  MSE: No change anxiety and drug-seeking behavior.  Nurses tell me he wants somebody in the room with him all the time.    Labs:  Results from last 7 days   Lab Units 03/12/21  1734 03/11/21  0607 03/09/21  0826 03/08/21  0919   GLUCOSE mg/dL 133* 118* 148* 118*   SODIUM mmol/L 139 139 136 136   POTASSIUM mmol/L 3.6 4.1 4.1 4.2   CO2 mmol/L 29.4* 32.3* 32.6* 31.1*   CHLORIDE mmol/L 96* 95* 92* 94*   ANION GAP  mmol/L 13.6 11.7 11.4 10.9   CREATININE mg/dL 0.53* 0.49* 0.60* 0.55*   BUN mg/dL 33* 31* 30* 26*   BUN / CREAT RATIO  62.3* 63.3* 50.0* 47.3*   CALCIUM mg/dL 9.5 9.7 9.5 9.2   EGFR IF NONAFRICN AM mL/min/1.73 >150 >150 >150 >150     Estimated Creatinine Clearance: 179 mL/min (A) (by C-G formula based on SCr of 0.53 mg/dL (L)).      Results from last 7 days   Lab Units 03/15/21  0721 03/14/21  0605 03/13/21  0928 03/11/21  0607 03/09/21  0826 03/08/21  1017   WBC 10*3/mm3 15.18* 17.08* 22.90* 12.13* 15.50*  --    RBC 10*6/mm3 3.51* 3.10* 3.46* 3.51* 3.39*  --    HEMOGLOBIN g/dL 9.7* 8.3* 9.6* 9.3* 9.6* 6.5*   HEMATOCRIT % 29.9* 25.6* 28.6* 28.7* 28.1* 19.8*   MCV fL 85.2 82.6 82.7 81.8 82.9  --    MCH pg 27.6 26.8 27.7 26.5* 28.3  --    MCHC g/dL 32.4 32.4 33.6 32.4 34.2  --    RDW % 16.4* 16.4* 16.2* 16.5* 16.4*  --    RDW-SD fl 50.4 49.1 48.0 48.6 49.3  --    MPV fL 10.4 10.0 9.5 10.5 9.7  --    PLATELETS 10*3/mm3 346 285 279 299 247  --    NEUTROPHIL % %  --   --   --   --  83.5*  --    LYMPHOCYTE % %  --   --   --   --  10.3*  --    MONOCYTES % %  --   --   --   --  5.2  --    EOSINOPHIL % %  --   --   --   --  0.5  --    BASOPHIL % %  --   --   --   --  0.1  --    IMM GRAN % %  --   --   --   --  0.4  --    NEUTROS ABS 10*3/mm3  --   --   --   --  12.94*  --    LYMPHS ABS 10*3/mm3  --   --   --   --  1.60  --    MONOS ABS 10*3/mm3  --   --   --   --  0.81  --    EOS ABS 10*3/mm3  --   --   --   --  0.07  --    BASOS ABS 10*3/mm3  --   --   --   --  0.02  --    IMMATURE GRANS (ABS) 10*3/mm3  --   --   --   --  0.06*  --    NRBC /100 WBC  --   --   --   --  0.0  --                      Results from last 7 days   Lab Units 03/15/21  0721 03/14/21  0605   PROCALCITONIN ng/mL 0.29* 0.38*                 acetylcysteine, 3 mL, Nebulization, BID - RT  ALPRAZolam, 0.5 mg, Per G Tube, Q6H While Awake - RT  ascorbic acid, 1,000 mg, Per G Tube, Daily  chlorhexidine, 15 mL, Mouth/Throat, Q12H  cyclobenzaprine, 10 mg, Per G  Tube, Daily  docusate sodium, 100 mg, Per G Tube, BID  fentaNYL, 1 patch, Transdermal, Q72H  furosemide, 20 mg, Oral, BID  Gabapentin, 800 mg, Per G Tube, Q8H  guaifenesin, 400 mg, Per G Tube, Q6H  hydrOXYzine, 25 mg, Oral, Daily  lansoprazole, 30 mg, Per G Tube, QAM  midodrine, 7.5 mg, Per G Tube, BID AC  multivitamin with minerals, 1 tablet, Oral, Daily  Scopolamine, 1 patch, Transdermal, Q72H  sertraline, 150 mg, Per G Tube, Daily  sodium chloride, 10 mL, Intravenous, Q12H  sodium chloride, 4 mL, Nebulization, BID - RT  zolpidem, 5 mg, Oral, Nightly           Diagnostics:  CT Abdomen Pelvis Without Contrast    Result Date: 2/10/2021  CT CHEST, ABDOMEN, AND PELVIS WITHOUT CONTRAST.  HISTORY: 35-year-old male with quadriplegia, pneumonia with tracheitis and mucous plugging. Sepsis. Persistent fever.  TECHNIQUE: Radiation dose reduction techniques were utilized, including automated exposure control and exposure modulation based on body size. 3 mm images were obtained through the chest, abdomen, and pelvis without the administration of IV contrast. Compared with CT of the abdomen and pelvis from 02/04/2021 and chest CTA from 02/04/2021.  FINDINGS: CHEST: There is significant motion/breathing artifact. The tracheostomy is in good position with the distal margin being 4 cm proximal to the titus. There is a significant volume of mucus/secretions within both mainstem bronchi and the lower lobe bronchi and right middle lobe bronchi appear essentially completely opacified. There are small-moderate-sized bilateral pleural effusions and there are dense consolidations adjacently at both lower lobes. There is no pericardial effusion.  ABDOMEN/PELVIS: The spleen is enlarged measuring 16 cm in diameter, measured on the coronal reconstruction series. The noncontrasted liver appears unremarkable. The gallbladder is contracted. Percutaneous G-tube is noted in place. Noncontrasted pancreas, adrenals, and kidneys appear  unremarkable. There are air-fluid levels predominantly within the colon and there is no colonic thickening. Appendix appears within normal limits. The urinary bladder is not abnormally distended.      1. Significant volume of mucus/secretions within the main stem bronchi and the bronchi at the lower lobes and right middle lobe are essentially completely opacified. Adjacent to small-moderate-sized bilateral pleural effusions are dense consolidations at both lower lobes which likely in part represent atelectasis, but pneumonia is possible as well. 2. Mild colonic ileus without evidence for colitis. 3. Splenomegaly.  This report was finalized on 2/10/2021 3:24 PM by Dr. Kenia Farrell M.D.      CT Chest Without Contrast Diagnostic    Result Date: 3/2/2021  CT CHEST WO CONTRAST DIAGNOSTIC-  CLINICAL HISTORY: Dyspnea. Pleural effusion. Quadriplegia. History of mucous plugging and atelectasis.  TECHNIQUE: Spiral CT images were obtained through the chest without IV contrast and were reconstructed in 3 mm thick slices.  Radiation dose reduction techniques were utilized, including automated exposure control and exposure modulation based on body size.  COMPARISON: CT chest dated 02/10/2021  FINDINGS: A tracheostomy tube remains in satisfactory position. Comparison with the previous study is difficult due to extensive breathing motion artifact that was present on the previous study. There are moderate-sized bilateral posteriorly layering pleural effusions, greater in size on the right than the left. The effusion on the right has increased in size somewhat in the interval. The effusion on the left appears essentially unchanged. There is dense consolidation in the left lower lobe containing a few air bronchograms that is most likely primarily due to atelectasis. This appears slightly larger. Slightly less extensive patchy consolidation in the right lower lobe also appears slightly more prominent. There are patchy reticulonodular  opacities throughout the right upper lobe that appear essentially new since the preceding CT scan. These have a tree-in-bud appearance and are likely due to mucus plugging within bronchioles. In addition, a 7 mm diameter discrete noncalcified nodule in the right upper lobe appears new. There also several additional smaller nodular opacities in the inferior aspect of the right upper lobe that are new. These are all favored to be due to peripheral mucous plugging, as well. Images through the upper abdomen demonstrate no obvious abnormality.      Suboptimal study due to artifact from patient breathing motion. There are moderate-sized bilateral posteriorly layering pleural effusions. The effusion on the left is unchanged since a CT dated 02/10/2021. The effusion on the right appears slightly larger. Areas of consolidation within both lower lobes also appear more prominent and are most likely due to atelectasis, although superimposed pneumonia cannot be excluded. Patchy reticulonodular opacities in the right upper lobe are new and are likely secondary to mucous plugging within bronchioles. There are also multiple macroscopic nodules in the inferior aspect of the right upper lobe that are new and are quite likely secondary to peripheral mucous plugging.  This report was finalized on 3/2/2021 8:11 PM by Dr. Rober Monson M.D.      CT Chest Without Contrast Diagnostic    Result Date: 2/10/2021  CT CHEST, ABDOMEN, AND PELVIS WITHOUT CONTRAST.  HISTORY: 35-year-old male with quadriplegia, pneumonia with tracheitis and mucous plugging. Sepsis. Persistent fever.  TECHNIQUE: Radiation dose reduction techniques were utilized, including automated exposure control and exposure modulation based on body size. 3 mm images were obtained through the chest, abdomen, and pelvis without the administration of IV contrast. Compared with CT of the abdomen and pelvis from 02/04/2021 and chest CTA from 02/04/2021.  FINDINGS: CHEST: There is  significant motion/breathing artifact. The tracheostomy is in good position with the distal margin being 4 cm proximal to the titus. There is a significant volume of mucus/secretions within both mainstem bronchi and the lower lobe bronchi and right middle lobe bronchi appear essentially completely opacified. There are small-moderate-sized bilateral pleural effusions and there are dense consolidations adjacently at both lower lobes. There is no pericardial effusion.  ABDOMEN/PELVIS: The spleen is enlarged measuring 16 cm in diameter, measured on the coronal reconstruction series. The noncontrasted liver appears unremarkable. The gallbladder is contracted. Percutaneous G-tube is noted in place. Noncontrasted pancreas, adrenals, and kidneys appear unremarkable. There are air-fluid levels predominantly within the colon and there is no colonic thickening. Appendix appears within normal limits. The urinary bladder is not abnormally distended.      1. Significant volume of mucus/secretions within the main stem bronchi and the bronchi at the lower lobes and right middle lobe are essentially completely opacified. Adjacent to small-moderate-sized bilateral pleural effusions are dense consolidations at both lower lobes which likely in part represent atelectasis, but pneumonia is possible as well. 2. Mild colonic ileus without evidence for colitis. 3. Splenomegaly.  This report was finalized on 2/10/2021 3:24 PM by Dr. Kenia Farrell M.D.      CT Abdomen Pelvis With Contrast    Result Date: 2/4/2021  CT ABDOMEN PELVIS W CONTRAST-  Radiation dose reduction techniques were utilized, including automated exposure control and exposure modulation based on body size.  Clinical: Acute abdominal pain, nonlocalizing anemia. Aspiration pneumonia  Note: Examination degraded due to respiratory motion artifact  FINDINGS: There is enlargement of the left gluteus superficial to the iliac wing. Mixed attenuation measuring 11.2 cm AP, 5.5 cm  transverse and 11.3 cm craniocaudal. Likely muscular hematoma. The upper border is somewhat rounded, I would recommend follow-up to exclude associated mass.  There are again bilateral free-flowing pleural effusions. Attenuation compatible with serous fluid. There is again bibasilar consolidation. There is cardiac enlargement.  There is splenomegaly with the spleen measuring approximately 16 cm cranial caudal. The liver is satisfactory in size and shape, there is a typical area of focal fatty infiltration within the left lobe. No suspicious liver lesion, there is a small cyst demonstrated within the right lobe. Intrahepatic biliary radicals are mildly pronounced. Due to the degree of respiratory motion artifact, difficult to evaluate the gallbladder and extrahepatic biliary tree, no gross gallstones seen. The pancreas is satisfactory in appearance.  The adrenal glands and kidneys have a typical appearance. There is a G-tube demonstrated within the gastric lumen. Its position is satisfactory. Diameter of the aorta is normal. Urinary bladder is typical in appearance.  The appendix is normal. Caliber of the colon and small bowel is within normal limits. No bowel wall thickening nor induration to suggest an inflammatory process. No free intraperitoneal air free fluid.  CONCLUSION: 1. Mixed attenuation collection within the left gluteus having the appearance of a sizable hematoma. See above measurements. Advise follow-up CT after resolved to exclude underlying mass. 2. Bilateral pleural effusions and bibasilar consolidation consistent with pneumonia. 3. Splenomegaly. 4. Prominent intrahepatic biliary radicals, motion artifact degrades evaluation of the gallbladder and biliary tree, no gross gallstones identified. 5. G-tube position is satisfactory. 6. The appendix, colon and small bowel are unremarkable.   This report was finalized on 2/4/2021 12:42 PM by Dr. Govind Carr M.D.      XR Chest 1 View    Result Date:  3/1/2021  ONE VIEW PORTABLE CHEST AT 3:48 PM  HISTORY: Shortness of breath. Right-sided atelectasis and pleural effusion. Possible mucous plugging.  FINDINGS: There is some atelectasis and pleural effusion at both bases, right greater than left and the appearance at the right base shows improvement from 02/28/2021. There is slight worsening of increased density at the left base. The heart size remains normal. A tracheostomy tube is in place.  This report was finalized on 3/1/2021 4:42 PM by Dr. Benjamin De Leon M.D.      XR Chest 1 View    Result Date: 2/28/2021  CHEST SINGLE VIEW  HISTORY: Mucous plugging. History of hypertension.  COMPARISON: AP chest 2/27/2021, 2/12/2021, CT chest 2/10/2021  FINDINGS: Tracheostomy tube is present. Moderate right and small left pleural effusions are present. Right pleural fluid extends partially into the major fissure. There is right lower lobe consolidation or infiltrate. Left lung is comparatively clear there is no perihilar edema or pneumothorax.      Moderate right and small left pleural effusions.  Right pleural effusion extends partially into the major fissure. There is dense right basilar opacity/consolidation and potential infiltrate. No evidence for perihilar edema or pneumothorax. Tracheostomy is present.  This report was finalized on 2/28/2021 2:20 PM by Dr. Taran Killian M.D.      XR Chest 1 View    Result Date: 2/27/2021  Patient: MARK HARDIN  Time Out: 02:51 Exam(s): FILM CXR 1 VIEW EXAM:   XR Chest, 1 View CLINICAL HISTORY:    Reason for exam: SOA. TECHNIQUE:   Frontal view of the chest. COMPARISON: 2 12 21. FINDINGS:   Redemonstrated tracheostomy.  Interval decrease in moderate right pleural effusion with associated compressive atelectasis.  Decrease in basilar volume loss.  No pneumothorax.  No cardiomegaly.  Cervical spinal fusion hardware. IMPRESSION:     Interval decrease in small-moderate right pleural effusion with associated compressive atelectasis.  Tracheostomy.     Electronically signed by Dharmesh Lopez M.D. on 02-27-21 at 0251    XR Chest 1 View    Result Date: 2/12/2021  XR CHEST 1 VW-  HISTORY: Male who is 35 years-old,  chest pain  TECHNIQUE: Frontal view of the chest  COMPARISON: 02/12/2021 at 0605 hours  FINDINGS: Stable appearing tracheostomy tube. The heart is mildly enlarged. Heart, mediastinum and pulmonary vasculature are unremarkable. Mild right pleural effusion with increased fissural fluid suggested. Small atelectasis or infiltrate at the lung bases. No pneumothorax. No acute osseous process.      Mild right pleural effusion with increased partial fluid suggested. Small atelectasis or infiltrate at the lung bases. Continued follow-up recommended.  This report was finalized on 2/12/2021 3:15 PM by Dr. Eddie Reddy M.D.      XR Chest 1 View    Result Date: 2/12/2021  XR CHEST 1 VW-  Clinical: Pleural effusion, right thoracentesis to 11/20/2021  COMPARISON CT scan of the chest 2/10/2021 and chest radiograph to 8/20/2021  FINDINGS: Tracheostomy tube position is satisfactory. Small right-sided pleural effusion demonstrated. Cardiac size within normal limits. No pneumothorax. Blunting of left costophrenic angle suggests trace left pleural effusion. There is vague opacity at both lung bases, suggesting atelectasis/infiltrate. No pulmonary edema identified. No acute consolidation seen. The remainder is unremarkable.  This report was finalized on 2/12/2021 7:40 AM by Dr. Govind Carr M.D.      XR Chest 1 View    Result Date: 2/8/2021  XR CHEST 1 VW-  HISTORY: Male who is 35 years-old,  rhonchi, fever  TECHNIQUE: Frontal views of the chest  COMPARISON: 02/05/2021  FINDINGS: Stable appearing tracheostomy tube. Heart, mediastinum and pulmonary vasculature are unremarkable. Mild bilateral basilar pleural effusions and likely atelectasis or infiltrate. No pneumothorax. No acute osseous process.      Mild bilateral basilar  atelectasis/infiltrate/effusion, follow-up suggested.  This report was finalized on 2/8/2021 3:46 PM by Dr. Eddie Reddy M.D.      XR Chest 1 View    Result Date: 2/5/2021  ONE VIEW PORTABLE CHEST AT 1:12 PM  HISTORY: Left lower lobe atelectasis and pneumonia. Follow-up evaluation.  FINDINGS: There has been marked improvement in the left basilar atelectasis and pneumonia when compared to yesterday's exam. The findings are most consistent with resolution of mucus plugging. No new abnormality is seen. The heart size is normal. A tracheostomy tube remains in place.  This report was finalized on 2/5/2021 2:28 PM by Dr. Benjamin De Leon M.D.      Duplex Venous Upper Extremity - Bilateral CAR    Result Date: 2/10/2021  · Acute right upper extremity superficial thrombophlebitis noted in the median cubital. · All other vessels appear normal.      Duplex Venous Lower Extremity - Bilateral CAR    Result Date: 2/10/2021  · Normal bilateral lower extremity venous duplex scan.      US Thoracentesis    Result Date: 2/11/2021  ULTRASOUND-GUIDED RIGHT THORACENTESIS.  HISTORY: Pleural effusion.  After signed informed consent was obtained the patient was prepped and draped in the usual sterile fashion. Lidocaine was used for local anesthesia.  Ultrasound guidance was used to place the thoracentesis catheter into the right pleural fluid collection. 400 mL of straw-colored fluid was removed. Sample was sent to the lab.  Confirmatory images were obtained.  Patient tolerated the procedure well with no complications.      Ultrasound-guided right thoracentesis as described.   This report was finalized on 2/11/2021 10:39 AM by Dr. Shad Smith M.D.        Chest x-ray reviewed I think there is a little bit of atelectasis in the right base again    Active Hospital Problems    Diagnosis  POA   • **Acute on chronic respiratory failure with hypoxemia (CMS/HCC) [J96.21]  Yes   • Hypotension [I95.9]  Yes   • Dyspnea [R06.00]  Yes   • Anemia  [D64.9]  Yes   • Leukocytosis [D72.829]  Yes   • Quadriplegia (CMS/HCC) [G82.50]  Yes   • HCAP (healthcare-associated pneumonia) [J18.9]  Yes      Resolved Hospital Problems   No resolved problems to display.         Assessment/Plan     1. Acute hypoxic respiratory failure secondary to mucous plugging complicated by his quadriplegia and inability to clear secretions .  Continue ventilatory support.  Is going to be impossible to wean him if he has this much anxiety on the ventilator especially when he is ventilating this well.  Also his frequent refusal of suctioning, respiratory therapies, treatments just increase the mucus secretions and mucous plugs.  He has been little better the last day or 2 with getting his treatments and suctioning.  2. Mucous plugging he is going to continue needing aggressive pulmonary therapy, he needs chest physiotherapy, CoughAssist and mucolytic therapy, bronchodilators, hypertonic saline to try and prevent these recurrent episodes.  Also I think that his constantly wanting to be totally knocked out with medications just contributes to hypoventilation as he does have some respiratory function.  Also he has some many oral secretions even with scopolamine and when he is awake he keeps his mouth clear to the secretions but when he is obtunded with medication the secretions just pool in the back of his throat and get down around his tracheostomy cuff.  I discussed this every day with the patient and his father, the father wants him to do everything absolutely does not want him to have a lot of sedating medications the patient is just the opposite but the patient does not want palliative care.  3. Quadriplegia  4. Bilateral pleural effusions improved almost completely resolved  5. Anemia hemoglobin drifting down looks like it is anemia of chronic disease his hemoglobin is up after transfusion we will continue to follow  6. Anxiety he has significant anxiety issues Dr. Tuttle saw him and  increased his Ativan he is less anxious  7. Hypertension  8. Fluids/electrolytes/nutrition continue tube feeds fluid status looks okay  9. Narcotic use understand the patient is a quadriplegic but continued request for high doses of narcotics is not going to benefit him is going increase problems .  10. Leukocytosis White count has come down significantly without intervention the pro calcitonin is slightly lower and is just borderline elevated range remained afebrile there are some increased markings in the lung bases to be vigilant for pneumonia he has so much problem with mucous plugging I think this is likely just plugged right now          Plan for disposition: We are awaiting placement.  Darío Mendez MD  03/15/21  09:24 EDT    Time: I have spent over 35 minutes on patient's care today part of this time was discussing coordinating care the other part was critical care

## 2021-03-15 NOTE — CONSULTS
The patient slept better last evening with the addition of Ambien.  I would recommend that this medication be continued on a scheduled basis.  I will not make any changes with the patient's other psychotropic medications at this time.

## 2021-03-15 NOTE — PLAN OF CARE
Goal Outcome Evaluation:   Remains in CCU waiting on [place] bed.  Asking to be suctioned often and for O2 to be boosted.  No acute distress noted.

## 2021-03-15 NOTE — PROGRESS NOTES
Name: Maxim Carvajal ADMIT: 2021   : 1985  PCP: Sheron Perez MD    MRN: [id number] LOS: 16 days   AGE/SEX: 35 y.o. male  ROOM: [facility]     Subjective   Subjective   Patient seen at bedside.       Objective   Objective   Vital Signs  Temp:  [98.3 °F (36.8 °C)-98.8 °F (37.1 °C)] 98.3 °F (36.8 °C)  Heart Rate:  [] 85  Resp:  [19-23] 23  BP: (100-151)/() 142/98  FiO2 (%):  [25 %-100 %] 25 %  SpO2:  [91 %-99 %] 94 %  on   ;   Device (Oxygen Therapy): ventilator  Body mass index is 20.97 kg/m².  Physical Exam   General, awake, lying in bed, no acute distress  Head and ENT, tracheostomy in place, connected to mechanical ventilator.  Lungs, symmetric expansion, mild bibasilar rhonchi  Heart, regular rate and rhythm.  Abdomen, soft, nontender, PEG tube in place  Musculoskeletal, bilateral upper and lower extremity muscle wasting seen.  Skin, warm and no rash.  Neuro, quadriplegic with minimal proximal upper extremity movement.  Psych, normal mood.    Results Review     I reviewed the patient's new clinical results.  Results from last 7 days   Lab Units 03/15/21  0721 21  0605 21  0928 21  0607   WBC 10*3/mm3 15.18* 17.08* 22.90* 12.13*   HEMOGLOBIN g/dL 9.7* 8.3* 9.6* 9.3*   PLATELETS 10*3/mm3 346 285 279 299     Results from last 7 days   Lab Units 21  1734 21  0607 21  0826   SODIUM mmol/L 139 139 136   POTASSIUM mmol/L 3.6 4.1 4.1   CHLORIDE mmol/L 96* 95* 92*   CO2 mmol/L 29.4* 32.3* 32.6*   BUN mg/dL 33* 31* 30*   CREATININE mg/dL 0.53* 0.49* 0.60*   GLUCOSE mg/dL 133* 118* 148*   Estimated Creatinine Clearance: 179 mL/min (A) (by C-G formula based on SCr of 0.53 mg/dL (L)).    Results from last 7 days   Lab Units 21  1734 21  0607 21  0826   CALCIUM mg/dL 9.5 9.7 9.5     Results from last 7 days   Lab Units 03/15/21  0721 21  0605   PROCALCITONIN ng/mL 0.29* 0.38*     COVID19   Date Value Ref Range Status   2021 Not  Detected Not Detected - Ref. Range Final   02/16/2021 Not Detected Not Detected - Ref. Range Final     Glucose   Date/Time Value Ref Range Status   03/14/2021 1218 159 (H) 70 - 130 mg/dL Final       XR Chest 1 View  Narrative: Patient: MARK HARDIN  Time Out: 06:08  Exam(s): FILM CXR 1 VIEW     EXAM:    XR Chest, 1 View    CLINICAL HISTORY:     Reason for exam: Leukocytosis.    TECHNIQUE:    Frontal view of the chest.    COMPARISON:    3 13 21 0313 hrs.    FINDINGS:      No change tip of tracheostomy tube.  Lower lobe predominant irregular   airspace disease likely pneumonia.  Negative for pneumothorax or   identifiable pleural fluid collections.  Impression: Electronically signed by Shiraz Bullock DO on 03-15-21 at 0608    Scheduled Medications  acetylcysteine, 3 mL, Nebulization, BID - RT  ALPRAZolam, 0.5 mg, Per G Tube, Q6H While Awake - RT  ascorbic acid, 1,000 mg, Per G Tube, Daily  chlorhexidine, 15 mL, Mouth/Throat, Q12H  cyclobenzaprine, 10 mg, Per G Tube, Daily  docusate sodium, 100 mg, Per G Tube, BID  fentaNYL, 1 patch, Transdermal, Q72H  furosemide, 20 mg, Oral, BID  Gabapentin, 800 mg, Per G Tube, Q8H  guaifenesin, 400 mg, Per G Tube, Q6H  hydrOXYzine, 25 mg, Oral, Daily  lansoprazole, 30 mg, Per G Tube, QAM  midodrine, 7.5 mg, Per G Tube, BID AC  multivitamin with minerals, 1 tablet, Oral, Daily  Scopolamine, 1 patch, Transdermal, Q72H  sertraline, 150 mg, Per G Tube, Daily  sodium chloride, 10 mL, Intravenous, Q12H  sodium chloride, 4 mL, Nebulization, BID - RT  zolpidem, 5 mg, Oral, Nightly    Infusions   Diet  JEVITY 1.5 JUSTO PO LIQD  NPO Diet       Assessment/Plan     Active Hospital Problems    Diagnosis  POA   • **Acute on chronic respiratory failure with hypoxemia (CMS/HCC) [J96.21]  Yes   • Hypotension [I95.9]  Yes   • Dyspnea [R06.00]  Yes   • Anemia [D64.9]  Yes   • Leukocytosis [D72.829]  Yes   • Quadriplegia (CMS/HCC) [G82.50]  Yes   • HCAP (healthcare-associated pneumonia) [J18.9]  Yes       Resolved Hospital Problems   No resolved problems to display.       35 y.o. male admitted with Acute on chronic respiratory failure with hypoxemia (CMS/[facility]).    Assessment and plan  1.  Acute respiratory failure with hypoxia, patient had underlying mucous plugging.  It is complicated by patient's underlying quadriplegia and inability to clear secretions.  He does experience anxiety intermittently.  Continue ongoing management per pulmonary.  Patient would benefit from LTAC placement.  2.  Bilateral pleural effusion, improved.  Continue to monitor.  3.  Anemia, hemoglobin noted to be stable at 9.7, continue to recheck labs.  4.  Hypertension, BP noted to be stable at 142/98.  Continue to monitor closely.  5.  Anxiety, ongoing management per psychiatry.  6.  CODE STATUS is full code.  Further plans based on hospital course.      Robert Verdugo MD  [facility]  03/15/21  11:59 EDT

## 2021-03-16 VITALS
SYSTOLIC BLOOD PRESSURE: 109 MMHG | WEIGHT: 151.9 LBS | TEMPERATURE: 99.2 F | RESPIRATION RATE: 19 BRPM | OXYGEN SATURATION: 96 % | BODY MASS INDEX: 21.75 KG/M2 | DIASTOLIC BLOOD PRESSURE: 58 MMHG | HEART RATE: 81 BPM | HEIGHT: 70 IN

## 2021-03-16 LAB
ANION GAP SERPL CALCULATED.3IONS-SCNC: 12.8 MMOL/L (ref 5–15)
BASOPHILS # BLD AUTO: 0.01 10*3/MM3 (ref 0–0.2)
BASOPHILS NFR BLD AUTO: 0.1 % (ref 0–1.5)
BUN SERPL-MCNC: 30 MG/DL (ref 6–20)
BUN/CREAT SERPL: 65.2 (ref 7–25)
CALCIUM SPEC-SCNC: 9.3 MG/DL (ref 8.6–10.5)
CHLORIDE SERPL-SCNC: 97 MMOL/L (ref 98–107)
CO2 SERPL-SCNC: 29.2 MMOL/L (ref 22–29)
CREAT SERPL-MCNC: 0.46 MG/DL (ref 0.76–1.27)
DEPRECATED RDW RBC AUTO: 50.1 FL (ref 37–54)
EOSINOPHIL # BLD AUTO: 0.03 10*3/MM3 (ref 0–0.4)
EOSINOPHIL NFR BLD AUTO: 0.3 % (ref 0.3–6.2)
ERYTHROCYTE [DISTWIDTH] IN BLOOD BY AUTOMATED COUNT: 16.2 % (ref 12.3–15.4)
FUNGUS WND CULT: NORMAL
GFR SERPL CREATININE-BSD FRML MDRD: >150 ML/MIN/1.73
GLUCOSE SERPL-MCNC: 126 MG/DL (ref 65–99)
HCT VFR BLD AUTO: 28 % (ref 37.5–51)
HGB BLD-MCNC: 9.1 G/DL (ref 13–17.7)
IMM GRANULOCYTES # BLD AUTO: 0.06 10*3/MM3 (ref 0–0.05)
IMM GRANULOCYTES NFR BLD AUTO: 0.6 % (ref 0–0.5)
LYMPHOCYTES # BLD AUTO: 1.52 10*3/MM3 (ref 0.7–3.1)
LYMPHOCYTES NFR BLD AUTO: 14.8 % (ref 19.6–45.3)
MCH RBC QN AUTO: 27.7 PG (ref 26.6–33)
MCHC RBC AUTO-ENTMCNC: 32.5 G/DL (ref 31.5–35.7)
MCV RBC AUTO: 85.1 FL (ref 79–97)
MONOCYTES # BLD AUTO: 0.77 10*3/MM3 (ref 0.1–0.9)
MONOCYTES NFR BLD AUTO: 7.5 % (ref 5–12)
NEUTROPHILS NFR BLD AUTO: 7.87 10*3/MM3 (ref 1.7–7)
NEUTROPHILS NFR BLD AUTO: 76.7 % (ref 42.7–76)
NRBC BLD AUTO-RTO: 0 /100 WBC (ref 0–0.2)
PLATELET # BLD AUTO: 309 10*3/MM3 (ref 140–450)
PMV BLD AUTO: 10.3 FL (ref 6–12)
POTASSIUM SERPL-SCNC: 4.2 MMOL/L (ref 3.5–5.2)
PROCALCITONIN SERPL-MCNC: 0.24 NG/ML (ref 0–0.25)
RBC # BLD AUTO: 3.29 10*6/MM3 (ref 4.14–5.8)
SODIUM SERPL-SCNC: 139 MMOL/L (ref 136–145)
WBC # BLD AUTO: 10.26 10*3/MM3 (ref 3.4–10.8)

## 2021-03-16 PROCEDURE — 94799 UNLISTED PULMONARY SVC/PX: CPT

## 2021-03-16 PROCEDURE — 85025 COMPLETE CBC W/AUTO DIFF WBC: CPT | Performed by: INTERNAL MEDICINE

## 2021-03-16 PROCEDURE — 94003 VENT MGMT INPAT SUBQ DAY: CPT

## 2021-03-16 PROCEDURE — 80048 BASIC METABOLIC PNL TOTAL CA: CPT | Performed by: INTERNAL MEDICINE

## 2021-03-16 PROCEDURE — 84145 PROCALCITONIN (PCT): CPT | Performed by: INTERNAL MEDICINE

## 2021-03-16 RX ORDER — ALPRAZOLAM 0.5 MG/1
0.5 TABLET ORAL
Qty: 12 TABLET | Refills: 0 | Status: SHIPPED | OUTPATIENT
Start: 2021-03-16

## 2021-03-16 RX ORDER — ZOLPIDEM TARTRATE 5 MG/1
5 TABLET ORAL NIGHTLY
Qty: 3 TABLET | Refills: 0 | Status: SHIPPED | OUTPATIENT
Start: 2021-03-16

## 2021-03-16 RX ORDER — MIDODRINE HYDROCHLORIDE 2.5 MG/1
7.5 TABLET ORAL
Qty: 60 TABLET | Refills: 0 | Status: SHIPPED | OUTPATIENT
Start: 2021-03-16

## 2021-03-16 RX ORDER — FUROSEMIDE 20 MG/1
20 TABLET ORAL 2 TIMES DAILY
Qty: 60 TABLET | Refills: 0 | Status: SHIPPED | OUTPATIENT
Start: 2021-03-16

## 2021-03-16 RX ORDER — GUAIFENESIN 200 MG/10ML
400 LIQUID ORAL EVERY 6 HOURS
Qty: 360 ML | Refills: 0 | Status: SHIPPED | OUTPATIENT
Start: 2021-03-16

## 2021-03-16 RX ORDER — SCOLOPAMINE TRANSDERMAL SYSTEM 1 MG/1
1 PATCH, EXTENDED RELEASE TRANSDERMAL
Qty: 1 PATCH | Refills: 0 | Status: SHIPPED | OUTPATIENT
Start: 2021-03-18

## 2021-03-16 RX ORDER — SODIUM CHLORIDE FOR INHALATION 7 %
4 VIAL, NEBULIZER (ML) INHALATION
Qty: 60 ML | Refills: 0 | Status: SHIPPED | OUTPATIENT
Start: 2021-03-16

## 2021-03-16 RX ORDER — ALBUTEROL SULFATE 90 UG/1
6 AEROSOL, METERED RESPIRATORY (INHALATION) EVERY 4 HOURS PRN
Qty: 1 G | Refills: 0 | Status: SHIPPED | OUTPATIENT
Start: 2021-03-16

## 2021-03-16 RX ORDER — CHLORHEXIDINE GLUCONATE 0.12 MG/ML
15 RINSE ORAL EVERY 12 HOURS SCHEDULED
Qty: 120 ML | Refills: 0 | Status: SHIPPED | OUTPATIENT
Start: 2021-03-16

## 2021-03-16 RX ADMIN — LANSOPRAZOLE 30 MG: KIT at 06:01

## 2021-03-16 RX ADMIN — Medication 1 TABLET: at 08:43

## 2021-03-16 RX ADMIN — OXYCODONE HYDROCHLORIDE 10 MG: 5 SOLUTION ORAL at 08:43

## 2021-03-16 RX ADMIN — GABAPENTIN 800 MG: 250 SOLUTION ORAL at 13:26

## 2021-03-16 RX ADMIN — HYDROXYZINE HYDROCHLORIDE 25 MG: 25 TABLET ORAL at 08:51

## 2021-03-16 RX ADMIN — DOCUSATE SODIUM 100 MG: 50 LIQUID ORAL at 08:43

## 2021-03-16 RX ADMIN — ALPRAZOLAM 0.5 MG: 0.5 TABLET ORAL at 13:25

## 2021-03-16 RX ADMIN — CHLORHEXIDINE GLUCONATE 15 ML: 1.2 RINSE ORAL at 08:44

## 2021-03-16 RX ADMIN — CYCLOBENZAPRINE 10 MG: 10 TABLET, FILM COATED ORAL at 08:43

## 2021-03-16 RX ADMIN — OXYCODONE HYDROCHLORIDE 10 MG: 5 SOLUTION ORAL at 01:25

## 2021-03-16 RX ADMIN — ACETYLCYSTEINE 3 ML: 200 SOLUTION ORAL; RESPIRATORY (INHALATION) at 07:20

## 2021-03-16 RX ADMIN — Medication 4 ML: at 07:28

## 2021-03-16 RX ADMIN — GUAIFENESIN 400 MG: 100 SOLUTION ORAL at 06:01

## 2021-03-16 RX ADMIN — GUAIFENESIN 400 MG: 100 SOLUTION ORAL at 15:04

## 2021-03-16 RX ADMIN — ALBUTEROL SULFATE 6 PUFF: 90 AEROSOL, METERED RESPIRATORY (INHALATION) at 07:23

## 2021-03-16 RX ADMIN — SODIUM CHLORIDE, PRESERVATIVE FREE 10 ML: 5 INJECTION INTRAVENOUS at 08:44

## 2021-03-16 RX ADMIN — MIDODRINE HYDROCHLORIDE 7.5 MG: 5 TABLET ORAL at 06:00

## 2021-03-16 RX ADMIN — FUROSEMIDE 20 MG: 20 TABLET ORAL at 08:43

## 2021-03-16 RX ADMIN — ALPRAZOLAM 0.5 MG: 0.5 TABLET ORAL at 06:00

## 2021-03-16 RX ADMIN — OXYCODONE HYDROCHLORIDE 10 MG: 5 SOLUTION ORAL at 17:13

## 2021-03-16 RX ADMIN — GABAPENTIN 800 MG: 250 SOLUTION ORAL at 06:00

## 2021-03-16 RX ADMIN — OXYCODONE HYDROCHLORIDE AND ACETAMINOPHEN 1000 MG: 500 TABLET ORAL at 08:43

## 2021-03-16 RX ADMIN — OXYCODONE HYDROCHLORIDE 10 MG: 5 SOLUTION ORAL at 13:26

## 2021-03-16 RX ADMIN — SERTRALINE 150 MG: 100 TABLET, FILM COATED ORAL at 08:43
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
## 2021-03-16 NOTE — NURSING NOTE
Pt remains in CCU. Trached on vent (FiO2 at 25%). VSS at this time. Pt was able to sleep for a few hours at beginning of shift. Wheezes and coarse breath sounds noted. Breathing treatment provided by RT at beginning of shift. Pt has become more anxious throughout shift. Pt requests more suctioning with O2 boosts towards end of shift. No acute respiratory distress noted. O2 sat remains between 92-97%. PRN pain medication given per order-effective for short time per pt. Turns provided and calming measures promoted. [id number] this shift. TF continued at goal. Awaiting bed from [place]. Continue to monitor.

## 2021-03-16 NOTE — CONSULTS
Patient reports that he slept better last evening, and reports the anxiety is improved but not entirely absent.  It appears as though the patient is nearing discharge to [place], so I will make no further changes in his psychotropic medications at this time.
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
## 2021-03-18 NOTE — PAYOR COMM NOTE
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 was just laying there knocked out most of the time he had a lot of oral secretions a buildup in the back of his throat and just sort of Harry over his trach cuff and drain down into his airway plugging him up.  When he is more alert he actually is able to suction his mouth, he does not have great hand control but he has strength in his arms he tapes a suction catheter to his hand and then is able to suction his mouth.  I had some long talks with him about using some much medications he was very unhappy he wanted his medications we tried weaning back on his narcotics which certainly helped his wakefulness.  Tremendous amount of anxiety psychiatry is seen him and adjusted his medications his anxiety is much better.  He still has anxiety he is always asking to have his oxygen boosted even though we have him monitored his oxygen saturations are very normal his end-tidal CO2 is normal he is getting good lung volumes.  This is something that he goes through hourly almost.  With the anxiety treatment that is getting a little bit better.  His father is pulled me out of the room multiple times and begged me not to give  him more narcotics or sedatives when he is asking for them he is tells me that the patient is an addict and actually I went back and found records were he indeed has been treated for overdoses.  Including narcotic.  The current regimen he seems to be doing very well.  If his anxiety is controlled I am not convinced that he could not wean from the ventilator though I think it will have to be a very slow process to find out.  Taken S a week and a half just to get some of the issues straightened out.  I would encourage not increasing his narcotics careful use of any respiratory suppressant drugs and eventually very slow wean of the narcotics that he is on.  He has a feeding tube and is doing well with that tolerating feeds well.  I recommend continuing aggressive chest therapy including percussion and drainage and cough assist.    Procedures Performed         Consults:   Consults     Date and Time Order Name Status Description    3/4/2021  4:05 PM Inpatient Infectious Diseases Consult      3/4/2021 12:54 PM Inpatient Psychiatrist Consult Completed     3/3/2021 12:12 PM Inpatient Cardiology Consult Completed     2/27/2021  5:28 AM Inpatient Pulmonology Consult Completed     2/27/2021  4:42 AM [facility] (on-call MD unless specified) Details Completed     2/8/2021  2:55 PM Inpatient Infectious Diseases Consult Completed     2/2/2021 11:46 AM Inpatient Pulmonology Consult Completed     2/2/2021 11:26 AM Hematology & Oncology Inpatient Consult Completed     2/2/2021  9:26 AM Inpatient Pulmonology Consult Completed     2/2/2021  8:32 AM [facility] (on-call MD unless specified) Details Completed           Pertinent Test Results:   Labs:  Results from last 7 days   Lab Units 03/16/21  0820 03/12/21  1734 03/11/21  0607   GLUCOSE mg/dL 126* 133* 118*   SODIUM mmol/L 139 139 139   POTASSIUM mmol/L 4.2 3.6 4.1   CO2 mmol/L 29.2* 29.4* 32.3*   CHLORIDE mmol/L 97* 96* 95*   ANION GAP mmol/L 12.8 13.6 11.7   CREATININE mg/dL 0.46* 0.53* 0.49*    BUN mg/dL 30* 33* 31*   BUN / CREAT RATIO  65.2* 62.3* 63.3*   CALCIUM mg/dL 9.3 9.5 9.7   EGFR IF NONAFRICN AM mL/min/1.73 >150 >150 >150     Estimated Creatinine Clearance: 214.3 mL/min (A) (by C-G formula based on SCr of 0.46 mg/dL (L)).      Results from last 7 days   Lab Units 03/16/21  0820 03/15/21  0721 03/14/21  0605   WBC 10*3/mm3 10.26 15.18* 17.08*   RBC 10*6/mm3 3.29* 3.51* 3.10*   HEMOGLOBIN g/dL 9.1* 9.7* 8.3*   HEMATOCRIT % 28.0* 29.9* 25.6*   MCV fL 85.1 85.2 82.6   MCH pg 27.7 27.6 26.8   MCHC g/dL 32.5 32.4 32.4   RDW % 16.2* 16.4* 16.4*   RDW-SD fl 50.1 50.4 49.1   MPV fL 10.3 10.4 10.0   PLATELETS 10*3/mm3 309 346 285   NEUTROPHIL % % 76.7*  --   --    LYMPHOCYTE % % 14.8*  --   --    MONOCYTES % % 7.5  --   --    EOSINOPHIL % % 0.3  --   --    BASOPHIL % % 0.1  --   --    IMM GRAN % % 0.6*  --   --    NEUTROS ABS 10*3/mm3 7.87*  --   --    LYMPHS ABS 10*3/mm3 1.52  --   --    MONOS ABS 10*3/mm3 0.77  --   --    EOS ABS 10*3/mm3 0.03  --   --    BASOS ABS 10*3/mm3 0.01  --   --    IMMATURE GRANS (ABS) 10*3/mm3 0.06*  --   --    NRBC /100 WBC 0.0  --   --                      Results from last 7 days   Lab Units 03/16/21  0820 03/15/21  0721 03/14/21  0605   PROCALCITONIN ng/mL 0.24 0.29* 0.38*           Imaging Results (Last 72 Hours)     Procedure Component Value Units Date/Time    XR Chest 1 View [[id number]] Collected: 03/15/21 0610     Updated: 03/15/21 0610    Narrative:        Patient: MARK HARDIN  Time Out: 06:08  Exam(s): FILM CXR 1 VIEW     EXAM:    XR Chest, 1 View    CLINICAL HISTORY:     Reason for exam: Leukocytosis.    TECHNIQUE:    Frontal view of the chest.    COMPARISON:    3 13 21 0313 hrs.    FINDINGS:      No change tip of tracheostomy tube.  Lower lobe predominant irregular   airspace disease likely pneumonia.  Negative for pneumothorax or   identifiable pleural fluid collections.      Impression:          Electronically signed by Shiraz Bullock DO on 03-15-21 at 0608     XR Chest 1 View [[id number]] Collected: 03/14/21 0938     Updated: 03/14/21 0943    Narrative:      XR CHEST 1 VW-     HISTORY: Male who is 35 years-old,  leukocytosis     TECHNIQUE: Frontal views of the chest     COMPARISON: 03/13/2021     FINDINGS: Tracheostomy tube appears stable. Heart, mediastinum and  pulmonary vasculature are unremarkable. Minimal atelectasis or  infiltrate the lung bases. Minimal right pleural effusion. No  pneumothorax. No acute osseous process.       Impression:      Minimal atelectasis or infiltrate at the lung bases. Minimal  right pleural effusion.     This report was finalized on 3/14/2021 9:40 AM by Dr. Eddie Reddy M.D.                 Condition on Discharge: Much better    Vital Signs  Temp:  [98.4 °F (36.9 °C)-99.3 °F (37.4 °C)] 99.2 °F (37.3 °C)  Heart Rate:  [] 82  Resp:  [20-25] 20  BP: ()/(47-73) 104/61  FiO2 (%):  [25 %-26 %] 25 %    Physical Exam:    General Appearance: Well-developed white male he is trached on a ventilator some pressure control rate of 16 inspiratory pressure of 18 PEEP of 8 cm FiO2 of 25% he is pulling tidal volumes from 500 to 850 cc.  His oxygen saturations are 93 to 95% his end-tidal CO2 is 38.  Eyes: Conjunctiva are clear and anicteric pupils are equal  ENT: Mucous membranes  are moist he does not have any blood in his oropharynx.  Neck: Tracheostomy site looks okay  6 Shiley with the inner cannula, no jugular venous distention, trachea midline  Lungs  chest is really quite clear now respiratory just suctioned him not long before I entered the room today.  Cardiac: He is regular rate rhythm no murmur  Abdomen: Soft no palpable hepatosplenomegaly or masses he has a left upper quadrant PEG type tube and the site looks okay  : Not examined  Musculoskeletal: He has very little muscle mass he has some slight movement with his arms he does not really have any  he has marked interosseous muscle wasting  Skin: No jaundice no petechiae  skin is warm and dry  Neuro: Quadriplegic with minimal proximal upper extremity movement  Extremities/P Vascular: Palpable radial and dorsalis pedis pulses bilaterally again marked muscle atrophy  MSE: No change anxiety and drug-seeking behavior.  Nurses tell me he wants somebody in the room with him all the time.    Discharge Disposition  Long Term Care (DC - External)    Discharge Medications     Discharge Medications      New Medications      Instructions Start Date   albuterol sulfate  (90 Base) MCG/ACT inhaler  Commonly known as: Ventolin HFA   6 puffs, Inhalation, Every 4 Hours PRN      chlorhexidine 0.12 % solution  Commonly known as: PERIDEX   15 mL, Mouth/Throat, Every 12 Hours Scheduled      furosemide 20 MG tablet  Commonly known as: LASIX   20 mg, Oral, 2 Times Daily      Scopolamine 1.5 MG/3DAYS patch  Commonly known as: TRANSDERM-SCOP   1 patch, Transdermal, Every 72 Hours   Start Date: March 18, 2021     sodium chloride 7 % nebulizer solution nebulizer solution   4 mL, Nebulization, 2 Times Daily - RT      zolpidem 5 MG tablet  Commonly known as: AMBIEN   5 mg, Oral, Nightly         Changes to Medications      Instructions Start Date   ALPRAZolam 0.5 MG tablet  Commonly known as: XANAX  What changed:   · when to take this  · reasons to take this   0.5 mg, Per G Tube, Every 6 Hours While Awake - RT      guaifenesin 100 MG/5ML liquid  Commonly known as: ROBITUSSIN  What changed: when to take this   400 mg, Per G Tube, Every 6 Hours      lansoprazole 3 MG/ML suspension oral suspension  What changed: how to take this   30 mg, Nasogastric, Every Morning      midodrine 2.5 MG tablet  Commonly known as: PROAMATINE  What changed: when to take this   7.5 mg, Per G Tube, 2 Times Daily Before Meals      polyethylene glycol 17 g packet  Commonly known as: MIRALAX  What changed:   · how to take this  · additional instructions   17 g, Oral, Daily      sertraline 50 MG tablet  Commonly known as: ZOLOFT  What  changed:   · medication strength  · how much to take   150 mg, Per G Tube, Daily   Start Date: March 17, 2021        Continue These Medications      Instructions Start Date   acetylcysteine 20 % nebulizer solution  Commonly known as: MUCOMYST   3 mL, Nebulization, 2 Times Daily - RT      ascorbic acid 500 MG tablet  Commonly known as: VITAMIN C   1,000 mg, Per G Tube, Daily, Via g tube      aspirin 81 MG EC tablet   81 mg, Daily, Give via g tube      bisacodyl 10 MG suppository  Commonly known as: DULCOLAX   10 mg, Rectal, Daily PRN      [employer] Zinc 50 MG tablet  Generic drug: zinc gluconate   50 mg, Per G Tube, Daily      cyclobenzaprine 10 MG tablet  Commonly known as: FLEXERIL   10 mg, Per G Tube, Daily      docusate sodium 150 MG/15ML liquid  Commonly known as: COLACE   100 mg, Per G Tube, 2 Times Daily      fentaNYL 25 MCG/HR patch  Commonly known as: DURAGESIC   1 patch, Transdermal, Every 72 Hours      Gabapentin 300 MG/6ML solution solution  Commonly known as: NEURONTIN   800 mg, Per G Tube, Every 8 Hours Scheduled      hydrOXYzine 25 MG tablet  Commonly known as: ATARAX   25 mg, Oral, Daily, Via g tube       multivitamin with minerals tablet tablet   1 tablet, Daily, Per g-tube      oxyCODONE 5 MG/5ML solution  Commonly known as: ROXICODONE   10 mg, Per G Tube, Every 4 Hours PRN      polyethyl glycol-propyl glycol 0.4-0.3 % solution ophthalmic solution  Commonly known as: SYSTANE   1 drop, Both Eyes, Every 1 Hour PRN         Stop These Medications    acetaminophen 160 MG/5ML solution  Commonly known as: TYLENOL     acetaminophen 650 MG suppository  Commonly known as: TYLENOL     clotrimazole 10 MG miguel  Commonly known as: MYCELEX     collagenase 250 UNIT/GM ointment     [employer] Probiotic (Lactobacillus) capsule     ferrous sulfate 325 (65 FE) MG tablet     fluconazole 100 MG tablet  Commonly known as: DIFLUCAN     ipratropium-albuterol 0.5-2.5 mg/3 ml nebulizer  Commonly known as: DUO-NEB     jevity 1.5 jen  liquid     ondansetron 4 MG tablet  Commonly known as: ZOFRAN     povidone-iodine 10 % external solution  Commonly known as: BETADINE     propranolol 20 MG tablet  Commonly known as: INDERAL     senna 8.6 MG tablet  Commonly known as: SENOKOT     sodium chloride 0.65 % nasal spray     WATER ORAL PO            Discharge Diet: Is on tube feeds    Activity at Discharge:     Follow-up Appointments  Future Appointments   Date Time Provider Department Center   3/16/2021  5:00 PM EMS MED 1  [facility]         Test Results Pending at Discharge       Darío Mendez MD  03/16/21  13:21 EDT    Time: I have spent over 40 minutes on his discharge          Electronically signed by Darío Mendez MD at 03/16/21 2718

## 2021-03-25 LAB
MYCOBACTERIUM SPEC CULT: NORMAL
NIGHT BLUE STAIN TISS: NORMAL

## 2021-04-16 ENCOUNTER — BULK ORDERING (OUTPATIENT)
Dept: CASE MANAGEMENT | Facility: OTHER | Age: 36
End: 2021-04-16

## 2021-04-16 DIAGNOSIS — Z23 IMMUNIZATION DUE: ICD-10-CM

## 2024-05-27 NOTE — PROGRESS NOTES
Name: Maxim Carvajal ADMIT: 2021   : 1985  PCP: Sheron Perez MD    MRN: [id number] LOS: 17 days   AGE/SEX: 35 y.o. male  ROOM: [facility]     Subjective   Subjective     Patient seen at bedside.         Objective   Objective   Vital Signs  Temp:  [98.4 °F (36.9 °C)-99.3 °F (37.4 °C)] 99.2 °F (37.3 °C)  Heart Rate:  [] 81  Resp:  [19-25] 19  BP: ()/(47-73) 109/58  FiO2 (%):  [25 %-26 %] 25 %  SpO2:  [91 %-100 %] 96 %  on   ;   Device (Oxygen Therapy): ventilator  Body mass index is 21.79 kg/m².  Physical Exam  General, awake, lying in bed, no acute distress  Head and ENT, tracheostomy in place, connected to mechanical ventilator.  Lungs, symmetric expansion, mild bibasilar rhonchi  Heart, regular rate and rhythm.  Abdomen, soft, nontender, PEG tube in place  Musculoskeletal, bilateral upper and lower extremity muscle wasting seen.  Skin, warm and no rash.  Neuro, quadriplegic with minimal proximal upper extremity movement.  Psych, normal mood.      Results Review     I reviewed the patient's new clinical results.  Results from last 7 days   Lab Units 21  0820 03/15/21  0721 21  0605 21  0928   WBC 10*3/mm3 10.26 15.18* 17.08* 22.90*   HEMOGLOBIN g/dL 9.1* 9.7* 8.3* 9.6*   PLATELETS 10*3/mm3 309 346 285 279     Results from last 7 days   Lab Units 21  0820 21  1734 21  0607   SODIUM mmol/L 139 139 139   POTASSIUM mmol/L 4.2 3.6 4.1   CHLORIDE mmol/L 97* 96* 95*   CO2 mmol/L 29.2* 29.4* 32.3*   BUN mg/dL 30* 33* 31*   CREATININE mg/dL 0.46* 0.53* 0.49*   GLUCOSE mg/dL 126* 133* 118*   Estimated Creatinine Clearance: 214.3 mL/min (A) (by C-G formula based on SCr of 0.46 mg/dL (L)).    Results from last 7 days   Lab Units 21  0820 21  1734 21  0607   CALCIUM mg/dL 9.3 9.5 9.7     Results from last 7 days   Lab Units 21  0820 03/15/21  0721 21  0605   PROCALCITONIN ng/mL 0.24 0.29* 0.38*     COVID19   Date Value Ref Range  Status   02/27/2021 Not Detected Not Detected - Ref. Range Final   02/16/2021 Not Detected Not Detected - Ref. Range Final     Glucose   Date/Time Value Ref Range Status   03/14/2021 1218 159 (H) 70 - 130 mg/dL Final       XR Chest 1 View  Narrative: Patient: MARK HARDIN  Time Out: 06:08  Exam(s): FILM CXR 1 VIEW     EXAM:    XR Chest, 1 View    CLINICAL HISTORY:     Reason for exam: Leukocytosis.    TECHNIQUE:    Frontal view of the chest.    COMPARISON:    3 13 21 0313 hrs.    FINDINGS:      No change tip of tracheostomy tube.  Lower lobe predominant irregular   airspace disease likely pneumonia.  Negative for pneumothorax or   identifiable pleural fluid collections.  Impression: Electronically signed by Shiraz Bullock DO on 03-15-21 at 0608    Scheduled Medications  acetylcysteine, 3 mL, Nebulization, BID - RT  ALPRAZolam, 0.5 mg, Per G Tube, Q6H While Awake - RT  ascorbic acid, 1,000 mg, Per G Tube, Daily  chlorhexidine, 15 mL, Mouth/Throat, Q12H  cyclobenzaprine, 10 mg, Per G Tube, Daily  docusate sodium, 100 mg, Per G Tube, BID  fentaNYL, 1 patch, Transdermal, Q72H  furosemide, 20 mg, Oral, BID  Gabapentin, 800 mg, Per G Tube, Q8H  guaifenesin, 400 mg, Per G Tube, Q6H  hydrOXYzine, 25 mg, Oral, Daily  lansoprazole, 30 mg, Per G Tube, QAM  midodrine, 7.5 mg, Per G Tube, BID AC  multivitamin with minerals, 1 tablet, Oral, Daily  Scopolamine, 1 patch, Transdermal, Q72H  sertraline, 150 mg, Per G Tube, Daily  sodium chloride, 10 mL, Intravenous, Q12H  sodium chloride, 4 mL, Nebulization, BID - RT  zolpidem, 5 mg, Oral, Nightly    Infusions   Diet  JEVITY 1.5 JUSTO PO LIQD  NPO Diet       Assessment/Plan     Active Hospital Problems    Diagnosis  POA   • **Acute on chronic respiratory failure with hypoxemia (CMS/HCC) [J96.21]  Yes   • Hypotension [I95.9]  Yes   • Dyspnea [R06.00]  Yes   • Anemia [D64.9]  Yes   • Leukocytosis [D72.829]  Yes   • Quadriplegia (CMS/HCC) [G82.50]  Yes   • HCAP (healthcare-associated  pneumonia) [J18.9]  Yes      Resolved Hospital Problems   No resolved problems to display.     Assessment and plan  1.  Acute respiratory failure with hypoxia, patient had underlying mucous plugging.  It is complicated by patient's underlying quadriplegia and inability to clear secretions.  He does experience anxiety intermittently.  Continue ongoing management per pulmonary. Transfer to LTAC upon bed availability.  2.  Bilateral pleural effusion, improved.  Continue to monitor.  3.  Anemia, hemoglobin noted to be stable.  4.  Hypertension, BP noted to be stable.  5.  Anxiety, ongoing management per psychiatry.  6.  CODE STATUS is full code.  Further plans based on hospital course.        Robert Verdugo MD  [facility]  03/16/21  18:00 EDT     Attending and PA/NP shared services statement (NON-critical care):

## 2025-03-26 NOTE — PLAN OF CARE
Goal Outcome Evaluation:  Plan of Care Reviewed With: patient, mother  Progress: improving  Outcome Summary: Pain/anxiety meds given per MAR, SR-ST, bedside bronch done, see MD notes, FIO2 50%, BM 3/1, lasix given for pleural effusion with good response, labs sent, MRSA +, respiratory culture pseudomonas +, patient and family updated on POC, all questions/comments/concerns answered   (2 weeks) improve balance 1/2 grade for safe transfers and gait.

## (undated) DEVICE — SENSR O2 OXIMAX FNGR A/ 18IN NONSTR

## (undated) DEVICE — ADAPT CLN BIOGUARD AIR/H2O DISP

## (undated) DEVICE — LN SMPL O2 NASL/ORL SMART/CAPNOLINE PLS A/

## (undated) DEVICE — SINGLE USE BIOPSY VALVE MAJ-210: Brand: SINGLE USE BIOPSY VALVE (STERILE)

## (undated) DEVICE — MSK AIRWY LARYNG LMA PILOT SZ4

## (undated) DEVICE — TRAP,MUCUS SPECIMEN, 80CC: Brand: MEDLINE

## (undated) DEVICE — SINGLE USE SUCTION VALVE MAJ-209: Brand: SINGLE USE SUCTION VALVE (STERILE)

## (undated) DEVICE — VITAL SIGNS™ JACKSON-REES CIRCUITS: Brand: VITAL SIGNS™

## (undated) DEVICE — TUBING, SUCTION, 1/4" X 10', STRAIGHT: Brand: MEDLINE

## (undated) DEVICE — ADAPT SWVL FIBROPTIC BRONCH